# Patient Record
Sex: FEMALE | Race: WHITE | NOT HISPANIC OR LATINO | URBAN - METROPOLITAN AREA
[De-identification: names, ages, dates, MRNs, and addresses within clinical notes are randomized per-mention and may not be internally consistent; named-entity substitution may affect disease eponyms.]

---

## 2017-04-05 ENCOUNTER — TRANSFERRED RECORDS (OUTPATIENT)
Dept: HEALTH INFORMATION MANAGEMENT | Facility: CLINIC | Age: 67
End: 2017-04-05

## 2017-07-17 ENCOUNTER — TRANSFERRED RECORDS (OUTPATIENT)
Dept: HEALTH INFORMATION MANAGEMENT | Facility: CLINIC | Age: 67
End: 2017-07-17

## 2017-07-21 ENCOUNTER — MEDICAL CORRESPONDENCE (OUTPATIENT)
Dept: HEALTH INFORMATION MANAGEMENT | Facility: CLINIC | Age: 67
End: 2017-07-21

## 2017-07-25 ENCOUNTER — MEDICAL CORRESPONDENCE (OUTPATIENT)
Dept: HEALTH INFORMATION MANAGEMENT | Facility: CLINIC | Age: 67
End: 2017-07-25

## 2017-07-31 ENCOUNTER — TELEPHONE (OUTPATIENT)
Dept: SURGERY | Facility: CLINIC | Age: 67
End: 2017-07-31

## 2017-07-31 NOTE — TELEPHONE ENCOUNTER
Received a referral from [facility] for newly diagnosed colon cancer.  Called and spoke with patient.  Patient is scheduled 08/08/17 at 10:30 am with Dr. June.  Provided clinic address and check-in location.  Patient confirms appointment, and has my direct number for additional questions or concerns.  I also left a message for [facility] at the [facility] informing her of scheduled appointment.  Asked Yulissa if patient had any [facility] at the [facility], as we will need images and reports.  Provided my direct number to discuss.

## 2017-08-02 ENCOUNTER — TELEPHONE (OUTPATIENT)
Dept: SURGERY | Facility: CLINIC | Age: 67
End: 2017-08-02

## 2017-08-02 NOTE — TELEPHONE ENCOUNTER
Patient left a message stating she needs to reschedule her upcoming appointment.  Called patient at number provided,  and left a message with my direct call back number.

## 2017-08-08 ENCOUNTER — OFFICE VISIT (OUTPATIENT)
Dept: SURGERY | Facility: CLINIC | Age: 67
End: 2017-08-08

## 2017-08-08 ENCOUNTER — DOCUMENTATION ONLY (OUTPATIENT)
Dept: SURGERY | Facility: CLINIC | Age: 67
End: 2017-08-08

## 2017-08-08 VITALS
BODY MASS INDEX: 35.75 KG/M2 | HEART RATE: 93 BPM | DIASTOLIC BLOOD PRESSURE: 66 MMHG | HEIGHT: 67 IN | OXYGEN SATURATION: 96 % | TEMPERATURE: 98 F | SYSTOLIC BLOOD PRESSURE: 147 MMHG | WEIGHT: 227.8 LBS

## 2017-08-08 DIAGNOSIS — C18.7 CANCER OF SIGMOID COLON (H): Primary | ICD-10-CM

## 2017-08-08 PROBLEM — E11.9 DIABETES MELLITUS (H): Status: ACTIVE | Noted: 2017-08-08

## 2017-08-08 RX ORDER — PAROXETINE 40 MG/1
60 TABLET, FILM COATED ORAL AT BEDTIME
COMMUNITY

## 2017-08-08 RX ORDER — LOSARTAN POTASSIUM 50 MG/1
25 TABLET ORAL EVERY MORNING
COMMUNITY

## 2017-08-08 ASSESSMENT — PAIN SCALES - GENERAL: PAINLEVEL: NO PAIN (0)

## 2017-08-08 NOTE — LETTER
2017       RE: Zach Olmos  [address] [id number]     Dear Colleague,    Thank you for referring your patient, Zach Olmos, to the [facility] COLON [facility]CT[facility]. Please see a copy of my visit note below.    Colon and Rectal Surgery Clinic Note      RE: Zach Olmos  : 1950  BARRETT: 2017    Zach is very pleasant 67-year-old woman referred by the [facility] for treatment of a recently diagnosed sigmoid cancer. The reason for referral was inability to be seen [facility]. Zach has been asymptomatic. She underwent colonoscopy after a positive fecal occult blood test. A non-circumferential lesion was noted at 18 cm. This required use of an upper GI endoscope to reach the cecum. The remaining colon was normal. Biopsies demonstrated invasive adenocarcinoma. Zach has had no bowel symptoms. She denies rectal bleeding or obstructive symptoms. Her appetite has been fine and her weight is stable. She has had no issues with bowel control.    Past medical history is most significant for diabetes mellitus, treated with metformin and insulin. The patient also has hypertension, obesity, hyperlipidemia, obstructive sleep apnea, and a history of major depression. She has not had prior surgery.     Family history is negative for colorectal cancer. A maternal aunt had uterine cancer in her 60s.    Social history: The patient is . She has 2 children and is seen today with her daughter Mickie. She is a nonsmoker and a nondrinker.    Physical examination: The patient is an obese pleasant woman in no acute distress. Sclera are anicteric. 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 The abdomen is obese and soft. There are no surgical scars. There are no palpable masses. A soft liver edge is palpable just below the right costal margin.    I obtained written  informed consent and performed flexible sigmoidoscopy. A judith-circumferential mass is noted at 20 cm on withdrawal. It actually required significant additional scope to reach the mass which reduced to 20 cm so I think this is a quite straight scope and I feel fairly confident that the lesion is in the sigmoid and not in the proximal rectum.    The patient has not yet had blood tests or a CT scan.    A detailed discussion with the patient regarding treatment of sigmoid cancer. We will await her CT scan and labs, but we discussed laparoscopic sigmoid colectomy as treatment for this cancer. We discussed the medical and surgical risks associated with the procedure, including but not limited to bleeding, infection, adjacent organ injury, myocardial infarction, cerebrovascular accident, deep venous thrombosis, pulmonary embolism, pneumonia, anastomotic leak with its attendant and sometimes severe consequences, and death. I answered all the patient's questions to her stated satisfaction.  She expressed her understanding and agreement with the proposed plan. We will look for the next available operating room date within the next several weeks.    Total time 45 minutes. Greater than half the time was spent counseling.      For details of past medical history, surgical history, family history, medications, allergies, and review of systems, please see details below.    Medical history:  No past medical history on file.    Surgical history:  No past surgical history on file.    Family history:  No family history on file.    Medications:  Current Outpatient Prescriptions   Medication Sig Dispense Refill     aspirin (OK ASPIRIN EC LOW DOSE) 81 MG EC tablet Take 81 mg by mouth daily       metFORMIN (GLUCOPHAGE) 1000 MG tablet Take 1,000 mg by mouth 2 times daily (with meals)       insulin glargine (LANTUS SOLOSTAR) 100 UNIT/ML injection Inject Subcutaneous At Bedtime       losartan (COZAAR) 50 MG tablet Take 50 mg by mouth  "daily       PARoxetine (PAXIL) 40 MG tablet Take 60 mg by mouth every morning       Misc Natural Products (OSTEO BI-FLEX JOINT SHIELD PO)        Allergies:  The patientis allergic to atorvastatin.    Social history:  Social History   Substance Use Topics     Smoking status: Never Smoker     Smokeless tobacco: Never Used     Alcohol use No     Marital status: .    Review of Systems:  Nursing Notes:   Jay Addison LPN  8/8/2017  9:52 AM  Signed  Chief Complaint   Patient presents with     Clinic Care Coordination - Initial     New pt consults, rectosigmoid ca.        Vitals:    08/08/17 0946   BP: 147/66   BP Location: Left arm   Patient Position: Chair   Cuff Size: Adult Large   Pulse: 93   Temp: 98  F (36.7  C)   TempSrc: Oral   SpO2: 96%   Weight: 227 lb 12.8 oz   Height: 5' 7\"     Body mass index is 35.68 kg/(m^2).    STANFORD Vasquez MD   Professor and Chief  Division of Colon and Rectal Surgery  [facility]      Referring Provider:  Rose Mary Lepe MD  XXX RESIGNED XXX  [address]  [place] [address]     Primary Care Provider:  Kody Castrejon    "

## 2017-08-08 NOTE — PROGRESS NOTES
Colon and Rectal Surgery Clinic Note      RE: Zach Olmos  : 1950  BARRETT: 2017      Zach is very pleasant 67-year-old woman referred by the [facility] for treatment of a recently diagnosed sigmoid cancer. The reason for referral was inability to be [facility]. Zach has been asymptomatic. She underwent colonoscopy after a positive fecal occult blood test. A non-circumferential lesion was noted at 18 cm. This required use of an upper GI endoscope to reach the cecum. The remaining colon was normal. Biopsies demonstrated invasive adenocarcinoma. Zach has had no bowel symptoms. She denies rectal bleeding or obstructive symptoms. Her appetite has been fine and her weight is stable. She has had no issues with bowel control.    Past medical history is most significant for diabetes mellitus, treated with metformin and insulin. The patient also has hypertension, obesity, hyperlipidemia, obstructive sleep apnea, and a history of major depression. She has not had prior surgery.     Family history is negative for colorectal cancer. A maternal aunt had uterine cancer in her 60s.    Social history: The patient is . She has 2 children and is seen today with her daughter Mickie. She is a nonsmoker and a nondrinker.    Physical examination: The patient is an obese pleasant woman in no acute distress. Sclera are anicteric. 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 The abdomen is obese and soft. There are no surgical scars. There are no palpable masses. A soft liver edge is palpable just below the right costal margin.    I obtained written informed consent and performed flexible sigmoidoscopy. A judith-circumferential mass is noted at 20 cm on withdrawal. It actually required significant additional scope to reach the mass which reduced to 20 cm so I think this is a quite straight scope and I feel fairly confident that the  lesion is in the sigmoid and not in the proximal rectum.    The patient has not yet had blood tests or a CT scan.    A detailed discussion with the patient regarding treatment of sigmoid cancer. We will await her CT scan and labs, but we discussed laparoscopic sigmoid colectomy as treatment for this cancer. We discussed the medical and surgical risks associated with the procedure, including but not limited to bleeding, infection, adjacent organ injury, myocardial infarction, cerebrovascular accident, deep venous thrombosis, pulmonary embolism, pneumonia, anastomotic leak with its attendant and sometimes severe consequences, and death. I answered all the patient's questions to her stated satisfaction.  She expressed her understanding and agreement with the proposed plan. We will look for the next available operating room date within the next several weeks.    Total time 45 minutes. Greater than half the time was spent counseling.      For details of past medical history, surgical history, family history, medications, allergies, and review of systems, please see details below.    Medical history:  No past medical history on file.    Surgical history:  No past surgical history on file.    Family history:  No family history on file.    Medications:  Current Outpatient Prescriptions   Medication Sig Dispense Refill     aspirin (OK ASPIRIN EC LOW DOSE) 81 MG EC tablet Take 81 mg by mouth daily       metFORMIN (GLUCOPHAGE) 1000 MG tablet Take 1,000 mg by mouth 2 times daily (with meals)       insulin glargine (LANTUS SOLOSTAR) 100 UNIT/ML injection Inject Subcutaneous At Bedtime       losartan (COZAAR) 50 MG tablet Take 50 mg by mouth daily       PARoxetine (PAXIL) 40 MG tablet Take 60 mg by mouth every morning       Misc Natural Products (OSTEO BI-FLEX JOINT SHIELD PO)        Allergies:  The patientis allergic to atorvastatin.    Social history:  Social History   Substance Use Topics     Smoking status: Never Smoker  "    Smokeless tobacco: Never Used     Alcohol use No     Marital status: .    Review of Systems:  Nursing Notes:   Jay Addison LPN  8/8/2017  9:52 AM  Signed  Chief Complaint   Patient presents with     Clinic Care Coordination - Initial     New pt consults, rectosigmoid ca.        Vitals:    08/08/17 0946   BP: 147/66   BP Location: Left arm   Patient Position: Chair   Cuff Size: Adult Large   Pulse: 93   Temp: 98  F (36.7  C)   TempSrc: Oral   SpO2: 96%   Weight: 227 lb 12.8 oz   Height: 5' 7\"       Body mass index is 35.68 kg/(m^2).    STANFORD Vasquez MD   Professor and Chief  Division of Colon and Rectal Surgery  [facility]      Referring Provider:  Rose Mary Lepe MD  XXX RESIGNED XXX  [address]  [place] [address]     Primary Care Provider:  Kody Castrejon  "

## 2017-08-08 NOTE — NURSING NOTE
"Chief Complaint   Patient presents with     Clinic Care Coordination - Initial     New pt consults, rectosigmoid ca.        Vitals:    08/08/17 0946   BP: 147/66   BP Location: Left arm   Patient Position: Chair   Cuff Size: Adult Large   Pulse: 93   Temp: 98  F (36.7  C)   TempSrc: Oral   SpO2: 96%   Weight: 227 lb 12.8 oz   Height: 5' 7\"       Body mass index is 35.68 kg/(m^2).    Kristi MOCK LPN                        "

## 2017-08-08 NOTE — MR AVS SNAPSHOT
After Visit Summary   2017    Zach Olmos    MRN: [id number]           Patient Information     Date Of Birth          1950        Visit Information        Provider Department      2017 10:30 AM Carlos June MD [facility] Colon and Rectal Surgery        Today's Diagnoses     Cancer of sigmoid colon (H)    -  1       Follow-ups after your visit        Future tests that were ordered for you today     Open Future Orders        Priority Expected Expires Ordered    CEA Routine  2017    Comprehensive metabolic panel Routine  2017    CBC with platelets Routine  2017    FLEXIBLE SIGMOIDOSCOPY Routine  2017            Who to contact     Please call your clinic at [phone] to:    Ask questions about your health    Make or cancel appointments    Discuss your medicines    Learn about your test results    Speak to your doctor   If you have compliments or concerns about an experience at your clinic, or if you wish to file a complaint, please contact [facility] Patient Relations at [phone] or email us at [email or web address]         Additional Information About Your Visit        MyChart Information     Strawberry energyt is an electronic gateway that provides easy, online access to your medical records. With [employer]ill, you can request a clinic appointment, read your test results, renew a prescription or communicate with your care team.     To sign up for Strawberry energyt visit the website at [email or web address]   You will be asked to enter the access code listed below, as well as some personal information. Please follow the directions to create your username and password.     Your access code is: [email or web address]  Expires: 10/30/2017  6:31 AM     Your access code will  in 90 days. If you need help or a new code, please contact your [facility] Clinic or call [phone] for assistance.       "  Care EveryWhere ID     This is your Care EveryWhere ID. This could be used by other organizations to access your [place] medical records  [id number]        Your Vitals Were     Pulse Temperature Height Pulse Oximetry BMI (Body Mass Index)       93 98  F (36.7  C) (Oral) 5' 7\" 96% 35.68 kg/m2        Blood Pressure from Last 3 Encounters:   08/08/17 147/66    Weight from Last 3 Encounters:   08/08/17 227 lb 12.8 oz              We Performed the Following     Ramandeep-Operative Worksheet        Primary Care Provider Office Phone # Fax #    Kody Castrejon [phone]       XXX RESIGNED  [address] [facility] [address]  [facility] [address]        Equal Access to Services     THELMA CLARKE : Hadii aad ku hadasho Soomaali, waaxda luqadaha, qaybta kaalmada adeegyada, waxay romainin hayrobertn sammie bonilla . So [facility] [phone].    ATENCIÓN: Si habla español, tiene a patricio disposición servicios gratuitos de asistencia lingüística. [facility] [phone].    We comply with applicable federal civil rights laws and [place] laws. We do not discriminate on the basis of race, color, national origin, age, disability sex, sexual orientation or gender identity.            Thank you!     Thank you for choosing [facility] COLON AND RECTAL SURGERY  for your care. Our goal is always to provide you with excellent care. Hearing back from our patients is one way we can continue to improve our services. Please take a few minutes to complete the written survey that you may receive in the mail after your visit with us. Thank you!             Your Updated Medication List - Protect others around you: Learn how to safely use, store and throw away your medicines at www.disposemymeds.org.          This list is accurate as of: 8/8/17  5:16 PM.  Always use your most recent med list.                   Brand Name Dispense Instructions for use Diagnosis    OK ASPIRIN EC LOW DOSE 81 MG EC tablet   Generic drug:  aspirin      Take " 81 mg by mouth daily        LANTUS SOLOSTAR 100 UNIT/ML injection   Generic drug:  insulin glargine      Inject Subcutaneous At Bedtime        losartan 50 MG tablet    COZAAR     Take 50 mg by mouth daily        metFORMIN 1000 MG tablet    GLUCOPHAGE     Take 1,000 mg by mouth 2 times daily (with meals)        OSTEO BI-FLEX JOINT SHIELD PO           PARoxetine 40 MG tablet    PAXIL     Take 60 mg by mouth every morning

## 2017-08-17 ENCOUNTER — TELEPHONE (OUTPATIENT)
Dept: SURGERY | Facility: CLINIC | Age: 67
End: 2017-08-17

## 2017-08-17 DIAGNOSIS — C18.7 CANCER OF SIGMOID COLON (H): Primary | ICD-10-CM

## 2017-08-17 NOTE — TELEPHONE ENCOUNTER
Dr. June has requested patient have an echocardiogram, EKG, flex sig in clinic with tattoo, and PAC appointment prior to surgery.  Called patient on home phone, however number is disconnected.  Left message on cell phone informing her of this, and that I would like to schedule her for this upcoming Tuesday 08/22/17.   Requested a call back to discuss.  Provided my direct number.

## 2017-08-21 NOTE — TELEPHONE ENCOUNTER
Called patient to follow up on clinic scheduling, as patient has not called back.  Left message requesting a call back to go over appointments for tomorrow.  I stressed the importance of these appointments, and that they need to be completed prior to her surgery.  Provided my direct number.

## 2017-08-22 ENCOUNTER — ANESTHESIA EVENT (OUTPATIENT)
Dept: SURGERY | Facility: CLINIC | Age: 67
DRG: 331 | End: 2017-08-22
Payer: COMMERCIAL

## 2017-08-22 NOTE — ANESTHESIA PREPROCEDURE EVALUATION
Anesthesia Evaluation     . Pt has not had prior anesthetic            ROS/MED HX    ENT/Pulmonary:     (+)sleep apnea, uses CPAP , recent URI resolved . Other pulmonary disease elevated right hemidiaphragm.   (-) tobacco use   Neurologic:  - neg neurologic ROS     Cardiovascular:     (+) Dyslipidemia, hypertension----. Taking blood thinners Pt has received instructions: Instructions Given to patient: stopped ASA 8/24. . . :. . Previous cardiac testing Echodate:8/29/17 - see belowresults:date: results:ECG reviewed date:8/24/17 results:NSR at 87 date: results:         (-) CAD   METS/Exercise Tolerance: Comment: Can walk 10-12 blocks.  >4 METS   Hematologic:     (+) Anemia, -     (-) History of Transfusion   Musculoskeletal:   (+) arthritis, , , -       GI/Hepatic:     (+) bowel prep,      (-) GERD   Renal/Genitourinary:  - ROS Renal section negative       Endo:     (+) type II DM Last HgA1c: 9.1 date: 8/24/17 Using insulin - not using insulin pump Normal glucose range: ~ 130 (checks couple x per week) Obesity, .      Psychiatric:     (+) psychiatric history depression and anxiety      Infectious Disease:  - neg infectious disease ROS       Malignancy:   (+) Malignancy History of GI  GI CA Active status post,         Other:                     Physical Exam      Airway   Mallampati: II  TM distance: >3 FB  Neck ROM: full    Dental   (+) missing and chipped  Comment: POOR dentition    Cardiovascular   Rhythm and rate: regular and normal  (+) murmur   (-) carotid bruit is not present and no peripheral edema    Pulmonary    breath sounds clear to auscultation    Other findings:   Echocardiogram 8/29/17  Interpretation Summary  Global and regional left ventricular function is normal with an EF of 60-65%.  Left ventricular wall thickness is normal. Left ventricular size is normal.  Grade II or moderate diastolic dysfunction. No regional wall motion  abnormalities are seen.  Right ventricular function, chamber size, wall  motion, and thickness are  normal.  Moderate left atrial enlargement is present.  Mild to moderate mitral annular calcification is present.  Mild to moderate aortic insufficiency is present.  The inferior vena cava was normal in size with preserved respiratory  Variability.    Labs 8/24/17  Hemoglobin 10.9  WBC 11.3  Plt 269    Na 135  K+ 4.5  Bicarb 25  Bun 15  Cr 0.6  Glu 266*           PAC Discussion and Assessment    ASA Classification: 3  Case is suitable for: [place]  Anesthetic techniques and relevant risks discussed: GA with regional block for post-op pain control  Invasive monitoring and risk discussed: No  Types:   Possibility and Risk of blood transfusion discussed: Yes  NPO instructions given:   Additional anesthetic preparation and risks discussed:   Needs early admission to pre-op area:   Other:     PAC Resident/NP Anesthesia Assessment:  Scheduled for Laparoscopic Sigmoidectomy on 8/30/17 by Dr. June in treatment of colon cancer.  PAC referral for risk assessment and optimization for anesthesia with comorbid conditions of:    ERAS CRS  ** caution with teeth (may need tooth guard) - poor dentition  NO prior surgery.  [facility]     Pre-operative considerations:  1.  Cardiac:  Functional status good.  Can walk 10-12 blocks.  0.9%  risk of major adverse cardiac event.  + heart murmur (mild - mod AI, grade II diastolic dysfunction).  Had brief SVT (175 bpm) during echocardiogram (asymptomatic).  No history of arrhythmias.  EKG: NSR at 87   > hypertension:  On Losartan.  B/p 166/67 in PAC.   2.  Pulm:  Airway feasible.  + ANGEL on CPAP (setting unknown).  Will bring CPAP.  Elevated right judith-diaphragm.  Sats 95% pre op.  URI resolved.   3.  GI:  Risk of PONV score = 3.  If 3 or > anti-emetic intervention recommended (with 2 or more meds).   4.  Endo:  Diabetes mellitus, IDDM (uncontrolled).  Aic 9.1.  Instructed to take 50% Glargine (6 units) at HS and hold metformin dos per PMD.   Monitor BS.   5.  Psych:  Anxiety - uses hydroxyzine prn.  Depression - on Paroxetine      Patient is optimized and is acceptable candidate for the proposed procedure.  No further diagnostic evaluation is needed.     Patient also evaluated by Dr. Mireles. See recommendations below.               Reviewed and Signed by PAC Mid-Level Provider/Resident  Mid-Level Provider/Resident: Nemo Cortez PA-C  Date: 8/29/17  Time: 1530    Attending Anesthesiologist Anesthesia Assessment:  I have examined the patient and reviewed the medical record  I have discussed the patient with the JESSE and concur with her findings  The patient is scheduled for laparoscopic sigmoid resection for sigmoid colon CA found on hemoccult testing  The patient's medical history is remarkable for:  CVS:  Functional activity level over 4 METs without symptoms.  Infrequent palpitations which are self terminating within seconds and not treated.  Echocardiogram today for heart murmur demonstrated normal EF, moderate diastolic dysfunction, moderate AI, and Pulmonary pressure 35 mm Hg over RAP and incidental brief episode of SVT.  Pulmonary:  ANGEL treated with CPAP.  No known COPD.  Incidental finding of elevated right diaphragm  Endo:  On lantus.  Reports BG 130s but HgbA1c is 9.1 - surgeons aware.    PE:  Poor dentition, MPC 2-3, 3 FBTMD.  Lungs clear.  CVS  RRR with 3/6 diastolic murmur    No further testing necessary per guidelines.  Final plan per attending anesthesiologist the day of surgery  Consider tooth guard.        Reviewed and Signed by PAC Anesthesiologist  Anesthesiologist: Ricardo Mireles MD  Date: 08/29/2017  Time:   Pass/Fail:   Disposition:     PAC Pharmacist Assessment:        Pharmacist:   Date:   Time:      Anesthesia Plan      History & Physical Review  History and physical reviewed and following examination; no interval change.    ASA Status:  3 .    NPO Status:  > 2 hours    Plan for General and ETT with Intravenous induction.  Maintenance will be Balanced.    PONV prophylaxis:  Ondansetron (or other 5HT-3) and Dexamethasone or Solumedrol  Additional equipment: Videolaryngoscope      Postoperative Care  Postoperative pain management:  Peripheral nerve block (Single Shot) and IV analgesics.      Consents  Anesthetic plan, risks, benefits and alternatives discussed with:  Patient.  Use of blood products discussed: Yes.   Use of blood products discussed with Patient.  Consented to blood products.  .                          .

## 2017-08-24 ENCOUNTER — TELEPHONE (OUTPATIENT)
Dept: SURGERY | Facility: CLINIC | Age: 67
End: 2017-08-24

## 2017-08-24 DIAGNOSIS — C20 RECTAL CANCER (H): Primary | ICD-10-CM

## 2017-08-24 NOTE — NURSING NOTE
Patient had not receive rx for surgery coming up with Dr. June. [facility] [place]. [facility]. Closest one is [address] [place] [address], [place] [address]. Attempt to call patient to confirm, but first number listed is D/C and 2nd # listed, no answer. Called in Golytely, flagyl, neomycin, and zofran into pharmacy. Pt to call if need rx call into a different pharmacy.   Kristi MOCK LPN

## 2017-08-24 NOTE — TELEPHONE ENCOUNTER
Called patient as she still has not responded to my voicemails, and did not show up for any of her appointments on Tuesday 08/22/17.  Called and spoke with patient.  Patient is rescheduled on 08/29/17 at 11:30 am for ECHO, 2:30 pm-PAC, and 4:00 pm- Dr. June.  Patient states she did have her pre-op physical already.  I confirmed with patient, and explained that Dr. June would still like her to meet with our PAC clinic.  Patient is agreeable to this.  I also explained that Dr. June will want to do another flexible sigmoidoscopy, which patient is agreeable to.  Patient confirms she still has her surgery packet information.  Patient has my direct number for additional questions or concerns.

## 2017-08-28 DIAGNOSIS — C18.7 CANCER OF SIGMOID COLON (H): Primary | ICD-10-CM

## 2017-08-28 RX ORDER — METRONIDAZOLE 500 MG/1
500 TABLET ORAL EVERY 8 HOURS
Qty: 3 TABLET | Refills: 0 | Status: ON HOLD | OUTPATIENT
Start: 2017-08-28 | End: 2017-08-31

## 2017-08-28 RX ORDER — ONDANSETRON 4 MG/1
4 TABLET, FILM COATED ORAL EVERY 6 HOURS PRN
Qty: 3 TABLET | Refills: 0 | Status: ON HOLD | OUTPATIENT
Start: 2017-08-28 | End: 2017-08-31

## 2017-08-28 RX ORDER — NEOMYCIN SULFATE 500 MG/1
1000 TABLET ORAL EVERY 8 HOURS
Qty: 6 TABLET | Refills: 0 | Status: ON HOLD | OUTPATIENT
Start: 2017-08-28 | End: 2017-08-31

## 2017-08-28 NOTE — NURSING NOTE
Prescriptions for neomycin, ondansetron, metronidazole, and Golytely local [facility] at [phone].

## 2017-08-29 ENCOUNTER — OFFICE VISIT (OUTPATIENT)
Dept: SURGERY | Facility: CLINIC | Age: 67
End: 2017-08-29

## 2017-08-29 ENCOUNTER — RADIANT APPOINTMENT (OUTPATIENT)
Dept: CARDIOLOGY | Facility: CLINIC | Age: 67
End: 2017-08-29
Attending: COLON & RECTAL SURGERY

## 2017-08-29 ENCOUNTER — ALLIED HEALTH/NURSE VISIT (OUTPATIENT)
Dept: SURGERY | Facility: CLINIC | Age: 67
End: 2017-08-29

## 2017-08-29 VITALS
TEMPERATURE: 98.3 F | SYSTOLIC BLOOD PRESSURE: 166 MMHG | OXYGEN SATURATION: 95 % | HEIGHT: 66 IN | WEIGHT: 225.8 LBS | BODY MASS INDEX: 36.29 KG/M2 | DIASTOLIC BLOOD PRESSURE: 67 MMHG | HEART RATE: 87 BPM

## 2017-08-29 VITALS
BODY MASS INDEX: 36.29 KG/M2 | DIASTOLIC BLOOD PRESSURE: 67 MMHG | TEMPERATURE: 98.3 F | OXYGEN SATURATION: 95 % | RESPIRATION RATE: 16 BRPM | HEART RATE: 87 BPM | SYSTOLIC BLOOD PRESSURE: 166 MMHG | HEIGHT: 66 IN | WEIGHT: 225.8 LBS

## 2017-08-29 DIAGNOSIS — C18.7 MALIGNANT NEOPLASM OF SIGMOID COLON (H): Primary | ICD-10-CM

## 2017-08-29 DIAGNOSIS — C18.9 MALIGNANT NEOPLASM OF COLON, UNSPECIFIED PART OF COLON (H): Primary | ICD-10-CM

## 2017-08-29 DIAGNOSIS — C18.7 CANCER OF SIGMOID COLON (H): ICD-10-CM

## 2017-08-29 DIAGNOSIS — C18.9 MALIGNANT NEOPLASM OF COLON, UNSPECIFIED PART OF COLON (H): ICD-10-CM

## 2017-08-29 LAB
ABO + RH BLD: NORMAL
ABO + RH BLD: NORMAL
ALBUMIN SERPL-MCNC: 3.8 G/DL (ref 3.4–5)
ALP SERPL-CCNC: 71 U/L (ref 40–150)
ALT SERPL W P-5'-P-CCNC: 33 U/L (ref 0–50)
ANION GAP SERPL CALCULATED.3IONS-SCNC: 8 MMOL/L (ref 3–14)
AST SERPL W P-5'-P-CCNC: 19 U/L (ref 0–45)
BILIRUB SERPL-MCNC: 0.2 MG/DL (ref 0.2–1.3)
BLD GP AB SCN SERPL QL: NORMAL
BLOOD BANK CMNT PATIENT-IMP: NORMAL
BLOOD BANK CMNT PATIENT-IMP: NORMAL
BUN SERPL-MCNC: 15 MG/DL (ref 7–30)
CALCIUM SERPL-MCNC: 9.2 MG/DL (ref 8.5–10.1)
CEA SERPL-MCNC: 1.9 UG/L (ref 0–2.5)
CHLORIDE SERPL-SCNC: 102 MMOL/L (ref 94–109)
CO2 SERPL-SCNC: 27 MMOL/L (ref 20–32)
CREAT SERPL-MCNC: 0.73 MG/DL (ref 0.52–1.04)
ERYTHROCYTE [DISTWIDTH] IN BLOOD BY AUTOMATED COUNT: 15.4 % (ref 10–15)
GFR SERPL CREATININE-BSD FRML MDRD: 79 ML/MIN/1.7M2
GLUCOSE SERPL-MCNC: 136 MG/DL (ref 70–99)
HCT VFR BLD AUTO: 35 % (ref 35–47)
HGB BLD-MCNC: 10.5 G/DL (ref 11.7–15.7)
MCH RBC QN AUTO: 23.5 PG (ref 26.5–33)
MCHC RBC AUTO-ENTMCNC: 30 G/DL (ref 31.5–36.5)
MCV RBC AUTO: 79 FL (ref 78–100)
PLATELET # BLD AUTO: 268 10E9/L (ref 150–450)
POTASSIUM SERPL-SCNC: 4.4 MMOL/L (ref 3.4–5.3)
PROT SERPL-MCNC: 8.1 G/DL (ref 6.8–8.8)
RBC # BLD AUTO: 4.46 10E12/L (ref 3.8–5.2)
SODIUM SERPL-SCNC: 137 MMOL/L (ref 133–144)
SPECIMEN EXP DATE BLD: NORMAL
WBC # BLD AUTO: 11.4 10E9/L (ref 4–11)

## 2017-08-29 RX ORDER — MULTIPLE VITAMINS W/ MINERALS TAB 9MG-400MCG
1 TAB ORAL DAILY
COMMUNITY

## 2017-08-29 RX ORDER — TRIAMCINOLONE ACETONIDE 1 MG/G
OINTMENT TOPICAL PRN
COMMUNITY

## 2017-08-29 RX ORDER — INSULIN GLARGINE 100 [IU]/ML
12 INJECTION, SOLUTION SUBCUTANEOUS AT BEDTIME
COMMUNITY

## 2017-08-29 ASSESSMENT — LIFESTYLE VARIABLES: TOBACCO_USE: 0

## 2017-08-29 ASSESSMENT — PAIN SCALES - GENERAL: PAINLEVEL: NO PAIN (0)

## 2017-08-29 NOTE — MR AVS SNAPSHOT
After Visit Summary   2017    Zach Olmos    MRN: [id number]           Patient Information     Date Of Birth          1950        Visit Information        Provider Department      2017 4:00 PM Carlos June MD [facility] Colon and Rectal Surgery        Today's Diagnoses     Malignant neoplasm of sigmoid colon (H)    -  1       Follow-ups after your visit        Your next 10 appointments already scheduled     Aug 30, 2017   Procedure with Carlos June MD   [facility], [place], Same Day Surgery (--)    [address]  [place] [id number] [phone]              Future tests that were ordered for you today     Open Future Orders        Priority Expected Expires Ordered    Red blood cell have available Routine 2017            Who to contact     Please call your clinic at [phone] to:    Ask questions about your health    Make or cancel appointments    Discuss your medicines    Learn about your test results    Speak to your doctor   If you have compliments or concerns about an experience at your clinic, or if you wish to file a complaint, please contact [facility] Patient Relations at [phone] or email us at [email or web address]         Additional Information About Your Visit        MyChart Information     WeddingWire Inct is an electronic gateway that provides easy, online access to your medical records. With Soteira, you can request a clinic appointment, read your test results, renew a prescription or communicate with your care team.     To sign up for WeddingWire Inct visit the website at [email or web address]   You will be asked to enter the access code listed below, as well as some personal information. Please follow the directions to create your username and password.     Your access code is: [email or web address]  Expires: 10/30/2017  6:31 AM     Your access code will  in 90 days. If you need help or a new code, please contact  "your [facility] or call [phone] for assistance.        Care EveryWhere ID     This is your Care EveryWhere ID. This could be used by other organizations to access your [place] medical records  [id number]        Your Vitals Were     Pulse Temperature Height Pulse Oximetry BMI (Body Mass Index)       87 98.3  F (36.8  C) (Oral) 5' 5.5\" 95% 37 kg/m2        Blood Pressure from Last 3 Encounters:   08/29/17 166/67   08/29/17 166/67   08/08/17 147/66    Weight from Last 3 Encounters:   08/29/17 225 lb 12.8 oz   08/29/17 225 lb 12.8 oz   08/08/17 227 lb 12.8 oz              We Performed the Following     FLEX SIGMOIDOSCOPY W INJ/TATTOOING        Primary Care Provider Office Phone # Fax #    Kody Castrejon [phone]       XXX RESIGNED  [address] [address]        Equal Access to Services     THELMA CLARKE : Hadii aad ku hadasho Soomaali, waaxda luqadaha, qaybta kaalmada adeegyada, waxay idiin hayaan sherieg zen bonilla . So [facility] [phone].    ATENCIÓN: Si habla español, tiene a patricio disposición servicios gratuitos de asistencia lingüística. Ll[facility] [phone].    We comply with applicable federal civil rights laws and [place] laws. We do not discriminate on the basis of race, color, national origin, age, disability sex, sexual orientation or gender identity.            Thank you!     Thank you for choosing [facility] COLON AND RECTAL SURGERY  for your care. Our goal is always to provide you with excellent care. Hearing back from our patients is one way we can continue to improve our services. Please take a few minutes to complete the written survey that you may receive in the mail after your visit with us. Thank you!             Your Updated Medication List - Protect others around you: Learn how to safely use, store and throw away your medicines at www.disposemymeds.org.          This list is accurate as of: 8/29/17  5:39 " PM.  Always use your most recent med list.                   Brand Name Dispense Instructions for use Diagnosis    OK ASPIRIN EC LOW DOSE 81 MG EC tablet   Generic drug:  aspirin      Take 81 mg by mouth every morning        HYDROXYZINE PAMOATE PO      Take 25 mg by mouth 3 times daily as needed for anxiety        insulin glargine 100 UNIT/ML injection    LANTUS     Inject 12 Units Subcutaneous At Bedtime        losartan 50 MG tablet    COZAAR     Take 25 mg by mouth every morning        metFORMIN 1000 MG tablet    GLUCOPHAGE     Take 1,000 mg by mouth 2 times daily (with meals)        metroNIDAZOLE 500 MG tablet    FLAGYL    3 tablet    Take 1 tablet (500 mg) by mouth every 8 hours for 1 day prior to surgery.  Take in conjunction with Neomycin.    Cancer of sigmoid colon (H)       Multi-vitamin Tabs tablet      Take 1 tablet by mouth daily        neomycin 500 MG tablet    MYCIFRADIN    6 tablet    Take 2 tablets (1,000 mg) by mouth every 8 hours for 1 day prior to surgery.  Take in conjunction with Flagyl.    Cancer of sigmoid colon (H)       ondansetron 4 MG tablet    ZOFRAN    3 tablet    Take 1 tablet (4 mg) by mouth every 6 hours as needed for nausea when taking Neomycin and Flagyl.    Cancer of sigmoid colon (H)       PARoxetine 40 MG tablet    PAXIL     Take 60 mg by mouth At Bedtime        triamcinolone 0.1 % ointment    KENALOG     Apply topically as needed

## 2017-08-29 NOTE — MR AVS SNAPSHOT
After Visit Summary   2017    Zach Olmos    MRN: [id number]           Patient Information     Date Of Birth          1950        Visit Information        Provider Department      2017 3:30 PM Rn, [facility] Preoperative Assessment Center        Care Instructions    Preparing for Your Surgery  Name:  Zach Olmos   MRN:  [id number]   :  1950   Today's Date:  2017     Arriving for surgery:  Surgery date:  17  Surgery time:  11:50 am  Arrival time:  9:15 am    Please come to:     [facility], [address]    [address] [place]  [address]    -   parking is available in front of the hospital from 5:15 am to 8:00 pm    -  Stop at the Information Desk in the lobby    -   Inform the information person that you are here for surgery. An escort to 3c will be provided. If you would not like an escort, please proceed to 3C on the 3rd floor. [phone]     What can I eat or drink?  -  Follow bowel prep instructions from your surgeon's office.  -  You may have clear liquids such as water, gatorade, tea, black coffee, broth, jello, apple juice or 7up/Sprite until 4 hours prior to your surgery (until 7:50 am).    Which medicines can I take?     -  Please take ONLY 6 units of Lantus/glargine insulin tonight.      -  Please take these medications the day of surgery:  NONE.        How do I prepare myself?  -  Take two showers: one the night before surgery; and one the morning of surgery.         Use Scrubcare or Hibiclens to wash from neck down.  You may use your own shampoo and conditioner. No other hair products.   -  Do NOT use lotion, powder, deodorant, or antiperspirant the day of your surgery.  -  Do NOT wear any makeup, fingernail polish or jewelry.  -  Do not bring your own medications to the hospital, except for inhalers and eye drops.  -  Bring your ID and insurance card.        -  Bring your CPAP machine.  Enhanced Recovery  After Surgery     This is a team effort, including you, to get you back on your feet, eating and drinking normally and out of the hospital as quickly as possible.       The goals are: 1) NO INFECTIONS and      2) RETURN TO NORMAL DIET    How can we achieve these goals?    1) STAY ACTIVE: Walk every day before your surgery; try to increase the amount every day.  Walk after surgery as much as you can-the nurses will help you.  Walking speeds healing and gets you home quicker, you heal better at home and have less risk of infection.     2) INCENTIVE SPIROMETER: Practice your incentive spirometer 4 times per day with 5-10 repetitions each time.  Using the incentive spirometer can strengthen your muscles between your ribs and help you have a strong cough after surgery.  A more effective cough can help prevent problems with your lungs.    3) STAY HYDRATED: Drink clear liquids up until 2 hours before your surgery. We would like you to purchase a drink such as Gatorade.  Drink this before bedtime and on the way into the hospital, drink between 8-12 ounces or until you feel hydrated.  Keeping well hydrated leads to your veins being plump, you wake up faster, and you are less likely to be nauseated. Start drinking water as soon as you can after surgery and advance to clear liquids and food as tolerated.  IV fluids contain salt, drinking fluids will minimize the amount of IV fluids you need and decrease the amount of salt you get.     4) PAIN MANAGEMENT: If we minimize the amount of opioids and narcotics, and use regional blocks (which numb the area where your surgery is) along with oral pain medications; you will have less side effects of nausea and constipation. Narcotics can slow down your bowels and cause you to stay in the hospital longer.     Our goal is to keep you comfortable; eating and drinking normally and back home safely.     Questions or Concerns:  If you have questions or concerns, please call the  Preoperative  Assessment Center, Monday-Friday 7AM-7PM:  [phone]    AFTER YOUR SURGERY  Breathing exercises   Breathing exercises help you recover faster. Take deep breaths and let the air out slowly. This will:     Help you wake up after surgery.    Help prevent complications like pneumonia.  Preventing complications will help you go home sooner.   We may give you a breathing device (incentive spirometer) to encourage you to breathe deeply.   Nausea and vomiting   You may feel sick to your stomach after surgery; if so, let your nurse know.    Pain control:  After surgery, you may have pain. Our goal is to help you manage your pain. Pain medicine will help you feel comfortable enough to do activities that will help you heal.  These activities may include breathing exercises, walking and physical therapy.   To help your health care team treat your pain we will ask: 1) If you have pain  2) where it is located 3) describe your pain in your words  Methods of pain control include medications given by mouth, vein or by nerve block for some surgeries.  We may give you a pain control pump that will:  1) Deliver the medicine through a tube placed in your vein  2) Control the amount of medicine you receive  3) Allow you to push a button to deliver a dose of pain medicine  Sequential Compression Device (SCD) or Pneumo Boots:  You may need to wear SCD S on your legs or feet. These are wraps connected to a machine that pumps in air and releases it. The repeated pumping helps prevent blood clots from forming.           Follow-ups after your visit        Your next 10 appointments already scheduled     Aug 29, 2017  4:00 PM CDT   (Arrive by 3:45 PM)   PAC Anesthesia Consult with  Pac Anesthesiologist   [facility] Preoperative Assessment Center ([facility])    [address] [place] [id number] [phone]            Aug 29, 2017  4:00 PM CDT   Flexible Sigmoidoscopy with Carlos June MD    [facility] Colon and Rectal Surgery ([facility])    [address]  4th Floor  [facility] [id number] [phone]            Aug 29, 2017  4:30 PM CDT   LAB with  LAB   [facility] Lab ([facility])    [address]  [facility] [id number] [phone]           Patient must bring picture ID. Patient should be prepared to give a urine specimen  Please do not eat 10-12 hours before your appointment if you are coming in fasting for labs on lipids, cholesterol, or glucose (sugar). Pregnant women should follow their Care Team instructions. Water with medications is okay. Do not drink coffee or other fluids. If you have concerns about taking  your medications, please ask at office or if scheduling via TrialBee, send a message by clicking on Secure Messaging, Message Your Care Team.            Aug 30, 2017   Procedure with Carlos June MD   [facility], [place], Same Day Surgery (--)    [address]  [facility] [id number] [phone]              Future tests that were ordered for you today     Open Future Orders        Priority Expected Expires Ordered    ABO/Rh type and screen Routine 8/29/2017 9/28/2017 8/29/2017    Red blood cell have available Routine 8/29/2017 9/28/2017 8/29/2017            Who to contact     Please call your clinic at [phone] to:    Ask questions about your health    Make or cancel appointments    Discuss your medicines    Learn about your test results    Speak to your doctor   If you have compliments or concerns about an experience at your clinic, or if you wish to file a complaint, please contact [facility] Patient Relations at [phone] or email us at [email or web address]         Additional Information About Your Visit        TrialBee Information     TrialBee is an electronic gateway that provides easy, online access to your medical records. With TrialBee, you can request a  clinic appointment, read your test results, renew a prescription or communicate with your care team.     To sign up for Elitecore Technologieshart visit the website at [email or web address]   You will be asked to enter the access code listed below, as well as some personal information. Please follow the directions to create your username and password.     Your access code is: [email or web address]  Expires: 10/30/2017  6:31 AM     Your access code will  in 90 days. If you need help or a new code, please contact your [facility] or call [phone] for assistance.        Care EveryWhere ID     This is your Care EveryWhere ID. This could be used by other organizations to access your [place] medical records  [id number]         Blood Pressure from Last 3 Encounters:   17 166/67   17 147/66    Weight from Last 3 Encounters:   17 102.4 kg (225 lb 12.8 oz)   17 103.3 kg (227 lb 12.8 oz)              Today, you had the following     No orders found for display       Primary Care Provider Office Phone # Fax #    Kody Castrejon [phone]       XXX RESIGNED  [address] [address]        Equal Access to Services     THELMA CLARKE : Hadii hue ocampo hadisaio Sotheresaali, waaxda luqadaha, qaybta kaalmada adeegyada, karina ruiz. So [facility] [phone].    ATENCIÓN: Si habla español, tiene a patricio disposición servicios gratuitos de asistencia lingüística. Llame al [phone].    We comply with applicable federal civil rights laws and [place] laws. We do not discriminate on the basis of race, color, national origin, age, disability sex, sexual orientation or gender identity.            Thank you!     Thank you for choosing [facility] PREOPERATIVE ASSESSMENT CENTER  for your care. Our goal is always to provide you with excellent care. Hearing back from our patients is one way we can continue to improve our services.  Please take a few minutes to complete the written survey that you may receive in the mail after your visit with us. Thank you!             Your Updated Medication List - Protect others around you: Learn how to safely use, store and throw away your medicines at www.disposemymeds.org.          This list is accurate as of: 8/29/17  3:32 PM.  Always use your most recent med list.                   Brand Name Dispense Instructions for use Diagnosis    OK ASPIRIN EC LOW DOSE 81 MG EC tablet   Generic drug:  aspirin      Take 81 mg by mouth every morning        HYDROXYZINE PAMOATE PO      Take 25 mg by mouth 3 times daily as needed for anxiety        insulin glargine 100 UNIT/ML injection    LANTUS     Inject 12 Units Subcutaneous At Bedtime        losartan 50 MG tablet    COZAAR     Take 25 mg by mouth every morning        metFORMIN 1000 MG tablet    GLUCOPHAGE     Take 1,000 mg by mouth 2 times daily (with meals)        metroNIDAZOLE 500 MG tablet    FLAGYL    3 tablet    Take 1 tablet (500 mg) by mouth every 8 hours for 1 day prior to surgery.  Take in conjunction with Neomycin.    Cancer of sigmoid colon (H)       Multi-vitamin Tabs tablet      Take 1 tablet by mouth daily        neomycin 500 MG tablet    MYCIFRADIN    6 tablet    Take 2 tablets (1,000 mg) by mouth every 8 hours for 1 day prior to surgery.  Take in conjunction with Flagyl.    Cancer of sigmoid colon (H)       ondansetron 4 MG tablet    ZOFRAN    3 tablet    Take 1 tablet (4 mg) by mouth every 6 hours as needed for nausea when taking Neomycin and Flagyl.    Cancer of sigmoid colon (H)       PARoxetine 40 MG tablet    PAXIL     Take 60 mg by mouth At Bedtime        triamcinolone 0.1 % ointment    KENALOG     Apply topically as needed

## 2017-08-29 NOTE — PROGRESS NOTES
Colon and Rectal Surgery Clinic Note      RE: Zach Olmos  : 1950  BARRETT: 2017    Zach returns to clinic today in anticipation of her surgery scheduled for tomorrow. I felt that her tumor lay at 20 cm on flexible sigmoidoscopy on her last visit, but according to the x-ray read the tumor appeared much lower. I felt this was probably an error but I wanted to reassess the tumor level and also be certain that the tattoo was present. I accordingly obtained informed consent and performed a flexible sigmoidoscopy. Preparation was only fair. The distal rectum was entirely normal, including retroflexion. This was the site being noted on the CT scan read. I again found the disc-like tumor at 20 cm with the straight scope. I tattooed with Ashley ink in 3 quadrants. The colon above this lesion was completely obscured with solid stool.    We again reviewed tomorrow's planned laparoscopic sigmoidectomy. I answered all the patient's questions to her stated satisfaction. She expressed understanding and readiness to proceed. She will be completing her bowel prep tonight.    Total time 20 minutes. Greater than half the time spent counseling.        For details of past medical history, surgical history, family history, medications, allergies, and review of systems, please see details below.    Medical history:  Past Medical History:   Diagnosis Date     Anxiety      Cancer of sigmoid colon (H)      Depression      Diverticulosis      DM (diabetes mellitus) (H)      Granuloma annulare      HLD (hyperlipidemia)      HTN (hypertension)      Obese      ANGEL on CPAP        Surgical history:  Past Surgical History:   Procedure Laterality Date     COLONOSCOPY         Family history:  Family History   Problem Relation Age of Onset     Uterine Cancer Maternal Aunt        Medications:  Current Outpatient Prescriptions   Medication Sig Dispense Refill     HYDROXYZINE PAMOATE PO Take 25 mg by mouth 3 times daily as needed for anxiety    "    insulin glargine (LANTUS) 100 UNIT/ML injection Inject 12 Units Subcutaneous At Bedtime       triamcinolone (KENALOG) 0.1 % ointment Apply topically as needed        multivitamin, therapeutic with minerals (MULTI-VITAMIN) TABS tablet Take 1 tablet by mouth daily       metroNIDAZOLE (FLAGYL) 500 MG tablet Take 1 tablet (500 mg) by mouth every 8 hours for 1 day prior to surgery.  Take in conjunction with Neomycin. 3 tablet 0     neomycin (MYCIFRADIN) 500 MG tablet Take 2 tablets (1,000 mg) by mouth every 8 hours for 1 day prior to surgery.  Take in conjunction with Flagyl. 6 tablet 0     ondansetron (ZOFRAN) 4 MG tablet Take 1 tablet (4 mg) by mouth every 6 hours as needed for nausea when taking Neomycin and Flagyl. 3 tablet 0     aspirin (OK ASPIRIN EC LOW DOSE) 81 MG EC tablet Take 81 mg by mouth every morning        metFORMIN (GLUCOPHAGE) 1000 MG tablet Take 1,000 mg by mouth 2 times daily (with meals)       losartan (COZAAR) 50 MG tablet Take 25 mg by mouth every morning        PARoxetine (PAXIL) 40 MG tablet Take 60 mg by mouth At Bedtime        [DISCONTINUED] insulin glargine (LANTUS SOLOSTAR) 100 UNIT/ML injection Inject Subcutaneous At Bedtime       Allergies:  The patientis allergic to aricept [donepezil]; atorvastatin; glipizide; and lisinopril.    Social history:  Social History   Substance Use Topics     Smoking status: Never Smoker     Smokeless tobacco: Never Used     Alcohol use No     Marital status: .    Review of Systems:  Nursing Notes:   Jay Addison LPN  8/29/2017  4:36 PM  Signed  Chief Complaint   Patient presents with     Clinic Care Coordination - Follow-up     Pt here today for flexible sigmoidoscopy.        Vitals:    08/29/17 1634   BP: 166/67   Pulse: 87   Temp: 98.3  F (36.8  C)   TempSrc: Oral   SpO2: 95%   Weight: 225 lb 12.8 oz   Height: 5' 5.5\"       Body mass index is 37 kg/(m^2).     Vitals transferred from morning appt.     Kristi MOCK LPN                       "         Carlos June MD   Professor and Chief  Division of Colon and Rectal Surgery  [facility]      Referring Provider:  Carlos June MD  [address] [place] [id number]     Primary Care Provider:  Kody Castrejon

## 2017-08-29 NOTE — LETTER
2017       RE: Zach Olmos  [address] [place] [address]     Dear Colleague,    Thank you for referring your patient, Zach Olmos, to the [facility] COLON AND RECTAL SURGERY at [facility]. Please see a copy of my visit note below.    Colon and Rectal Surgery Clinic Note      RE: Zach Olmos  : 1950  BARRETT: 2017    Zach returns to clinic today in anticipation of her surgery scheduled for tomorrow. I felt that her tumor lay at 20 cm on flexible sigmoidoscopy on her last visit, but according to the x-ray read the tumor appeared much lower. I felt this was probably an error but I wanted to reassess the tumor level and also be certain that the tattoo was present. I accordingly obtained informed consent and performed a flexible sigmoidoscopy. Preparation was only fair. The distal rectum was entirely normal, including retroflexion. This was the site being noted on the CT scan read. I again found the disc-like tumor at 20 cm with the straight scope. I tattooed with Ashley ink in 3 quadrants. The colon above this lesion was completely obscured with solid stool.    We again reviewed tomorrow's planned laparoscopic sigmoidectomy. I answered all the patient's questions to her stated satisfaction. She expressed understanding and readiness to proceed. She will be completing her bowel prep tonight.    Total time 20 minutes. Greater than half the time spent counseling.    For details of past medical history, surgical history, family history, medications, allergies, and review of systems, please see details below.    Medical history:  Past Medical History:   Diagnosis Date     Anxiety      Cancer of sigmoid colon (H)      Depression      Diverticulosis      DM (diabetes mellitus) (H)      Granuloma annulare      HLD (hyperlipidemia)      HTN (hypertension)      Obese      ANGEL on CPAP        Surgical history:  Past Surgical History:   Procedure Laterality Date      COLONOSCOPY         Family history:  Family History   Problem Relation Age of Onset     Uterine Cancer Maternal Aunt        Medications:  Current Outpatient Prescriptions   Medication Sig Dispense Refill     HYDROXYZINE PAMOATE PO Take 25 mg by mouth 3 times daily as needed for anxiety       insulin glargine (LANTUS) 100 UNIT/ML injection Inject 12 Units Subcutaneous At Bedtime       triamcinolone (KENALOG) 0.1 % ointment Apply topically as needed        multivitamin, therapeutic with minerals (MULTI-VITAMIN) TABS tablet Take 1 tablet by mouth daily       metroNIDAZOLE (FLAGYL) 500 MG tablet Take 1 tablet (500 mg) by mouth every 8 hours for 1 day prior to surgery.  Take in conjunction with Neomycin. 3 tablet 0     neomycin (MYCIFRADIN) 500 MG tablet Take 2 tablets (1,000 mg) by mouth every 8 hours for 1 day prior to surgery.  Take in conjunction with Flagyl. 6 tablet 0     ondansetron (ZOFRAN) 4 MG tablet Take 1 tablet (4 mg) by mouth every 6 hours as needed for nausea when taking Neomycin and Flagyl. 3 tablet 0     aspirin (OK ASPIRIN EC LOW DOSE) 81 MG EC tablet Take 81 mg by mouth every morning        metFORMIN (GLUCOPHAGE) 1000 MG tablet Take 1,000 mg by mouth 2 times daily (with meals)       losartan (COZAAR) 50 MG tablet Take 25 mg by mouth every morning        PARoxetine (PAXIL) 40 MG tablet Take 60 mg by mouth At Bedtime        [DISCONTINUED] insulin glargine (LANTUS SOLOSTAR) 100 UNIT/ML injection Inject Subcutaneous At Bedtime       Allergies:  The patientis allergic to aricept [donepezil]; atorvastatin; glipizide; and lisinopril.    Social history:  Social History   Substance Use Topics     Smoking status: Never Smoker     Smokeless tobacco: Never Used     Alcohol use No     Marital status: .    Review of Systems:  Nursing Notes:   Jay Addison LPN  8/29/2017  4:36 PM  Signed  Chief Complaint   Patient presents with     Clinic Care Coordination - Follow-up     Pt here today for flexible  "sigmoidoscopy.        Vitals:    08/29/17 1634   BP: 166/67   Pulse: 87   Temp: 98.3  F (36.8  C)   TempSrc: Oral   SpO2: 95%   Weight: 225 lb 12.8 oz   Height: 5' 5.5\"       Body mass index is 37 kg/(m^2).     Vitals transferred from morning appt.     Kristi MOCK LPN    Referring Provider:  Carlos June MD  [address] [place] [address] [address]  [place] [address]     Primary Care Provider:  Kody Castrejon    Again, thank you for allowing me to participate in the care of your patient.      Sincerely,    Carlos June MD      "

## 2017-08-29 NOTE — NURSING NOTE
"Chief Complaint   Patient presents with     Clinic Care Coordination - Follow-up     Pt here today for flexible sigmoidoscopy.        Vitals:    08/29/17 1634   BP: 166/67   Pulse: 87   Temp: 98.3  F (36.8  C)   TempSrc: Oral   SpO2: 95%   Weight: 225 lb 12.8 oz   Height: 5' 5.5\"       Body mass index is 37 kg/(m^2).     Vitals transferred from morning appt.     Kristi MOCK LPN                        "

## 2017-08-29 NOTE — PATIENT INSTRUCTIONS
Preparing for Your Surgery  Name:  Zach Olmos   MRN:  [id number]   :  1950   Today's Date:  2017     Arriving for surgery:  Surgery date:  17  Surgery time:  11:50 am  Arrival time:  9:15 am    Please come to:     [facility], [address]    [address] [place]  [address]    -   parking is available in front of the hospital from 5:15 am to 8:00 pm    -  Stop at the Information Desk in the lobby    -   Inform the information person that you are here for surgery. An escort to 3c will be provided. If you would not like an escort, please proceed to 3C on the 3rd floor. [phone]     What can I eat or drink?  -  Follow bowel prep instructions from your surgeon's office.  -  You may have clear liquids such as water, gatorade, tea, black coffee, broth, jello, apple juice or 7up/Sprite until 4 hours prior to your surgery (until 7:50 am).    Which medicines can I take?     -  Please take ONLY 6 units of Lantus/glargine insulin tonight.      -  Please take these medications the day of surgery:  NONE.        How do I prepare myself?  -  Take two showers: one the night before surgery; and one the morning of surgery.         Use Scrubcare or Hibiclens to wash from neck down.  You may use your own shampoo and conditioner. No other hair products.   -  Do NOT use lotion, powder, deodorant, or antiperspirant the day of your surgery.  -  Do NOT wear any makeup, fingernail polish or jewelry.  -  Do not bring your own medications to the hospital, except for inhalers and eye drops.  -  Bring your ID and insurance card.        -  Bring your CPAP machine.  Enhanced Recovery After Surgery     This is a team effort, including you, to get you back on your feet, eating and drinking normally and out of the hospital as quickly as possible.       The goals are: 1) NO INFECTIONS and      2) RETURN TO NORMAL DIET    How can we achieve these goals?    1) STAY ACTIVE: Walk every day  before your surgery; try to increase the amount every day.  Walk after surgery as much as you can-the nurses will help you.  Walking speeds healing and gets you home quicker, you heal better at home and have less risk of infection.     2) INCENTIVE SPIROMETER: Practice your incentive spirometer 4 times per day with 5-10 repetitions each time.  Using the incentive spirometer can strengthen your muscles between your ribs and help you have a strong cough after surgery.  A more effective cough can help prevent problems with your lungs.    3) STAY HYDRATED: Drink clear liquids up until 2 hours before your surgery. We would like you to purchase a drink such as Gatorade.  Drink this before bedtime and on the way into the hospital, drink between 8-12 ounces or until you feel hydrated.  Keeping well hydrated leads to your veins being plump, you wake up faster, and you are less likely to be nauseated. Start drinking water as soon as you can after surgery and advance to clear liquids and food as tolerated.  IV fluids contain salt, drinking fluids will minimize the amount of IV fluids you need and decrease the amount of salt you get.     4) PAIN MANAGEMENT: If we minimize the amount of opioids and narcotics, and use regional blocks (which numb the area where your surgery is) along with oral pain medications; you will have less side effects of nausea and constipation. Narcotics can slow down your bowels and cause you to stay in the hospital longer.     Our goal is to keep you comfortable; eating and drinking normally and back home safely.     Questions or Concerns:  If you have questions or concerns, please call the  Preoperative Assessment Center, Monday-Friday 7AM-7PM:  [phone]    AFTER YOUR SURGERY  Breathing exercises   Breathing exercises help you recover faster. Take deep breaths and let the air out slowly. This will:     Help you wake up after surgery.    Help prevent complications like pneumonia.  Preventing  complications will help you go home sooner.   We may give you a breathing device (incentive spirometer) to encourage you to breathe deeply.   Nausea and vomiting   You may feel sick to your stomach after surgery; if so, let your nurse know.    Pain control:  After surgery, you may have pain. Our goal is to help you manage your pain. Pain medicine will help you feel comfortable enough to do activities that will help you heal.  These activities may include breathing exercises, walking and physical therapy.   To help your health care team treat your pain we will ask: 1) If you have pain  2) where it is located 3) describe your pain in your words  Methods of pain control include medications given by mouth, vein or by nerve block for some surgeries.  We may give you a pain control pump that will:  1) Deliver the medicine through a tube placed in your vein  2) Control the amount of medicine you receive  3) Allow you to push a button to deliver a dose of pain medicine  Sequential Compression Device (SCD) or Pneumo Boots:  You may need to wear SCD S on your legs or feet. These are wraps connected to a machine that pumps in air and releases it. The repeated pumping helps prevent blood clots from forming.

## 2017-08-30 ENCOUNTER — HOSPITAL ENCOUNTER (INPATIENT)
Facility: CLINIC | Age: 67
LOS: 6 days | Discharge: HOME HEALTH | DRG: 331 | End: 2017-09-05
Attending: COLON & RECTAL SURGERY | Admitting: COLON & RECTAL SURGERY
Payer: COMMERCIAL

## 2017-08-30 ENCOUNTER — ANESTHESIA (OUTPATIENT)
Dept: SURGERY | Facility: CLINIC | Age: 67
DRG: 331 | End: 2017-08-30
Payer: COMMERCIAL

## 2017-08-30 DIAGNOSIS — C18.7 CARCINOMA OF SIGMOID COLON (H): ICD-10-CM

## 2017-08-30 DIAGNOSIS — Z90.49 S/P LAPAROSCOPIC-ASSISTED SIGMOIDECTOMY: Primary | ICD-10-CM

## 2017-08-30 LAB
ANION GAP SERPL CALCULATED.3IONS-SCNC: 9 MMOL/L (ref 3–14)
BUN SERPL-MCNC: 13 MG/DL (ref 7–30)
CALCIUM SERPL-MCNC: 7.8 MG/DL (ref 8.5–10.1)
CHLORIDE SERPL-SCNC: 104 MMOL/L (ref 94–109)
CO2 SERPL-SCNC: 24 MMOL/L (ref 20–32)
CREAT SERPL-MCNC: 0.7 MG/DL (ref 0.52–1.04)
CREAT SERPL-MCNC: 0.78 MG/DL (ref 0.52–1.04)
ERYTHROCYTE [DISTWIDTH] IN BLOOD BY AUTOMATED COUNT: 16.1 % (ref 10–15)
GFR SERPL CREATININE-BSD FRML MDRD: 73 ML/MIN/1.7M2
GFR SERPL CREATININE-BSD FRML MDRD: 84 ML/MIN/1.7M2
GLUCOSE BLDC GLUCOMTR-MCNC: 195 MG/DL (ref 70–99)
GLUCOSE BLDC GLUCOMTR-MCNC: 210 MG/DL (ref 70–99)
GLUCOSE BLDC GLUCOMTR-MCNC: 228 MG/DL (ref 70–99)
GLUCOSE BLDC GLUCOMTR-MCNC: 236 MG/DL (ref 70–99)
GLUCOSE BLDC GLUCOMTR-MCNC: 248 MG/DL (ref 70–99)
GLUCOSE BLDC GLUCOMTR-MCNC: 255 MG/DL (ref 70–99)
GLUCOSE BLDC GLUCOMTR-MCNC: 266 MG/DL (ref 70–99)
GLUCOSE BLDC GLUCOMTR-MCNC: 299 MG/DL (ref 70–99)
GLUCOSE BLDC GLUCOMTR-MCNC: 299 MG/DL (ref 70–99)
GLUCOSE SERPL-MCNC: 282 MG/DL (ref 70–99)
HCT VFR BLD AUTO: 29.5 % (ref 35–47)
HGB BLD-MCNC: 9.2 G/DL (ref 11.7–15.7)
MAGNESIUM SERPL-MCNC: 1.6 MG/DL (ref 1.6–2.3)
MCH RBC QN AUTO: 24.3 PG (ref 26.5–33)
MCHC RBC AUTO-ENTMCNC: 31.2 G/DL (ref 31.5–36.5)
MCV RBC AUTO: 78 FL (ref 78–100)
PHOSPHATE SERPL-MCNC: 5.1 MG/DL (ref 2.5–4.5)
PLATELET # BLD AUTO: 205 10E9/L (ref 150–450)
PLATELET # BLD AUTO: 213 10E9/L (ref 150–450)
POTASSIUM SERPL-SCNC: 4.2 MMOL/L (ref 3.4–5.3)
RBC # BLD AUTO: 3.79 10E12/L (ref 3.8–5.2)
SODIUM SERPL-SCNC: 138 MMOL/L (ref 133–144)
TROPONIN I SERPL-MCNC: <0.015 UG/L (ref 0–0.04)
WBC # BLD AUTO: 15.1 10E9/L (ref 4–11)

## 2017-08-30 PROCEDURE — C9290 INJ, BUPIVACAINE LIPOSOME: HCPCS | Performed by: ANESTHESIOLOGY

## 2017-08-30 PROCEDURE — 80048 BASIC METABOLIC PNL TOTAL CA: CPT | Performed by: ANESTHESIOLOGY

## 2017-08-30 PROCEDURE — 37000008 ZZH ANESTHESIA TECHNICAL FEE, 1ST 30 MIN: Performed by: COLON & RECTAL SURGERY

## 2017-08-30 PROCEDURE — 40000275 ZZH STATISTIC RCP TIME EA 10 MIN

## 2017-08-30 PROCEDURE — 36415 COLL VENOUS BLD VENIPUNCTURE: CPT | Performed by: SURGERY

## 2017-08-30 PROCEDURE — 25000128 H RX IP 250 OP 636: Performed by: COLON & RECTAL SURGERY

## 2017-08-30 PROCEDURE — 83735 ASSAY OF MAGNESIUM: CPT | Performed by: ANESTHESIOLOGY

## 2017-08-30 PROCEDURE — 88304 TISSUE EXAM BY PATHOLOGIST: CPT | Performed by: COLON & RECTAL SURGERY

## 2017-08-30 PROCEDURE — 25000128 H RX IP 250 OP 636: Performed by: NURSE ANESTHETIST, CERTIFIED REGISTERED

## 2017-08-30 PROCEDURE — 25000128 H RX IP 250 OP 636: Performed by: ANESTHESIOLOGY

## 2017-08-30 PROCEDURE — 25000125 ZZHC RX 250: Performed by: NURSE ANESTHETIST, CERTIFIED REGISTERED

## 2017-08-30 PROCEDURE — 94660 CPAP INITIATION&MGMT: CPT

## 2017-08-30 PROCEDURE — 25000565 ZZH ISOFLURANE, EA 15 MIN: Performed by: COLON & RECTAL SURGERY

## 2017-08-30 PROCEDURE — 85049 AUTOMATED PLATELET COUNT: CPT | Performed by: SURGERY

## 2017-08-30 PROCEDURE — C9399 UNCLASSIFIED DRUGS OR BIOLOG: HCPCS | Performed by: NURSE ANESTHETIST, CERTIFIED REGISTERED

## 2017-08-30 PROCEDURE — 25000132 ZZH RX MED GY IP 250 OP 250 PS 637: Performed by: ANESTHESIOLOGY

## 2017-08-30 PROCEDURE — 25000128 H RX IP 250 OP 636: Performed by: SURGERY

## 2017-08-30 PROCEDURE — 88341 IMHCHEM/IMCYTCHM EA ADD ANTB: CPT | Performed by: COLON & RECTAL SURGERY

## 2017-08-30 PROCEDURE — 36000070 ZZH SURGERY LEVEL 5 EA 15 ADDTL MIN - UMMC: Performed by: COLON & RECTAL SURGERY

## 2017-08-30 PROCEDURE — 84484 ASSAY OF TROPONIN QUANT: CPT | Performed by: ANESTHESIOLOGY

## 2017-08-30 PROCEDURE — 0DBN0ZZ EXCISION OF SIGMOID COLON, OPEN APPROACH: ICD-10-PCS | Performed by: COLON & RECTAL SURGERY

## 2017-08-30 PROCEDURE — 25000132 ZZH RX MED GY IP 250 OP 250 PS 637: Performed by: SURGERY

## 2017-08-30 PROCEDURE — 25000125 ZZHC RX 250: Performed by: ANESTHESIOLOGY

## 2017-08-30 PROCEDURE — 0DJD8ZZ INSPECTION OF LOWER INTESTINAL TRACT, VIA NATURAL OR ARTIFICIAL OPENING ENDOSCOPIC: ICD-10-PCS | Performed by: COLON & RECTAL SURGERY

## 2017-08-30 PROCEDURE — 93005 ELECTROCARDIOGRAM TRACING: CPT

## 2017-08-30 PROCEDURE — 40000141 ZZH STATISTIC PERIPHERAL IV START W/O US GUIDANCE

## 2017-08-30 PROCEDURE — 84100 ASSAY OF PHOSPHORUS: CPT | Performed by: ANESTHESIOLOGY

## 2017-08-30 PROCEDURE — 88342 IMHCHEM/IMCYTCHM 1ST ANTB: CPT | Performed by: COLON & RECTAL SURGERY

## 2017-08-30 PROCEDURE — 27210794 ZZH OR GENERAL SUPPLY STERILE: Performed by: COLON & RECTAL SURGERY

## 2017-08-30 PROCEDURE — 25000131 ZZH RX MED GY IP 250 OP 636 PS 637: Performed by: SURGERY

## 2017-08-30 PROCEDURE — 82565 ASSAY OF CREATININE: CPT | Performed by: SURGERY

## 2017-08-30 PROCEDURE — 00000146 ZZHCL STATISTIC GLUCOSE BY METER IP

## 2017-08-30 PROCEDURE — 36000068 ZZH SURGERY LEVEL 5 1ST 30 MIN - UMMC: Performed by: COLON & RECTAL SURGERY

## 2017-08-30 PROCEDURE — 40000171 ZZH STATISTIC PRE-PROCEDURE ASSESSMENT III: Performed by: COLON & RECTAL SURGERY

## 2017-08-30 PROCEDURE — 37000009 ZZH ANESTHESIA TECHNICAL FEE, EACH ADDTL 15 MIN: Performed by: COLON & RECTAL SURGERY

## 2017-08-30 PROCEDURE — 93010 ELECTROCARDIOGRAM REPORT: CPT | Performed by: INTERNAL MEDICINE

## 2017-08-30 PROCEDURE — 71000014 ZZH RECOVERY PHASE 1 LEVEL 2 FIRST HR: Performed by: COLON & RECTAL SURGERY

## 2017-08-30 PROCEDURE — 12000006 ZZH R&B IMCU INTERMEDIATE UMMC

## 2017-08-30 PROCEDURE — 71000015 ZZH RECOVERY PHASE 1 LEVEL 2 EA ADDTL HR: Performed by: COLON & RECTAL SURGERY

## 2017-08-30 PROCEDURE — 88309 TISSUE EXAM BY PATHOLOGIST: CPT | Performed by: COLON & RECTAL SURGERY

## 2017-08-30 PROCEDURE — S0020 INJECTION, BUPIVICAINE HYDRO: HCPCS | Performed by: ANESTHESIOLOGY

## 2017-08-30 PROCEDURE — 85027 COMPLETE CBC AUTOMATED: CPT | Performed by: ANESTHESIOLOGY

## 2017-08-30 PROCEDURE — 25000132 ZZH RX MED GY IP 250 OP 250 PS 637: Performed by: COLON & RECTAL SURGERY

## 2017-08-30 RX ORDER — ALBUTEROL SULFATE 0.83 MG/ML
2.5 SOLUTION RESPIRATORY (INHALATION) EVERY 6 HOURS PRN
Status: DISCONTINUED | OUTPATIENT
Start: 2017-08-30 | End: 2017-09-05 | Stop reason: HOSPADM

## 2017-08-30 RX ORDER — LIDOCAINE 40 MG/G
CREAM TOPICAL
Status: DISCONTINUED | OUTPATIENT
Start: 2017-08-30 | End: 2017-08-30 | Stop reason: HOSPADM

## 2017-08-30 RX ORDER — FENTANYL CITRATE 50 UG/ML
INJECTION, SOLUTION INTRAMUSCULAR; INTRAVENOUS PRN
Status: DISCONTINUED | OUTPATIENT
Start: 2017-08-30 | End: 2017-08-30

## 2017-08-30 RX ORDER — ONDANSETRON 2 MG/ML
4-8 INJECTION INTRAMUSCULAR; INTRAVENOUS EVERY 6 HOURS PRN
Status: DISCONTINUED | OUTPATIENT
Start: 2017-08-30 | End: 2017-09-05 | Stop reason: HOSPADM

## 2017-08-30 RX ORDER — NALOXONE HYDROCHLORIDE 0.4 MG/ML
.1-.4 INJECTION, SOLUTION INTRAMUSCULAR; INTRAVENOUS; SUBCUTANEOUS
Status: DISCONTINUED | OUTPATIENT
Start: 2017-08-30 | End: 2017-08-30 | Stop reason: HOSPADM

## 2017-08-30 RX ORDER — PAROXETINE 20 MG/1
60 TABLET, FILM COATED ORAL AT BEDTIME
Status: DISCONTINUED | OUTPATIENT
Start: 2017-08-31 | End: 2017-09-05 | Stop reason: HOSPADM

## 2017-08-30 RX ORDER — PROPOFOL 10 MG/ML
INJECTION, EMULSION INTRAVENOUS PRN
Status: DISCONTINUED | OUTPATIENT
Start: 2017-08-30 | End: 2017-08-30

## 2017-08-30 RX ORDER — FENTANYL CITRATE 50 UG/ML
25-50 INJECTION, SOLUTION INTRAMUSCULAR; INTRAVENOUS
Status: DISCONTINUED | OUTPATIENT
Start: 2017-08-30 | End: 2017-08-30 | Stop reason: HOSPADM

## 2017-08-30 RX ORDER — ONDANSETRON 2 MG/ML
4 INJECTION INTRAMUSCULAR; INTRAVENOUS EVERY 30 MIN PRN
Status: DISCONTINUED | OUTPATIENT
Start: 2017-08-30 | End: 2017-08-30 | Stop reason: HOSPADM

## 2017-08-30 RX ORDER — CHLORHEXIDINE GLUCONATE 40 MG/ML
SOLUTION TOPICAL ONCE
Status: DISCONTINUED | OUTPATIENT
Start: 2017-08-30 | End: 2017-08-30 | Stop reason: HOSPADM

## 2017-08-30 RX ORDER — GLYCOPYRROLATE 0.2 MG/ML
INJECTION, SOLUTION INTRAMUSCULAR; INTRAVENOUS PRN
Status: DISCONTINUED | OUTPATIENT
Start: 2017-08-30 | End: 2017-08-30

## 2017-08-30 RX ORDER — SODIUM CHLORIDE, SODIUM LACTATE, POTASSIUM CHLORIDE, CALCIUM CHLORIDE 600; 310; 30; 20 MG/100ML; MG/100ML; MG/100ML; MG/100ML
INJECTION, SOLUTION INTRAVENOUS CONTINUOUS PRN
Status: DISCONTINUED | OUTPATIENT
Start: 2017-08-30 | End: 2017-08-30

## 2017-08-30 RX ORDER — DEXTROSE MONOHYDRATE 25 G/50ML
25-50 INJECTION, SOLUTION INTRAVENOUS
Status: DISCONTINUED | OUTPATIENT
Start: 2017-08-30 | End: 2017-09-05 | Stop reason: HOSPADM

## 2017-08-30 RX ORDER — LIDOCAINE HYDROCHLORIDE 20 MG/ML
INJECTION, SOLUTION INFILTRATION; PERINEURAL PRN
Status: DISCONTINUED | OUTPATIENT
Start: 2017-08-30 | End: 2017-08-30

## 2017-08-30 RX ORDER — CALCIUM CHLORIDE 100 MG/ML
500 INJECTION INTRAVENOUS; INTRAVENTRICULAR ONCE
Status: COMPLETED | OUTPATIENT
Start: 2017-08-30 | End: 2017-08-30

## 2017-08-30 RX ORDER — ACETAMINOPHEN 10 MG/ML
1000 INJECTION, SOLUTION INTRAVENOUS EVERY 6 HOURS
Status: DISCONTINUED | OUTPATIENT
Start: 2017-08-30 | End: 2017-08-31

## 2017-08-30 RX ORDER — BUPIVACAINE HYDROCHLORIDE 2.5 MG/ML
INJECTION, SOLUTION EPIDURAL; INFILTRATION; INTRACAUDAL PRN
Status: DISCONTINUED | OUTPATIENT
Start: 2017-08-30 | End: 2017-08-30

## 2017-08-30 RX ORDER — SODIUM CHLORIDE, SODIUM LACTATE, POTASSIUM CHLORIDE, CALCIUM CHLORIDE 600; 310; 30; 20 MG/100ML; MG/100ML; MG/100ML; MG/100ML
INJECTION, SOLUTION INTRAVENOUS CONTINUOUS
Status: DISCONTINUED | OUTPATIENT
Start: 2017-08-30 | End: 2017-08-30 | Stop reason: HOSPADM

## 2017-08-30 RX ORDER — NICOTINE POLACRILEX 4 MG
15-30 LOZENGE BUCCAL
Status: DISCONTINUED | OUTPATIENT
Start: 2017-08-30 | End: 2017-09-05 | Stop reason: HOSPADM

## 2017-08-30 RX ORDER — LABETALOL HYDROCHLORIDE 5 MG/ML
10 INJECTION, SOLUTION INTRAVENOUS
Status: DISCONTINUED | OUTPATIENT
Start: 2017-08-30 | End: 2017-08-30 | Stop reason: HOSPADM

## 2017-08-30 RX ORDER — ACETAMINOPHEN 325 MG/1
975 TABLET ORAL ONCE
Status: COMPLETED | OUTPATIENT
Start: 2017-08-30 | End: 2017-08-30

## 2017-08-30 RX ORDER — ONDANSETRON 2 MG/ML
INJECTION INTRAMUSCULAR; INTRAVENOUS PRN
Status: DISCONTINUED | OUTPATIENT
Start: 2017-08-30 | End: 2017-08-30

## 2017-08-30 RX ORDER — ERTAPENEM 1 G/1
1 INJECTION, POWDER, LYOPHILIZED, FOR SOLUTION INTRAMUSCULAR; INTRAVENOUS
Status: COMPLETED | OUTPATIENT
Start: 2017-08-30 | End: 2017-08-30

## 2017-08-30 RX ORDER — FLUMAZENIL 0.1 MG/ML
0.2 INJECTION, SOLUTION INTRAVENOUS
Status: DISCONTINUED | OUTPATIENT
Start: 2017-08-30 | End: 2017-08-30 | Stop reason: HOSPADM

## 2017-08-30 RX ORDER — LIDOCAINE 40 MG/G
CREAM TOPICAL
Status: DISCONTINUED | OUTPATIENT
Start: 2017-08-30 | End: 2017-09-05 | Stop reason: HOSPADM

## 2017-08-30 RX ORDER — LABETALOL HYDROCHLORIDE 5 MG/ML
INJECTION, SOLUTION INTRAVENOUS PRN
Status: DISCONTINUED | OUTPATIENT
Start: 2017-08-30 | End: 2017-08-30

## 2017-08-30 RX ORDER — LIDOCAINE 50 MG/G
1-2 PATCH TOPICAL
Status: DISCONTINUED | OUTPATIENT
Start: 2017-08-30 | End: 2017-09-05 | Stop reason: HOSPADM

## 2017-08-30 RX ORDER — ERTAPENEM 1 G/1
1 INJECTION, POWDER, LYOPHILIZED, FOR SOLUTION INTRAMUSCULAR; INTRAVENOUS SEE ADMIN INSTRUCTIONS
Status: DISCONTINUED | OUTPATIENT
Start: 2017-08-30 | End: 2017-08-30 | Stop reason: HOSPADM

## 2017-08-30 RX ORDER — SODIUM CHLORIDE, SODIUM LACTATE, POTASSIUM CHLORIDE, CALCIUM CHLORIDE 600; 310; 30; 20 MG/100ML; MG/100ML; MG/100ML; MG/100ML
INJECTION, SOLUTION INTRAVENOUS CONTINUOUS
Status: DISCONTINUED | OUTPATIENT
Start: 2017-08-30 | End: 2017-09-03

## 2017-08-30 RX ORDER — HYDRALAZINE HYDROCHLORIDE 20 MG/ML
INJECTION INTRAMUSCULAR; INTRAVENOUS PRN
Status: DISCONTINUED | OUTPATIENT
Start: 2017-08-30 | End: 2017-08-30

## 2017-08-30 RX ORDER — ONDANSETRON 4 MG/1
4 TABLET, ORALLY DISINTEGRATING ORAL EVERY 30 MIN PRN
Status: DISCONTINUED | OUTPATIENT
Start: 2017-08-30 | End: 2017-08-30 | Stop reason: HOSPADM

## 2017-08-30 RX ORDER — FENTANYL CITRATE 50 UG/ML
25-50 INJECTION, SOLUTION INTRAMUSCULAR; INTRAVENOUS EVERY 5 MIN PRN
Status: DISCONTINUED | OUTPATIENT
Start: 2017-08-30 | End: 2017-08-30 | Stop reason: HOSPADM

## 2017-08-30 RX ORDER — NALOXONE HYDROCHLORIDE 0.4 MG/ML
.1-.4 INJECTION, SOLUTION INTRAMUSCULAR; INTRAVENOUS; SUBCUTANEOUS
Status: DISCONTINUED | OUTPATIENT
Start: 2017-08-30 | End: 2017-09-05 | Stop reason: HOSPADM

## 2017-08-30 RX ADMIN — PROPOFOL 200 MG: 10 INJECTION, EMULSION INTRAVENOUS at 11:57

## 2017-08-30 RX ADMIN — ERTAPENEM SODIUM 1 G: 1 INJECTION, POWDER, LYOPHILIZED, FOR SOLUTION INTRAMUSCULAR; INTRAVENOUS at 11:41

## 2017-08-30 RX ADMIN — HUMAN INSULIN 3 UNITS: 100 INJECTION, SOLUTION SUBCUTANEOUS at 19:14

## 2017-08-30 RX ADMIN — HYDROMORPHONE HYDROCHLORIDE 0.5 MG: 1 INJECTION, SOLUTION INTRAMUSCULAR; INTRAVENOUS; SUBCUTANEOUS at 17:50

## 2017-08-30 RX ADMIN — HYDRALAZINE HYDROCHLORIDE 3 MG: 20 INJECTION INTRAMUSCULAR; INTRAVENOUS at 14:47

## 2017-08-30 RX ADMIN — ROCURONIUM BROMIDE 20 MG: 10 INJECTION INTRAVENOUS at 16:27

## 2017-08-30 RX ADMIN — FENTANYL CITRATE 50 MCG: 50 INJECTION, SOLUTION INTRAMUSCULAR; INTRAVENOUS at 13:46

## 2017-08-30 RX ADMIN — FENTANYL CITRATE 50 MCG: 50 INJECTION INTRAMUSCULAR; INTRAVENOUS at 10:28

## 2017-08-30 RX ADMIN — PHENYLEPHRINE HYDROCHLORIDE 100 MCG: 10 INJECTION, SOLUTION INTRAMUSCULAR; INTRAVENOUS; SUBCUTANEOUS at 12:12

## 2017-08-30 RX ADMIN — SODIUM CHLORIDE, POTASSIUM CHLORIDE, SODIUM LACTATE AND CALCIUM CHLORIDE: 600; 310; 30; 20 INJECTION, SOLUTION INTRAVENOUS at 14:30

## 2017-08-30 RX ADMIN — LIDOCAINE HYDROCHLORIDE 100 MG: 20 INJECTION, SOLUTION INFILTRATION; PERINEURAL at 11:57

## 2017-08-30 RX ADMIN — BUPIVACAINE HYDROCHLORIDE 20 ML: 2.5 INJECTION, SOLUTION EPIDURAL; INFILTRATION; INTRACAUDAL at 10:35

## 2017-08-30 RX ADMIN — FENTANYL CITRATE 50 MCG: 50 INJECTION, SOLUTION INTRAMUSCULAR; INTRAVENOUS at 13:16

## 2017-08-30 RX ADMIN — SUGAMMADEX 200 MG: 100 INJECTION, SOLUTION INTRAVENOUS at 17:50

## 2017-08-30 RX ADMIN — MIDAZOLAM 1 MG: 1 INJECTION INTRAMUSCULAR; INTRAVENOUS at 10:28

## 2017-08-30 RX ADMIN — ROCURONIUM BROMIDE 10 MG: 10 INJECTION INTRAVENOUS at 14:36

## 2017-08-30 RX ADMIN — ROCURONIUM BROMIDE 20 MG: 10 INJECTION INTRAVENOUS at 15:44

## 2017-08-30 RX ADMIN — SODIUM CHLORIDE, POTASSIUM CHLORIDE, SODIUM LACTATE AND CALCIUM CHLORIDE: 600; 310; 30; 20 INJECTION, SOLUTION INTRAVENOUS at 19:01

## 2017-08-30 RX ADMIN — PHENYLEPHRINE HYDROCHLORIDE 100 MCG: 10 INJECTION, SOLUTION INTRAMUSCULAR; INTRAVENOUS; SUBCUTANEOUS at 12:04

## 2017-08-30 RX ADMIN — INSULIN ASPART 3 UNITS: 100 INJECTION, SOLUTION INTRAVENOUS; SUBCUTANEOUS at 22:48

## 2017-08-30 RX ADMIN — ROCURONIUM BROMIDE 30 MG: 10 INJECTION INTRAVENOUS at 15:21

## 2017-08-30 RX ADMIN — HYDROMORPHONE HYDROCHLORIDE: 10 INJECTION, SOLUTION INTRAMUSCULAR; INTRAVENOUS; SUBCUTANEOUS at 18:57

## 2017-08-30 RX ADMIN — INSULIN GLARGINE 6 UNITS: 100 INJECTION, SOLUTION SUBCUTANEOUS at 22:48

## 2017-08-30 RX ADMIN — SODIUM CHLORIDE, POTASSIUM CHLORIDE, SODIUM LACTATE AND CALCIUM CHLORIDE: 600; 310; 30; 20 INJECTION, SOLUTION INTRAVENOUS at 16:27

## 2017-08-30 RX ADMIN — SODIUM CHLORIDE, POTASSIUM CHLORIDE, SODIUM LACTATE AND CALCIUM CHLORIDE: 600; 310; 30; 20 INJECTION, SOLUTION INTRAVENOUS at 11:41

## 2017-08-30 RX ADMIN — FENTANYL CITRATE 50 MCG: 50 INJECTION INTRAMUSCULAR; INTRAVENOUS at 19:33

## 2017-08-30 RX ADMIN — LABETALOL HYDROCHLORIDE 10 MG: 5 INJECTION, SOLUTION INTRAVENOUS at 17:54

## 2017-08-30 RX ADMIN — FENTANYL CITRATE 100 MCG: 50 INJECTION, SOLUTION INTRAMUSCULAR; INTRAVENOUS at 11:57

## 2017-08-30 RX ADMIN — ROCURONIUM BROMIDE 20 MG: 10 INJECTION INTRAVENOUS at 12:32

## 2017-08-30 RX ADMIN — Medication 2 G: at 19:47

## 2017-08-30 RX ADMIN — ONDANSETRON 4 MG: 2 INJECTION INTRAMUSCULAR; INTRAVENOUS at 19:16

## 2017-08-30 RX ADMIN — CALCIUM CHLORIDE 500 MG: 100 INJECTION INTRAVENOUS; INTRAVENTRICULAR at 19:50

## 2017-08-30 RX ADMIN — PROCHLORPERAZINE EDISYLATE 5 MG: 5 INJECTION INTRAMUSCULAR; INTRAVENOUS at 21:52

## 2017-08-30 RX ADMIN — HUMAN INSULIN 3 UNITS: 100 INJECTION, SOLUTION SUBCUTANEOUS at 18:40

## 2017-08-30 RX ADMIN — ROCURONIUM BROMIDE 10 MG: 10 INJECTION INTRAVENOUS at 14:58

## 2017-08-30 RX ADMIN — ACETAMINOPHEN 975 MG: 325 TABLET, FILM COATED ORAL at 09:49

## 2017-08-30 RX ADMIN — FENTANYL CITRATE 100 MCG: 50 INJECTION, SOLUTION INTRAMUSCULAR; INTRAVENOUS at 12:46

## 2017-08-30 RX ADMIN — LIDOCAINE 1 PATCH: 50 PATCH CUTANEOUS at 21:52

## 2017-08-30 RX ADMIN — FENTANYL CITRATE 100 MCG: 50 INJECTION, SOLUTION INTRAMUSCULAR; INTRAVENOUS at 12:27

## 2017-08-30 RX ADMIN — ONDANSETRON 4 MG: 2 INJECTION INTRAMUSCULAR; INTRAVENOUS at 17:18

## 2017-08-30 RX ADMIN — ONDANSETRON 4 MG: 2 INJECTION INTRAMUSCULAR; INTRAVENOUS at 21:17

## 2017-08-30 RX ADMIN — ENOXAPARIN SODIUM 40 MG: 40 INJECTION SUBCUTANEOUS at 10:33

## 2017-08-30 RX ADMIN — ROCURONIUM BROMIDE 10 MG: 10 INJECTION INTRAVENOUS at 13:37

## 2017-08-30 RX ADMIN — HYDRALAZINE HYDROCHLORIDE 3 MG: 20 INJECTION INTRAMUSCULAR; INTRAVENOUS at 14:41

## 2017-08-30 RX ADMIN — LABETALOL HYDROCHLORIDE 7.5 MG: 5 INJECTION, SOLUTION INTRAVENOUS at 14:53

## 2017-08-30 RX ADMIN — FENTANYL CITRATE 100 MCG: 50 INJECTION, SOLUTION INTRAMUSCULAR; INTRAVENOUS at 15:52

## 2017-08-30 RX ADMIN — Medication 0.1 MG: at 11:57

## 2017-08-30 RX ADMIN — PHENYLEPHRINE HYDROCHLORIDE 100 MCG: 10 INJECTION, SOLUTION INTRAMUSCULAR; INTRAVENOUS; SUBCUTANEOUS at 12:24

## 2017-08-30 RX ADMIN — ROCURONIUM BROMIDE 50 MG: 10 INJECTION INTRAVENOUS at 11:54

## 2017-08-30 RX ADMIN — BUPIVACAINE 20 ML: 13.3 INJECTION, SUSPENSION, LIPOSOMAL INFILTRATION at 10:35

## 2017-08-30 RX ADMIN — PROPOFOL 100 MG: 10 INJECTION, EMULSION INTRAVENOUS at 17:50

## 2017-08-30 NOTE — OR NURSING
Patient tolerated bilateral TAP blocks with no observed discomfort and no immediate complications. Monitored per flowsheets and until transferred to OR.

## 2017-08-30 NOTE — IP AVS SNAPSHOT
MRN:[id number]                      After Visit Summary   8/30/2017    Zach Olmos    MRN: [id number]           Thank you!     Thank you for choosing [facility] for your care. Our goal is always to provide you with excellent care. Hearing back from our patients is one way we can continue to improve our services. Please take a few minutes to complete the written survey that you may receive in the mail after you visit with us. Thank you!        Patient Information     Date Of Birth          1950        Designated Caregiver       Most Recent Value    Caregiver    Will someone help with your care after discharge? yes    Name of designated caregiver lexa    Phone number of caregiver [phone]    Caregiver address [facility]      About your hospital stay     You were admitted on:  August 30, 2017 You last received care in the:  [address] [facility]    You were discharged on:  September 5, 2017        Reason for your hospital stay       You underwent a laparoscopic sigmoidectomy                  Who to Call     For medical emergencies, please call 911.  For non-urgent questions about your medical care, please call your primary care provider or clinic, [phone]  For questions related to your surgery, please call your surgery clinic        Attending Provider     Provider Specialty    Carlos June MD Colon and Rectal Surgery       Primary Care Provider Office Phone # Fax #    Kody Castrejon [phone]      After Care Instructions     Activity       Your activity upon discharge:   -No lifting, pushing, pulling greater than 10 lbs and no strenuous exercise for 6 weeks   -No driving while on narcotic analgesics (i.e. Percocet, oxycodone, Vicodin)  -No driving until you are able to fully twist to both sides or slam on brakes quickly and without any pain            Diet       Follow this diet upon discharge:   -Low Residue Diet for at least 4-6 weeks unless cleared by  Colorectal surgery.  No raw vegetables, fruit skins, fibrous foods that require a lot of chewing, nuts, seeds, corn, popcorn.   -We recommend eating slowly, chewing thoroughly, eating small frequent meals throughout the day  -Stay well hydrated.                  Follow-up Appointments     Adult [facility] Follow-up and recommended labs and tests       DIET  -Low Residue Diet for at least 4-6 weeks unless cleared by Colorectal surgery.  No raw vegetables, fruit skins, fibrous foods that require a lot of chewing, nuts, seeds, corn, popcorn.   -We recommend eating slowly, chewing thoroughly, eating small frequent meals throughout the day  -Stay well hydrated.      ACTIVITY  -No lifting, pushing, pulling greater than 10 lbs and no strenuous exercise for 6 weeks   -No driving while on narcotic analgesics (i.e. Percocet, oxycodone, Vicodin)  -No driving until you are able to fully twist to both sides or slam on brakes quickly and without any pain    WOUND CLINIC  -Inspect your wounds daily for signs of infection (increased redness, drainage, pain)  -Keep your wound clean and dry  -You may shower, but do not soak in tub or pool    NOTIFY  Please contact Kristi Addison LPN or Melanie Ferraro RN at [phone] for problems after discharge such as:  -Temperature > 101F, chills, rigors, dizziness  -Redness around or purulent drainage from wound  -Inability to tolerate diet, nausea or vomiting  -You stop passing gas, develop significant bloating, abdominal pain  -Have blood in stools/vomit  -Have severe diarrhea/constipation  -Any other questions or concerns.  - At nights (after 4:30pm), on weekends, or if urgent, call [phone] and ask the  to speak with the on-call Colorectal Surgery resident or fellow      Medication Instructions  Some of your medications may have changed. Please take only prescribed and resumed medications     FOLLOW-UP  1.  You will need to follow-up with Rose Mary Shannon NP in the Colon  and Rectal Surgery clinic in 1 week(s) and then with CRS Staff: Dr. June in 2-3 weeks after.  Please contact our clinic scheduler Liliane Lopez (phone # [phone]) or Gloria Glover (phone # [phone]) if you have not heard from our clinic in 3 business days afer discharge to schedule a follow-up appointment.       Appointments on [facility] and/or [facility] (with [facility] or [facility] provider or service). Call [phone] if you haven't heard regarding these appointments within 7 days of discharge.                  Additional Services     Home Care OT Referral for Hospital Discharge       OT to eval and treat    Your provider has ordered home care - occupational therapy. If you have not been contacted within 2 days of your discharge please call the department phone number listed on the top of this document.            Home care nursing referral       Please fax discharge orders to [facility]    Ph:  [phone]    Fax: [phone]    Skilled home care RN for initial home safety evaluation and 1-3 times a week to evaluate medication management, nutrition and hydration evaluation, endurance evaluation, and general status evaluation after discharge from the acute care hospital setting status post surgery.    Skilled RN to assist with management and education reinforcement with home lovenox injections as prescribed.    Skilled home Care Physical Therapist to Evaluate and treat in home setting.    Skilled home care Occupational Therapist to evaluate and treat in home setting and to perform a cognition evaluation in her home setting.                  Pending Results     Date and Time Order Name Status Description    9/2/2017 0833 Blood culture Preliminary     9/2/2017 0833 Blood culture Preliminary             Statement of Approval     Ordered          09/05/17 1304  I have reviewed and agree with all the recommendations and orders detailed in this document.  EFFECTIVE NOW     Approved and  "electronically signed by:  Bernadine Young MD             Admission Information     Date & Time Provider Department Dept. Phone    2017 Carlos June MD [address] [facility] [phone]      Your Vitals Were     Blood Pressure Pulse Temperature Respirations Height Weight    168/55 (BP Location: Left arm) 85 97.5  F (36.4  C) (Oral) 16 1.664 m (5' 5.51\") 105.2 kg (232 lb)    Pulse Oximetry BMI (Body Mass Index)                97% 38.01 kg/m2          [employer] Information     [facility] lets you send messages to your doctor, view your test results, renew your prescriptions, schedule appointments and more. To sign up, go to [email or web address] . Click on \"Log in\" on the left side of the screen, which will take you to the Welcome page. Then click on \"Sign up Now\" on the right side of the page.     You will be asked to enter the access code listed below, as well as some personal information. Please follow the directions to create your username and password.     Your access code is: [email or web address]  Expires: 10/30/2017  6:31 AM     Your access code will  in 90 days. If you need help or a new code, please call your [facility] clinic or [phone].        Care EveryWhere ID     This is your Care EveryWhere ID. This could be used by other organizations to access your [facility] medical records  [id number]        Equal Access to Services     THELMA CLARKE AH: Hadii hue ocampo hadasho Sotheresaali, waaxda luqadaha, qaybta kaalmada marcel, karina bonilla . So [facility] [phone].    ATENCIÓN: Si habla español, tiene a patricio disposición servicios gratuitos de asistencia lingüística. Llame al [phone].    We comply with applicable federal civil rights laws and [place] laws. We do not discriminate on the basis of race, color, national origin, age, disability sex, sexual orientation or gender identity.               Review of your medicines      START taking        Dose / Directions    " acetaminophen 500 MG tablet   Commonly known as:  TYLENOL        Dose:  1000 mg   Take 2 tablets (1,000 mg) by mouth every 6 hours   Quantity:  400 tablet   Refills:  0       enoxaparin 40 MG/0.4ML injection   Commonly known as:  LOVENOX        Dose:  40 mg   Inject 0.4 mLs (40 mg) Subcutaneous every 24 hours for 23 days   Quantity:  9.2 mL   Refills:  0       oxyCODONE 5 MG IR tablet   Commonly known as:  ROXICODONE        Dose:  2.5 mg   Take 0.5 tablets (2.5 mg) by mouth every 4 hours as needed for moderate to severe pain   Quantity:  10 tablet   Refills:  0         CONTINUE these medicines which have NOT CHANGED        Dose / Directions    OK ASPIRIN EC LOW DOSE 81 MG EC tablet   Generic drug:  aspirin        Dose:  81 mg   Take 81 mg by mouth every morning   Refills:  0       HYDROXYZINE PAMOATE PO        Dose:  25 mg   Take 25 mg by mouth 3 times daily as needed for anxiety   Refills:  0       insulin glargine 100 UNIT/ML injection   Commonly known as:  LANTUS        Dose:  12 Units   Inject 12 Units Subcutaneous At Bedtime   Refills:  0       losartan 50 MG tablet   Commonly known as:  COZAAR        Dose:  25 mg   Take 25 mg by mouth every morning   Refills:  0       metFORMIN 1000 MG tablet   Commonly known as:  GLUCOPHAGE        Dose:  1000 mg   Take 1,000 mg by mouth 2 times daily (with meals)   Refills:  0       Multi-vitamin Tabs tablet        Dose:  1 tablet   Take 1 tablet by mouth daily   Refills:  0       PARoxetine 40 MG tablet   Commonly known as:  PAXIL        Dose:  60 mg   Take 60 mg by mouth At Bedtime   Refills:  0       triamcinolone 0.1 % ointment   Commonly known as:  KENALOG        Apply topically as needed   Refills:  0         STOP taking     metroNIDAZOLE 500 MG tablet   Commonly known as:  FLAGYL           neomycin 500 MG tablet   Commonly known as:  MYCIFRADIN           ondansetron 4 MG tablet   Commonly known as:  ZOFRAN                Where to get your medicines      These  medications were sent to [facility] - [place], [place] - [address]  [address], [facility] [address]     Phone:  [phone]     acetaminophen 500 MG tablet    enoxaparin 40 MG/0.4ML injection         Some of these will need a paper prescription and others can be bought over the counter. Ask your nurse if you have questions.     Bring a paper prescription for each of these medications     oxyCODONE 5 MG IR tablet                Protect others around you: Learn how to safely use, store and throw away your medicines at www.disposemymeds.org.             Medication List: This is a list of all your medications and when to take them. Check marks below indicate your daily home schedule. Keep this list as a reference.      Medications           Morning Afternoon Evening Bedtime As Needed    acetaminophen 500 MG tablet   Commonly known as:  TYLENOL   Take 2 tablets (1,000 mg) by mouth every 6 hours   Last time this was given:  1,000 mg on 9/5/2017  2:07 PM                                OK ASPIRIN EC LOW DOSE 81 MG EC tablet   Take 81 mg by mouth every morning   Generic drug:  aspirin                                enoxaparin 40 MG/0.4ML injection   Commonly known as:  LOVENOX   Inject 0.4 mLs (40 mg) Subcutaneous every 24 hours for 23 days   Last time this was given:  40 mg on 9/5/2017 10:42 AM                                HYDROXYZINE PAMOATE PO   Take 25 mg by mouth 3 times daily as needed for anxiety                                insulin glargine 100 UNIT/ML injection   Commonly known as:  LANTUS   Inject 12 Units Subcutaneous At Bedtime   Last time this was given:  14 Units on 9/4/2017 10:16 PM                                losartan 50 MG tablet   Commonly known as:  COZAAR   Take 25 mg by mouth every morning   Last time this was given:  25 mg on 9/5/2017  8:35 AM                                metFORMIN 1000 MG tablet   Commonly known as:  GLUCOPHAGE   Take 1,000 mg by mouth  2 times daily (with meals)                                Multi-vitamin Tabs tablet   Take 1 tablet by mouth daily                                oxyCODONE 5 MG IR tablet   Commonly known as:  ROXICODONE   Take 0.5 tablets (2.5 mg) by mouth every 4 hours as needed for moderate to severe pain   Last time this was given:  5 mg on 9/3/2017 10:06 AM                                PARoxetine 40 MG tablet   Commonly known as:  PAXIL   Take 60 mg by mouth At Bedtime   Last time this was given:  60 mg on 9/4/2017 10:15 PM                                triamcinolone 0.1 % ointment   Commonly known as:  KENALOG   Apply topically as needed

## 2017-08-30 NOTE — PROVIDER NOTIFICATION
Diabetes CNS was notified yesterday of this patient's surgery scheduled for today and need for glycemic management.  Please utilize IV insulin infusion, adult, for periop management and order Endocrinology/Diabetes Management Team Consult.    Thank you.    KIMBERLY Jaimes, MSN, ACNS-BC, BC-ADM, CDE  Diabetes Clinical Nurse Specialist  [phone]

## 2017-08-30 NOTE — ANESTHESIA PROCEDURE NOTES
Peripheral Nerve Block Procedure Note    Staff:     Anesthesiologist:  MARLON JAIN    Resident/CRNA:  ALLEN VILLANUEVA    Block performed by resident/CRNA in the presence of a teaching physician    Location: Pre-op  Procedure Start/Stop TImes:      8/30/2017 10:30 AM     8/30/2017 10:35 AM    patient identified, IV checked, site marked, risks and benefits discussed, informed consent, monitors and equipment checked, pre-op evaluation, at physician/surgeon's request and post-op pain management      Correct Patient: Yes      Correct Position: Yes      Correct Site: Yes      Correct Procedure: Yes      Correct Laterality:  Yes    Site Marked:  Yes  Procedure details:     Procedure:  TAP    Laterality:  Bilateral    Position:  Supine    Sterile Prep: chloraprep, mask and sterile gloves      Needle:  Short bevel    Needle gauge:  21    Needle length (mm):  110    Ultrasound: Yes      Ultrasound used to identify targeted nerve, plexus, or vascular structure and placed a needle adjacent to it      Permanent Image entered into patiient's record      Abnormal pain on injection: No      Blood Aspirated: No      Paresthesias:  No    Bleeding at site: No      Bolus via:  Needle    Complications:  None  Assessment/Narrative:     Injection made incrementally with aspirations every (mL):  5

## 2017-08-30 NOTE — LETTER
Transition Communication Hand-off for Care Transitions to Next Level of Care Provider    Name: Zach Olmos  MRN #: [id number]  Primary Care Provider: Kody Castrejon     Primary Clinic: XXX RESIGNED  [facility] [address]  [facility] [address]     Reason for Hospitalization:  S/P laparoscopic-assisted sigmoidectomy [Z90.49]  Admit Date/Time: 8/30/2017  8:53 AM  Discharge Date: To be determined.  Payor Source: Payor: COMMERCIAL / Plan: [facility] / Product Type: Indemnity /     Discharge Plan: Home with home care services verses possible TCU this post op period.  Discharge Needs Assessment:  Needs       Most Recent Value    Equipment Currently Used at Home none    Transportation Available family or friend will provide    Home Care [facility] [phone], Fax: [phone]        Follow-up plan:  Future Appointments  Date Time Provider Department Center   9/4/2017 6:00 AM Yulissa Marinelli [facility] O   9/4/2017 6:30 AM Francisca Michaud [facility]       Any outstanding tests or procedures:        Referrals     Future Labs/Procedures    Home care nursing referral     Comments:    Please fax discharge orders to [facility]    Ph:  [phone]    Fax: [phone]    Skilled home care RN for initial home safety evaluation and 1-3 times a week to evaluate medication management, nutrition and hydration evaluation, endurance evaluation, and general status evaluation after discharge from the acute care hospital setting status post surgery.    Skilled RN to assist with management and education reinforcement with home lovenox injections as prescribed.    Skilled home Care Physical Therapist to Evaluate and treat in home setting.    Skilled home care Occupational Therapist to evaluate and treat in home setting and to perform a cognition evaluation in her home setting.            Hailey Morrissey, B.S.N., P.H.N.,R.N.         Pager

## 2017-08-30 NOTE — OR NURSING
Dr. Villafuerte at bedside in PACU and would like a CBC and BMP due to pt's noted change in EKG. Also MD notified of BS level. MD gave a verbal order for 3 units regular insulin IV.

## 2017-08-30 NOTE — LETTER
Transition Communication Hand-off for Care Transitions to Next Level of Care Provider    Name: Zach Olmos  MRN #: [id number]  Primary Care Provider: Kody Castrejon     Primary Clinic: XXX RESIGNED  [facility] [address]  [facility] [address]     Reason for Hospitalization:  S/P laparoscopic-assisted sigmoidectomy [Z90.49]  Admit Date/Time: 8/30/2017  8:53 AM  Discharge Date:9/5/17  Payor Source: Payor: COMMERCIAL / Plan: [facility] / Product Type: Indemnity /                  Reason for Communication Hand-off Referral: Notify of admit and discharge from acute setting    Discharge Plan:  HOME       Concern for non-adherence with plan of care:   Y/N nO  Discharge Needs Assessment:  Needs       Most Recent Value    Equipment Currently Used at Home none    Transportation Available family or friend will provide    Home Care [facility] [phone], Fax: [phone]            Follow-up specialty is recommended: No    Follow-up plan:  No future appointments.    Any outstanding tests or procedures:        Referrals     Future Labs/Procedures    Home care nursing referral     Comments:    Please fax discharge orders to [facility]    Ph:  [phone]    Fax: [phone]    Skilled home care RN for initial home safety evaluation and 1-3 times a week to evaluate medication management, nutrition and hydration evaluation, endurance evaluation, and general status evaluation after discharge from the acute care hospital setting status post surgery.    Skilled RN to assist with management and education reinforcement with home lovenox injections as prescribed.    Skilled home Care Physical Therapist to Evaluate and treat in home setting.    Skilled home care Occupational Therapist to evaluate and treat in home setting and to perform a cognition evaluation in her home setting.            Key Recommendations:      Arleth Clay    AVS/Discharge Summary is the source of  truth; this is a helpful guide for improved communication of patient story

## 2017-08-30 NOTE — ANESTHESIA CARE TRANSFER NOTE
Patient: Zach Olmos    Procedure(s):  Laparoscopic Assisted Sigmoid Colectomy, Flexible Sigmoidoscopy   - Wound Class: II-Clean Contaminated   - Wound Class: II-Clean Contaminated    Diagnosis: Sigmoid Colon Cancer   Diagnosis Additional Information: No value filed.    Anesthesia Type:   General, ETT     Note:  Airway :Face Mask  Patient transferred to:PACU  Comments: Nasal trumpet placed on arrival to PAR for obstructive resp pattern followed by CPAP 10 .35. Staff present on arrival to PAR. Par notified of runs of PACs      Vitals: (Last set prior to Anesthesia Care Transfer)    CRNA VITALS  8/30/2017 1746 - 8/30/2017 1833      8/30/2017             Resp Rate (observed): 13    Resp Rate (set): 10                Electronically Signed By: KIMBERLY Wolfe CRNA  August 30, 2017  6:33 PM

## 2017-08-30 NOTE — OR NURSING
Notified surgery cross cover of pt's EKG changes, MD said she will place orders for tele floor as well as mg and phos add on and EKG.

## 2017-08-30 NOTE — BRIEF OP NOTE
[facility] [place]    Brief Operative Note    Pre-operative diagnosis: Sigmoid Colon Cancer   Post-operative diagnosis Sigmoid colon cancer  Procedure: Procedure(s):  Laparoscopic Assisted Sigmoid Colectomy, Flexible Sigmoidoscopy   - Wound Class: II-Clean Contaminated   - Wound Class: II-Clean Contaminated  Surgeon: Surgeon(s) and Role:     * Carlos June MD - Primary     * Tj Scott MD - Resident - Assisting     * Meño Pedroza MD - Assisting  Anesthesia: Combined General with Block   Estimated blood loss: 200 ml  Drains: None  Specimens:   ID Type Source Tests Collected by Time Destination   A : Sigmoid Colon Tissue Large Intestine, Sigmoid SURGICAL PATHOLOGY EXAM Carlos June MD 8/30/2017  4:46 PM    B : Anastomotic Ring Tissue Colon SURGICAL PATHOLOGY EXAM Carlos June MD 8/30/2017  5:05 PM      Findings:   Sigmoid colon cancer  Complications: None.  Implants: None.

## 2017-08-30 NOTE — IP AVS SNAPSHOT
[address] [address]    Phone:  [phone]                                       After Visit Summary   8/30/2017    Zach Olmos    MRN: [id number]           After Visit Summary Signature Page     I have received my discharge instructions, and my questions have been answered. I have discussed any challenges I see with this plan with the nurse or doctor.    ..........................................................................................................................................  Patient/Patient Representative Signature      ..........................................................................................................................................  Patient Representative Print Name and Relationship to Patient    ..................................................               ................................................  Date                                            Time    ..........................................................................................................................................  Reviewed by Signature/Title    ...................................................              ..............................................  Date                                                            Time

## 2017-08-31 ENCOUNTER — APPOINTMENT (OUTPATIENT)
Dept: PHYSICAL THERAPY | Facility: CLINIC | Age: 67
DRG: 331 | End: 2017-08-31
Attending: COLON & RECTAL SURGERY
Payer: COMMERCIAL

## 2017-08-31 LAB
ERYTHROCYTE [DISTWIDTH] IN BLOOD BY AUTOMATED COUNT: 16.3 % (ref 10–15)
GLUCOSE BLDC GLUCOMTR-MCNC: 191 MG/DL (ref 70–99)
GLUCOSE BLDC GLUCOMTR-MCNC: 207 MG/DL (ref 70–99)
GLUCOSE BLDC GLUCOMTR-MCNC: 207 MG/DL (ref 70–99)
GLUCOSE BLDC GLUCOMTR-MCNC: 211 MG/DL (ref 70–99)
GLUCOSE BLDC GLUCOMTR-MCNC: 224 MG/DL (ref 70–99)
GLUCOSE BLDC GLUCOMTR-MCNC: 242 MG/DL (ref 70–99)
GLUCOSE BLDC GLUCOMTR-MCNC: 253 MG/DL (ref 70–99)
GLUCOSE BLDC GLUCOMTR-MCNC: 266 MG/DL (ref 70–99)
GLUCOSE BLDC GLUCOMTR-MCNC: 282 MG/DL (ref 70–99)
HBA1C MFR BLD: 9.3 % (ref 4.3–6)
HCT VFR BLD AUTO: 27 % (ref 35–47)
HGB BLD-MCNC: 8.1 G/DL (ref 11.7–15.7)
LACTATE BLD-SCNC: 1.8 MMOL/L (ref 0.7–2)
MCH RBC QN AUTO: 23.2 PG (ref 26.5–33)
MCHC RBC AUTO-ENTMCNC: 30 G/DL (ref 31.5–36.5)
MCV RBC AUTO: 77 FL (ref 78–100)
PLATELET # BLD AUTO: 221 10E9/L (ref 150–450)
RBC # BLD AUTO: 3.49 10E12/L (ref 3.8–5.2)
WBC # BLD AUTO: 13.6 10E9/L (ref 4–11)

## 2017-08-31 PROCEDURE — 25000132 ZZH RX MED GY IP 250 OP 250 PS 637: Performed by: SURGERY

## 2017-08-31 PROCEDURE — 97530 THERAPEUTIC ACTIVITIES: CPT | Mod: GP | Performed by: PHYSICAL THERAPIST

## 2017-08-31 PROCEDURE — 94660 CPAP INITIATION&MGMT: CPT

## 2017-08-31 PROCEDURE — 36415 COLL VENOUS BLD VENIPUNCTURE: CPT | Performed by: SURGERY

## 2017-08-31 PROCEDURE — 25000132 ZZH RX MED GY IP 250 OP 250 PS 637: Performed by: COLON & RECTAL SURGERY

## 2017-08-31 PROCEDURE — 83036 HEMOGLOBIN GLYCOSYLATED A1C: CPT | Performed by: SURGERY

## 2017-08-31 PROCEDURE — 36415 COLL VENOUS BLD VENIPUNCTURE: CPT | Performed by: COLON & RECTAL SURGERY

## 2017-08-31 PROCEDURE — 40000193 ZZH STATISTIC PT WARD VISIT: Performed by: PHYSICAL THERAPIST

## 2017-08-31 PROCEDURE — 85027 COMPLETE CBC AUTOMATED: CPT | Performed by: SURGERY

## 2017-08-31 PROCEDURE — 25000128 H RX IP 250 OP 636: Performed by: SURGERY

## 2017-08-31 PROCEDURE — 97116 GAIT TRAINING THERAPY: CPT | Mod: GP | Performed by: PHYSICAL THERAPIST

## 2017-08-31 PROCEDURE — 99231 SBSQ HOSP IP/OBS SF/LOW 25: CPT | Performed by: NURSE PRACTITIONER

## 2017-08-31 PROCEDURE — 12000006 ZZH R&B IMCU INTERMEDIATE UMMC

## 2017-08-31 PROCEDURE — 83605 ASSAY OF LACTIC ACID: CPT | Performed by: COLON & RECTAL SURGERY

## 2017-08-31 PROCEDURE — 40000275 ZZH STATISTIC RCP TIME EA 10 MIN

## 2017-08-31 PROCEDURE — 97161 PT EVAL LOW COMPLEX 20 MIN: CPT | Mod: GP | Performed by: PHYSICAL THERAPIST

## 2017-08-31 PROCEDURE — 00000146 ZZHCL STATISTIC GLUCOSE BY METER IP

## 2017-08-31 RX ORDER — ACETAMINOPHEN 500 MG
1000 TABLET ORAL EVERY 6 HOURS
Status: DISCONTINUED | OUTPATIENT
Start: 2017-08-31 | End: 2017-09-05 | Stop reason: HOSPADM

## 2017-08-31 RX ADMIN — ACETAMINOPHEN 1000 MG: 500 TABLET, FILM COATED ORAL at 15:23

## 2017-08-31 RX ADMIN — ACETAMINOPHEN 1000 MG: 10 INJECTION, SOLUTION INTRAVENOUS at 09:30

## 2017-08-31 RX ADMIN — ACETAMINOPHEN 1000 MG: 10 INJECTION, SOLUTION INTRAVENOUS at 00:37

## 2017-08-31 RX ADMIN — SODIUM CHLORIDE, POTASSIUM CHLORIDE, SODIUM LACTATE AND CALCIUM CHLORIDE: 600; 310; 30; 20 INJECTION, SOLUTION INTRAVENOUS at 09:42

## 2017-08-31 RX ADMIN — INSULIN ASPART 2 UNITS: 100 INJECTION, SOLUTION INTRAVENOUS; SUBCUTANEOUS at 14:02

## 2017-08-31 RX ADMIN — PAROXETINE 60 MG: 20 TABLET, FILM COATED ORAL at 22:09

## 2017-08-31 RX ADMIN — INSULIN ASPART 2 UNITS: 100 INJECTION, SOLUTION INTRAVENOUS; SUBCUTANEOUS at 06:06

## 2017-08-31 RX ADMIN — INSULIN ASPART 3 UNITS: 100 INJECTION, SOLUTION INTRAVENOUS; SUBCUTANEOUS at 02:24

## 2017-08-31 RX ADMIN — INSULIN ASPART 2 UNITS: 100 INJECTION, SOLUTION INTRAVENOUS; SUBCUTANEOUS at 09:35

## 2017-08-31 RX ADMIN — ACETAMINOPHEN 1000 MG: 10 INJECTION, SOLUTION INTRAVENOUS at 04:09

## 2017-08-31 RX ADMIN — ACETAMINOPHEN 1000 MG: 500 TABLET, FILM COATED ORAL at 22:10

## 2017-08-31 RX ADMIN — LIDOCAINE 1 PATCH: 50 PATCH CUTANEOUS at 22:10

## 2017-08-31 RX ADMIN — INSULIN ASPART 3 UNITS: 100 INJECTION, SOLUTION INTRAVENOUS; SUBCUTANEOUS at 17:59

## 2017-08-31 RX ADMIN — INSULIN ASPART 3 UNITS: 100 INJECTION, SOLUTION INTRAVENOUS; SUBCUTANEOUS at 22:18

## 2017-08-31 ASSESSMENT — PAIN DESCRIPTION - DESCRIPTORS
DESCRIPTORS: ACHING;CRAMPING
DESCRIPTORS: ACHING;CRAMPING

## 2017-08-31 NOTE — PLAN OF CARE
Problem: Goal Outcome Summary  Goal: Goal Outcome Summary  Outcome: Improving  Neuro: A&Ox4, follows commands, PERRLA intact.  Cardiac: SR. HR 80's, 's-150's, afebrile.      Respiratory: Sating on 90's on CPAP/Capno. EtCO2 25-26, IPI 8-10 RA. Clear lung sounds with diminished bases.  GI/: Good urine output via camacho.  BM X0, active bowel sounds.  Diet/appetite: Tolerating NPO/ice chip diet.   Activity: Pt is able to able assist with repo.  Pain: Abdominal/incisional, pain managed with PCA dilaudid 0.1mg with bumps q10min.  Skin: Abdominal incision with 2 lap sites approximated and vladimir with no drainage.  LDA's: Rt PIV x 2.      Will continue with POC and notify primary team with changes.

## 2017-08-31 NOTE — PROGRESS NOTES
"Colorectal Surgery Progress Note    Subjective: No acute issues overnight. Pain controlled with PCA. Denies fevers, chills, CP, SOB, and N/V. Voiding to camacho. No flatus, no BM.    Objective:   /87 (BP Location: Left arm, Cuff Size: Adult Regular)  Temp 99.1  F (37.3  C) (Axillary)  Resp 15  Ht 1.664 m (5' 5.51\")  Wt 105 kg (231 lb 7.7 oz)  SpO2 98%  BMI 37.92 kg/m2    Gen: Awake, alert, NAD   CV: RRR  Resp: non-labored at rest  Abd: Soft, non-distended, ATTP  Ext: warm and well perfused  Incision: c/d/i    A/P: Zahc Olmos is a 67 year old female, who is POD # 1  following lap assisted sigmoidectomy. She initially had runs of afib and vtach in the PACU, but that resolved overnight and she was asymptomatic. She is doing well after surgery.    -FLD  -Hgb 10->9->8. Expected after intra-op bleeding. Will hold lovenox today  -Ambulate  -IS/cough/db  -D/c camacho later today  -IVF  -Transfer to     Seen and discussed with colorectal fellow, who will discuss with staff.    Tj Scott MD  General Surgery PGY-3    Attestation:  This patient has been seen and evaluated by me.  Discussed with the house staff team or resident(s) and agree with the findings and plan in this note.  Carlos June MD  Professor of Surgery  Chief, Division of Colon and Rectal Surgery  [phone]        "

## 2017-08-31 NOTE — PLAN OF CARE
Problem: Goal Outcome Summary  Goal: Goal Outcome Summary  PT 6B: Physical therapy evaluation completed, treatment initiated. Pt completes bed mobility with min assist, STS with SBA without UE use, ambulates 200ft with single UE support on IV pole and some mild instability, VSS on 5L O2, pain controlled throughout session.   Pending progress, anticipate discharge home with assist when medically appropriate.

## 2017-08-31 NOTE — PROGRESS NOTES
"REGIONAL ANESTHESIA PAIN SERVICE  SUBJECTIVE: Reports adequate pain control with  analgesics and nerve block injections, rating pain 4/10 at rest and 6/10 with activity.  Patient is tolerating FL diet, denies nausea/vomiting.  Denies any weakness, paresthesias, circumoral numbness, metallic taste or tinnitus.       Clinically Aligned Pain Assessment (CAPA):  Comfort (How is your pain?): Comfortably manageable  Change in Pain (Since your last medication/intervention?): About the same  Pain Control (How are your pain treatments working?):  Partially effective pain control      Medications related to Pain Management (Future)    Start     Dose/Rate Route Frequency Ordered Stop    08/30/17 2200  acetaminophen (OFIRMEV) infusion 1,000 mg      1,000 mg  400 mL/hr over 15 Minutes Intravenous EVERY 6 HOURS 08/30/17 2114 08/30/17 2200  lidocaine (LIDODERM) 5 % Patch 1-2 patch      1-2 patch Transdermal EVERY 24 HOURS 2000 08/30/17 2114 08/30/17 2200  lidocaine (LIDODERM) patch in PLACE       Transdermal EVERY 8 HOURS 08/30/17 2114 08/30/17 2114  bupivacaine liposome (EXPAREL) LONG ACTING injection was administered into the infiltration site to produce postsurgical analgesia. Duration of action is up to 72 hours, and other \"fabio\" medications should not be given for 96 hours with the exception of the lidocaine 5% patch (LIDODERM) and the lidocaine 10mg in potassium infusions. This entry is for INFORMATION ONLY.       Does not apply CONTINUOUS PRN 08/30/17 2114 09/03/17 2113 08/30/17 2114  lidocaine 1 % 1 mL      1 mL Other EVERY 1 HOUR PRN 08/30/17 2114 08/30/17 2114  lidocaine (LMX4) kit       Topical EVERY 1 HOUR PRN 08/30/17 2114 08/30/17 1845  HYDROmorphone (DILAUDID) PCA 1 mg/mL       Intravenous PCA 08/30/17 1838            OBJECTIVE:    Blood pressure 142/56, temperature 98.1  F (36.7  C), temperature source Oral, resp. rate 18, height 1.664 m (5' 5.51\"), weight 105 kg (231 lb 7.7 oz), SpO2 " 98 %.        ASSESSMENT/PLAN:    Zach Olmos is a 67 year old female with h/o carcinoma of the distal sigmoid, now POD #1 s/p  LAPAROSCOPIC ASSISTED SIGMOID COLECTOMY with single shot B/L TAP nerve block injections.  Total bupivacaine 0.25% with epinephrine 1:200,000 20mL total, then liposome bupivacaine (Exparel) 1.3% 20mL total administered 8/30/17 for postop pain control.  Pt is ambulating without difficulty.  No weakness or paresthesias.  No evidence of adverse side effects associated with B/L TAPnerve block injections.  Pt acheiving adequate pain control, with B/L TAP nerve block and oral IV analgesics.  Anticipate up to 72 hours of pain control with long-acting local anesthetic. Additionally, pt will continue to require opioid/nonopioid analgesics for visceral and muscle pain not controlled with local anesthetic.      - NO other local anesthetic use within 96 hours of liposome bupivacaine (Exparel)  - patient received verbal and written instructions about liposome bupivacaine and counseling about pharmacologic and nonpharmacologic measures for acute postoperative pain management  - please call if questions or concerns      KIMBERLY Chaidez CNP  Regional Anesthesia Pain Service  8/31/2017 9:42 AM    24 hour Job Code Pager.  For in-house use only.     Dial * * [phone] and  [place]:  [phone]  [place]: [phone]  Peds:  [phone]  Enter call-back number  May text page using [employer], but NOT [employer].

## 2017-08-31 NOTE — PROGRESS NOTES
Transfer  Transferred from: PACU  Via:bed  Reason for transfer:Pt appropriate for 6B- improved/worsened patient condition  Family: Aware of transfer, toni Saleem at bedside.   Belongings: Received with pt, clothing, shoes, cpap machine. Wallet and change purse sent home with toni Saleem per pt's request.   Chart: Received with pt  2 RN Skin Assessment Completed By: AIDEN Meyers and Kristina WLOFE

## 2017-08-31 NOTE — ANESTHESIA POSTPROCEDURE EVALUATION
Patient: Zach Olmos    Procedure(s):  Laparoscopic Assisted Sigmoid Colectomy, Flexible Sigmoidoscopy   - Wound Class: II-Clean Contaminated   - Wound Class: II-Clean Contaminated    Diagnosis:Sigmoid Colon Cancer   Diagnosis Additional Information: No value filed.    Anesthesia Type:  General, ETT    Note:  Anesthesia Post Evaluation    Patient location during evaluation: PACU and Bedside  Patient participation: Able to fully participate in evaluation  Level of consciousness: sleepy but conscious  Pain management: adequate  Airway patency: patent  Cardiovascular status: acceptable  Respiratory status: acceptable  Hydration status: acceptable  PONV: none     Anesthetic complications: None          Last vitals:  Vitals:    08/30/17 1930 08/30/17 1945 08/30/17 2000   BP: 148/62 117/56 137/59   Resp: 18 16 13   Temp:   36.8  C (98.2  F)   SpO2: 97% 96% 98%         Electronically Signed By: Justin Villafuerte MD  August 30, 2017  8:07 PM

## 2017-08-31 NOTE — PROGRESS NOTES
08/31/17 1627   Quick Adds   Type of Visit Initial PT Evaluation   Living Environment   Lives With child(cindy), adult   Living Arrangements house   Home Accessibility stairs to enter home;stairs within home   Number of Stairs to Enter Home 6   Number of Stairs Within Home 12   Stair Railings at Home outside, present at both sides;inside, present at both sides   Transportation Available car;family or friend will provide   Living Environment Comment pt reports she lives with her son who works but says she has family/friends around to help as needed.    Self-Care   Usual Activity Tolerance good   Current Activity Tolerance moderate   Regular Exercise no   Equipment Currently Used at Home none   Activity/Exercise/Self-Care Comment Pt reports she is IND prior to admission    Functional Level Prior   Ambulation 0-->independent   Transferring 0-->independent   Toileting 0-->independent   Bathing 0-->independent   Dressing 0-->independent   Eating 0-->independent   Communication 0-->understands/communicates without difficulty   Swallowing 0-->swallows foods/liquids without difficulty   Cognition 0 - no cognition issues reported   Fall history within last six months no   Which of the above functional risks had a recent onset or change? none   Prior Functional Level Comment IND   General Information   Onset of Illness/Injury or Date of Surgery - Date 08/30/17   Referring Physician Tj Scott MD   Patient/Family Goals Statement to get better/ go home   Pertinent History of Current Problem (include personal factors and/or comorbidities that impact the POC) 67 year old female, who is POD # 1  following lap assisted sigmoidectomy. She initially had runs of afib and vtach in the PACU, but that resolved overnight and she was asymptomatic   Precautions/Limitations abdominal precautions   Heart Disease Risk Factors Diabetes;Age;Overweight   General Observations Pt alert, agreeable to session   General Info Comments Activity  orders: ambulate with assist   Cognitive Status Examination   Orientation orientation to person, place and time   Level of Consciousness alert   Follows Commands and Answers Questions 100% of the time   Personal Safety and Judgment intact   Memory intact   Pain Assessment   Patient Currently in Pain Yes, see Vital Sign flowsheet   Integumentary/Edema   Integumentary/Edema no deficits were identifed   Integumentary/Edema Comments surgical incision    Posture    Posture Forward head position   Range of Motion (ROM)   ROM Quick Adds No deficits were identified   Strength   Manual Muscle Testing Quick Adds No deficits were identified   Bed Mobility   Bed Mobility Comments Pt completes supine to sit transfer with    Transfer Skills   Transfer Comments Pt completes STS with SBA without UE use   Gait   Gait Comments pt ambulates 200 ft with UE support on IV pole    Balance   Balance Comments some mild lateral instability noted with gait   Sensory Examination   Sensory Perception no deficits were identified   Coordination   Coordination no deficits were identified   Muscle Tone   Muscle Tone no deficits were identified   General Therapy Interventions   Planned Therapy Interventions balance training;gait training;progressive activity/exercise;home program guidelines;risk factor education;transfer training;strengthening   Clinical Impression   Criteria for Skilled Therapeutic Intervention yes, treatment indicated   PT Diagnosis impaired functional mobility    Influenced by the following impairments impaired balance, gait, tolerance   Functional limitations due to impairments Impaired functional mobility    Clinical Presentation Stable/Uncomplicated   Clinical Presentation Rationale Pt with uncomplicated PMH, clear presentation   Clinical Decision Making (Complexity) Low complexity   Therapy Frequency` daily   Predicted Duration of Therapy Intervention (days/wks) 5 days    Anticipated Discharge Disposition Home with Assist  "  Risk & Benefits of therapy have been explained Yes   Patient, Family & other staff in agreement with plan of care Yes   Clinical Impression Comments Pt with impaired activity tolerance, impaired gait however active previously, likely will resume PLOF    [facility] TM \"6 Clicks\"   2016, Trustees of [facility], under license to [employer].  All rights reserved.   6 Clicks Short Forms Basic Mobility Inpatient Short Form   [facility]  \"6 Clicks\" V.2 Basic Mobility Inpatient Short Form   1. Turning from your back to your side while in a flat bed without using bedrails? 4 - None   2. Moving from lying on your back to sitting on the side of a flat bed without using bedrails? 3 - A Little   3. Moving to and from a bed to a chair (including a wheelchair)? 4 - None   4. Standing up from a chair using your arms (e.g., wheelchair, or bedside chair)? 4 - None   5. To walk in hospital room? 3 - A Little   6. Climbing 3-5 steps with a railing? 3 - A Little   Basic Mobility Raw Score (Score out of 24.Lower scores equate to lower levels of function) 21   Total Evaluation Time   Total Evaluation Time (Minutes) 5     "

## 2017-08-31 NOTE — PLAN OF CARE
Problem: Goal Outcome Summary  Goal: Goal Outcome Summary  Outcome: No Change  Pt AOx4. VSS. No N/V. Pain controlled with 0.2mg Dilaudid PCA [id number]. 3-5L NC when sleeping. Pt sleeping most of the day, claims she didn't sleep at all over night. Lap sites CDI, liquid bandage present. Camacho removed, due to void. Adequate output when camacho was in place. Poor appetite, but tolerating some foods. Pt ambulated x2 with staff/PT. Will update team with changes.

## 2017-08-31 NOTE — PROGRESS NOTES
Care Coordinator Progress Note     Admission Date/Time:  8/30/2017  Attending MD:  Carlos June MD     Data  Chart reviewed, discussed with interdisciplinary team.   Patient was admitted for: Sigmoid Colectomy    Concerns with insurance coverage for discharge needs: None identified   Current Living Situation: Patient lives with Son  Support System: Pts Daughter is Primary Caregiver  Services Involved:   Transportation: Family  Barriers to Discharge: None identified          Assessment  Pt s/p lap Sigmoid Colectomy, anticipated discharge 1-2 days. I have met with Pt to introduce myself and care coordinator role. Pt states she was independent prior to surgery, her children will be available to assist post discharge.  Pt is pleased with Nursing care and hospitals Surgery Team has kept her updated.  No discharge needs identified.     Plan  Anticipated Discharge Date:  1-2 days  Anticipated Discharge Plan:  D/C to home with out patient follow up    Constance Yepez RN   6B care coordinator #[phone]

## 2017-08-31 NOTE — OP NOTE
DATE OF PROCEDURE:  8/30/2017      PREOPERATIVE DIAGNOSIS:  Carcinoma of the distal sigmoid.      POSTOPERATIVE DIAGNOSIS:  Carcinoma of the distal sigmoid.      OPERATION:  Laparoscopic sigmoid colectomy.      HISTORY:  Zach Olmos is a 67-year-old woman who was diagnosed with a distal sigmoid cancer at the [facility].  Preoperative workup showed no evidence of metastasis.  Sigmoidoscopy showed a disk-like tumor at the 20 cm level, which I tattooed.  I advised laparoscopic sigmoid colectomy.  I discussed all the risks and alternatives.  I answered all the patient's questions to her stated satisfaction.  She expressed understanding and provided written informed consent.      SURGEON:  Carlos June MD      ASSISTANTS:  Meño Pedroza MD, Tj Scott MD.      DESCRIPTION OF PROCEDURE:  After induction of adequate endotracheal anesthesia, the patient was placed in the modified lithotomy position using Yellofin stirrups and pneumatic compression stockings.  She was positioned on a pink pad.  She was strapped to the table.  The right arm was tucked and the left arm was on an arm board.  The abdomen was prepped and draped in a sterile fashion.  A timeout was performed and the patient and procedure confirmed.  We placed a 12 mm balloon port using a Timi technique just above the umbilicus.  Two additional 5 mm ports were placed in the right upper and lower quadrants and in suprapubic position.  A laparoscopic exploration of the abdomen demonstrated a quite redundant sigmoid colon and a tattoo just above the peritoneal reflection.  There was no intra-abdominal evidence of metastasis.  We began our dissection with a medial to lateral dissection.  Given the patient's quite redundant colon, I had planned not to take down the splenic flexure.  We elevated the superior rectal artery but initially had difficulty identifying the left ureter.  This was related to the patient's marked central obesity  with a BMI of 36.6.  We switched to a lateral to medial dissection and took down the lateral attachments of the sigmoid and descending colon.  We went all the way to the splenic flexure, but did not fully mobilize the splenic flexure.  Once lateral dissection was complete, we again looked medially and now were able to identify the left ureter, which we preserved posteriorly.  We worked our way proximally to the vascular pedicle.  I initially chose to divide the superior rectal artery, which we did using 3 double applications of the LigaSure.  Once this was completed, I elected to perform a lower midline incision to complete the pelvic mobilization.  We placed the Christian wound retractor and used the Omni  Speed-Tract as well.  We opened the pelvic peritoneum and dissected caudally until we had mobilized the bowel adequate to choose a point for transection 6-7 cm below the tattoo and palpable tumor in the uncut rectosigmoid specimen.  This was in the level of the upper rectum.  With the abdomen open, I had a much clearer view of the inferior mesenteric artery pedicle and chose to divide this above the superior rectal to facilitate reaching to the pelvis.  This was clamped, divided and suture ligated with 0 Vicryl.  We selected then selected a healthy-appearing segment of descending colon, cleared it, and divided between clamps.  The mesentery was taken down using the LigaSure to a point of transection above our division of the inferior mesenteric artery pedicle.  We secured the anvil of an EEA 28-mm stapler in the proximal bowel segment with a 2-0 Prolene pursestring.  We then cleared the tunnel posterior to the proximal rectum at our designated level of resection.  We divided the rectum with a green load contour stapler.  We then worked our way perpendicular through the mesorectum using the LigaSure.  This afforded us a nice distal margin of what appeared to be 6-7 cm, with intact underlying proximal mesorectum.  We  irrigated the abdomen and ensured hemostasis.  I went to the perineal position and performed a sigmoidoscopy of the rectal remnant.  This was healthy-appearing.  Air insufflation showed no air leak.  I then introduced the 28 mm EEA stapler under abdominal guidance to the apex of the rectal remnant.  The trocar was advanced through the left corner of the contour staple lines so there would only be intersecting staple line.  The stapler was reassembled, taking care to ensure that there was no twisting of the mesentery or any extraneous material included in the staple line.  The stapler was then fired and removed.  There were 2 generous anastomotic rings.  Sigmoidoscopy was performed above the anastomosis and the proximal bowel segment was well vascularized.  The anastomosis was anatomically intact and had no bleeding.  Air insufflation showed no air leak.  I evacuated all possible air from the large bowel and completed the scope.  The abdomen was next irrigated with warm saline and hemostasis reassured.  The midline fascia was closed with running #1 PDS suture.  Subcutaneous tissues were irrigated.  The skin was closed with running 4-0 Monocryl and Dermabond.      Estimated blood loss was 100 mL.  The patient tolerated the procedure well without evident complications.  Sponge, needle and instrument counts were reported as correct at the conclusion of the case x2.         DANIELLE GARZA MD             D: 2017 17:37   T: 2017 22:16   MT: JONNY      Name:     ELKE WESLEY   MRN:      [id number]        Account:        [id number]   :      1950           Procedure Date: 2017      Document: [id number]       cc: ETHEL MCCALLUM MD       [facility] Surgery Billing

## 2017-08-31 NOTE — CONSULTS
Diabetes/Hyperglycemia Management Consult    Chief Complaint: The patient is seen in consultation at the request of KIMBERLY Law.     History of Present Illness:   Zach Olmos is a 66yo female with PMH significant for sigmoid adenocarcinoma, Type II Diabetes, HTN, HLD, obesity, and ANGEL who presented for a scheduled surgery for her colon cancer. She is s/p sigmoidectomy 8/30. The Endocrine Diabetes team was consulted for post-op hyperglycemia. Her glucose levels in the hospital have been in the 200s. She is on a full liquid diet- had soup and ice cream today. Continues to have abdominal pain from surgery, improves with pain meds. Denies headaches, f/c, chest pain, SOB, flatus, numbness and tingling.     Diabetes Type: Type II Diabetes   Diabetes Duration: diagnosed 20 yrs ago  Usual home Diabetes Regimen: has been on metformin for past 18yrs and on insulin for past 10yrs (started at 10 units and increased to 12 units ~2yrs ago). Her current outpatient regimen is Lantus 12 units at bedtime and Metformin 1,000mg twice a day. She reports being compliant with her medications.   Glucose Checks Outpatient: patient reports checking her blood sugars 3-4x a week before breakfast, they have been ~130-135.  Diabetes Complications: cataract surgery in both eyes ~10yrs ago, has been receiving injections in both eyes once every 3mo for retinopathy (started in 11/2016). No peripheral neuropathy. No nephropathy.   Able to Detect Hypoglycemia: headaches with low blood sugars. No diaphoresis or hx of seizures.   Usual Diabetes Care Provider: PCP: Dr. Castrejon     Ordered Dm Regimen:  Medium Insulin Resistance Aspart  Dose     Total Daily Insulin:   8/31: 5 units  8/30: 18 units    Current Diet:  Full liquid diet    Recent Labs  Lab 08/31/17  1129 08/31/17  0935 08/31/17  0804 08/31/17  0606 08/31/17  0224 08/30/17  2247  08/30/17  1842  08/29/17  1601   GLC  --   --   --   --   --   --   --  282*  --  136*   * 211* 191*  207* 282* 299*  < >  --   < >  --    < > = values in this interval not displayed.      Diabetes Control:   Lab Results   Component Value Date    A1C 9.3 2017       Review of Systems  10 point ROS completed with pertinent positives and negatives noted in the HPI    Past Medical History  Past Medical History:   Diagnosis Date     Anxiety      Cancer of sigmoid colon (H)      Depression      Diverticulosis      DM (diabetes mellitus) (H)      Granuloma annulare      HLD (hyperlipidemia)      HTN (hypertension)      Obese      ANGEL on CPAP        Family History  Family History   Problem Relation Age of Onset     Uterine Cancer Maternal Aunt    Brother- Type II Diabetes  No family history of thyroid problems.     Social History  Social History     Social History     Marital status:      Spouse name: N/A     Number of children: N/A     Years of education: N/A     Social History Main Topics     Smoking status: Never Smoker     Smokeless tobacco: Never Used     Alcohol use No     Drug use: No     Sexual activity: Not Asked     Other Topics Concern     None     Social History Narrative         Physical Exam  Temp: 98.4  F (36.9  C) Temp  Min: 98  F (36.7  C)  Max: 99.1  F (37.3  C)  Resp: 24 Resp  Min: 12  Max: 24  SpO2: 93 % SpO2  Min: 93 %  Max: 100 %  Pulse: 77 Pulse  Min: 77  Max: 77  Heart Rate: 89 Heart Rate  Min: 64  Max: 89  BP: 136/42 Systolic (24hrs), Av , Min:109 , Max:161   Diastolic (24hrs), Av, Min:42, Max:87    General: NAD, lying in bed comfortably   HEENT: NC/AT, anicteric sclera, no adenopathy, no thyroid enlargement or masses  Heart: RRR, S1 S2 normal, 2/6 systolic ejection murmur best heard at upper right sternal border   Lungs: CTAB, no wheezing, crackles, or rhonchi   ABD: soft, ND, normoactive bowel sounds, tender to palpation in lower abdomen, no rebound or guarding. Incision site without erythema, swelling, or discharge.   Skin: warm and dry, no obvious lesions  Extremities: no  "LE edema  Neuro: alert, oriented x3, communicating clearly, no focal deficits   Psych: calm, even mood    Laboratory  Recent Labs   Lab Test  08/30/17   2158  08/30/17   1842  08/29/17   1601   NA   --   138  137   POTASSIUM   --   4.2  4.4   CHLORIDE   --   104  102   CO2   --   24  27   ANIONGAP   --   9  8   GLC   --   282*  136*   BUN   --   13  15   CR  0.70  0.78  0.73   NICKOLAS   --   7.8*  9.2     CBC RESULTS:   Recent Labs   Lab Test  08/31/17   0637   WBC  13.6*   RBC  3.49*   HGB  8.1*   HCT  27.0*   MCV  77*   MCH  23.2*   MCHC  30.0*   RDW  16.3*   PLT  221       Liver Function Studies -   Recent Labs   Lab Test  08/29/17   1601   PROTTOTAL  8.1   ALBUMIN  3.8   BILITOTAL  0.2   ALKPHOS  71   AST  19   ALT  33       Active Medications  Current Facility-Administered Medications   Medication     bupivacaine liposome (EXPAREL) LONG ACTING injection was administered into the infiltration site to produce postsurgical analgesia. Duration of action is up to 72 hours, and other \"fabio\" medications should not be given for 96 hours with the exception of the lidocaine 5% patch (LIDODERM) and the lidocaine 10mg in potassium infusions. This entry is for INFORMATION ONLY.     insulin glargine (LANTUS) injection 6 Units     PARoxetine (PAXIL) tablet 60 mg     lidocaine 1 % 1 mL     lidocaine (LMX4) kit     sodium chloride (PF) 0.9% PF flush 3 mL     sodium chloride (PF) 0.9% PF flush 3 mL     lactated ringers BOLUS 500 mL     naloxone (NARCAN) injection 0.1-0.4 mg     lactated ringers infusion     HYDROmorphone (DILAUDID) PCA 1 mg/mL     acetaminophen (OFIRMEV) infusion 1,000 mg     ondansetron (ZOFRAN) injection 4-8 mg     prochlorperazine (COMPAZINE) injection 5 mg     albuterol neb solution 2.5 mg     phenol (CHLORASEPTIC) 1.4 % spray 1 mL     lidocaine (LIDODERM) 5 % Patch 1-2 patch    And     lidocaine (LIDODERM) patch REMOVAL    And     lidocaine (LIDODERM) patch in PLACE     menthol (ICY HOT) 5 % patch 1 patch    " And     menthol (ICY HOT) Patch in Place    And     menthol (ICY HOT) patch REMOVAL     glucose 40 % gel 15-30 g    Or     dextrose 50 % injection 25-50 mL    Or     glucagon injection 1 mg     insulin aspart (NovoLOG) inj (RAPID ACTING)     No current outpatient prescriptions on file.         Assessment and Plan:  Zach Olmos is a 66yo female with PMH significant for sigmoid adenocarcinoma, Type II Diabetes, HTN, HLD, obesity, and ANGEL who presented for a scheduled surgery and is s/p sigmoidectomy 8/30. The Endocrine Diabetes team was consulted for post-op hyperglycemia.     1. Poorly controlled Type II Diabetes-, presumably complicated by diabetic retinopathy/ maculopathy - (we do not have the eye records).  patient is on Lantus and Metformin as outpatient. Her HgbA1C from 8/31 is 9.3 indicating that her diabetes has been poorly controlled over the past 3 months. Her blood glucose levels post-op have been in the 200s and her current insulin regimen has not been sufficient in controlling her sugars. Our recommendation is to restart patient on her home dose of lantus, continue with the medium insulin resistance dose, and place her on prandial coverage.   - Lantus 12 units in the evening (starting 8/31) (order placed)  - Prandial insulin coverage at 1 unit/10 grams of carbohydrate (order placed)   - Continue with medium insulin resistance dose  - Blood glucose checks   - Hypoglycemia protocol  - Endocrine Diabetes will continue to follow     Patient seen and discussed with staff Endocrinologist, Dr. Smith.    Mala Ruiz MD  Internal Medicine, PGY-1  Pager: [phone]    Attending tie-in statement: Patient seen and examined by me approximately 4PM, discussed with resident/  whose note I have reviewed and amended to reflect our joint findings.     Hayley Smith MD

## 2017-08-31 NOTE — PROGRESS NOTES
Surgery Cross-Cover Note  8/30/2017 8:15 PM     Spoke to cardiology about the recent event of vtach and afib.   She is asymptomatic  He reviewed her EKG, the rhythm is sinus with 1 small run of SVT, ? Atrial tachycardia  There is no role of any intervention at the moment except for correcting electrolytes  If the runs are longer or patient is symptomatic, we can consider metoprolol  In the long run, she might benefit from ablation, if she remains stable inpatient, she can have an outpatient cardiology referral for further evaluation of this issue    Maurice Donis MD  PGY-1 General Surgery  Pager # [phone]

## 2017-08-31 NOTE — PLAN OF CARE
Problem: Goal Outcome Summary  Goal: Goal Outcome Summary  Outcome: No Change  Temp:  [98.2  F (36.8  C)-98.7  F (37.1  C)] 98.2  F (36.8  C)  Heart Rate:  [64-95] 80  Resp:  [9-21] 16  BP: (109-166)/(47-68) 152/58  FiO2 (%):  [35 %] 35 %  SpO2:  [94 %-100 %] 96 %    Shift 4414-6997  Neuro: lethargic, arouses to voice. A/Ox4.   Cardiac: NSR, no tele alarms.   Respiratory:  3L NC. Capno WNL. CPAP for night shift set up per RT.   GI/: hypoactive bowel sounds, no flatus yet. Adequate UO per camacho.   Diet/appetite: ice chips. Nauseated upon arrival to floor, received zofran and compazine with relief after compazine.   MIV LR @100/hr.  Activity: able to assist with turns. Moves all extremities equally.   Pain: improving per pt. Dilaudid 0.1mg Q10min bumps.   Skin: midline incision and 2 lap sites approximated with liquid bandage, no drainage, POWER.      BG Q4, 299 SS insulin and lantus.       Continue with POC. Notify primary team with changes.

## 2017-08-31 NOTE — PROGRESS NOTES
"POST OP CHECK  08/31/2017    Zach Olmos is a 67 year old female with h/o sigmoid colon cancer now POD #0 s/p laparoscopic assisted sigmoid colectomy, flexible sigmoidoscopy.    Pt reports pain is controlled. No new issues.    /58  Temp 98.2  F (36.8  C) (Oral)  Resp 16  Ht 1.664 m (5' 5.51\")  Wt 105 kg (231 lb 7.7 oz)  SpO2 96%  BMI 37.92 kg/m2    General: Alert, interactive, & in NAD  Resp: CTAB, no crackles or wheezes  Cardiac: Regular rate; extremities warm;   Abdomen: Soft, non tender, non distended  Incision: c/d/i withouth erythema, warmth, or discharge.   Extremities: No LE edema or obvious joint abnormalities    Tele strip is showing sinus rhythm      ml      A/P: No acute post-op issues. Continue plan of care per primary team. Please call with questions. 200 ml    Maurice Donis MD  General Surgery PGY-1  [phone]  (After 6AM Please page primary team)        "

## 2017-08-31 NOTE — OR NURSING
Notified Dr. Villafuerte of keith BS. MD ok with result, no further insulin to be given in PACU. MD said he will place anesthesia sign out. Pt ok to transfer to floor.

## 2017-08-31 NOTE — OR NURSING
Notified Dr. Villafuerte of pt's recheck BS. MD gave another verbal order for 3 units regular insulin IV. MD also notified of lab results and EKG being completed.

## 2017-09-01 ENCOUNTER — APPOINTMENT (OUTPATIENT)
Dept: PHYSICAL THERAPY | Facility: CLINIC | Age: 67
DRG: 331 | End: 2017-09-01
Attending: COLON & RECTAL SURGERY
Payer: COMMERCIAL

## 2017-09-01 LAB
ERYTHROCYTE [DISTWIDTH] IN BLOOD BY AUTOMATED COUNT: 16.6 % (ref 10–15)
GLUCOSE BLDC GLUCOMTR-MCNC: 176 MG/DL (ref 70–99)
GLUCOSE BLDC GLUCOMTR-MCNC: 214 MG/DL (ref 70–99)
GLUCOSE BLDC GLUCOMTR-MCNC: 230 MG/DL (ref 70–99)
GLUCOSE BLDC GLUCOMTR-MCNC: 237 MG/DL (ref 70–99)
GLUCOSE BLDC GLUCOMTR-MCNC: 259 MG/DL (ref 70–99)
HCT VFR BLD AUTO: 27.8 % (ref 35–47)
HGB BLD-MCNC: 8.2 G/DL (ref 11.7–15.7)
INTERPRETATION ECG - MUSE: NORMAL
MCH RBC QN AUTO: 23.4 PG (ref 26.5–33)
MCHC RBC AUTO-ENTMCNC: 29.5 G/DL (ref 31.5–36.5)
MCV RBC AUTO: 79 FL (ref 78–100)
PLATELET # BLD AUTO: 250 10E9/L (ref 150–450)
RBC # BLD AUTO: 3.51 10E12/L (ref 3.8–5.2)
WBC # BLD AUTO: 18.2 10E9/L (ref 4–11)

## 2017-09-01 PROCEDURE — 97530 THERAPEUTIC ACTIVITIES: CPT | Mod: GP | Performed by: PHYSICAL THERAPIST

## 2017-09-01 PROCEDURE — 00000146 ZZHCL STATISTIC GLUCOSE BY METER IP

## 2017-09-01 PROCEDURE — 40000894 ZZH STATISTIC OT IP EVAL DEFER: Performed by: OCCUPATIONAL THERAPIST

## 2017-09-01 PROCEDURE — 25000132 ZZH RX MED GY IP 250 OP 250 PS 637: Performed by: SURGERY

## 2017-09-01 PROCEDURE — 83605 ASSAY OF LACTIC ACID: CPT | Performed by: COLON & RECTAL SURGERY

## 2017-09-01 PROCEDURE — 12000001 ZZH R&B MED SURG/OB UMMC

## 2017-09-01 PROCEDURE — 36415 COLL VENOUS BLD VENIPUNCTURE: CPT | Performed by: COLON & RECTAL SURGERY

## 2017-09-01 PROCEDURE — 25000128 H RX IP 250 OP 636: Performed by: SURGERY

## 2017-09-01 PROCEDURE — 25000132 ZZH RX MED GY IP 250 OP 250 PS 637: Performed by: COLON & RECTAL SURGERY

## 2017-09-01 PROCEDURE — 94660 CPAP INITIATION&MGMT: CPT

## 2017-09-01 PROCEDURE — 85027 COMPLETE CBC AUTOMATED: CPT | Performed by: STUDENT IN AN ORGANIZED HEALTH CARE EDUCATION/TRAINING PROGRAM

## 2017-09-01 PROCEDURE — 97116 GAIT TRAINING THERAPY: CPT | Mod: GP | Performed by: PHYSICAL THERAPIST

## 2017-09-01 PROCEDURE — 36415 COLL VENOUS BLD VENIPUNCTURE: CPT | Performed by: STUDENT IN AN ORGANIZED HEALTH CARE EDUCATION/TRAINING PROGRAM

## 2017-09-01 PROCEDURE — 40000141 ZZH STATISTIC PERIPHERAL IV START W/O US GUIDANCE

## 2017-09-01 PROCEDURE — 40000193 ZZH STATISTIC PT WARD VISIT: Performed by: PHYSICAL THERAPIST

## 2017-09-01 PROCEDURE — 40000275 ZZH STATISTIC RCP TIME EA 10 MIN

## 2017-09-01 RX ORDER — DEXTROSE MONOHYDRATE 25 G/50ML
25-50 INJECTION, SOLUTION INTRAVENOUS
Status: DISCONTINUED | OUTPATIENT
Start: 2017-09-01 | End: 2017-09-01

## 2017-09-01 RX ORDER — NICOTINE POLACRILEX 4 MG
15-30 LOZENGE BUCCAL
Status: DISCONTINUED | OUTPATIENT
Start: 2017-09-01 | End: 2017-09-01

## 2017-09-01 RX ADMIN — INSULIN ASPART 2 UNITS: 100 INJECTION, SOLUTION INTRAVENOUS; SUBCUTANEOUS at 02:40

## 2017-09-01 RX ADMIN — SODIUM CHLORIDE, POTASSIUM CHLORIDE, SODIUM LACTATE AND CALCIUM CHLORIDE: 600; 310; 30; 20 INJECTION, SOLUTION INTRAVENOUS at 17:04

## 2017-09-01 RX ADMIN — INSULIN ASPART 3 UNITS: 100 INJECTION, SOLUTION INTRAVENOUS; SUBCUTANEOUS at 08:04

## 2017-09-01 RX ADMIN — ACETAMINOPHEN 1000 MG: 500 TABLET, FILM COATED ORAL at 10:23

## 2017-09-01 RX ADMIN — ACETAMINOPHEN 1000 MG: 500 TABLET, FILM COATED ORAL at 04:40

## 2017-09-01 RX ADMIN — SODIUM CHLORIDE, POTASSIUM CHLORIDE, SODIUM LACTATE AND CALCIUM CHLORIDE 1000 ML: 600; 310; 30; 20 INJECTION, SOLUTION INTRAVENOUS at 05:08

## 2017-09-01 RX ADMIN — PAROXETINE 60 MG: 20 TABLET, FILM COATED ORAL at 22:40

## 2017-09-01 RX ADMIN — ENOXAPARIN SODIUM 40 MG: 40 INJECTION SUBCUTANEOUS at 10:47

## 2017-09-01 RX ADMIN — ONDANSETRON 8 MG: 2 INJECTION INTRAMUSCULAR; INTRAVENOUS at 21:36

## 2017-09-01 RX ADMIN — ONDANSETRON 8 MG: 2 INJECTION INTRAMUSCULAR; INTRAVENOUS at 13:04

## 2017-09-01 RX ADMIN — LIDOCAINE 2 PATCH: 50 PATCH CUTANEOUS at 22:41

## 2017-09-01 RX ADMIN — ACETAMINOPHEN 1000 MG: 500 TABLET, FILM COATED ORAL at 16:05

## 2017-09-01 RX ADMIN — PROCHLORPERAZINE EDISYLATE 5 MG: 5 INJECTION INTRAMUSCULAR; INTRAVENOUS at 22:58

## 2017-09-01 RX ADMIN — ACETAMINOPHEN 1000 MG: 500 TABLET, FILM COATED ORAL at 22:40

## 2017-09-01 RX ADMIN — INSULIN ASPART 2 UNITS: 100 INJECTION, SOLUTION INTRAVENOUS; SUBCUTANEOUS at 12:10

## 2017-09-01 RX ADMIN — ONDANSETRON 8 MG: 2 INJECTION INTRAMUSCULAR; INTRAVENOUS at 05:08

## 2017-09-01 RX ADMIN — INSULIN ASPART 2 UNITS: 100 INJECTION, SOLUTION INTRAVENOUS; SUBCUTANEOUS at 05:09

## 2017-09-01 ASSESSMENT — PAIN DESCRIPTION - DESCRIPTORS
DESCRIPTORS: SORE
DESCRIPTORS: ACHING

## 2017-09-01 NOTE — PROGRESS NOTES
Diabetes Consult Daily Progress Note    Assessment/Plan:      Zach Olmos is a 68yo female with PMH significant for sigmoid adenocarcinoma, Type II Diabetes, HTN, HLD, obesity, and ANGEL who presented for a scheduled surgery and is s/p sigmoidectomy 8/30. The Endocrine Diabetes team was consulted for post-op hyperglycemia.      1. Poorly controlled Type II Diabetes-  Usual home Diabetes Regimen: Lantus 12 units at bedtime and Metformin 1,000mg twice a day  Her HgbA1C from 8/31 is 9.3 indicating that her diabetes has been poorly controlled over the past 3 months. Her blood glucose levels post-op have been in the 200s and her current insulin regimen has not been sufficient in controlling her sugars.   - Increase Lantus to 14 units in the evening  - Cont Prandial insulin coverage at 1 unit/10 grams of carbohydrate  - Cont with medium insulin resistance dose  - Blood glucose checks   - Hypoglycemia protocol  - Endocrine Diabetes will continue to follow      Patient discussed with staff Endocrinologist, Dr. Smith.    Akira Issa MD  Endocrine Fellow  [phone]     Interval History:      No acute event over night. Pt still has poor appetite. Glucose 230-266. Total insulin requirement from yesterday 30 units. Glucose improve this afternoon, glucose 176  She had jello, tea and chicken broth for lunch. No BM yet.    Current Diabetes Medications:   Lantus 12 units in the evening (starting 8/31)   Prandial insulin coverage at 1 unit/10 grams of carbohydrate   Medium insulin resistance dose      Active Diet Order    Active Diet Order      Full Liquid Diet      Diet      Exam:      B/P: 131/52, T: 98.3, P: 88, R: 20    General: Lying in bed, on PCA   HEENT: NC/AT, mucous membranes are moist.  Resp: Unlabored breathing, on cannula  Neuro: Alert and oriented, communicating clearly.   Ext: no swelling or edema  Data:        Recent Labs  Lab 09/01/17  1612 09/01/17  1209 09/01/17  0755 09/01/17  0443 08/31/17  4310  08/31/17  1919  08/30/17  1842  08/29/17  1601   GLC  --   --   --   --   --   --   --  282*  --  136*   * 214* 259* 230* 266* 242*  < >  --   < >  --    < > = values in this interval not displayed.  Lab Results   Component Value Date    A1C 9.3 08/31/2017     Attending tie-in statement: Patient discussed with fellow whose note I have reviewed and with which I agree.    Hayley Smith MD

## 2017-09-01 NOTE — PLAN OF CARE
Problem: Goal Outcome Summary  Goal: Goal Outcome Summary  OT 7C: OT orders received and eval deferred. Per screen of functional performance and discussion with PT, no OT needs identified at this time. Pt demos mod ind with supine<>sit and is able to independently self-modify LB dressing without intervention. Discussed options for seated shower during recovery given mild balance instability, pt reports she has a chair available to borrow and walk-in shower available. Will complete OT orders and defer full eval.

## 2017-09-01 NOTE — PLAN OF CARE
Problem: Goal Outcome Summary  Goal: Goal Outcome Summary  Outcome: No Change     Neuro: lethargic but easily arousable. A/Ox4 with occasional confusion.   Cardiac: NSR, VSS   Respiratory:  3L NC while awake, CPAP with 5L while asleep.   GI/: pt able to void without difficulty following removal of camacho catheter during the evening shift.     Diet/appetite: full liquid diet, poor appetite. received zofran PRN x1 this shift for c/o nausea.   LR @100/hr.  Activity: assist of 1-2, up to the commode.   Pain: improving per pt. Dilaudid PCA at 0.2mg available Q10min.   Skin: midline incision and 2 lap sites approximated with liquid bandage, POWER.       BG Q4, SS and carb coverage novolog and scheduled lantus.         Continue with POC. Notify primary team with changes.

## 2017-09-01 NOTE — PROGRESS NOTES
"Transfer to  at 1133  Transferred to: 7C  Via: WC  Reason for transfer: Pt no longer appropriate for 6B- improved patient condition  Belongings: Packed and sent with pt  Chart: Delivered with pt to next unit  Medications: Meds sent to   Report given to: Lucia WOLFE  Pt status: Stable. Blood pressure 131/52, pulse 88, temperature 98.3  F (36.8  C), temperature source Oral, resp. rate 20, height 1.664 m (5' 5.51\"), weight 103.9 kg (229 lb), SpO2 97 %.      "

## 2017-09-01 NOTE — PLAN OF CARE
Problem: Goal Outcome Summary  Goal: Goal Outcome Summary  Outcome: No Change  [id number]:  Neuro: A&Ox4. Quiet, flat.   Cardiac: SR w/ rare PVC. About 20 sec run of SVT, asymptomatic. MD notified and ok with transfer to 7C off tele. OVSS        Respiratory: Sating >92% on 5L NC. CPAP when sleeping. Capno IPI 9-10. CO2 33-40  GI/: Adequate urine output. Voiding spont. No BM. Pt denies passing gas.   Diet/appetite: Full liquid diet. Poor appetite. Zofran partially effective this AM.   Activity:  Assist of 1 to commode and ambulated to bathroom x1  Pain: At acceptable level on current regimen. 0.2 mg Dilaudid PCA.   Skin: Midline incision POWER. Lap sites POWER.   LDA's: PIV infusing LR at 75.      Report given to Lucia WOLFE on 7C. Bed ready and WC transport ordered.      Plan: Continue with POC. Notify primary team with changes.    Problem: Pain, Acute (Adult)  Goal: Identify Related Risk Factors and Signs and Symptoms  Related risk factors and signs and symptoms are identified upon initiation of Human Response Clinical Practice Guideline (CPG)   Outcome: No Change    09/01/17 1150   Pain, Acute   Related Risk Factors (Acute Pain) surgery   Signs and Symptoms (Acute Pain) verbalization of pain descriptors

## 2017-09-01 NOTE — PLAN OF CARE
Problem: Goal Outcome Summary  Goal: Goal Outcome Summary  PT 7C: Pt ambulates 200ft x 2 with CGA without AD, some unsteadiness on feet and instances of pt reaching for external support for stability with fatigue. Pt SBA for transfers, cues for bed mobility within precautions, completes 8 stairs with B rails and SBA. Pt on 4L O2 during session, Spo2 98%, left on 2L at end of session.   Anticipate discharge home with assist when medically appropriate.

## 2017-09-01 NOTE — PROGRESS NOTES
CLINICAL NUTRITION SERVICES  Reason for Assessment:  Nutrition education regarding low fiber diet education  Diet History:  Patient has no history of low fiber diet education.  Nutrition Diagnosis:  Food- and nutrition-related knowledge deficit r/t no previous knowledge of low fiber diet as evidenced by patient report  Interventions:  Provided instruction on low fiber diet. Discussed each food group and foods to eat and avoid. Answered patient's questions regarding diet.   Provided handouts : Low Fiber Nutrition Therapy and Low Fiber Diet Hospital Menu  Goals:   Patient will verbalize at least 5 low fiber foods acceptable on diet and 5 high fiber foods to avoid.  Follow-up:    Patient to ask any further nutrition-related questions before discharge.  In addition, pt may request outpatient RD appointment.    Komal Lou RD, LD  Unit 7C pgr: [phone]

## 2017-09-01 NOTE — PROGRESS NOTES
"Colorectal Surgery Progress Note    Subjective: No acute issues overnight. Pain controlled. Denies fevers, chills, CP, SOB, and V. Tolerating FLD. Voiding. no flatus, no BM.    Objective:   /52 (BP Location: Left arm)  Pulse 88  Temp 98.3  F (36.8  C) (Oral)  Resp 20  Ht 1.664 m (5' 5.51\")  Wt 103.9 kg (229 lb)  SpO2 97%  BMI 37.51 kg/m2    Gen: Awake, alert, NAD   CV: RRR  Resp: non-labored at rest  Abd: Soft, non-distended, ATTP  Ext: warm and well perfused  Incision: c/d/i     A/P: Zach Olmos is a 67 year old female, who is POD # 2  following lap assisted sigmoidectomy. She initially had runs of afib and vtach in the PACU, but that resolved and she has been asymptomatic. She is doing well after surgery.     -FLD until ROBF  -Hgb stable, will start lovenox today  -Ambulate  -IS/cough/db  -Decrease IVF to 75cc/hr  -Transfer to     Seen and discussed with colorectal fellow, who will discuss with staff.    Tj Scott MD  General Surgery PGY-3  Please page [phone] with any questions    Attending addendum:  Seen and examined. No real bowel function yet.  Abd benign  Plan as above.  Glucose control.  "

## 2017-09-01 NOTE — PLAN OF CARE
Problem: Goal Outcome Summary  Goal: Goal Outcome Summary  Outcome: No Change  Transferred from  at 1245. VSS. Currently on 4 L via nasal cannula. Capno dc'd. Puls ox on. Pain managed with dilaudid pca.  HR regular rate and rhythm. Up with assist. Hypoactive bowel sounds. - flatus or stool. Awaiting ROBF. Voiding spontaneously. Tolerating full liquid diet. BG checked q 4 hrs and carb coverage with meals.  Midline incision and lap sites x 4 intact with dermabond. Patient would like to shower this evening. Encourage ambulation. Resting in-between cares. Will continue current plan of care.

## 2017-09-01 NOTE — PLAN OF CARE
Problem: Goal Outcome Summary  Goal: Goal Outcome Summary  Outcome: Improving  VSS on 2L via NC. Pt up with SBA. Ambulated in halls. Tolerating full liquid diet. Insulin given per sliding scale. Voids spont with adequate UOP. No Flatus, no BM. Pain controlled with dilaudid PCA. Cont. POC.

## 2017-09-02 ENCOUNTER — APPOINTMENT (OUTPATIENT)
Dept: PHYSICAL THERAPY | Facility: CLINIC | Age: 67
DRG: 331 | End: 2017-09-02
Attending: COLON & RECTAL SURGERY
Payer: COMMERCIAL

## 2017-09-02 ENCOUNTER — APPOINTMENT (OUTPATIENT)
Dept: GENERAL RADIOLOGY | Facility: CLINIC | Age: 67
DRG: 331 | End: 2017-09-02
Attending: COLON & RECTAL SURGERY
Payer: COMMERCIAL

## 2017-09-02 LAB
ALBUMIN UR-MCNC: 10 MG/DL
APPEARANCE UR: ABNORMAL
BILIRUB UR QL STRIP: NEGATIVE
COLOR UR AUTO: ABNORMAL
ERYTHROCYTE [DISTWIDTH] IN BLOOD BY AUTOMATED COUNT: 16.5 % (ref 10–15)
GLUCOSE BLDC GLUCOMTR-MCNC: 145 MG/DL (ref 70–99)
GLUCOSE BLDC GLUCOMTR-MCNC: 170 MG/DL (ref 70–99)
GLUCOSE BLDC GLUCOMTR-MCNC: 171 MG/DL (ref 70–99)
GLUCOSE BLDC GLUCOMTR-MCNC: 182 MG/DL (ref 70–99)
GLUCOSE BLDC GLUCOMTR-MCNC: 224 MG/DL (ref 70–99)
GLUCOSE UR STRIP-MCNC: 150 MG/DL
HCT VFR BLD AUTO: 24.7 % (ref 35–47)
HGB BLD-MCNC: 7.5 G/DL (ref 11.7–15.7)
HGB UR QL STRIP: NEGATIVE
KETONES UR STRIP-MCNC: 80 MG/DL
LACTATE BLD-SCNC: 1.1 MMOL/L (ref 0.7–2)
LACTATE BLD-SCNC: 2.1 MMOL/L (ref 0.7–2)
LEUKOCYTE ESTERASE UR QL STRIP: NEGATIVE
MCH RBC QN AUTO: 24 PG (ref 26.5–33)
MCHC RBC AUTO-ENTMCNC: 30.4 G/DL (ref 31.5–36.5)
MCV RBC AUTO: 79 FL (ref 78–100)
NITRATE UR QL: NEGATIVE
PH UR STRIP: 5.5 PH (ref 5–7)
PLATELET # BLD AUTO: 212 10E9/L (ref 150–450)
PLATELET # BLD AUTO: 236 10E9/L (ref 150–450)
RBC # BLD AUTO: 3.13 10E12/L (ref 3.8–5.2)
RBC #/AREA URNS AUTO: 0 /HPF (ref 0–2)
SOURCE: ABNORMAL
SP GR UR STRIP: 1.02 (ref 1–1.03)
UROBILINOGEN UR STRIP-MCNC: NORMAL MG/DL (ref 0–2)
WBC # BLD AUTO: 10.8 10E9/L (ref 4–11)
WBC #/AREA URNS AUTO: 1 /HPF (ref 0–2)

## 2017-09-02 PROCEDURE — 25000128 H RX IP 250 OP 636: Performed by: SURGERY

## 2017-09-02 PROCEDURE — 97110 THERAPEUTIC EXERCISES: CPT | Mod: GP | Performed by: PHYSICAL THERAPIST

## 2017-09-02 PROCEDURE — 40000141 ZZH STATISTIC PERIPHERAL IV START W/O US GUIDANCE

## 2017-09-02 PROCEDURE — 97116 GAIT TRAINING THERAPY: CPT | Mod: GP | Performed by: PHYSICAL THERAPIST

## 2017-09-02 PROCEDURE — 85027 COMPLETE CBC AUTOMATED: CPT | Performed by: STUDENT IN AN ORGANIZED HEALTH CARE EDUCATION/TRAINING PROGRAM

## 2017-09-02 PROCEDURE — 36415 COLL VENOUS BLD VENIPUNCTURE: CPT | Performed by: STUDENT IN AN ORGANIZED HEALTH CARE EDUCATION/TRAINING PROGRAM

## 2017-09-02 PROCEDURE — 00000146 ZZHCL STATISTIC GLUCOSE BY METER IP

## 2017-09-02 PROCEDURE — 94660 CPAP INITIATION&MGMT: CPT

## 2017-09-02 PROCEDURE — 25000132 ZZH RX MED GY IP 250 OP 250 PS 637: Performed by: COLON & RECTAL SURGERY

## 2017-09-02 PROCEDURE — 87040 BLOOD CULTURE FOR BACTERIA: CPT | Performed by: STUDENT IN AN ORGANIZED HEALTH CARE EDUCATION/TRAINING PROGRAM

## 2017-09-02 PROCEDURE — 36415 COLL VENOUS BLD VENIPUNCTURE: CPT | Performed by: SURGERY

## 2017-09-02 PROCEDURE — 83605 ASSAY OF LACTIC ACID: CPT

## 2017-09-02 PROCEDURE — 81001 URINALYSIS AUTO W/SCOPE: CPT | Performed by: STUDENT IN AN ORGANIZED HEALTH CARE EDUCATION/TRAINING PROGRAM

## 2017-09-02 PROCEDURE — 71020 XR CHEST 2 VW: CPT

## 2017-09-02 PROCEDURE — 12000001 ZZH R&B MED SURG/OB UMMC

## 2017-09-02 PROCEDURE — 85049 AUTOMATED PLATELET COUNT: CPT | Performed by: SURGERY

## 2017-09-02 PROCEDURE — 25000132 ZZH RX MED GY IP 250 OP 250 PS 637: Performed by: STUDENT IN AN ORGANIZED HEALTH CARE EDUCATION/TRAINING PROGRAM

## 2017-09-02 PROCEDURE — 40000193 ZZH STATISTIC PT WARD VISIT: Performed by: PHYSICAL THERAPIST

## 2017-09-02 PROCEDURE — 97530 THERAPEUTIC ACTIVITIES: CPT | Mod: GP | Performed by: PHYSICAL THERAPIST

## 2017-09-02 PROCEDURE — 25000132 ZZH RX MED GY IP 250 OP 250 PS 637: Performed by: SURGERY

## 2017-09-02 RX ORDER — OXYCODONE HYDROCHLORIDE 5 MG/1
5-10 TABLET ORAL EVERY 6 HOURS PRN
Qty: 25 TABLET | Refills: 0 | Status: SHIPPED | OUTPATIENT
Start: 2017-09-02 | End: 2017-09-05

## 2017-09-02 RX ORDER — HYDROXYZINE HYDROCHLORIDE 25 MG/1
25 TABLET, FILM COATED ORAL EVERY 6 HOURS PRN
Status: DISCONTINUED | OUTPATIENT
Start: 2017-09-02 | End: 2017-09-05 | Stop reason: HOSPADM

## 2017-09-02 RX ORDER — LOSARTAN POTASSIUM 25 MG/1
25 TABLET ORAL DAILY
Status: DISCONTINUED | OUTPATIENT
Start: 2017-09-02 | End: 2017-09-05 | Stop reason: HOSPADM

## 2017-09-02 RX ORDER — HYDROMORPHONE HYDROCHLORIDE 1 MG/ML
.3-.5 INJECTION, SOLUTION INTRAMUSCULAR; INTRAVENOUS; SUBCUTANEOUS
Status: DISCONTINUED | OUTPATIENT
Start: 2017-09-02 | End: 2017-09-05 | Stop reason: HOSPADM

## 2017-09-02 RX ORDER — OXYCODONE HYDROCHLORIDE 5 MG/1
5-10 TABLET ORAL EVERY 4 HOURS PRN
Status: DISCONTINUED | OUTPATIENT
Start: 2017-09-02 | End: 2017-09-03

## 2017-09-02 RX ADMIN — LOSARTAN POTASSIUM 25 MG: 25 TABLET, FILM COATED ORAL at 10:00

## 2017-09-02 RX ADMIN — ACETAMINOPHEN 1000 MG: 500 TABLET, FILM COATED ORAL at 06:05

## 2017-09-02 RX ADMIN — HYDROMORPHONE HYDROCHLORIDE: 10 INJECTION, SOLUTION INTRAMUSCULAR; INTRAVENOUS; SUBCUTANEOUS at 00:45

## 2017-09-02 RX ADMIN — OXYCODONE HYDROCHLORIDE 10 MG: 5 TABLET ORAL at 13:39

## 2017-09-02 RX ADMIN — ENOXAPARIN SODIUM 40 MG: 40 INJECTION SUBCUTANEOUS at 10:00

## 2017-09-02 RX ADMIN — PAROXETINE 60 MG: 20 TABLET, FILM COATED ORAL at 22:11

## 2017-09-02 RX ADMIN — MENTHOL 1 PATCH: 205.5 PATCH TOPICAL at 10:03

## 2017-09-02 RX ADMIN — OXYCODONE HYDROCHLORIDE 5 MG: 5 TABLET ORAL at 19:35

## 2017-09-02 RX ADMIN — LIDOCAINE 2 PATCH: 50 PATCH CUTANEOUS at 22:13

## 2017-09-02 RX ADMIN — ACETAMINOPHEN 1000 MG: 500 TABLET, FILM COATED ORAL at 17:42

## 2017-09-02 RX ADMIN — SODIUM CHLORIDE, POTASSIUM CHLORIDE, SODIUM LACTATE AND CALCIUM CHLORIDE: 600; 310; 30; 20 INJECTION, SOLUTION INTRAVENOUS at 19:26

## 2017-09-02 RX ADMIN — SODIUM CHLORIDE, POTASSIUM CHLORIDE, SODIUM LACTATE AND CALCIUM CHLORIDE: 600; 310; 30; 20 INJECTION, SOLUTION INTRAVENOUS at 06:10

## 2017-09-02 RX ADMIN — ACETAMINOPHEN 1000 MG: 500 TABLET, FILM COATED ORAL at 11:32

## 2017-09-02 ASSESSMENT — PAIN DESCRIPTION - DESCRIPTORS
DESCRIPTORS: CONSTANT;DISCOMFORT
DESCRIPTORS: ACHING
DESCRIPTORS: CONSTANT
DESCRIPTORS: ACHING

## 2017-09-02 NOTE — PLAN OF CARE
Problem: Goal Outcome Summary  Goal: Goal Outcome Summary  Outcome: Improving  /64, AOVSS on 3L oxymask. Pt. Cozaar started. Showed signs of confusion early in shift. This seems to have improved throughout the morning and afternoon. Pain well managed with Dilaudid PCA. PCA discontinued and now on oxycodone q 4 hrs prn. SBA. Ambulated x 2. Lactic Acid 1.1.   and 171. Tolerating full liquid diet.  Denies nausea. + Gas. Setting off bed/chair alarm occasionally. Icy Hot patch on lower right back.  Please remove at 1800. Respiratory notified for hospital CPAP for this evening.

## 2017-09-02 NOTE — PROGRESS NOTES
Video patient monitoring started @approx 0325 d/t patient impulsivity; pulling at lines and repeatedly attempting to get out of bed independently despite weakness and reinforcement to call for assistance.     Pt informed of VPM and provided consent. Bed alarm still on for safety. Pt also moved to private room closer to desk for closer monitoring.

## 2017-09-02 NOTE — PROGRESS NOTES
"Diabetes consult service:    Assessment/plan  1. Diabetes mellitus type 2 with historically poor control.  BSs are improving but still above ideal on only full liquid diet.  It is too soon to conclude the current regimen isn't working.  No change in regimen for now.     Hayley Smith MD      Cc DM    HPI  Elke Wesley is a 68yo female with PMH significant for sigmoid adenocarcinoma, Type II Diabetes, HTN, HLD, obesity, and ANGEL who presented for a scheduled surgery and is s/p sigmoidectomy 8/30    Total daily insulin  9/1/17: 30 units  8/31/17: 30 units    Ordered DM regimen  aspart medium resistance scale with meals and at HS  aspart 1 unit/10 grams of carbs  glargine 14 units at HS- lst dos was last night.  She got 12 unit son 8/31.        Active Diet Order      Full Liquid Diet      Diet      Recent Labs  Lab 09/02/17  0912 09/02/17  0205 09/01/17  2155 09/01/17  1612 09/01/17  1209 09/01/17  0755  08/30/17  1842  08/29/17  1601   GLC  --   --   --   --   --   --   --  282*  --  136*   * 224* 237* 176* 214* 259*  < >  --   < >  --    < > = values in this interval not displayed.      ROS:  Doing OK  Tolerating diet - had mushroom broth today - doesn't know when diet will advance  Has been up walking    Exam  /61 (BP Location: Left arm)  Pulse 81  Temp 97.9  F (36.6  C) (Oral)  Resp 18  Ht 1.664 m (5' 5.51\")  Wt 105.1 kg (231 lb 12.8 oz)  SpO2 99%  BMI 37.97 kg/m2   Patient seen and examined by me approximately 2 PM.  Sitting in chair in NAD  Awake alert, responds appropriate    Results for ELKE WESLEY (MRN [id number]) as of 9/2/2017 11:39   Ref. Range 9/2/2017 09:13   WBC Latest Ref Range: 4.0 - 11.0 10e9/L 10.8   Hemoglobin Latest Ref Range: 11.7 - 15.7 g/dL 7.5 (L)   Hematocrit Latest Ref Range: 35.0 - 47.0 % 24.7 (L)   Platelet Count Latest Ref Range: 150 - 450 10e9/L 236   RBC Count Latest Ref Range: 3.8 - 5.2 10e12/L 3.13 (L)   MCV Latest Ref Range: 78 - 100 fl 79   MCH Latest Ref " Range: 26.5 - 33.0 pg 24.0 (L)   MCHC Latest Ref Range: 31.5 - 36.5 g/dL 30.4 (L)   RDW Latest Ref Range: 10.0 - 15.0 % 16.5 (H)

## 2017-09-02 NOTE — PROGRESS NOTES
"Subjective: No acute events overnight. RN reports pt was agitated, requiring bed alarm. Tolerating FLD without nausea or emesis. Denies flatus or BM. Endorses abdominal tenderness. Hypertensive to SBP 160s with lactate of 2.1. This AM SBP in 130s with lactate 1.1.    Objective:   /61 (BP Location: Left arm)  Pulse 81  Temp 97.9  F (36.6  C) (Oral)  Resp 18  Ht 1.664 m (5' 5.51\")  Wt 103.9 kg (229 lb)  SpO2 99%  BMI 37.51 kg/m2    PE:  Gen: Awake, alert, NAD   CV: RRR  Resp: non-labored at rest  Abd: Soft, non-distended, ATTP  Ext: warm and well perfused  Incision: c/d/i with small amount of erythema along inferior edge of incision, no induration      I/O last 3 completed shifts:  In: 2643.33 [P.O.:460; I.V.:2183.33]  Out: 1650 [Urine:1650] - Last 24 hours      Labs/Imaging  Heme:  Recent Labs  Lab 09/02/17  0730 09/01/17  0627 08/31/17  0637 08/30/17  2158 08/30/17  1842 08/29/17  1601   WBC  --  18.2* 13.6*  --  15.1* 11.4*   HGB  --  8.2* 8.1*  --  9.2* 10.5*    250 221 213 205 268     Chem:  Recent Labs  Lab 08/30/17 2158 08/30/17  1842 08/29/17  1601   POTASSIUM  --  4.2 4.4   CR 0.70 0.78 0.73         A/P: Zach Olmos is a 67 year old female, who is POD # 3  following lap assisted sigmoidectomy. She initially had runs of afib and vtach in the PACU, but that resolved and she has been asymptomatic. She is doing well after surgery.      NEURO Agitation. Continue home paxil.   Pain well controlled on  Dilaudid PCA and acetaminophen  Changes:  PO oxycodone PRN,  IV dilaudid PRN   CV HDS. Restart home lasartan   PULM  I/S. Still requiring 3L supp O2. CXR today.   FEN/GI mIVF @ 75 ml/hr. Continue current diet     No acute issues, good UOP    HEME Hgb as above. Postop anemia expected for this surgery.  Continue to monitor. No transfusions indicated at this time   ID Afebrile, worsening leukocytosis. Will obtain blood cultures, repeat CBC, UA.   Antibiotics: None    ENDO Goal BG < 180 unachieved. " Currently high dose insulin correction scale. Appreciate recs from Endocrine.   ACTIVITY PT/OT consulted  Ambulate at least TID    PPx Prophylactic lovenox         Patient seen and discussed with the Fellow.    Corinna Kidd, PGY-1  General Surgery    Physician Attestation   I, Bernadine Young, saw this patient with the resident and agree with the resident s findings and plan of care as documented in the resident s note.      I personally reviewed vital signs, medications and labs.    Key findings: looks great. + bowel movement and flatus. LRD, possible DC tomorrow PM. Hopefully glucoses will improve.    Bernadine Young  Date of Service (when I saw the patient): 09/02/17

## 2017-09-02 NOTE — PROGRESS NOTES
[facility], [place]    Sepsis Evaluation Progress Note    Date of Service: 09/02/2017    I was called to see Zach Olmos due to abnormal vital signs triggering the Sepsis SIRS screening alert. She is not known to have an infection.     Physical Exam    Vital Signs:  Temp: 98.6  F (37  C) Temp src: Oral BP: 150/59 (pt just got back from bathroom) Pulse: 86 Heart Rate: 98 Resp: 16 SpO2: 96 % O2 Device: Oxymask Oxygen Delivery: 3 LPM    Lab:  Lactic Acid   Date Value Ref Range Status   09/01/2017 2.1 (H) 0.7 - 2.0 mmol/L Final       The patient is at baseline mental status.    The rest of their physical exam is significant for the surgery that was done recently.    Assessment and Plan    The SIRS and exam findings are likely due to her recent surgery for strangulated ventral hernia, there is no sign of sepsis at this time.    Disposition: The patient will remain on the current unit. We will continue to monitor this patient closely.    Maurice Forbes MD

## 2017-09-02 NOTE — PLAN OF CARE
Problem: Goal Outcome Summary  Goal: Goal Outcome Summary  PT 7C: Pt demonstrated supine>sit with SBA using logroll, tx sit<>stand with SBA, and amb 250 ft x 2 with SBA and no AD. Pt amb at slowed jose and with decreased heel strike, progressing well with no overt LOB or path deviation. Pt negotiated 4 stairs x 2 with B HR and SBA. Recommend disch home with assist once medically appropriate.

## 2017-09-02 NOTE — PLAN OF CARE
Problem: Goal Outcome Summary  Goal: Goal Outcome Summary  Outcome: No Change  HTN but within parameters, AOVSS on 3L oxymask to keep sats>90%. RT unable to utilize oxygen adapter with pt's home BIPAP/CPAP machine, please f/u in am to order hospital CPAP. Pt reports pain managed with dilaudid PCA @0.2 mg available q 10 + scheduled tylenol, pt pushing PCA button minimally this shift. Pt up with SBA/assist of 1, pt unsteady on feet at times and needs assistance with lines management. Blood sugars continuing to be in 200s, covered per sliding scale. Full liquid diet. C/o nausea @2130, IV zofran & compazine given with improvement; per pt coughing up sputum contributed to nausea. Pt passed flatus this shift! VPM & bed alarm on for safety. Restless legs noted while sleeping.

## 2017-09-02 NOTE — PROVIDER NOTIFICATION
Notified Dr. Maurice Donis at 0054 regarding lab results, triggered SIRS protocol, Lactic 2.1.    Orders were obtained for recheck at 7 am.

## 2017-09-02 NOTE — PROVIDER NOTIFICATION
Charge RN Demario Daley notified surg crosscover #[id number] of lactic acid result 2.1.     Writer spoke with surg resident on phone & surg resident ordered lactic acid recheck @0700.

## 2017-09-02 NOTE — PLAN OF CARE
Problem: Goal Outcome Summary  Goal: Goal Outcome Summary  Outcome: Therapy, progress toward functional goals as expected  Cared from pt from 9900-2995. Slightly hypertensive (started on Cozaar today), on 1L oxymask (titrating, was on 2L)- otherwise VSS. Wears CPAP at night. Is not on 02 at home. Incisions closed with derma bond. Erythema present. LR running @ 75 an hour into PIV. Advanced to low fiber diet this evening. Blood sugar checks QID. Carb coverage. +PG/+BM. Up SBA.  Pain is being managed with Oxycodone and Tylenol. Please take off icy hot patch this evening. Plan-- Await SUBHASH, pain control, wean off 02.

## 2017-09-03 ENCOUNTER — APPOINTMENT (OUTPATIENT)
Dept: PHYSICAL THERAPY | Facility: CLINIC | Age: 67
DRG: 331 | End: 2017-09-03
Attending: COLON & RECTAL SURGERY
Payer: COMMERCIAL

## 2017-09-03 ENCOUNTER — APPOINTMENT (OUTPATIENT)
Dept: OCCUPATIONAL THERAPY | Facility: CLINIC | Age: 67
DRG: 331 | End: 2017-09-03
Attending: COLON & RECTAL SURGERY
Payer: COMMERCIAL

## 2017-09-03 LAB
ANION GAP SERPL CALCULATED.3IONS-SCNC: 6 MMOL/L (ref 3–14)
BUN SERPL-MCNC: 11 MG/DL (ref 7–30)
CALCIUM SERPL-MCNC: 8 MG/DL (ref 8.5–10.1)
CHLORIDE SERPL-SCNC: 101 MMOL/L (ref 94–109)
CO2 SERPL-SCNC: 31 MMOL/L (ref 20–32)
CREAT SERPL-MCNC: 0.68 MG/DL (ref 0.52–1.04)
ERYTHROCYTE [DISTWIDTH] IN BLOOD BY AUTOMATED COUNT: 16.6 % (ref 10–15)
GFR SERPL CREATININE-BSD FRML MDRD: 85 ML/MIN/1.7M2
GLUCOSE BLDC GLUCOMTR-MCNC: 146 MG/DL (ref 70–99)
GLUCOSE BLDC GLUCOMTR-MCNC: 149 MG/DL (ref 70–99)
GLUCOSE BLDC GLUCOMTR-MCNC: 167 MG/DL (ref 70–99)
GLUCOSE BLDC GLUCOMTR-MCNC: 180 MG/DL (ref 70–99)
GLUCOSE SERPL-MCNC: 154 MG/DL (ref 70–99)
HCT VFR BLD AUTO: 24.3 % (ref 35–47)
HGB BLD-MCNC: 7.4 G/DL (ref 11.7–15.7)
MAGNESIUM SERPL-MCNC: 1.9 MG/DL (ref 1.6–2.3)
MCH RBC QN AUTO: 24.2 PG (ref 26.5–33)
MCHC RBC AUTO-ENTMCNC: 30.5 G/DL (ref 31.5–36.5)
MCV RBC AUTO: 79 FL (ref 78–100)
PLATELET # BLD AUTO: 205 10E9/L (ref 150–450)
POTASSIUM SERPL-SCNC: 3.6 MMOL/L (ref 3.4–5.3)
RBC # BLD AUTO: 3.06 10E12/L (ref 3.8–5.2)
SODIUM SERPL-SCNC: 137 MMOL/L (ref 133–144)
WBC # BLD AUTO: 8.7 10E9/L (ref 4–11)

## 2017-09-03 PROCEDURE — 25000132 ZZH RX MED GY IP 250 OP 250 PS 637: Performed by: COLON & RECTAL SURGERY

## 2017-09-03 PROCEDURE — 97112 NEUROMUSCULAR REEDUCATION: CPT | Mod: GP | Performed by: PHYSICAL THERAPIST

## 2017-09-03 PROCEDURE — 97530 THERAPEUTIC ACTIVITIES: CPT | Mod: GP | Performed by: PHYSICAL THERAPIST

## 2017-09-03 PROCEDURE — 94660 CPAP INITIATION&MGMT: CPT

## 2017-09-03 PROCEDURE — 40000275 ZZH STATISTIC RCP TIME EA 10 MIN

## 2017-09-03 PROCEDURE — 25000132 ZZH RX MED GY IP 250 OP 250 PS 637: Performed by: STUDENT IN AN ORGANIZED HEALTH CARE EDUCATION/TRAINING PROGRAM

## 2017-09-03 PROCEDURE — 97165 OT EVAL LOW COMPLEX 30 MIN: CPT | Mod: GO

## 2017-09-03 PROCEDURE — 97116 GAIT TRAINING THERAPY: CPT | Mod: GP | Performed by: PHYSICAL THERAPIST

## 2017-09-03 PROCEDURE — 80048 BASIC METABOLIC PNL TOTAL CA: CPT | Performed by: STUDENT IN AN ORGANIZED HEALTH CARE EDUCATION/TRAINING PROGRAM

## 2017-09-03 PROCEDURE — 25000132 ZZH RX MED GY IP 250 OP 250 PS 637: Performed by: SURGERY

## 2017-09-03 PROCEDURE — 83735 ASSAY OF MAGNESIUM: CPT | Performed by: STUDENT IN AN ORGANIZED HEALTH CARE EDUCATION/TRAINING PROGRAM

## 2017-09-03 PROCEDURE — 36415 COLL VENOUS BLD VENIPUNCTURE: CPT | Performed by: STUDENT IN AN ORGANIZED HEALTH CARE EDUCATION/TRAINING PROGRAM

## 2017-09-03 PROCEDURE — 25000128 H RX IP 250 OP 636: Performed by: SURGERY

## 2017-09-03 PROCEDURE — 40000133 ZZH STATISTIC OT WARD VISIT

## 2017-09-03 PROCEDURE — 00000146 ZZHCL STATISTIC GLUCOSE BY METER IP

## 2017-09-03 PROCEDURE — 40000193 ZZH STATISTIC PT WARD VISIT: Performed by: PHYSICAL THERAPIST

## 2017-09-03 PROCEDURE — 97535 SELF CARE MNGMENT TRAINING: CPT | Mod: GO

## 2017-09-03 PROCEDURE — 12000001 ZZH R&B MED SURG/OB UMMC

## 2017-09-03 PROCEDURE — 85027 COMPLETE CBC AUTOMATED: CPT | Performed by: STUDENT IN AN ORGANIZED HEALTH CARE EDUCATION/TRAINING PROGRAM

## 2017-09-03 RX ORDER — OXYCODONE HYDROCHLORIDE 5 MG/1
5 TABLET ORAL EVERY 4 HOURS PRN
Status: DISCONTINUED | OUTPATIENT
Start: 2017-09-03 | End: 2017-09-03

## 2017-09-03 RX ADMIN — ACETAMINOPHEN 1000 MG: 500 TABLET, FILM COATED ORAL at 16:40

## 2017-09-03 RX ADMIN — OXYCODONE HYDROCHLORIDE 5 MG: 5 TABLET ORAL at 00:58

## 2017-09-03 RX ADMIN — SODIUM CHLORIDE, POTASSIUM CHLORIDE, SODIUM LACTATE AND CALCIUM CHLORIDE: 600; 310; 30; 20 INJECTION, SOLUTION INTRAVENOUS at 08:30

## 2017-09-03 RX ADMIN — ACETAMINOPHEN 1000 MG: 500 TABLET, FILM COATED ORAL at 10:06

## 2017-09-03 RX ADMIN — LIDOCAINE 1 PATCH: 50 PATCH CUTANEOUS at 22:57

## 2017-09-03 RX ADMIN — ENOXAPARIN SODIUM 40 MG: 40 INJECTION SUBCUTANEOUS at 10:06

## 2017-09-03 RX ADMIN — OXYCODONE HYDROCHLORIDE 5 MG: 5 TABLET ORAL at 10:06

## 2017-09-03 RX ADMIN — LOSARTAN POTASSIUM 25 MG: 25 TABLET, FILM COATED ORAL at 09:18

## 2017-09-03 RX ADMIN — PAROXETINE 60 MG: 20 TABLET, FILM COATED ORAL at 22:57

## 2017-09-03 RX ADMIN — ACETAMINOPHEN 1000 MG: 500 TABLET, FILM COATED ORAL at 22:57

## 2017-09-03 RX ADMIN — ACETAMINOPHEN 1000 MG: 500 TABLET, FILM COATED ORAL at 04:06

## 2017-09-03 ASSESSMENT — ACTIVITIES OF DAILY LIVING (ADL): PREVIOUS_RESPONSIBILITIES: MEAL PREP;HOUSEKEEPING;LAUNDRY;SHOPPING;YARDWORK;MEDICATION MANAGEMENT;FINANCES;DRIVING

## 2017-09-03 ASSESSMENT — PAIN DESCRIPTION - DESCRIPTORS
DESCRIPTORS: SORE

## 2017-09-03 NOTE — PLAN OF CARE
Problem: Goal Outcome Summary  Goal: Goal Outcome Summary  Outcome: Improving  SBA. AVSS on 0-1 L NC.   and 180. Pt. Appeared groggy this morning but after waking up fully has been A&O, no s/sx or confusion. Tolerating low fiber diet. Denies nausea. Pain well controlled with 5-10 mg oxycodone q 4 hours and scheduled Tylenol. Has not set off bed or chair alarm today, calling appropriately. VPM on. Midline incision with dermabond, CDI. Lap sites in tact. PT/OT consulted. Lido patches removed. IVF d/c'd, adequate oral intake. Needs Lovenox teaching.

## 2017-09-03 NOTE — PROGRESS NOTES
09/03/17 1233   Quick Adds   Type of Visit Initial Occupational Therapy Evaluation   Living Environment   Lives With child(cindy), adult;other (see comments)  (30 year old son, 1 dog American bull dog)   Living Arrangements house   Home Accessibility bed and bath on same level;tub/shower is not walk in;other (see comments);stairs (1 railing present);stairs (2 railings present);stairs to enter home;stairs within home  (claw foot tub)   Number of Stairs to Enter Home 10   Number of Stairs Within Home 24  (12 main to UL and 12 main to LL)   Stair Railings at Home outside, present on left side;outside, present at both sides;inside, present on left side  (outside 6 both sides, 4 one side)   Transportation Available car;family or friend will provide   Living Environment Comment Does not need to access LL, laundry there can get assist   Self-Care   Dominant Hand right   Usual Activity Tolerance moderate   Current Activity Tolerance fair   Regular Exercise yes   Activity/Exercise Type walking   Exercise Amount/Frequency 30 mins;3-5 times/wk   Equipment Currently Used at Home none   Functional Level Prior   Ambulation 0-->independent   Transferring 0-->independent   Toileting 0-->independent   Bathing 0-->independent   Dressing 0-->independent   Eating 0-->independent   Communication 0-->understands/communicates without difficulty   Swallowing 0-->swallows foods/liquids without difficulty   Cognition 0 - no cognition issues reported   Fall history within last six months no   Which of the above functional risks had a recent onset or change? none   Prior Functional Level Comment I per PLOF. Son works 12 hours a day 6 AM to 6 PM.       Present no   Language English   General Information   Onset of Illness/Injury or Date of Surgery - Date 08/31/17   Referring Physician Corinna Kidd MD    Patient/Family Goals Statement No stated goals.   Additional Occupational Profile Info/Pertinent History of Current  Problem 67 year old female, who is POD # 4  following lap assisted sigmoidectomy. She initially had runs of afib and vtach in the PACU, but that resolved overnight and she was asymptomatic   Precautions/Limitations fall precautions;abdominal precautions   Weight-Bearing Status - LUE other (see comments)  (10#)   Weight-Bearing Status - RUE other (see comments)  (10#)   Weight-Bearing Status - LLE full weight-bearing   Weight-Bearing Status - RLE full weight-bearing   General Info Comments activity:  ambulate with assist   Cognitive Status Examination   Orientation orientation to person, place and time   Level of Consciousness alert;confused   Able to Follow Commands mild impairment   Personal Safety (Cognitive) mild impairment;at risk behaviors demonstrated   Memory impaired   Attention Distractible during evaluation;Quiet environment required   Cognitive Comment 20/30 MoCA cognitive screen.    Visual Perception   Visual Perception No deficits were identified;Wears glasses   Sensory Examination   Sensory Quick Adds No deficits were identified   Pain Assessment   Patient Currently in Pain Yes, see Vital Sign flowsheet   Range of Motion (ROM)   ROM Quick Adds No deficits were identified   ROM Comment B UE gross screen WFL   Strength   Manual Muscle Testing Quick Adds Other   Strength Comments B UE gross screen MMT 3+/5 lightly tested 2/2 precautions   Hand Strength   Hand Strength Comments Good R hand fair L hand gross grasp   Muscle Tone Assessment   Muscle Tone Quick Adds No deficits were identified   Coordination   Coordination Comments Decreased MCP/PIP L hand digits 1-4 due to arthritis, unable to make full grasp   Lower Body Dressing   Level of Bowman: Dress Lower Body minimum assist (75% patients effort)   Physical Assist/Nonphysical Assist: Dress Lower Body set-up required;1 person assist   Instrumental Activities of Daily Living (IADL)   Previous Responsibilities meal  "prep;housekeeping;laundry;shopping;yardwork;medication management;finances;driving   IADL Comments Son assist with heavy IADLs   Activities of Daily Living Analysis   Impairments Contributing to Impaired Activities of Daily Living cognition impaired;pain;post surgical precautions;strength decreased;ROM decreased   General Therapy Interventions   Planned Therapy Interventions ADL retraining;IADL retraining;cognition;strengthening;home program guidelines;progressive activity/exercise;risk factor education   Clinical Impression   Criteria for Skilled Therapeutic Interventions Met yes, treatment indicated   OT Diagnosis Risk of deconditioning impacting I with ADLs   Assessment of Occupational Performance 3-5 Performance Deficits   Identified Performance Deficits Cognition, LB dressing, bathing, home management   Clinical Decision Making (Complexity) Low complexity   Therapy Frequency other (see comments)  (6xweek)   Predicted Duration of Therapy Intervention (days/wks) 1 week 9/9/17   Anticipated Equipment Needs at Discharge bathing equipment;other (see comments);shower chair;dressing equipment  (Long handle sponge/loofah)   Anticipated Discharge Disposition Home with Assist;Home with Home Therapy;Transitional Care Facility   Risks and Benefits of Treatment have been explained. Yes   Patient, Family & other staff in agreement with plan of care Yes   [facility] TM \"6 Clicks\"   2016, Trustees of [facility], under license to [employer].  All rights reserved.   6 Clicks Short Forms Daily Activity Inpatient Short Form   [facility]  \"6 Clicks\" Daily Activity Inpatient Short Form   1. Putting on and taking off regular lower body clothing? 3 - A Little   2. Bathing (including washing, rinsing, drying)? 3 - A Little   3. Toileting, which includes using toilet, bedpan or urinal? 3 - A Little   4. Putting on and taking off regular upper body clothing? 4 - None   5. Taking care of personal " grooming such as brushing teeth? 4 - None   6. Eating meals? 4 - None   Daily Activity Raw Score (Score out of 24.Lower scores equate to lower levels of function) 21   Total Evaluation Time   Total Evaluation Time (Minutes) 5

## 2017-09-03 NOTE — PROGRESS NOTES
"Subjective: No acute issues overnight. Drowsy, but arousable this AM. Seen again at the end of rounds; doing well. Pain controlled, tolerated diet without n/v. Last flatus and BM was yesterday. Ambulated. Lives with son, but doesn't feel ready for d/c today. Hypoxia improved with CPAP last night. Now on 1L NC from 3L NC.    Objective:   /63 (BP Location: Left arm)  Pulse 87  Temp 98.3  F (36.8  C) (Axillary)  Resp 18  Ht 1.664 m (5' 5.51\")  Wt 105.1 kg (231 lb 12.8 oz)  SpO2 95%  BMI 37.97 kg/m2    PE:  Gen: Awake, alert, NAD   CV: RRR  Resp: non-labored at rest  Abd: Soft, non-distended, NTTP  Ext: warm and well perfused  Incision: c/d/i without surrounding erythema. Has small areas of ecchymosis from lovenox injections that are stable in size.    I/O last 3 completed shifts:  In: 2400 [P.O.:600; I.V.:1800]  Out: 1550 [Urine:1550] - Last 24 hours      Labs/Imaging  Heme:  Recent Labs  Lab 09/03/17  0718 09/02/17  0913 09/02/17  0730 09/01/17  0627 08/31/17  0637   WBC 8.7 10.8  --  18.2* 13.6*   HGB 7.4* 7.5*  --  8.2* 8.1*    236 212 250 221     Chem:  Recent Labs  Lab 09/03/17  0718 08/30/17  2158 08/30/17  1842 08/29/17  1601   POTASSIUM 3.6  --  4.2 4.4   CR 0.68 0.70 0.78 0.73         A/P:Zach Olmos is a 67 year old female, who is POD # 4  following lap assisted sigmoidectomy. She initially had runs of afib and vtach in the PACU, but that resolved and she has been asymptomatic. She is doing well after surgery with improvement in agitation after restarting home CPAP.       NEURO Continue home paxil.   Pain well controlled on  PO oxycodone PRN, IV dilaudid PRN   CV HDS. Lasartan.   PULM  I/S. 1L NC,wean off as tolerated   FEN/GI D/c MIVF. Continue current diet     No acute issues, good UOP    HEME Hgb as above.    ID Afebrile, improving leukocytosis. F/u blood cultures, currently NGTD x 24 hours.   ENDO Goal BG < 180 unachieved. Currently high dose insulin correction scale. Appreciate recs " from Endocrine.   ACTIVITY PT/OT consulted for dispo planning.  Ambulate at least TID    PPx Prophylactic lovenox           Patient seen and discussed with the Fellow.    Corinna Kidd, PGY-1  General Surgery    Physician Attestation   I, Bernadine Young, saw this patient with the resident and agree with the resident s findings and plan of care as documented in the resident s note.      I personally reviewed vital signs, medications and labs.    Key findings: looks well. Glucoses better controlled. Wound not infected.  Plan as agove.  Will decrease oxycodone to 5 mg dose in order to limit confusion and low score on cognitive testing- discussed with patient.    Bernadine Young  Date of Service (when I saw the patient): 09/03/17

## 2017-09-03 NOTE — PLAN OF CARE
Problem: Goal Outcome Summary  Goal: Goal Outcome Summary  Outcome: Therapy, progress towards functional goals is fair  OT 7C: OT eval and treatment completed. Scored 20/30 on cognitive screen, MoCA, normal 26 and above. Educated on precautions and min assist for lower body dressing due to cueing for incisional straining. Chair alarm on for safety during session. O2 89-95% and HR 93 during session.      Recommend home with 24 hour assist and home OT/PT due to cognitive deficits and max assist for driving, med management, and finances or TCU if 24 hour assist not available to increase strength and activity tolerance for ADLs, safety with ADLs within precautions and to monitor cognitive deficits.

## 2017-09-03 NOTE — PROGRESS NOTES
"  Care Coordinator- Discharge Planning     Admission Date/Time:  8/30/2017  Attending MD:  Carlos June MD     Data  Date of initial CC assessment:  Today after report from the interdisciplinary team.   Chart reviewed, discussed with interdisciplinary team.   Patient was admitted for:   1. S/P laparoscopic-assisted sigmoidectomy    2. Carcinoma of sigmoid colon (H)         Assessment  Ms. Olmos has recommendations as of today for 24 hour assistance upon return home verses TCU placement if 24 hour assistance is not available. Patient is hoping to improve and wishes to not have to be discharged to a TCU.  In light of the above, the following home care plans of care have been arranged in the event Ms. Olmos can discharge to home:    Please fax discharge orders to [facility]     Ph:  [phone]     Fax: [phone]     Skilled home care RN for initial home safety evaluation and 1-3 times a week to evaluate medication management, nutrition and hydration evaluation, endurance evaluation, and general status evaluation after discharge from the acute care hospital setting status post surgery.     Skilled RN to assist with management and education reinforcement with home lovenox injections as prescribed.    Skilled home Care Physical Therapist to Evaluate and treat in home setting.     Skilled home care Occupational Therapist to evaluate and treat in home setting and to perform a cognition evaluation in her home setting.     Per patient, she will check with her children this evening to inquire if they can stay with her for a few days after discharge.  \"Both my son and daughter work but, maybe they can rotate their work schedules around to be with me.\"    Coordination of Care and Referrals: Provided patient/family with options for home care agency of choice in home area.      Plan  Anticipated Discharge Date:  To be determined.  Anticipated Discharge Plan:  To be determined.  As above if Ms. Olmos can " safely discharge to home verses possible TCU placement.  If TCU needed, inpatient Social Work Team will need to assist with locating a TCU of choice.    CTS Handoff completed: (Clinic Letter)  Sent to clinic.    LONNIE Styles.S.JUSTIN., P.H.N.,R.N.         Pager

## 2017-09-03 NOTE — PROGRESS NOTES
Diabetes Consult Daily Progress Note    Assessment/Plan:      Assessment/plan  1. Diabetes mellitus type 2 with historically poor control at home.  BSs in good control over 24 hours  -- continue current regimen     Patient was seen with Dr. Smith.     Akira Issa MD  Endocrine Fellow  [phone]     Interval History:    Talked to nursing staff, last night, pt tried to get out of bed, intermittent confused and could not keep the CPAP mask on. Switched to oxygen mask. She was fine during the day  Talked to the pt, she had fish for dinner yesterday, finished half of the plate. No N/V  This morning, just woke up and did not order breakfast yet.  IV fluid running     Current Diabetes Medications:   Lantus 14 units at bedtime  Aspart 1:10 g CHO and medium SSI      Active Diet Order    Active Diet Order      Diet      Low Fiber Diet      Exam:      B/P: 122/63, T: 98.3, P: 87, R: 18    General: NAD. Lying in bed on oxygen mask (Pt used CPAP at home and has CPAP at bedside)  HEENT: NC/AT, mucous membranes are moist.  Resp: Unlabored breathing.   Neuro: drowsy but engage and communicate clearly.   Ext: no swelling or edema  Data:        Recent Labs  Lab 09/03/17  0803 09/03/17  0718 09/03/17  0159 09/02/17  2204 09/02/17  1744 09/02/17  1229 09/02/17  0912  08/30/17  1842  08/29/17  1601   GLC  --  154*  --   --   --   --   --   --  282*  --  136*   *  --  167* 182* 145* 171* 170*  < >  --   < >  --    < > = values in this interval not displayed.  Lab Results   Component Value Date    A1C 9.3 08/31/2017       Attending tie-in statement: Patient  discussed fellow whose note I have reviewed and with which I agree.

## 2017-09-03 NOTE — PLAN OF CARE
Problem: Goal Outcome Summary  Goal: Goal Outcome Summary  Outcome: Improving  Assumed care for this patient at 1930.  Patient up in chair for dinner.  Up in room with stand-by assist.  Oxycodone administered for pain with reported relief, also on scheduled meds.  Lidocaine patches on abdomen and IcyHot patch removed from back. Oxiplus mask at 1LPM while awake, now wearing CPAP at HS.  Voiding adequate amounts.  Reports positive flatus.  Tolerating low fiber diet.  BG monitoring continues AC/HS/2am, on Lantus and Novolog sliding scale and carb coverage.  Oriented x4, but remains on VPM and bed alarm for safety as she does not use call light consistently.

## 2017-09-03 NOTE — PLAN OF CARE
Problem: Goal Outcome Summary  Goal: Goal Outcome Summary  Outcome: Improving  AVSS.  CPAP overnight, pt tolerated well.  Forgetful at times, but cooperative, using call light.  Bed Alarm ON for safety overnight, VPM on.  Oxycodone for abd pain, + scheduled tylenol.  Lido patches ON overnight.  Midline with dermabond, c/d/i.  Up with SBA to BR, voiding good vols.  + flatus.  PIV@75/hr.  Denies nausea, tolerating diet.    02 UO=235.  Cont with POC.

## 2017-09-03 NOTE — PLAN OF CARE
Problem: Goal Outcome Summary  Goal: Goal Outcome Summary  Outcome: Therapy, progress toward functional goals as expected  PT-7C: pt has progressed to independent bed mobility, transfers & gait on level without device, maintaining abd precautions with all mobility. SBA for stairs. Scored 20/24 on DGI; scores of 19/24 or higher are associated with lower risk for falls. Recommend discharge to home when medically appropriate with assist from son prn.

## 2017-09-04 ENCOUNTER — APPOINTMENT (OUTPATIENT)
Dept: OCCUPATIONAL THERAPY | Facility: CLINIC | Age: 67
DRG: 331 | End: 2017-09-04
Attending: COLON & RECTAL SURGERY
Payer: COMMERCIAL

## 2017-09-04 LAB
GLUCOSE BLDC GLUCOMTR-MCNC: 127 MG/DL (ref 70–99)
GLUCOSE BLDC GLUCOMTR-MCNC: 148 MG/DL (ref 70–99)
GLUCOSE BLDC GLUCOMTR-MCNC: 158 MG/DL (ref 70–99)
GLUCOSE BLDC GLUCOMTR-MCNC: 173 MG/DL (ref 70–99)
GLUCOSE BLDC GLUCOMTR-MCNC: 197 MG/DL (ref 70–99)

## 2017-09-04 PROCEDURE — 25000132 ZZH RX MED GY IP 250 OP 250 PS 637: Performed by: COLON & RECTAL SURGERY

## 2017-09-04 PROCEDURE — 40000133 ZZH STATISTIC OT WARD VISIT

## 2017-09-04 PROCEDURE — 97535 SELF CARE MNGMENT TRAINING: CPT | Mod: GO

## 2017-09-04 PROCEDURE — 00000146 ZZHCL STATISTIC GLUCOSE BY METER IP

## 2017-09-04 PROCEDURE — 25000128 H RX IP 250 OP 636: Performed by: SURGERY

## 2017-09-04 PROCEDURE — 25000132 ZZH RX MED GY IP 250 OP 250 PS 637: Performed by: SURGERY

## 2017-09-04 PROCEDURE — 12000001 ZZH R&B MED SURG/OB UMMC

## 2017-09-04 PROCEDURE — 25000132 ZZH RX MED GY IP 250 OP 250 PS 637: Performed by: STUDENT IN AN ORGANIZED HEALTH CARE EDUCATION/TRAINING PROGRAM

## 2017-09-04 RX ADMIN — ACETAMINOPHEN 1000 MG: 500 TABLET, FILM COATED ORAL at 04:11

## 2017-09-04 RX ADMIN — ACETAMINOPHEN 1000 MG: 500 TABLET, FILM COATED ORAL at 10:24

## 2017-09-04 RX ADMIN — MENTHOL 1 PATCH: 205.5 PATCH TOPICAL at 13:59

## 2017-09-04 RX ADMIN — ACETAMINOPHEN 1000 MG: 500 TABLET, FILM COATED ORAL at 16:19

## 2017-09-04 RX ADMIN — ENOXAPARIN SODIUM 40 MG: 40 INJECTION SUBCUTANEOUS at 10:24

## 2017-09-04 RX ADMIN — LOSARTAN POTASSIUM 25 MG: 25 TABLET, FILM COATED ORAL at 08:50

## 2017-09-04 RX ADMIN — PAROXETINE 60 MG: 20 TABLET, FILM COATED ORAL at 22:15

## 2017-09-04 RX ADMIN — LIDOCAINE 1 PATCH: 50 PATCH CUTANEOUS at 22:22

## 2017-09-04 NOTE — PROGRESS NOTES
Diabetes Consult Daily Progress Note    Assessment/Plan:      Assessment/plan  1. Diabetes mellitus type 2 with historically poor control at home.  Usual home Diabetes Regimen: Lantus 12 units at bedtime and Metformin 1,000mg twice a day.  BSs in good control over 24 hours on basal bolus  -- continue current regimen     Patient was seen with Dr. Smith.     Akira Issa MD  Endocrine Fellow  [phone]     Interval History:    Pt said food is going well, she had fish again for dinner yesterday. No N/V.  Plan to be discharge home with 24/7 or TCU  Glucose 149-180    Current Diabetes Medications:   Lantus 14 units at bedtime  Aspart 1:10 g CHO and medium SSI    Total daily insulin  9/3/17: 30 units  9/2/17: 25 units  9/1/17: 30 units  8/31/17: 30 units      Active Diet Order    Active Diet Order      Diet      Low Fiber Diet      Exam:      B/P: 122/63, T: 98.3, P: 87, R: 18    General: NAD. Lying in bed on CPAP   HEENT: NC/AT, mucous membranes are moist.  Resp: Unlabored breathing.   Neuro: alert and orientedx3  Ext: no swelling or edema  Data:        Recent Labs  Lab 09/04/17  0142 09/03/17  2217 09/03/17  1149 09/03/17  0803 09/03/17  0718 09/03/17  0159 09/02/17  2204  08/30/17  1842  08/29/17  1601   GLC  --   --   --   --  154*  --   --   --  282*  --  136*   * 146* 180* 149*  --  167* 182*  < >  --   < >  --    < > = values in this interval not displayed.  Lab Results   Component Value Date    A1C 9.3 08/31/2017     Attending tie-in statement: Patient seen and examined by me, separate from fellow, at around 1:15 PM. She is sitting in chair eating lunch when I saw her.  She tells me discharge is not planned due to lack of BM.   Patient discussed with  fellow whose note I have reviewed and with which I agree.  If she is discharged today OK to go back to outpatient preadmission  Metformin and past DM regimen. However, she needs to continue to monitor her BS as outpatient and follow up with her PCP  as it is possible she will need additional therapy as outpatient.     Hayley Smith MD

## 2017-09-04 NOTE — PLAN OF CARE
Problem: Goal Outcome Summary  Goal: Goal Outcome Summary  Outcome: No Change  AVSS.  CPAP overnight, pt tolerated well.  Denies pain, scheduled tylenol + Lido patches with relief.  Pt aware oxycodone is available.  Denies nausea, tolerating LFD.  SL.  Bed Alarm ON, VPM in use - pt forgetful at times, using call light appropriate.  Abd incision with brusing.  02 CH=843.  Patchy rash on LE - pt using cream at home - not new since hospitalization.    Up to BR with SBA, voiding good vols.  +BS, BM on days.    Per OT, pt will need 24 hr supervision.  Pt states son can provide part, will check with daughter.  [facility] referral placed.  Cont with POC.

## 2017-09-04 NOTE — PLAN OF CARE
Problem: Goal Outcome Summary  Goal: Goal Outcome Summary  OT 7C: Patient presented with improved cognition this AM. Recalled therapist and able to state 5/5 safety questions correctly. Completed simulated med management task and scored 2/4 correctly and required significant amount of additional time and max assist x1 for 2/4 meds for correction.      Recommend home with assist for heavy household chores and running errands. Home health to assist with med management/set up and home OT for safety evaluation

## 2017-09-04 NOTE — PLAN OF CARE
Problem: Goal Outcome Summary  Goal: Goal Outcome Summary  Outcome: Therapy, progress toward functional goals is gradual  VSS. Up SBA. Denies dizziness/lightheadness. Tolerating low fiber diet. Blood glucose checks QID and 0200. Pt forgot to call when ordering dinner. BG not checked before diner. Carb coverage given. Hypoactive bowel sounds. -PG (today)/+ BM (today). Midline and lap sites ecchymotic. Lovenox teaching given. On RA (was on Oxymask 1L). Pain is well controlled with just Tylenol this evening. Oxycodone dose changed form 5mg to 2.5mg q4h. VPM and alarms in use. Alert and oriented x 4, forgetful. Per OT pt will need 24 supervision. Pt states son can provide part of that care. Pt has not checked with her daughter yet. Home nursing referral placed. Continue POC.

## 2017-09-04 NOTE — PROGRESS NOTES
Care Coordinator- Discharge Planning     Admission Date/Time:  8/30/2017  Attending MD:  Carlos June MD     Data  Date of initial CC assessment:  9/3/2017  Chart reviewed, discussed with interdisciplinary team.   Patient was admitted for:   1. S/P laparoscopic-assisted sigmoidectomy    2. Carcinoma of sigmoid colon (H)         Coordination of Care and Referrals: Received call from resident Srini, who needed home care set up for patient. Per Hailey Morrissey RNCC note, home care orders were written. Orders to be  faxed to [facility] on Tuesday, Setpember 5 prior to patient dc'ing home.      _______________________________________________________________________  [facility]  Phone  [phone]  Fax  [phone]  _______________________________________________________________________        Plan  Anticipated Discharge Date:  9/5/2017  Anticipated Discharge Plan:  Home with Home Care      Sarah Whelan RN, BSN  Care Coordinator, 8A  Phone [phone]  Pager [phone]

## 2017-09-04 NOTE — PLAN OF CARE
Problem: Goal Outcome Summary  Goal: Goal Outcome Summary  Outcome: Therapy, progress toward functional goals as expected  POD5 of lap sigmoidectomy. A&Ox4, VSS on room air. Denies pain. Icy hot patch on. PIV is SL. LS clear, BS active. Tolerating a regular diet. BG checks AC/HS with SSI and CC. Incision is open to air and CDI. Passing gas, BM yesterday. Plan is to DC tomorrow with home care to follow.

## 2017-09-04 NOTE — PROGRESS NOTES
"Colorectal Surgery Progress Note    Subjective: No acute issues overnight. Pain controlled. Denies fevers, chills, CP, SOB, and N/V. Tolerating diet. Voiding. Passing flatus, and BM.    Objective:   /79 (BP Location: Right arm)  Pulse 71  Temp 96.8  F (36  C) (Oral)  Resp 18  Ht 1.664 m (5' 5.51\")  Wt 105.1 kg (231 lb 12.8 oz)  SpO2 94%  BMI 37.97 kg/m2    Gen: Awake, alert, NAD   CV: RRR  Resp: non-labored at rest  Abd: Soft, non-distended, NTTP  Ext: warm and well perfused  Incision: c/d/i without surrounding erythema. Has small areas of ecchymosis from lovenox injections that are stable in size.       A/P: Zach Olmos is a 67 year old female, who is POD # 5  following lap assisted sigmoidectomy. She initially had runs of afib and vtach in the PACU, but that resolved and she has been asymptomatic. She is doing well after surgery with improvement in agitation after restarting home CPAP.     -Cleared for discharge to home with home health RN for medication assistance and home OT. These services are not available today due to the holiday and thus cannot be set up.  -Will plan to d/c tomorrow AM once home services have been set up  -Endocrine prefers to keep current BG management while inpatient and patient may resume outpatient medication on discharge with close follow up with PCP.    Discussed with staff.    Tj Scott MD  General Surgery PGY-3    Attending addendum:  Seen and examined, agree with above documentation. Discussed with Dr. Scott  "

## 2017-09-05 ENCOUNTER — APPOINTMENT (OUTPATIENT)
Dept: PHYSICAL THERAPY | Facility: CLINIC | Age: 67
DRG: 331 | End: 2017-09-05
Attending: COLON & RECTAL SURGERY
Payer: COMMERCIAL

## 2017-09-05 VITALS
HEIGHT: 66 IN | SYSTOLIC BLOOD PRESSURE: 168 MMHG | DIASTOLIC BLOOD PRESSURE: 55 MMHG | TEMPERATURE: 97.5 F | HEART RATE: 85 BPM | WEIGHT: 232 LBS | BODY MASS INDEX: 37.28 KG/M2 | RESPIRATION RATE: 16 BRPM | OXYGEN SATURATION: 97 %

## 2017-09-05 LAB
C DIFF TOX B STL QL: NEGATIVE
GLUCOSE BLDC GLUCOMTR-MCNC: 132 MG/DL (ref 70–99)
GLUCOSE BLDC GLUCOMTR-MCNC: 143 MG/DL (ref 70–99)
GLUCOSE BLDC GLUCOMTR-MCNC: 171 MG/DL (ref 70–99)
PLATELET # BLD AUTO: 282 10E9/L (ref 150–450)
SPECIMEN SOURCE: NORMAL

## 2017-09-05 PROCEDURE — 94660 CPAP INITIATION&MGMT: CPT

## 2017-09-05 PROCEDURE — 36415 COLL VENOUS BLD VENIPUNCTURE: CPT | Performed by: SURGERY

## 2017-09-05 PROCEDURE — 25000132 ZZH RX MED GY IP 250 OP 250 PS 637: Performed by: COLON & RECTAL SURGERY

## 2017-09-05 PROCEDURE — 85049 AUTOMATED PLATELET COUNT: CPT | Performed by: SURGERY

## 2017-09-05 PROCEDURE — 97112 NEUROMUSCULAR REEDUCATION: CPT | Mod: GP

## 2017-09-05 PROCEDURE — 40000193 ZZH STATISTIC PT WARD VISIT

## 2017-09-05 PROCEDURE — 25000132 ZZH RX MED GY IP 250 OP 250 PS 637

## 2017-09-05 PROCEDURE — 25000132 ZZH RX MED GY IP 250 OP 250 PS 637: Performed by: STUDENT IN AN ORGANIZED HEALTH CARE EDUCATION/TRAINING PROGRAM

## 2017-09-05 PROCEDURE — 97530 THERAPEUTIC ACTIVITIES: CPT | Mod: GP

## 2017-09-05 PROCEDURE — 97116 GAIT TRAINING THERAPY: CPT | Mod: GP

## 2017-09-05 PROCEDURE — 00000146 ZZHCL STATISTIC GLUCOSE BY METER IP

## 2017-09-05 PROCEDURE — 40000275 ZZH STATISTIC RCP TIME EA 10 MIN

## 2017-09-05 PROCEDURE — 25000128 H RX IP 250 OP 636: Performed by: SURGERY

## 2017-09-05 PROCEDURE — 87493 C DIFF AMPLIFIED PROBE: CPT | Performed by: COLON & RECTAL SURGERY

## 2017-09-05 RX ORDER — OXYCODONE HYDROCHLORIDE 5 MG/1
2.5 TABLET ORAL EVERY 4 HOURS PRN
Qty: 10 TABLET | Refills: 0 | Status: SHIPPED | OUTPATIENT
Start: 2017-09-05 | End: 2017-09-12

## 2017-09-05 RX ORDER — ACETAMINOPHEN 500 MG
1000 TABLET ORAL EVERY 6 HOURS
Qty: 400 TABLET | Refills: 0 | Status: SHIPPED | OUTPATIENT
Start: 2017-09-05

## 2017-09-05 RX ADMIN — LOSARTAN POTASSIUM 25 MG: 25 TABLET, FILM COATED ORAL at 08:35

## 2017-09-05 RX ADMIN — ACETAMINOPHEN 1000 MG: 500 TABLET, FILM COATED ORAL at 01:41

## 2017-09-05 RX ADMIN — ACETAMINOPHEN 1000 MG: 500 TABLET, FILM COATED ORAL at 14:07

## 2017-09-05 RX ADMIN — ACETAMINOPHEN 1000 MG: 500 TABLET, FILM COATED ORAL at 08:35

## 2017-09-05 RX ADMIN — BISMUTH SUBSALICYLATE 15 ML: 262 LIQUID ORAL at 01:29

## 2017-09-05 RX ADMIN — ENOXAPARIN SODIUM 40 MG: 40 INJECTION SUBCUTANEOUS at 10:42

## 2017-09-05 ASSESSMENT — PAIN DESCRIPTION - DESCRIPTORS: DESCRIPTORS: OTHER (COMMENT)

## 2017-09-05 NOTE — PLAN OF CARE
Problem: Goal Outcome Summary  Goal: Goal Outcome Summary  Outcome: Therapy, progress toward functional goals is gradual  VSS. Denies pain but taking scheduled tylenol tabs and pain patches. UAL in room, SBA in ambulation in hallways. Abd incision/lap sites with intact derma bond, incision with bruising. +BS, passing flatus, loose stools, negative C.diff. Good uop. Pt tolerating diet, BGs checked, insulin given per sliding scale and per carbs consumption. IS/CDB done.  P: continue to monitor status and continue with POC. DC planned in the luis when ride is available.    Addendum 4:27 PM  DC instructions and follow up appts reviewed with pt using teach back. To home with Home Care at around 7-7:30PM.

## 2017-09-05 NOTE — PROGRESS NOTES
[facility]  Met with pt to discuss plans for HC.  Pt to be discharged home 9/5 and has agreed to have [facility] follow with services of . Patient care support center processing referral.  Pt verbalized understanding that initial visit is scheduled for 1 to 2 days after discharge.   Pt has 24 hour phone number for [facility] for any questions or concerns.    Regina Mccloud RN, BSN  [facility] Liaison  [phone]

## 2017-09-05 NOTE — PLAN OF CARE
Problem: Goal Outcome Summary  Goal: Goal Outcome Summary  Outcome: Improving  VSS. Alert and oriented x4, calling appropriately. Pain comfortably managed with scheduled tylenol, IcyHot and lidocaine patches. Tolerating low fiber diet. BG with meals and bedtime, SS and CC insulin given. +passing flatus, +BM today. Voiding spont, adequate amounts. Up with SBA. PIV saline locked. Pt resting comfortably. Continue POC. Possible D/C home tomorrow with home care.

## 2017-09-05 NOTE — PLAN OF CARE
Problem: Goal Outcome Summary  Goal: Goal Outcome Summary  PT 7C: Pt ambulates 2 x 275' with SBA to Independent without AD; pt endorses slowed gait speed and slight imbalance. Completes 3 x 4 stairs with 1 rail and SBA. Tolerates dynamic balance exercises well with SBA - CGA. Completes bed mobility independently     Recommend discharge home with assist as needed once medically appropriate.

## 2017-09-05 NOTE — PROGRESS NOTES
Colorectal Surgery Progress Note  POD#6      Subjective:  Feels a little bloated this AM.  Had multiple stools this AM (6x), but very small and has consistency of 'peanut butter.'  Otherwise doing ok.  Her son (liam) does not get off of work until 6pm so would not be able to dc until about 7pm tonight.      Vitals:  Vitals:    09/04/17 0900 09/04/17 1013 09/04/17 1516 09/04/17 2205   BP:   145/51 170/55   BP Location:   Left arm Right arm   Cuff Size:       Pulse:   85    Resp:   16 20   Temp:   99  F (37.2  C) 99.7  F (37.6  C)   TempSrc:   Oral Oral   SpO2: 94%  94% 95%   Weight:  106 kg (233 lb 9.6 oz)     Height:         I/O:  I/O last 3 completed shifts:  In: 300 [P.O.:300]  Out: 1100 [Urine:1100]    Physical Exam:  Gen: AAOx3, NAD  Pulm: Non-labored breathing  Abd: Soft, mildly distended, appropriately tender, no guarding/rebound   Incision C/D/I  Ext:  Warm and well-perfused    BMP  Recent Labs  Lab 09/03/17  0718 08/30/17  2158 08/30/17  1842 08/29/17  1601     --  138 137   POTASSIUM 3.6  --  4.2 4.4   CHLORIDE 101  --  104 102   CO2 31  --  24 27   BUN 11  --  13 15   CR 0.68 0.70 0.78 0.73   *  --  282* 136*   MAG 1.9  --  1.6  --    PHOS  --   --  5.1*  --      CBC  Recent Labs  Lab 09/05/17  0612 09/03/17  0718 09/02/17  0913 09/02/17  0730 09/01/17  0627 08/31/17  0637   WBC  --  8.7 10.8  --  18.2* 13.6*   HGB  --  7.4* 7.5*  --  8.2* 8.1*   HCT  --  24.3* 24.7*  --  27.8* 27.0*    205 236 212 250 221         ASSESSMENT: This is a 67 year old female with PMH of DM2, HTN, depression, ANGEL on CPAP, recently diagnosed with sigmoid cancer.  Now POD# 6 lap sigmoid colectomy.     Neuro/Pain: home paxil.  Tylenol 1000 QID, lidoderm patches, low dose oxycodone.    CV: home losartan  PULM: encourage IS.  GI/FEN: LRD.  Ambulate.  : NANCY  Heme:  Hgb 7.4.  [facility].   ID: NANCY  Endocrine: home lantus 14 U qHS.  With scale and meal time coverage while inpatient.  Resume  metformin at discharge.   Activity: as tolerated.  Ppx: lovenox  Dispo: DC later this evening.  Will need 30 days of lovenox injections.        Jennifer Calhoun PA-C   Colon and Rectal Surgery  [id number]     Patient was seen and discussed with Dr. Pedroza.         Post discharge addendum:  Acute on chronic anemia, multifactorial - inherent to procedure, dilution, on chronic anemia in the setting of known colon cancer.

## 2017-09-05 NOTE — PLAN OF CARE
Problem: Goal Outcome Summary  Goal: Goal Outcome Summary  Outcome: Improving  All vitals stable. Complained of indigestion discomfort. PRN Bismuth subsalicylate  given with relief. Pt able to rest soundly the remainder the of the shift.  Denies nausea and vomiting, tolerating low fiber diet.  Ambulated to bathroom twice with SBA. Making needs known calling out appropriately. Slept comfortably throughout night. Plan is for patient to be discharged this morning.     Update 0640 hours: Pt has had multiple loose stools since last evening. Colorectal team aware. On enteric precautions awaiting for next stool so that sample can be taken.

## 2017-09-05 NOTE — PROGRESS NOTES
Care Coordinator- Discharge Planning     Admission Date/Time:  8/30/2017  Attending MD:  Carlos June MD     Data  Date of initial CC assessment:  8/31/2017  Chart reviewed, discussed with interdisciplinary team.   Patient was admitted for:   1. S/P laparoscopic-assisted sigmoidectomy    2. Carcinoma of sigmoid colon (H)         Assessment  Concerns with insurance coverage for discharge needs: None.  Current Living Situation: Patient lives with family/lives with son.  Support System: Supportive  Services Involved: None at this time  Transportation: Family or Friend will provide  Barriers to Discharge: None        Coordination of Care and Referrals: Provided patient/family with options for N/A.      Plan  Anticipated Discharge Date:  9/5/17  Anticipated Discharge Plan:  Home    CTS Handoff completed:  YES  Arleth Clay RN

## 2017-09-05 NOTE — DISCHARGE SUMMARY
[facility]  Discharge Summary  Colon and Rectal Surgery     Zach Olmos MRN# [id number]   YOB: 1950 Age: 67 year old     Date of Admission:  8/30/2017  Date of Discharge::  9/5/2017  Admitting Physician:  Carlos June MD  Discharge Physician:    Primary Care Physician:        Kody Castrejon          Admission Diagnoses:   S/P laparoscopic-assisted sigmoidectomy [Z90.49]  Sigmoid adenocarcinoma  Chronic anemia          Discharge Diagnosis:   Sigmoid adenocarcinoma    Acute on chronic anemia secondary to expected acute blood loss from surgery and dilution from post-operative fluid resuscitation in the setting of chronic anemia and sigmoid adenocarcinoma.        Procedures:   8/30 - Laparoscopic assisted sigmoid colectomy          Consultations:   OCCUPATIONAL THERAPY ADULT IP CONSULT  PHYSICAL THERAPY ADULT IP CONSULT  VASCULAR ACCESS CARE ADULT IP CONSULT  ENDOCRINE DIABETES ADULT IP CONSULT  VASCULAR ACCESS CARE ADULT IP CONSULT  VASCULAR ACCESS CARE ADULT IP CONSULT  VASCULAR ACCESS ADULT IP CONSULT  VASCULAR ACCESS CARE ADULT IP CONSULT  PHYSICAL THERAPY ADULT IP CONSULT  OCCUPATIONAL THERAPY ADULT IP CONSULT         Imaging Studies:     Results for orders placed or performed during the hospital encounter of 08/30/17   XR Chest 2 Views    Narrative    EXAMINATION: XR CHEST 2 VW, 9/2/2017 10:30 AM    COMPARISON: None    HISTORY: r/o pneumonia    FINDINGS: Asymmetric elevation of the right hemidiaphragm. Cardiac  silhouette is within normal limits. No significant pleural effusion.  Basilar atelectasis.      Impression    IMPRESSION: Asymmetric right hemidiaphragm elevation and basilar  atelectasis. Otherwise clear lungs.     JUNG PETER MD          Medications Prior to Admission:     Prescriptions Prior to Admission   Medication Sig Dispense Refill Last Dose     HYDROXYZINE PAMOATE PO Take 25 mg by mouth 3 times daily as needed for anxiety   8/29/2017 at 2000      insulin glargine (LANTUS) 100 UNIT/ML injection Inject 12 Units Subcutaneous At Bedtime   8/29/2017 at 2000     triamcinolone (KENALOG) 0.1 % ointment Apply topically as needed    Past Week at Unknown time     multivitamin, therapeutic with minerals (MULTI-VITAMIN) TABS tablet Take 1 tablet by mouth daily   Past Week at Unknown time     metFORMIN (GLUCOPHAGE) 1000 MG tablet Take 1,000 mg by mouth 2 times daily (with meals)   8/29/2017 at 2000     losartan (COZAAR) 50 MG tablet Take 25 mg by mouth every morning    8/29/2017 at 0800     PARoxetine (PAXIL) 40 MG tablet Take 60 mg by mouth At Bedtime    8/29/2017 at 2000     [DISCONTINUED] metroNIDAZOLE (FLAGYL) 500 MG tablet Take 1 tablet (500 mg) by mouth every 8 hours for 1 day prior to surgery.  Take in conjunction with Neomycin. 3 tablet 0      [DISCONTINUED] neomycin (MYCIFRADIN) 500 MG tablet Take 2 tablets (1,000 mg) by mouth every 8 hours for 1 day prior to surgery.  Take in conjunction with Flagyl. 6 tablet 0      [DISCONTINUED] ondansetron (ZOFRAN) 4 MG tablet Take 1 tablet (4 mg) by mouth every 6 hours as needed for nausea when taking Neomycin and Flagyl. 3 tablet 0 8/29/2017 at 2000     aspirin (OK ASPIRIN EC LOW DOSE) 81 MG EC tablet Take 81 mg by mouth every morning    8/26/2017              Discharge Medications:     Current Discharge Medication List      START taking these medications    Details   enoxaparin (LOVENOX) 40 MG/0.4ML injection Inject 0.4 mLs (40 mg) Subcutaneous every 24 hours for 23 days  Qty: 9.2 mL, Refills: 0    Associated Diagnoses: Carcinoma of sigmoid colon (H)      acetaminophen (TYLENOL) 500 MG tablet Take 2 tablets (1,000 mg) by mouth every 6 hours  Qty: 400 tablet, Refills: 0    Associated Diagnoses: Carcinoma of sigmoid colon (H)      oxyCODONE (ROXICODONE) 5 MG IR tablet Take 0.5 tablets (2.5 mg) by mouth every 4 hours as needed for moderate to severe pain  Qty: 10 tablet, Refills: 0    Associated Diagnoses: Carcinoma of  sigmoid colon (H)         CONTINUE these medications which have NOT CHANGED    Details   HYDROXYZINE PAMOATE PO Take 25 mg by mouth 3 times daily as needed for anxiety      insulin glargine (LANTUS) 100 UNIT/ML injection Inject 12 Units Subcutaneous At Bedtime      triamcinolone (KENALOG) 0.1 % ointment Apply topically as needed       multivitamin, therapeutic with minerals (MULTI-VITAMIN) TABS tablet Take 1 tablet by mouth daily      metFORMIN (GLUCOPHAGE) 1000 MG tablet Take 1,000 mg by mouth 2 times daily (with meals)      losartan (COZAAR) 50 MG tablet Take 25 mg by mouth every morning       PARoxetine (PAXIL) 40 MG tablet Take 60 mg by mouth At Bedtime       aspirin (OK ASPIRIN EC LOW DOSE) 81 MG EC tablet Take 81 mg by mouth every morning          STOP taking these medications       metroNIDAZOLE (FLAGYL) 500 MG tablet Comments:   Reason for Stopping:         neomycin (MYCIFRADIN) 500 MG tablet Comments:   Reason for Stopping:         ondansetron (ZOFRAN) 4 MG tablet Comments:   Reason for Stopping:                   Brief History of Illness:   Zach is very pleasant 67-year-old woman referred by the [facility] for treatment of a recently diagnosed sigmoid cancer. The reason for referral was [facility]. Zach has been asymptomatic. She underwent colonoscopy after a positive fecal occult blood test. A non-circumferential lesion was noted at 18 cm. This required use of an upper GI endoscope to reach the cecum. The remaining colon was normal. Biopsies demonstrated invasive adenocarcinoma. Zach has had no bowel symptoms. She denies rectal bleeding or obstructive symptoms. Her appetite has been fine and her weight is stable. She has had no issues with bowel control.           Hospital Course:   She underwent the above named procedure and tolerated it well. She had some brief runs of Vtach and Afib in the PACU and was monitored on  "telemetry overnight. This resolved with correction of electrolytes and EKG and troponin were normal. She was transferred to the general care floor. Her diet was advanced and pain controlled on oral medication. OT determined she was safe to return with home OT and home health RN. On the day of discharge she was ambulating, tolerating a diet, having anterograde bowel function and her pain was controlled with PO medication. She was discharged safely to home.    Patient is to follow up in the Colon and Rectal Surgery Clinic in 1 week with Rose Mary Shannon NP and then with Dr. June in 2-3 weeks after.   She will also follow up with oncology for evaluation of the need for post-operative chemotherapy.         Day of Discharge Physical Exam:   Blood pressure 163/64, pulse 84, temperature 97.2  F (36.2  C), temperature source Oral, resp. rate 18, height 1.664 m (5' 5.51\"), weight 105.2 kg (232 lb), SpO2 94 %.    Gen: AAOx3, NAD  Pulm: Non-labored breathing  Abd: Soft, appropriately tender, no guarding   Incision C/D/I  Ext:  Warm and well-perfused         Final Pathology Result:   A: Sigmoid colon: Low grade colon adenocarcinoma, extending into but not through muscularis propria (pT2)    - Resection margins negative for malignancy    - 30 lymph nodes with no evidence of malignancy    -pT2N0  B: Anastomotic ring - No evidence of malignancy          Discharge Instructions and Follow-Up:     Discharge Procedure Orders  Home care nursing referral   Referral Type: Home Health Therapies & Aides     Home Care OT Referral for Hospital Discharge     Reason for your hospital stay   Order Comments: You underwent a laparoscopic sigmoidectomy     Adult [facility] Follow-up and recommended labs and tests   Order Comments: DIET  -Low Residue Diet for at least 4-6 weeks unless cleared by Colorectal surgery.  No raw vegetables, fruit skins, fibrous foods that require a lot of chewing, nuts, seeds, corn, popcorn.   -We recommend " eating slowly, chewing thoroughly, eating small frequent meals throughout the day  -Stay well hydrated.      ACTIVITY  -No lifting, pushing, pulling greater than 10 lbs and no strenuous exercise for 6 weeks   -No driving while on narcotic analgesics (i.e. Percocet, oxycodone, Vicodin)  -No driving until you are able to fully twist to both sides or slam on brakes quickly and without any pain    WOUND CLINIC  -Inspect your wounds daily for signs of infection (increased redness, drainage, pain)  -Keep your wound clean and dry  -You may shower, but do not soak in tub or pool    NOTIFY  Please contact Kristi Addison LPN or Melanie Ferraro RN at [phone] for problems after discharge such as:  -Temperature > 101F, chills, rigors, dizziness  -Redness around or purulent drainage from wound  -Inability to tolerate diet, nausea or vomiting  -You stop passing gas, develop significant bloating, abdominal pain  -Have blood in stools/vomit  -Have severe diarrhea/constipation  -Any other questions or concerns.  - At nights (after 4:30pm), on weekends, or if urgent, call [phone] and ask the  to speak with the on-call Colorectal Surgery resident or fellow      Medication Instructions  Some of your medications may have changed. Please take only prescribed and resumed medications     FOLLOW-UP  1.  You will need to follow-up with Rose Mary Shannon NP in the Colon and Rectal Surgery clinic in 1 week(s) and then with CRS Staff: Dr. June in 2-3 weeks after.  Please contact our clinic scheduler Liliane Lopez (phone # [phone]) or Gloria Glover (phone # [phone]) if you have not heard from our clinic in 3 business days afer discharge to schedule a follow-up appointment.       Appointments on [place] and/or [facility] (with [facility] or [facility] provider or service). Call [phone] if you haven't heard regarding these appointments within 7 days of discharge.     Activity   Order Comments: Your activity upon  discharge:   -No lifting, pushing, pulling greater than 10 lbs and no strenuous exercise for 6 weeks   -No driving while on narcotic analgesics (i.e. Percocet, oxycodone, Vicodin)  -No driving until you are able to fully twist to both sides or slam on brakes quickly and without any pain   Order Specific Question Answer Comments   Is discharge order? Yes      Full Code     Diet   Order Comments: Follow this diet upon discharge:   -Low Residue Diet for at least 4-6 weeks unless cleared by Colorectal surgery.  No raw vegetables, fruit skins, fibrous foods that require a lot of chewing, nuts, seeds, corn, popcorn.   -We recommend eating slowly, chewing thoroughly, eating small frequent meals throughout the day  -Stay well hydrated.   Order Specific Question Answer Comments   Is discharge order? Yes               Home Health Care:   Arranged           Discharge Disposition:   Discharged to home      Condition at discharge: Stable    Tj Scott MD  General Surgery PGY-3    Physician Attestation   I, Bernadine Young, saw this patient with the resident and agree with the resident s findings and plan of care as documented in the resident s note.      I personally reviewed vital signs, medications and labs.    Key findings: looks great.BM and flatus today. Feels well. Wound without infection. Stable for [place] home.    Bernadine Young  Date of Service (when I saw the patient): 09/05/17

## 2017-09-06 ENCOUNTER — CARE COORDINATION (OUTPATIENT)
Dept: CARE COORDINATION | Facility: CLINIC | Age: 67
End: 2017-09-06

## 2017-09-06 ENCOUNTER — TELEPHONE (OUTPATIENT)
Dept: SURGERY | Facility: CLINIC | Age: 67
End: 2017-09-06

## 2017-09-06 NOTE — TELEPHONE ENCOUNTER
"Called to check on patient after hospital discharge. She states that she is doing well. She is not having any pain. She is having \"squirts\" of stool a few times a day. She denies any nausea or vomiting. She denies any fevers or chills. Would like to see her in follow up in one week and she confirms appointment date and time of 9/12/17 at 1400. Encouraged the patient to contact the clinic in the meantime with any questions or concerns.    KIMBERLY Hernández, NP-C  Colon and Rectal Surgery  [facility]    "

## 2017-09-06 NOTE — PROGRESS NOTES
Patient was Discharged from Colon and Rectal Surgery so their Care Coordinators will handle patient's Post Discharge Follow up        [facility]  Discharge Summary  Colon and Rectal Surgery      Zach Olmos MRN# [id number]   YOB: 1950 Age: 67 year old      Date of Admission:                                          8/30/2017  Date of Discharge::                                          9/5/2017  Admitting Physician:                                        Carlos June MD  Discharge Physician:                                         Primary Care Physician:        Kody Castrejon

## 2017-09-06 NOTE — PLAN OF CARE
Problem: Goal Outcome Summary  Goal: Goal Outcome Summary  Outcome: Adequate for Discharge Date Met:  09/05/17  AVSS, A&Ox4. Writer removed PIV immediately prior to leaving 7C. Pt left floor @approx 1940 by wheelchair accompanied by writer, discharge meds and personal belongings. Picked up by patient's son at hospital front door, discharging to home with home care.      Please refer to previous note - previous RN Lucia CHING Reviewed all discharge education and AVS with pt, all questions answered at that time, pt had no questions for me.

## 2017-09-06 NOTE — PLAN OF CARE
Problem: Goal Outcome Summary  Goal: Goal Outcome Summary  Physical Therapy Discharge Summary     Reason for therapy discharge:    Discharged to home with home therapy.     Progress towards therapy goal(s). See goals on Care Plan in [facility] electronic health record for goal details.  Goals met     Therapy recommendation(s):    Continue home exercise program.   OT for home safety eval with higher level ADLs

## 2017-09-08 LAB
BACTERIA SPEC CULT: NO GROWTH
BACTERIA SPEC CULT: NO GROWTH
SPECIMEN SOURCE: NORMAL
SPECIMEN SOURCE: NORMAL

## 2017-09-12 ENCOUNTER — OFFICE VISIT (OUTPATIENT)
Dept: SURGERY | Facility: CLINIC | Age: 67
End: 2017-09-12

## 2017-09-12 VITALS
BODY MASS INDEX: 35.39 KG/M2 | OXYGEN SATURATION: 93 % | TEMPERATURE: 99.3 F | HEART RATE: 81 BPM | WEIGHT: 220.2 LBS | SYSTOLIC BLOOD PRESSURE: 156 MMHG | DIASTOLIC BLOOD PRESSURE: 55 MMHG | HEIGHT: 66 IN

## 2017-09-12 DIAGNOSIS — Z09 FOLLOW-UP EXAMINATION FOLLOWING SURGERY: Primary | ICD-10-CM

## 2017-09-12 DIAGNOSIS — C18.7 CANCER OF SIGMOID COLON (H): ICD-10-CM

## 2017-09-12 ASSESSMENT — PAIN SCALES - GENERAL: PAINLEVEL: NO PAIN (0)

## 2017-09-12 NOTE — PROGRESS NOTES
Colon and Rectal Surgery Postoperative Clinic Note    RE: Zach Olmos  : 1950  BARRETT: 2017    Zach Olmos is a very pleasant 67 year old female  with carcinoma of the distal sigmoid colon who is now status post laparoscopic sigmoid colectomy on 2017 with Dr. June. Her postoperative course was uneventful and she was discharged to home on 2017. She presents today for follow-up.    Interval history: Zach has been doing well since returning home. She denies any pain. She is having soft bowel movements daily. She is eating and drinking without difficulty and tolerating a low residue diet. She denies any nausea or vomiting. She denies any fevers or chills.    Assessment/Plan:  67 year old female status post laparoscopic sigmoid colectomy on 2017 with Dr. June. 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 [facility] and she will contact them to get this set up. She is otherwise recovering quite well from surgery. Midline incision is healing without any signs of infection. Advised her to remain on a low residue diet for another 2 weeks and then she can slowly add fiber back into her diet. Notify the clinic for any uncontrolled pain, nausea, vomiting, fevers, chills, or with any questions or concerns. Will have her follow up with Dr. June in the next 3-4 weeks as well.  Patient's questions were answered to her stated satisfaction and she is in agreement with this plan.    Medical history:  Past Medical History:   Diagnosis Date     Anxiety      Cancer of sigmoid colon (H)      Depression      Diabetic retinopathy (H)      Diverticulosis      DM (diabetes mellitus) (H)      Granuloma annulare      HLD (hyperlipidemia)      HTN (hypertension)      Obese      ANGEL on CPAP        Surgical history:  Past Surgical History:   Procedure Laterality Date      COLONOSCOPY       LAPAROSCOPIC ASSISTED SIGMOID COLECTOMY N/A 8/30/2017    Procedure: LAPAROSCOPIC ASSISTED SIGMOID COLECTOMY;  Laparoscopic Assisted Sigmoid Colectomy, Flexible Sigmoidoscopy  ;  Surgeon: Carlos June MD;  Location: [facility] OR     SIGMOIDOSCOPY FLEXIBLE N/A 8/30/2017    Procedure: SIGMOIDOSCOPY FLEXIBLE;;  Surgeon: Carlos June MD;  Location: [facility] OR       Problem list:  Patient Active Problem List    Diagnosis Date Noted     Carcinoma of sigmoid colon (H) 08/30/2017     Priority: Medium     Diabetes mellitus (H) 08/08/2017     Priority: Medium       Medications:  Current Outpatient Prescriptions   Medication Sig Dispense Refill     enoxaparin (LOVENOX) 40 MG/0.4ML injection Inject 0.4 mLs (40 mg) Subcutaneous every 24 hours for 23 days 9.2 mL 0     acetaminophen (TYLENOL) 500 MG tablet Take 2 tablets (1,000 mg) by mouth every 6 hours 400 tablet 0     HYDROXYZINE PAMOATE PO Take 25 mg by mouth 3 times daily as needed for anxiety       insulin glargine (LANTUS) 100 UNIT/ML injection Inject 12 Units Subcutaneous At Bedtime       triamcinolone (KENALOG) 0.1 % ointment Apply topically as needed        multivitamin, therapeutic with minerals (MULTI-VITAMIN) TABS tablet Take 1 tablet by mouth daily       aspirin (OK ASPIRIN EC LOW DOSE) 81 MG EC tablet Take 81 mg by mouth every morning        metFORMIN (GLUCOPHAGE) 1000 MG tablet Take 1,000 mg by mouth 2 times daily (with meals)       losartan (COZAAR) 50 MG tablet Take 25 mg by mouth every morning        PARoxetine (PAXIL) 40 MG tablet Take 60 mg by mouth At Bedtime          Allergies:  Allergies   Allergen Reactions     Aricept [Donepezil]      Per [facility] records.       Atorvastatin Other (See Comments)     Dizzyness, tremors     Glipizide      Tremors, dizzy     Lisinopril      ? Reaction (per [facility])       Family history:  Family History   Problem Relation Age of Onset     Uterine Cancer Maternal Aunt        Social history:  Social History  "  Substance Use Topics     Smoking status: Never Smoker     Smokeless tobacco: Never Used     Alcohol use No     Marital status: .     Jay Addison LPN  9/12/2017  2:03 PM  Signed  Chief Complaint   Patient presents with     Surgical Followup     Post op appt, Pt had surgery with Dr. June.        Vitals:    09/12/17 1400   BP: 156/55   BP Location: Left arm   Patient Position: Chair   Cuff Size: Adult Regular   Pulse: 81   Temp: 99.3  F (37.4  C)   TempSrc: Oral   SpO2: 93%   Weight: 220 lb 3.2 oz   Height: 5' 5.51\"       Body mass index is 36.07 kg/(m^2).  Kristi MOCK LPN                           Physical Examination:  /55 (BP Location: Left arm, Patient Position: Chair, Cuff Size: Adult Regular)  Pulse 81  Temp 99.3  F (37.4  C) (Oral)  Ht 5' 5.51\"  Wt 220 lb 3.2 oz  SpO2 93%  BMI 36.07 kg/m2  General: Alert, oriented, in no acute distress, sitting comfortably  HEENT:  mucous membranes moist  Abdomen: soft, nondistended, nontender on palpation. Midline incision well approximated with no erythema and no drainage present.    Total face to face time was 15 minutes, >50% counseling.  This is a postoperative visit.     KIMBERLY Hernández, NP-C  Colon and Rectal Surgery  [facility]    This note was created using speech recognition software and may contain unintended word substitutions.    "

## 2017-09-12 NOTE — NURSING NOTE
"Chief Complaint   Patient presents with     Surgical Followup     Post op appt, Pt had surgery with Dr. June.        Vitals:    09/12/17 1400   BP: 156/55   BP Location: Left arm   Patient Position: Chair   Cuff Size: Adult Regular   Pulse: 81   Temp: 99.3  F (37.4  C)   TempSrc: Oral   SpO2: 93%   Weight: 220 lb 3.2 oz   Height: 5' 5.51\"       Body mass index is 36.07 kg/(m^2).  Kristi MOCK LPN                        "

## 2017-09-12 NOTE — MR AVS SNAPSHOT
After Visit Summary   2017    Zach Olmos    MRN: [id number]           Patient Information     Date Of Birth          1950        Visit Information        Provider Department      2017 2:00 PM Rose Mary Gregory APRN [facility] Colon and Rectal Surgery         Follow-ups after your visit        Your next 10 appointments already scheduled     Sep 26, 2017 11:30 AM CDT   (Arrive by 11:15 AM)   Return Visit with MD MARY Ervin [facility] Colon and Rectal Surgery ([facility] [place])    [address] [place] [id number] [phone]              Who to contact     Please call your clinic at [phone] to:    Ask questions about your health    Make or cancel appointments    Discuss your medicines    Learn about your test results    Speak to your doctor   If you have compliments or concerns about an experience at your clinic, or if you wish to file a complaint, please contact [facility] Patient Relations at [phone] or email us at [email or web address]         Additional Information About Your Visit        MyChart Information     Blue Rooster is an electronic gateway that provides easy, online access to your medical records. With Blue Rooster, you can request a clinic appointment, read your test results, renew a prescription or communicate with your care team.     To sign up for Blue Rooster visit the website at [email or web address]   You will be asked to enter the access code listed below, as well as some personal information. Please follow the directions to create your username and password.     Your access code is: [email or web address]  Expires: 10/30/2017  6:31 AM     Your access code will  in 90 days. If you need help or a new code, please contact your [facility] or call [phone] for assistance.        Care EveryWhere ID     This is your Care EveryWhere ID.  "This could be used by other organizations to access your [place] medical records  [id number]        Your Vitals Were     Pulse Temperature Height Pulse Oximetry BMI (Body Mass Index)       81 99.3  F (37.4  C) (Oral) 5' 5.51\" 93% 36.07 kg/m2        Blood Pressure from Last 3 Encounters:   09/12/17 156/55   09/05/17 168/55   08/29/17 166/67    Weight from Last 3 Encounters:   09/12/17 220 lb 3.2 oz   09/05/17 232 lb   08/29/17 225 lb 12.8 oz              Today, you had the following     No orders found for display       Primary Care Provider Office Phone # Fax #    Brown Bender [phone]       [facility] [address]  [facility] [address]        Equal Access to Services     [facility]: Hadii hue ocampo hadasho Soshandra, waaxda luqadaha, qaybta kaalmada adeegyada, karina henriquez hayalonzo bonilla . So [facility] [phone].    ATENCIÓN: Si habla español, tiene a patricio disposición servicios gratuitos de asistencia lingüística. James al [phone].    We comply with applicable federal civil rights laws and [place] laws. We do not discriminate on the basis of race, color, national origin, age, disability sex, sexual orientation or gender identity.            Thank you!     Thank you for choosing [facility] COLON AND RECTAL SURGERY  for your care. Our goal is always to provide you with excellent care. Hearing back from our patients is one way we can continue to improve our services. Please take a few minutes to complete the written survey that you may receive in the mail after your visit with us. Thank you!             Your Updated Medication List - Protect others around you: Learn how to safely use, store and throw away your medicines at www.disposemymeds.org.          This list is accurate as of: 9/12/17  2:18 PM.  Always use your most recent med list.                   Brand Name Dispense Instructions for use Diagnosis    acetaminophen 500 MG tablet    TYLENOL    400 tablet "    Take 2 tablets (1,000 mg) by mouth every 6 hours    Carcinoma of sigmoid colon (H)       OK ASPIRIN EC LOW DOSE 81 MG EC tablet   Generic drug:  aspirin      Take 81 mg by mouth every morning        enoxaparin 40 MG/0.4ML injection    LOVENOX    9.2 mL    Inject 0.4 mLs (40 mg) Subcutaneous every 24 hours for 23 days    Carcinoma of sigmoid colon (H)       HYDROXYZINE PAMOATE PO      Take 25 mg by mouth 3 times daily as needed for anxiety        insulin glargine 100 UNIT/ML injection    LANTUS     Inject 12 Units Subcutaneous At Bedtime        losartan 50 MG tablet    COZAAR     Take 25 mg by mouth every morning        metFORMIN 1000 MG tablet    GLUCOPHAGE     Take 1,000 mg by mouth 2 times daily (with meals)        Multi-vitamin Tabs tablet      Take 1 tablet by mouth daily        PARoxetine 40 MG tablet    PAXIL     Take 60 mg by mouth At Bedtime        triamcinolone 0.1 % ointment    KENALOG     Apply topically as needed

## 2017-09-26 ENCOUNTER — OFFICE VISIT (OUTPATIENT)
Dept: SURGERY | Facility: CLINIC | Age: 67
End: 2017-09-26

## 2017-09-26 VITALS
TEMPERATURE: 98.4 F | HEIGHT: 66 IN | OXYGEN SATURATION: 98 % | WEIGHT: 213.6 LBS | BODY MASS INDEX: 34.33 KG/M2 | HEART RATE: 102 BPM | DIASTOLIC BLOOD PRESSURE: 50 MMHG | SYSTOLIC BLOOD PRESSURE: 131 MMHG

## 2017-09-26 DIAGNOSIS — Z09 FOLLOW-UP EXAMINATION FOLLOWING SURGERY: Primary | ICD-10-CM

## 2017-09-26 DIAGNOSIS — C18.7 CANCER OF SIGMOID COLON (H): ICD-10-CM

## 2017-09-26 ASSESSMENT — PAIN SCALES - GENERAL: PAINLEVEL: NO PAIN (0)

## 2017-09-26 NOTE — NURSING NOTE
"Chief Complaint   Patient presents with     Surgical Followup     Post op visit.        Vitals:    09/26/17 1121   BP: 131/50   BP Location: Left arm   Patient Position: Chair   Cuff Size: Adult Large   Pulse: 102   Temp: 98.4  F (36.9  C)   TempSrc: Oral   SpO2: 98%   Weight: 213 lb 9.6 oz   Height: 5' 5.51\"       Body mass index is 34.99 kg/(m^2).    Kristi MOCK LPN                     "

## 2017-09-26 NOTE — PROGRESS NOTES
Colon and Rectal Surgery Postoperative Clinic Note    RE: Zach Olmos  : 1950  BARRETT: 2017    Zach Olmos is a very pleasant 67 year old female  with carcinoma of the distal sigmoid colon who is now status post laparoscopic sigmoid colectomy on 2017 with Dr. June. Her postoperative course was uneventful and she was discharged to home on 2017. She was seen in clinic on 17 and presents today for follow up.    Interval history: Zach has been doing well. She denies any pain. She is having soft bowel movements daily. She is eating and drinking without difficulty and tolerating a low residue diet. She denies any nausea or vomiting. She denies any fevers or chills. She has two open sites in her wound that she would like assessed today.    Assessment/Plan:  67 year old female status post laparoscopic sigmoid colectomy on 2017 with Dr. June. 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 [facility]. She is otherwise recovering quite well from surgery. Midline incision with two open areas. This was assessed by Dr. June today as well. Suture and some fibrinous tissue were trimmed out of the superior portion of the wound. I taught her how to pack this with NuGauze. Return to clinic in 3-4 weeks if not completely healed. [facility] June recommended flexible sigmoidoscopy in  Notify the clinic for any uncontrolled pain, nausea, vomiting, fevers, chills, or with any questions or concerns. Will have her follow up with Dr. June in the next 3-4 weeks as well.  Patient's questions were answered to her stated satisfaction and she is in agreement with this plan.    Medical history:  Past Medical History:   Diagnosis Date     Anxiety      Cancer of sigmoid colon (H)      Depression      Diabetic retinopathy (H)       Diverticulosis      DM (diabetes mellitus) (H) 1997     Granuloma annulare      HLD (hyperlipidemia)      HTN (hypertension)      Obese      ANGEL on CPAP        Surgical history:  Past Surgical History:   Procedure Laterality Date     COLONOSCOPY       LAPAROSCOPIC ASSISTED SIGMOID COLECTOMY N/A 8/30/2017    Procedure: LAPAROSCOPIC ASSISTED SIGMOID COLECTOMY;  Laparoscopic Assisted Sigmoid Colectomy, Flexible Sigmoidoscopy  ;  Surgeon: Carlos June MD;  Location: [facility] OR     SIGMOIDOSCOPY FLEXIBLE N/A 8/30/2017    Procedure: SIGMOIDOSCOPY FLEXIBLE;;  Surgeon: Carlos June MD;  Location: [facility] OR       Problem list:  Patient Active Problem List    Diagnosis Date Noted     Carcinoma of sigmoid colon (H) 08/30/2017     Priority: Medium     Diabetes mellitus (H) 08/08/2017     Priority: Medium       Medications:  Current Outpatient Prescriptions   Medication Sig Dispense Refill     enoxaparin (LOVENOX) 40 MG/0.4ML injection Inject 0.4 mLs (40 mg) Subcutaneous every 24 hours for 23 days 9.2 mL 0     acetaminophen (TYLENOL) 500 MG tablet Take 2 tablets (1,000 mg) by mouth every 6 hours 400 tablet 0     HYDROXYZINE PAMOATE PO Take 25 mg by mouth 3 times daily as needed for anxiety       insulin glargine (LANTUS) 100 UNIT/ML injection Inject 12 Units Subcutaneous At Bedtime       triamcinolone (KENALOG) 0.1 % ointment Apply topically as needed        multivitamin, therapeutic with minerals (MULTI-VITAMIN) TABS tablet Take 1 tablet by mouth daily       aspirin (OK ASPIRIN EC LOW DOSE) 81 MG EC tablet Take 81 mg by mouth every morning        metFORMIN (GLUCOPHAGE) 1000 MG tablet Take 1,000 mg by mouth 2 times daily (with meals)       losartan (COZAAR) 50 MG tablet Take 25 mg by mouth every morning        PARoxetine (PAXIL) 40 MG tablet Take 60 mg by mouth At Bedtime          Allergies:  Allergies   Allergen Reactions     Aricept [Donepezil]      Per [facility] records.       Atorvastatin Other (See Comments)     Dizzyness,  "tremors     Glipizide      Tremors, dizzy     Lisinopril      ? Reaction (per [facility])       Family history:  Family History   Problem Relation Age of Onset     Uterine Cancer Maternal Aunt        Social history:  Social History   Substance Use Topics     Smoking status: Never Smoker     Smokeless tobacco: Never Used     Alcohol use No     Marital status: .     Jay Addison LPN  9/12/2017  2:03 PM  Signed  Chief Complaint   Patient presents with     Surgical Followup     Post op appt, Pt had surgery with Dr. June.        Vitals:    09/12/17 1400   BP: 156/55   BP Location: Left arm   Patient Position: Chair   Cuff Size: Adult Regular   Pulse: 81   Temp: 99.3  F (37.4  C)   TempSrc: Oral   SpO2: 93%   Weight: 220 lb 3.2 oz   Height: 5' 5.51\"       Body mass index is 36.07 kg/(m^2).  Kristi MOCK LPN                           Physical Examination:  /55 (BP Location: Left arm, Patient Position: Chair, Cuff Size: Adult Regular)  Pulse 81  Temp 99.3  F (37.4  C) (Oral)  Ht 5' 5.51\"  Wt 220 lb 3.2 oz  SpO2 93%  BMI 36.07 kg/m2  General: Alert, oriented, in no acute distress, sitting comfortably  HEENT:  mucous membranes moist  Abdomen: soft, nondistended, nontender on palpation. Midline incision with two open areas in the superior and inferior portions of the wound. Some fibrinous tissue and suture trimmed out of the superior portion of the wound. No purulent drainage. Mild local erythema. Packed with 1/4 inch NuGauze.    Total face to face time was 15 minutes, >50% counseling.  This is a postoperative visit.     KIMBERLY Hernández, NP-C  Colon and Rectal Surgery  [facility]    This note was created using speech recognition software and may contain unintended word substitutions.    "

## 2017-09-26 NOTE — MR AVS SNAPSHOT
After Visit Summary   2017    Zach Olmos    MRN: [id number]           Patient Information     Date Of Birth          1950        Visit Information        Provider Department      2017 11:30 AM Rose Mary Gregory APRN [facility] Colon and Rectal Surgery        Today's Diagnoses     Follow-up examination following surgery    -  1    Cancer of sigmoid colon (H)           Follow-ups after your visit        Who to contact     Please call your clinic at [phone] to:    Ask questions about your health    Make or cancel appointments    Discuss your medicines    Learn about your test results    Speak to your doctor   If you have compliments or concerns about an experience at your clinic, or if you wish to file a complaint, please contact [facility] Patient Relations at [phone] or email us at [email or web address]         Additional Information About Your Visit        MyChart Information     "[employer] is an electronic gateway that provides easy, online access to your medical records. With "[employer] you can request a clinic appointment, read your test results, renew a prescription or communicate with your care team.     To sign up for "[employer] visit the website at [email or web address]   You will be asked to enter the access code listed below, as well as some personal information. Please follow the directions to create your username and password.     Your access code is: [email or web address]  Expires: 10/30/2017  6:31 AM     Your access code will  in 90 days. If you need help or a new code, please contact your [facility] or call [phone] for assistance.        Care EveryWhere ID     This is your Care EveryWhere ID. This could be used by other organizations to access your [place] medical records  [id number]        Your Vitals Were     Pulse Temperature Height Pulse Oximetry BMI (Body Mass Index)        "102 98.4  F (36.9  C) (Oral) 1.664 m (5' 5.51\") 98% 34.99 kg/m2        Blood Pressure from Last 3 Encounters:   09/26/17 131/50   09/12/17 156/55   09/05/17 168/55    Weight from Last 3 Encounters:   09/26/17 96.9 kg (213 lb 9.6 oz)   09/12/17 99.9 kg (220 lb 3.2 oz)   09/05/17 105.2 kg (232 lb)              Today, you had the following     No orders found for display       Primary Care Provider Office Phone # Fax #    Brown MD Yuila [phone]       [facility] [address]  [facility] [address]        Equal Access to Services     THELMA CLARKE : Hadii hue shermano Soshandra, waaxda luqadaha, qaybta kaalmada adeegyada, karina bonilla . So [facility] [phone].    ATENCIÓN: Si habla español, tiene a patricio disposición servicios gratuitos de asistencia lingüística. James al [phone].    We comply with applicable federal civil rights laws and [place] laws. We do not discriminate on the basis of race, color, national origin, age, disability sex, sexual orientation or gender identity.            Thank you!     Thank you for choosing [facility] COLON AND RECTAL SURGERY  for your care. Our goal is always to provide you with excellent care. Hearing back from our patients is one way we can continue to improve our services. Please take a few minutes to complete the written survey that you may receive in the mail after your visit with us. Thank you!             Your Updated Medication List - Protect others around you: Learn how to safely use, store and throw away your medicines at www.disposemymeds.org.          This list is accurate as of: 9/26/17  1:07 PM.  Always use your most recent med list.                   Brand Name Dispense Instructions for use Diagnosis    acetaminophen 500 MG tablet    TYLENOL    400 tablet    Take 2 tablets (1,000 mg) by mouth every 6 hours    Carcinoma of sigmoid colon (H)       OK ASPIRIN EC LOW DOSE 81 MG EC tablet   Generic drug:  " aspirin      Take 81 mg by mouth every morning        enoxaparin 40 MG/0.4ML injection    LOVENOX    9.2 mL    Inject 0.4 mLs (40 mg) Subcutaneous every 24 hours for 23 days    Carcinoma of sigmoid colon (H)       HYDROXYZINE PAMOATE PO      Take 25 mg by mouth 3 times daily as needed for anxiety        insulin glargine 100 UNIT/ML injection    LANTUS     Inject 12 Units Subcutaneous At Bedtime        losartan 50 MG tablet    COZAAR     Take 25 mg by mouth every morning        metFORMIN 1000 MG tablet    GLUCOPHAGE     Take 1,000 mg by mouth 2 times daily (with meals)        Multi-vitamin Tabs tablet      Take 1 tablet by mouth daily        PARoxetine 40 MG tablet    PAXIL     Take 60 mg by mouth At Bedtime        triamcinolone 0.1 % ointment    KENALOG     Apply topically as needed

## 2017-10-18 LAB — COPATH REPORT: NORMAL

## 2017-10-19 ENCOUNTER — TELEPHONE (OUTPATIENT)
Dept: SURGERY | Facility: CLINIC | Age: 67
End: 2017-10-19

## 2017-10-19 NOTE — TELEPHONE ENCOUNTER
"Patient called with questions about her incision. Patient states that the two open areas that she has been packing have changed in appearance and [facility]. The umbilicus opening has not changed in size and she feels she may be having to pack it more. The opening at the bottom of her incision is closing well and she does not need to pack it much.Both sites are without redness, but do have drainage with is \"slighly cloudy\" and does not have an odor. The incision remains sore but this soreness is getting progressively better. This nurse made an appointment for the patient to come in for a nurse visit next Monday at 11:30  "

## 2017-10-23 ENCOUNTER — ALLIED HEALTH/NURSE VISIT (OUTPATIENT)
Dept: SURGERY | Facility: CLINIC | Age: 67
End: 2017-10-23

## 2017-10-23 DIAGNOSIS — Z51.89 VISIT FOR WOUND CHECK: Primary | ICD-10-CM

## 2017-10-23 NOTE — MR AVS SNAPSHOT
After Visit Summary   10/23/2017    Zach Olmos    MRN: [id number]           Patient Information     Date Of Birth          1950        Visit Information        Provider Department      10/23/2017 11:30 AM Nurse,  Surgery General Surgery        Today's Diagnoses     Visit for wound check    -  1       Follow-ups after your visit        Who to contact     Please call your clinic at [phone] to:    Ask questions about your health    Make or cancel appointments    Discuss your medicines    Learn about your test results    Speak to your doctor   If you have compliments or concerns about an experience at your clinic, or if you wish to file a complaint, please contact [facility] Patient Relations at [phone] or email us at [email or web address]         Additional Information About Your Visit        MyChart Information     VeriTweet is an electronic gateway that provides easy, online access to your medical records. With VeriTweet, you can request a clinic appointment, read your test results, renew a prescription or communicate with your care team.     To sign up for [employer] visit the website at [email or web address]   You will be asked to enter the access code listed below, as well as some personal information. Please follow the directions to create your username and password.     Your access code is: [email or web address]  Expires: 10/30/2017  6:31 AM     Your access code will  in 90 days. If you need help or a new code, please contact your [facility] or call [phone] for assistance.        Care EveryWhere ID     This is your Care EveryWhere ID. This could be used by other organizations to access your [place] medical records  [id number]         Blood Pressure from Last 3 Encounters:   17 131/50   17 156/55   17 168/55    Weight from Last 3 Encounters:   17 213 lb 9.6 oz   17 220 lb 3.2 oz    09/05/17 232 lb              Today, you had the following     No orders found for display       Primary Care Provider Office Phone # Fax #    Brown Bender [phone]       [facility] [address]  [facility] [address]        Equal Access to Services     ULIJOVANY PEOPLESREBECCA : Hadii aad ku hadasho Soomaali, waaxda luqadaha, qaybta kaalmada adeegyada, waxay idiin hayaan adeeg zen lajovanna . So [facility] [phone].    ATENCIÓN: Si habla español, tiene a patricio disposición servicios gratuitos de asistencia lingüística. [facility] [phone].    We comply with applicable federal civil rights laws and [place] laws. We do not discriminate on the basis of race, color, national origin, age, disability, sex, sexual orientation, or gender identity.            Thank you!     Thank you for choosing GENERAL SURGERY  for your care. Our goal is always to provide you with excellent care. Hearing back from our patients is one way we can continue to improve our services. Please take a few minutes to complete the written survey that you may receive in the mail after your visit with us. Thank you!             Your Updated Medication List - Protect others around you: Learn how to safely use, store and throw away your medicines at www.disposemymeds.org.          This list is accurate as of: 10/23/17  2:07 PM.  Always use your most recent med list.                   Brand Name Dispense Instructions for use Diagnosis    acetaminophen 500 MG tablet    TYLENOL    400 tablet    Take 2 tablets (1,000 mg) by mouth every 6 hours    Carcinoma of sigmoid colon (H)       OK ASPIRIN EC LOW DOSE 81 MG EC tablet   Generic drug:  aspirin      Take 81 mg by mouth every morning        HYDROXYZINE PAMOATE PO      Take 25 mg by mouth 3 times daily as needed for anxiety        insulin glargine 100 UNIT/ML injection    LANTUS     Inject 12 Units Subcutaneous At Bedtime        losartan 50 MG tablet    COZAAR     Take 25 mg  by mouth every morning        metFORMIN 1000 MG tablet    GLUCOPHAGE     Take 1,000 mg by mouth 2 times daily (with meals)        Multi-vitamin Tabs tablet      Take 1 tablet by mouth daily        PARoxetine 40 MG tablet    PAXIL     Take 60 mg by mouth At Bedtime        triamcinolone 0.1 % ointment    KENALOG     Apply topically as needed

## 2017-10-23 NOTE — NURSING NOTE
[facility] for a nursing visit  for wound assessment of  Two open wounds the patient has been packing along her surgical incision since her surgery on 08/30/2017) with Dr. June.     Clean gloves are donned and current dressing removed. Previous treatment includes patient packing wound once a day    Objective findings:   Wound 1    Location: midline incision around umbilicus and at the lower 3cm of incision    Wound measured.: 6 cm in length at the umbilicus and width is 1 cm. Deepest area of wound was 0.5 cm in depth and the size of a tip of a q-tip. The lowerst 3 cm of the incision are aslo open, unable to see and depth and the width is also 1 cm.     Wound description: wound healing    Tenderness:occasionally with movment    Erythema surrounding the wound worrisome: mobility appropriate, no erythema, no drainage, wound(s) healing well and no erythemia    Odor: none     Not inflamed.    Drainage is scant, clear     Tunneling:  N/A      Undermining: N/A    Wound Base: granulation     Palpation of the wound bed:  normal    Slough appearance:  none       Eschar appearance:  none   0  %     Periwound Skin: intact,      Color: pink  Treatment: Removal of existing dressing, cleaning and talked with the patient about wound care she is doing at home. Patient will continue to pack the small area at the umbilicus that is the size of a q-tip head. She will lay the reast of the packing material over the open wound and then put a dry gauze dressing on top to protect the area of healing. It was explained to the patient that the wound is healing well and there was no need for antibiotics, or changes in plan of care at this time      Pt received the following instructions:    Cleansing with soap and water once a day in her daily shower. Patient should continue to pack the small area of the wound and then place a gauze over the packing to protect it  Secure with: tape. Frequency:Once daily.  Signs and symptoms  "of infection taught.  Patient needs to be seen prn.   No treatment and appointment scheduled to see MD. Rose Mary Shannon NP  was available for supervision of care if needed or if questions should arise and regarding plan of care.     Patient was also given an abdominal binder at this visit due to patient stating she felt as thought the incision was \"pulling apart as she moved. The abdominal binder was fit to the patient and the patient stated immediatly after the binder was tightened around the abd. that the binder helped with the discomfort she was feeling around her incision site when moving around.       Sarah Padilla RN                        At Home Dressing Change Instructions:  Wash hands before and after the dressing change. You may wear gloves if you choose. Gloves are available at any pharmacy like [facility] or [facility].  Use clean scissors at home that you keep in a glass of rubbing alcohol or wipe clean before and after use. Keep these scissors for wound care only.    Remove old dressing and wash hands again.  Cleanse wound with wound cleanser irrigate as needed.   Cover as instructed.  Change dressing Once daily .  You may shower with this dressing off    Signs and symptoms of infection:  Yellow or green drainage, Foul odor, Increased pain to wound, Redness or swelling at the site of the wound orFever    Call your physician if you notice any of these signs or symptoms. Our call center is [phone]    The following supplies of packing gauze and 4x4  were Gathered and sent home with the patient.  "

## 2017-11-20 DIAGNOSIS — C18.7 CANCER OF SIGMOID COLON (H): Primary | ICD-10-CM

## 2018-11-01 ENCOUNTER — TELEPHONE (OUTPATIENT)
Dept: SURGERY | Facility: CLINIC | Age: 68
End: 2018-11-01

## 2018-11-01 NOTE — TELEPHONE ENCOUNTER
----- Message from Liliane Lopez sent at 11/20/2017  8:49 AM CST -----  Regarding: FW: Recall      ----- Message -----     From: Liliane Lopez     Sent: 11/20/2017       To: Liliane Lopez, #  Subject: Recall                                           Patient is due for flexible sigmoidoscopy December 2018.  [facility] not [facility].      Will need colonoscopy 09/2018.

## 2018-11-01 NOTE — TELEPHONE ENCOUNTER
Per task, called patient and left a message requesting a call back to schedule.  Provided my direct number.

## 2018-11-14 ENCOUNTER — TELEPHONE (OUTPATIENT)
Dept: GASTROENTEROLOGY | Facility: CLINIC | Age: 68
End: 2018-11-14

## 2018-11-14 DIAGNOSIS — C18.7 CARCINOMA OF SIGMOID COLON (H): Primary | ICD-10-CM

## 2018-11-15 ENCOUNTER — HOSPITAL ENCOUNTER (OUTPATIENT)
Facility: CLINIC | Age: 68
End: 2018-11-15
Attending: INTERNAL MEDICINE | Admitting: INTERNAL MEDICINE
Payer: COMMERCIAL

## 2018-12-13 ENCOUNTER — TELEPHONE (OUTPATIENT)
Dept: GASTROENTEROLOGY | Facility: CLINIC | Age: 68
End: 2018-12-13

## 2018-12-13 NOTE — TELEPHONE ENCOUNTER
VM with request pt contact Endoscopy Pre-assessment RN to review upcoming procedure information.      Telephone call-back number provided.  Chelly Barnes, RN  [facility]    Additional Information regarding appointment:   Patient scheduled for:  Colonoscopy  Indication for procedure: Screening / Previous CA Colon  Date/Arrival time: Thurs; 12/20/18; 0930  Procedure Provider:  Jason  Referring Provider. Slade  Prep Type: Golytely eRx: (not sent)  Facility location:  [address], [address], [address]  Anticoagulants or blood thinners: ASA 81mg  ________________________________

## 2018-12-18 ENCOUNTER — OFFICE VISIT - HEALTHEAST (OUTPATIENT)
Dept: GERIATRICS | Facility: CLINIC | Age: 68
End: 2018-12-18

## 2018-12-18 DIAGNOSIS — I25.709 CORONARY ARTERY DISEASE INVOLVING CORONARY BYPASS GRAFT WITH ANGINA PECTORIS, UNSPECIFIED WHETHER NATIVE OR TRANSPLANTED HEART (H): ICD-10-CM

## 2018-12-18 DIAGNOSIS — E78.5 HYPERLIPIDEMIA, UNSPECIFIED HYPERLIPIDEMIA TYPE: ICD-10-CM

## 2018-12-18 DIAGNOSIS — I63.9 CEREBROVASCULAR ACCIDENT (CVA), UNSPECIFIED MECHANISM (H): ICD-10-CM

## 2018-12-18 DIAGNOSIS — I10 ESSENTIAL HYPERTENSION: ICD-10-CM

## 2018-12-18 DIAGNOSIS — E10.8 TYPE 1 DIABETES MELLITUS WITH COMPLICATION (H): ICD-10-CM

## 2018-12-18 DIAGNOSIS — K11.20 PAROTITIS: ICD-10-CM

## 2018-12-18 DIAGNOSIS — G81.91 RIGHT HEMIPLEGIA (H): ICD-10-CM

## 2018-12-19 ENCOUNTER — AMBULATORY - HEALTHEAST (OUTPATIENT)
Dept: ADMINISTRATIVE | Facility: CLINIC | Age: 68
End: 2018-12-19

## 2018-12-19 ENCOUNTER — RECORDS - HEALTHEAST (OUTPATIENT)
Dept: LAB | Facility: CLINIC | Age: 68
End: 2018-12-19

## 2018-12-19 RX ORDER — LIDOCAINE 40 MG/G
CREAM TOPICAL
Status: CANCELLED | OUTPATIENT
Start: 2018-12-19

## 2018-12-19 RX ORDER — ONDANSETRON 2 MG/ML
4 INJECTION INTRAMUSCULAR; INTRAVENOUS
Status: CANCELLED | OUTPATIENT
Start: 2018-12-19

## 2018-12-20 ENCOUNTER — OFFICE VISIT - HEALTHEAST (OUTPATIENT)
Dept: GERIATRICS | Facility: CLINIC | Age: 68
End: 2018-12-20

## 2018-12-20 DIAGNOSIS — I63.9 CEREBROVASCULAR ACCIDENT (CVA), UNSPECIFIED MECHANISM (H): ICD-10-CM

## 2018-12-20 DIAGNOSIS — Z85.038 HISTORY OF COLON CANCER: ICD-10-CM

## 2018-12-20 DIAGNOSIS — I25.709 CORONARY ARTERY DISEASE INVOLVING CORONARY BYPASS GRAFT WITH ANGINA PECTORIS, UNSPECIFIED WHETHER NATIVE OR TRANSPLANTED HEART (H): ICD-10-CM

## 2018-12-20 DIAGNOSIS — G81.91 RIGHT HEMIPLEGIA (H): ICD-10-CM

## 2018-12-20 DIAGNOSIS — E78.5 HYPERLIPIDEMIA, UNSPECIFIED HYPERLIPIDEMIA TYPE: ICD-10-CM

## 2018-12-20 DIAGNOSIS — Z86.718 PERSONAL HISTORY OF DVT (DEEP VEIN THROMBOSIS): ICD-10-CM

## 2018-12-20 DIAGNOSIS — K11.20 PAROTITIS: ICD-10-CM

## 2018-12-20 DIAGNOSIS — Z86.711 HISTORY OF PULMONARY EMBOLUS (PE): ICD-10-CM

## 2018-12-20 LAB
ALBUMIN SERPL-MCNC: 3 G/DL (ref 3.5–5)
ALP SERPL-CCNC: 57 U/L (ref 45–120)
ALT SERPL W P-5'-P-CCNC: 37 U/L (ref 0–45)
ANION GAP SERPL CALCULATED.3IONS-SCNC: 10 MMOL/L (ref 5–18)
AST SERPL W P-5'-P-CCNC: 43 U/L (ref 0–40)
BILIRUB SERPL-MCNC: 0.4 MG/DL (ref 0–1)
BUN SERPL-MCNC: 26 MG/DL (ref 8–28)
CALCIUM SERPL-MCNC: 9.7 MG/DL (ref 8.5–10.5)
CHLORIDE BLD-SCNC: 99 MMOL/L (ref 98–107)
CO2 SERPL-SCNC: 27 MMOL/L (ref 22–31)
CREAT SERPL-MCNC: 0.66 MG/DL (ref 0.6–1.1)
GFR SERPL CREATININE-BSD FRML MDRD: >60 ML/MIN/1.73M2
GLUCOSE BLD-MCNC: 207 MG/DL (ref 70–125)
HGB BLD-MCNC: 10.7 G/DL (ref 12–16)
IRON SATN MFR SERPL: 14 % (ref 20–50)
IRON SERPL-MCNC: 49 UG/DL (ref 42–175)
POTASSIUM BLD-SCNC: 3.9 MMOL/L (ref 3.5–5)
PREALB SERPL-MCNC: 31.3 MG/DL (ref 19–38)
PROT SERPL-MCNC: 7.6 G/DL (ref 6–8)
SODIUM SERPL-SCNC: 136 MMOL/L (ref 136–145)
TIBC SERPL-MCNC: 340 UG/DL (ref 313–563)
TRANSFERRIN SERPL-MCNC: 272 MG/DL (ref 212–360)

## 2018-12-27 ENCOUNTER — OFFICE VISIT - HEALTHEAST (OUTPATIENT)
Dept: GERIATRICS | Facility: CLINIC | Age: 68
End: 2018-12-27

## 2018-12-27 DIAGNOSIS — I10 HYPERTENSION, UNSPECIFIED TYPE: ICD-10-CM

## 2018-12-27 DIAGNOSIS — G81.91 RIGHT HEMIPLEGIA (H): ICD-10-CM

## 2018-12-27 DIAGNOSIS — I63.9 CEREBROVASCULAR ACCIDENT (CVA), UNSPECIFIED MECHANISM (H): ICD-10-CM

## 2018-12-28 ENCOUNTER — OFFICE VISIT - HEALTHEAST (OUTPATIENT)
Dept: GERIATRICS | Facility: CLINIC | Age: 68
End: 2018-12-28

## 2018-12-28 DIAGNOSIS — G81.91 RIGHT HEMIPLEGIA (H): ICD-10-CM

## 2018-12-28 DIAGNOSIS — R13.12 OROPHARYNGEAL DYSPHAGIA: ICD-10-CM

## 2018-12-28 DIAGNOSIS — I10 ESSENTIAL HYPERTENSION: ICD-10-CM

## 2018-12-28 DIAGNOSIS — E10.8 TYPE 1 DIABETES MELLITUS WITH COMPLICATION (H): ICD-10-CM

## 2019-01-02 ENCOUNTER — OFFICE VISIT - HEALTHEAST (OUTPATIENT)
Dept: GERIATRICS | Facility: CLINIC | Age: 69
End: 2019-01-02

## 2019-01-02 DIAGNOSIS — R13.12 OROPHARYNGEAL DYSPHAGIA: ICD-10-CM

## 2019-01-02 DIAGNOSIS — I10 ESSENTIAL HYPERTENSION: ICD-10-CM

## 2019-01-02 DIAGNOSIS — G81.91 RIGHT HEMIPLEGIA (H): ICD-10-CM

## 2019-01-02 DIAGNOSIS — E10.8 TYPE 1 DIABETES MELLITUS WITH COMPLICATION (H): ICD-10-CM

## 2019-01-04 ENCOUNTER — OFFICE VISIT - HEALTHEAST (OUTPATIENT)
Dept: GERIATRICS | Facility: CLINIC | Age: 69
End: 2019-01-04

## 2019-01-04 DIAGNOSIS — I63.9 CEREBROVASCULAR ACCIDENT (CVA), UNSPECIFIED MECHANISM (H): ICD-10-CM

## 2019-01-04 DIAGNOSIS — G81.91 RIGHT HEMIPLEGIA (H): ICD-10-CM

## 2019-01-04 DIAGNOSIS — E11.9 DIABETES MELLITUS TYPE 2, INSULIN DEPENDENT (H): ICD-10-CM

## 2019-01-04 DIAGNOSIS — E10.8 TYPE 1 DIABETES MELLITUS WITH COMPLICATION (H): ICD-10-CM

## 2019-01-04 DIAGNOSIS — I10 ESSENTIAL HYPERTENSION: ICD-10-CM

## 2019-01-04 DIAGNOSIS — Z79.4 DIABETES MELLITUS TYPE 2, INSULIN DEPENDENT (H): ICD-10-CM

## 2019-01-04 DIAGNOSIS — E78.5 HYPERLIPIDEMIA, UNSPECIFIED HYPERLIPIDEMIA TYPE: ICD-10-CM

## 2019-01-04 DIAGNOSIS — R13.12 OROPHARYNGEAL DYSPHAGIA: ICD-10-CM

## 2019-01-04 DIAGNOSIS — I25.709 CORONARY ARTERY DISEASE INVOLVING CORONARY BYPASS GRAFT WITH ANGINA PECTORIS, UNSPECIFIED WHETHER NATIVE OR TRANSPLANTED HEART (H): ICD-10-CM

## 2019-01-18 ENCOUNTER — COMMUNICATION - HEALTHEAST (OUTPATIENT)
Dept: GERIATRICS | Facility: CLINIC | Age: 69
End: 2019-01-18

## 2019-01-18 ENCOUNTER — RECORDS - HEALTHEAST (OUTPATIENT)
Dept: LAB | Facility: CLINIC | Age: 69
End: 2019-01-18

## 2019-01-18 LAB
ALBUMIN UR-MCNC: NEGATIVE MG/DL
APPEARANCE UR: ABNORMAL
BACTERIA #/AREA URNS HPF: ABNORMAL HPF
BILIRUB UR QL STRIP: NEGATIVE
COLOR UR AUTO: YELLOW
GLUCOSE UR STRIP-MCNC: NEGATIVE MG/DL
HGB UR QL STRIP: NEGATIVE
KETONES UR STRIP-MCNC: NEGATIVE MG/DL
LEUKOCYTE ESTERASE UR QL STRIP: ABNORMAL
NITRATE UR QL: POSITIVE
PH UR STRIP: 5 [PH] (ref 4.5–8)
RBC #/AREA URNS AUTO: ABNORMAL HPF
SP GR UR STRIP: 1.01 (ref 1–1.03)
SQUAMOUS #/AREA URNS AUTO: ABNORMAL LPF
UROBILINOGEN UR STRIP-ACNC: ABNORMAL
WBC #/AREA URNS AUTO: ABNORMAL HPF
WBC CLUMPS #/AREA URNS HPF: PRESENT /[HPF]

## 2019-01-21 LAB — BACTERIA SPEC CULT: ABNORMAL

## 2019-02-20 ENCOUNTER — OFFICE VISIT - HEALTHEAST (OUTPATIENT)
Dept: GERIATRICS | Facility: CLINIC | Age: 69
End: 2019-02-20

## 2019-02-20 DIAGNOSIS — E78.5 HYPERLIPIDEMIA, UNSPECIFIED HYPERLIPIDEMIA TYPE: ICD-10-CM

## 2019-02-20 DIAGNOSIS — R13.12 OROPHARYNGEAL DYSPHAGIA: ICD-10-CM

## 2019-02-20 DIAGNOSIS — G81.91 RIGHT HEMIPLEGIA (H): ICD-10-CM

## 2019-02-20 DIAGNOSIS — E10.8 TYPE 1 DIABETES MELLITUS WITH COMPLICATION (H): ICD-10-CM

## 2019-02-25 ENCOUNTER — RECORDS - HEALTHEAST (OUTPATIENT)
Dept: ADMINISTRATIVE | Facility: OTHER | Age: 69
End: 2019-02-25

## 2019-03-01 ENCOUNTER — COMMUNICATION - HEALTHEAST (OUTPATIENT)
Dept: GERIATRICS | Facility: CLINIC | Age: 69
End: 2019-03-01

## 2019-03-07 ENCOUNTER — HOSPITAL ENCOUNTER (OUTPATIENT)
Dept: RADIOLOGY | Facility: CLINIC | Age: 69
Discharge: HOME | End: 2019-03-07

## 2019-03-07 DIAGNOSIS — T17.998A ASPIRATION OF LIQUID: ICD-10-CM

## 2019-03-25 ENCOUNTER — OFFICE VISIT - HEALTHEAST (OUTPATIENT)
Dept: GERIATRICS | Facility: CLINIC | Age: 69
End: 2019-03-25

## 2019-03-25 DIAGNOSIS — G81.91 RIGHT HEMIPLEGIA (H): ICD-10-CM

## 2019-03-25 DIAGNOSIS — I25.709 CORONARY ARTERY DISEASE INVOLVING CORONARY BYPASS GRAFT WITH ANGINA PECTORIS, UNSPECIFIED WHETHER NATIVE OR TRANSPLANTED HEART (H): ICD-10-CM

## 2019-03-25 DIAGNOSIS — I63.9 CEREBROVASCULAR ACCIDENT (CVA), UNSPECIFIED MECHANISM (H): ICD-10-CM

## 2019-03-25 DIAGNOSIS — E11.9 DIABETES MELLITUS TYPE 2, INSULIN DEPENDENT (H): ICD-10-CM

## 2019-03-25 DIAGNOSIS — E78.5 HYPERLIPIDEMIA, UNSPECIFIED HYPERLIPIDEMIA TYPE: ICD-10-CM

## 2019-03-25 DIAGNOSIS — Z79.4 DIABETES MELLITUS TYPE 2, INSULIN DEPENDENT (H): ICD-10-CM

## 2019-03-25 DIAGNOSIS — R13.12 OROPHARYNGEAL DYSPHAGIA: ICD-10-CM

## 2019-03-25 DIAGNOSIS — I10 ESSENTIAL HYPERTENSION: ICD-10-CM

## 2019-04-10 ENCOUNTER — COMMUNICATION - HEALTHEAST (OUTPATIENT)
Dept: GERIATRICS | Facility: CLINIC | Age: 69
End: 2019-04-10

## 2019-04-10 ENCOUNTER — OFFICE VISIT - HEALTHEAST (OUTPATIENT)
Dept: GERIATRICS | Facility: CLINIC | Age: 69
End: 2019-04-10

## 2019-04-10 DIAGNOSIS — R13.12 OROPHARYNGEAL DYSPHAGIA: ICD-10-CM

## 2019-04-10 DIAGNOSIS — I63.9 CEREBROVASCULAR ACCIDENT (CVA), UNSPECIFIED MECHANISM (H): ICD-10-CM

## 2019-04-10 DIAGNOSIS — E10.8 TYPE 1 DIABETES MELLITUS WITH COMPLICATION (H): ICD-10-CM

## 2019-04-10 DIAGNOSIS — G81.91 RIGHT HEMIPLEGIA (H): ICD-10-CM

## 2019-04-12 ENCOUNTER — COMMUNICATION - HEALTHEAST (OUTPATIENT)
Dept: GERIATRIC MEDICINE | Facility: CLINIC | Age: 69
End: 2019-04-12

## 2019-04-16 ENCOUNTER — RECORDS - HEALTHEAST (OUTPATIENT)
Dept: LAB | Facility: CLINIC | Age: 69
End: 2019-04-16

## 2019-04-16 LAB
CHOLEST SERPL-MCNC: 77 MG/DL
FASTING STATUS PATIENT QL REPORTED: ABNORMAL
HBA1C MFR BLD: 6.4 % (ref 4.2–6.1)
HDLC SERPL-MCNC: 19 MG/DL
HGB BLD-MCNC: 10.6 G/DL (ref 12–16)
LDLC SERPL CALC-MCNC: 38 MG/DL
TRIGL SERPL-MCNC: 98 MG/DL

## 2019-04-23 ENCOUNTER — AMBULATORY - HEALTHEAST (OUTPATIENT)
Dept: GERIATRICS | Facility: CLINIC | Age: 69
End: 2019-04-23

## 2019-05-15 ENCOUNTER — OFFICE VISIT - HEALTHEAST (OUTPATIENT)
Dept: GERIATRICS | Facility: CLINIC | Age: 69
End: 2019-05-15

## 2019-05-15 DIAGNOSIS — G81.91 RIGHT HEMIPLEGIA (H): ICD-10-CM

## 2019-05-15 DIAGNOSIS — I63.9 CEREBROVASCULAR ACCIDENT (CVA), UNSPECIFIED MECHANISM (H): ICD-10-CM

## 2019-05-15 DIAGNOSIS — I10 ESSENTIAL HYPERTENSION: ICD-10-CM

## 2019-05-15 DIAGNOSIS — R13.12 OROPHARYNGEAL DYSPHAGIA: ICD-10-CM

## 2019-05-24 ENCOUNTER — COMMUNICATION - HEALTHEAST (OUTPATIENT)
Dept: GERIATRICS | Facility: CLINIC | Age: 69
End: 2019-05-24

## 2019-05-29 ENCOUNTER — OFFICE VISIT - HEALTHEAST (OUTPATIENT)
Dept: GERIATRICS | Facility: CLINIC | Age: 69
End: 2019-05-29

## 2019-05-29 DIAGNOSIS — E11.8 TYPE 2 DIABETES MELLITUS WITH COMPLICATION, WITH LONG-TERM CURRENT USE OF INSULIN (H): ICD-10-CM

## 2019-05-29 DIAGNOSIS — I10 ESSENTIAL HYPERTENSION: ICD-10-CM

## 2019-05-29 DIAGNOSIS — I63.9 CEREBROVASCULAR ACCIDENT (CVA), UNSPECIFIED MECHANISM (H): ICD-10-CM

## 2019-05-29 DIAGNOSIS — Z79.4 TYPE 2 DIABETES MELLITUS WITH COMPLICATION, WITH LONG-TERM CURRENT USE OF INSULIN (H): ICD-10-CM

## 2019-05-29 DIAGNOSIS — Z93.1 G TUBE FEEDINGS (H): ICD-10-CM

## 2019-06-12 ENCOUNTER — OFFICE VISIT - HEALTHEAST (OUTPATIENT)
Dept: GERIATRICS | Facility: CLINIC | Age: 69
End: 2019-06-12

## 2019-06-12 DIAGNOSIS — I10 ESSENTIAL HYPERTENSION: ICD-10-CM

## 2019-06-12 DIAGNOSIS — I63.9 CEREBROVASCULAR ACCIDENT (CVA), UNSPECIFIED MECHANISM (H): ICD-10-CM

## 2019-06-12 DIAGNOSIS — R13.12 OROPHARYNGEAL DYSPHAGIA: ICD-10-CM

## 2019-06-12 DIAGNOSIS — G81.91 RIGHT HEMIPLEGIA (H): ICD-10-CM

## 2019-07-18 ENCOUNTER — OFFICE VISIT - HEALTHEAST (OUTPATIENT)
Dept: GERIATRICS | Facility: CLINIC | Age: 69
End: 2019-07-18

## 2019-07-18 DIAGNOSIS — H53.8 BLURRY VISION, RIGHT EYE: ICD-10-CM

## 2019-07-18 DIAGNOSIS — I10 ESSENTIAL HYPERTENSION: ICD-10-CM

## 2019-07-18 DIAGNOSIS — G81.91 RIGHT HEMIPLEGIA (H): ICD-10-CM

## 2019-07-18 DIAGNOSIS — E78.5 HYPERLIPIDEMIA, UNSPECIFIED HYPERLIPIDEMIA TYPE: ICD-10-CM

## 2019-07-18 DIAGNOSIS — I25.709 CORONARY ARTERY DISEASE INVOLVING CORONARY BYPASS GRAFT WITH ANGINA PECTORIS, UNSPECIFIED WHETHER NATIVE OR TRANSPLANTED HEART (H): ICD-10-CM

## 2019-07-18 DIAGNOSIS — I63.9 CEREBROVASCULAR ACCIDENT (CVA), UNSPECIFIED MECHANISM (H): ICD-10-CM

## 2019-07-25 ENCOUNTER — OFFICE VISIT - HEALTHEAST (OUTPATIENT)
Dept: GERIATRICS | Facility: CLINIC | Age: 69
End: 2019-07-25

## 2019-07-25 ENCOUNTER — AMBULATORY - HEALTHEAST (OUTPATIENT)
Dept: GERIATRICS | Facility: CLINIC | Age: 69
End: 2019-07-25

## 2019-07-25 DIAGNOSIS — R13.12 OROPHARYNGEAL DYSPHAGIA: ICD-10-CM

## 2019-07-25 DIAGNOSIS — G81.91 RIGHT HEMIPLEGIA (H): ICD-10-CM

## 2019-07-25 DIAGNOSIS — I10 ESSENTIAL HYPERTENSION: ICD-10-CM

## 2019-07-25 DIAGNOSIS — E78.5 HYPERLIPIDEMIA, UNSPECIFIED HYPERLIPIDEMIA TYPE: ICD-10-CM

## 2019-07-31 ENCOUNTER — OFFICE VISIT - HEALTHEAST (OUTPATIENT)
Dept: GERIATRICS | Facility: CLINIC | Age: 69
End: 2019-07-31

## 2019-07-31 DIAGNOSIS — E11.22 TYPE 2 DM WITH CKD STAGE 1 AND HYPERTENSION (H): ICD-10-CM

## 2019-07-31 DIAGNOSIS — N18.1 TYPE 2 DM WITH CKD STAGE 1 AND HYPERTENSION (H): ICD-10-CM

## 2019-07-31 DIAGNOSIS — F32.A ANXIETY AND DEPRESSION: ICD-10-CM

## 2019-07-31 DIAGNOSIS — I12.9 TYPE 2 DM WITH CKD STAGE 1 AND HYPERTENSION (H): ICD-10-CM

## 2019-07-31 DIAGNOSIS — G47.33 OSA ON CPAP: ICD-10-CM

## 2019-07-31 DIAGNOSIS — I48.0 PAF (PAROXYSMAL ATRIAL FIBRILLATION) (H): ICD-10-CM

## 2019-07-31 DIAGNOSIS — K21.9 GASTROESOPHAGEAL REFLUX DISEASE WITHOUT ESOPHAGITIS: ICD-10-CM

## 2019-07-31 DIAGNOSIS — G81.91 RIGHT HEMIPLEGIA (H): ICD-10-CM

## 2019-07-31 DIAGNOSIS — F41.9 ANXIETY AND DEPRESSION: ICD-10-CM

## 2019-07-31 DIAGNOSIS — E78.5 HYPERLIPIDEMIA, UNSPECIFIED HYPERLIPIDEMIA TYPE: ICD-10-CM

## 2019-07-31 DIAGNOSIS — I10 ESSENTIAL HYPERTENSION: ICD-10-CM

## 2019-07-31 DIAGNOSIS — I25.709 CORONARY ARTERY DISEASE INVOLVING CORONARY BYPASS GRAFT WITH ANGINA PECTORIS, UNSPECIFIED WHETHER NATIVE OR TRANSPLANTED HEART (H): ICD-10-CM

## 2019-07-31 DIAGNOSIS — I63.9 CEREBROVASCULAR ACCIDENT (CVA), UNSPECIFIED MECHANISM (H): ICD-10-CM

## 2019-07-31 DIAGNOSIS — R13.12 OROPHARYNGEAL DYSPHAGIA: ICD-10-CM

## 2019-08-06 ENCOUNTER — RECORDS - HEALTHEAST (OUTPATIENT)
Dept: LAB | Facility: CLINIC | Age: 69
End: 2019-08-06

## 2019-08-07 LAB
ALBUMIN SERPL-MCNC: 3.2 G/DL (ref 3.5–5)
ALP SERPL-CCNC: 65 U/L (ref 45–120)
ALT SERPL W P-5'-P-CCNC: 16 U/L (ref 0–45)
ANION GAP SERPL CALCULATED.3IONS-SCNC: 6 MMOL/L (ref 5–18)
AST SERPL W P-5'-P-CCNC: 20 U/L (ref 0–40)
BASOPHILS # BLD AUTO: 0.1 THOU/UL (ref 0–0.2)
BASOPHILS NFR BLD AUTO: 1 % (ref 0–2)
BILIRUB SERPL-MCNC: 0.4 MG/DL (ref 0–1)
BUN SERPL-MCNC: 14 MG/DL (ref 8–28)
CALCIUM SERPL-MCNC: 9.6 MG/DL (ref 8.5–10.5)
CHLORIDE BLD-SCNC: 100 MMOL/L (ref 98–107)
CO2 SERPL-SCNC: 33 MMOL/L (ref 22–31)
CREAT SERPL-MCNC: 0.78 MG/DL (ref 0.6–1.1)
EOSINOPHIL # BLD AUTO: 0.3 THOU/UL (ref 0–0.4)
EOSINOPHIL NFR BLD AUTO: 3 % (ref 0–6)
ERYTHROCYTE [DISTWIDTH] IN BLOOD BY AUTOMATED COUNT: 13.7 % (ref 11–14.5)
GFR SERPL CREATININE-BSD FRML MDRD: >60 ML/MIN/1.73M2
GLUCOSE BLD-MCNC: 93 MG/DL (ref 70–125)
HBA1C MFR BLD: 5.7 % (ref 4.2–6.1)
HCT VFR BLD AUTO: 34.3 % (ref 35–47)
HGB BLD-MCNC: 11.8 G/DL (ref 12–16)
LYMPHOCYTES # BLD AUTO: 3 THOU/UL (ref 0.8–4.4)
LYMPHOCYTES NFR BLD AUTO: 31 % (ref 20–40)
MCH RBC QN AUTO: 30.3 PG (ref 27–34)
MCHC RBC AUTO-ENTMCNC: 34.4 G/DL (ref 32–36)
MCV RBC AUTO: 88 FL (ref 80–100)
MONOCYTES # BLD AUTO: 0.8 THOU/UL (ref 0–0.9)
MONOCYTES NFR BLD AUTO: 9 % (ref 2–10)
NEUTROPHILS # BLD AUTO: 5.4 THOU/UL (ref 2–7.7)
NEUTROPHILS NFR BLD AUTO: 57 % (ref 50–70)
PLATELET # BLD AUTO: 204 THOU/UL (ref 140–440)
PMV BLD AUTO: 10.4 FL (ref 8.5–12.5)
POTASSIUM BLD-SCNC: 4.2 MMOL/L (ref 3.5–5)
PROT SERPL-MCNC: 6.8 G/DL (ref 6–8)
RBC # BLD AUTO: 3.9 MILL/UL (ref 3.8–5.4)
SODIUM SERPL-SCNC: 139 MMOL/L (ref 136–145)
TSH SERPL DL<=0.005 MIU/L-ACNC: 1.04 UIU/ML (ref 0.3–5)
WBC: 9.5 THOU/UL (ref 4–11)

## 2019-08-22 ENCOUNTER — OFFICE VISIT - HEALTHEAST (OUTPATIENT)
Dept: GERIATRICS | Facility: CLINIC | Age: 69
End: 2019-08-22

## 2019-08-22 DIAGNOSIS — E11.22 TYPE 2 DM WITH CKD STAGE 1 AND HYPERTENSION (H): ICD-10-CM

## 2019-08-22 DIAGNOSIS — I12.9 TYPE 2 DM WITH CKD STAGE 1 AND HYPERTENSION (H): ICD-10-CM

## 2019-08-22 DIAGNOSIS — F41.9 ANXIETY AND DEPRESSION: ICD-10-CM

## 2019-08-22 DIAGNOSIS — I48.0 PAF (PAROXYSMAL ATRIAL FIBRILLATION) (H): ICD-10-CM

## 2019-08-22 DIAGNOSIS — G47.33 OSA ON CPAP: ICD-10-CM

## 2019-08-22 DIAGNOSIS — I10 ESSENTIAL HYPERTENSION: ICD-10-CM

## 2019-08-22 DIAGNOSIS — G81.91 RIGHT HEMIPLEGIA (H): ICD-10-CM

## 2019-08-22 DIAGNOSIS — N18.1 TYPE 2 DM WITH CKD STAGE 1 AND HYPERTENSION (H): ICD-10-CM

## 2019-08-22 DIAGNOSIS — R13.12 OROPHARYNGEAL DYSPHAGIA: ICD-10-CM

## 2019-08-22 DIAGNOSIS — F32.A ANXIETY AND DEPRESSION: ICD-10-CM

## 2019-08-22 DIAGNOSIS — K21.9 GASTROESOPHAGEAL REFLUX DISEASE WITHOUT ESOPHAGITIS: ICD-10-CM

## 2019-08-22 DIAGNOSIS — I63.9 CEREBROVASCULAR ACCIDENT (CVA), UNSPECIFIED MECHANISM (H): ICD-10-CM

## 2019-08-30 ENCOUNTER — OFFICE VISIT - HEALTHEAST (OUTPATIENT)
Dept: GERIATRICS | Facility: CLINIC | Age: 69
End: 2019-08-30

## 2019-08-30 DIAGNOSIS — I48.0 PAROXYSMAL ATRIAL FIBRILLATION (H): ICD-10-CM

## 2019-08-30 DIAGNOSIS — I10 ESSENTIAL HYPERTENSION: ICD-10-CM

## 2019-08-30 DIAGNOSIS — I63.9 CEREBROVASCULAR ACCIDENT (CVA), UNSPECIFIED MECHANISM (H): ICD-10-CM

## 2019-09-09 ENCOUNTER — COMMUNICATION - HEALTHEAST (OUTPATIENT)
Dept: GERIATRICS | Facility: CLINIC | Age: 69
End: 2019-09-09

## 2019-09-10 ENCOUNTER — OFFICE VISIT - HEALTHEAST (OUTPATIENT)
Dept: GERIATRICS | Facility: CLINIC | Age: 69
End: 2019-09-10

## 2019-09-10 DIAGNOSIS — K21.9 GASTROESOPHAGEAL REFLUX DISEASE WITHOUT ESOPHAGITIS: ICD-10-CM

## 2019-09-10 DIAGNOSIS — F32.A ANXIETY AND DEPRESSION: ICD-10-CM

## 2019-09-10 DIAGNOSIS — E78.5 DYSLIPIDEMIA (HIGH LDL; LOW HDL): ICD-10-CM

## 2019-09-10 DIAGNOSIS — K94.29 GASTRIC TUBE GRANULATION TISSUE (H): ICD-10-CM

## 2019-09-10 DIAGNOSIS — L92.9 HYPERGRANULATION: ICD-10-CM

## 2019-09-10 DIAGNOSIS — E11.22 TYPE 2 DM WITH CKD STAGE 1 AND HYPERTENSION (H): ICD-10-CM

## 2019-09-10 DIAGNOSIS — I12.9 TYPE 2 DM WITH CKD STAGE 1 AND HYPERTENSION (H): ICD-10-CM

## 2019-09-10 DIAGNOSIS — I10 ESSENTIAL HYPERTENSION: ICD-10-CM

## 2019-09-10 DIAGNOSIS — I25.709 CORONARY ARTERY DISEASE INVOLVING CORONARY BYPASS GRAFT WITH ANGINA PECTORIS, UNSPECIFIED WHETHER NATIVE OR TRANSPLANTED HEART (H): ICD-10-CM

## 2019-09-10 DIAGNOSIS — I48.0 PAF (PAROXYSMAL ATRIAL FIBRILLATION) (H): ICD-10-CM

## 2019-09-10 DIAGNOSIS — R13.12 OROPHARYNGEAL DYSPHAGIA: ICD-10-CM

## 2019-09-10 DIAGNOSIS — F41.9 ANXIETY AND DEPRESSION: ICD-10-CM

## 2019-09-10 DIAGNOSIS — N18.1 TYPE 2 DM WITH CKD STAGE 1 AND HYPERTENSION (H): ICD-10-CM

## 2019-09-10 DIAGNOSIS — I63.9 CEREBROVASCULAR ACCIDENT (CVA), UNSPECIFIED MECHANISM (H): ICD-10-CM

## 2019-09-24 ENCOUNTER — COMMUNICATION - HEALTHEAST (OUTPATIENT)
Dept: GERIATRICS | Facility: CLINIC | Age: 69
End: 2019-09-24

## 2019-09-25 ENCOUNTER — OFFICE VISIT - HEALTHEAST (OUTPATIENT)
Dept: GERIATRICS | Facility: CLINIC | Age: 69
End: 2019-09-25

## 2019-09-25 DIAGNOSIS — F41.9 ANXIETY AND DEPRESSION: ICD-10-CM

## 2019-09-25 DIAGNOSIS — F32.A ANXIETY AND DEPRESSION: ICD-10-CM

## 2019-09-25 DIAGNOSIS — K94.29 GASTRIC TUBE GRANULATION TISSUE (H): ICD-10-CM

## 2019-09-25 DIAGNOSIS — I25.709 CORONARY ARTERY DISEASE INVOLVING CORONARY BYPASS GRAFT WITH ANGINA PECTORIS, UNSPECIFIED WHETHER NATIVE OR TRANSPLANTED HEART (H): ICD-10-CM

## 2019-09-25 DIAGNOSIS — I10 ESSENTIAL HYPERTENSION: ICD-10-CM

## 2019-09-25 DIAGNOSIS — E11.22 TYPE 2 DM WITH CKD STAGE 1 AND HYPERTENSION (H): ICD-10-CM

## 2019-09-25 DIAGNOSIS — N18.1 TYPE 2 DM WITH CKD STAGE 1 AND HYPERTENSION (H): ICD-10-CM

## 2019-09-25 DIAGNOSIS — R13.12 OROPHARYNGEAL DYSPHAGIA: ICD-10-CM

## 2019-09-25 DIAGNOSIS — Z78.9 ENCOUNTER FOR GASTROJEJUNAL (GJ) TUBE PLACEMENT: ICD-10-CM

## 2019-09-25 DIAGNOSIS — I12.9 TYPE 2 DM WITH CKD STAGE 1 AND HYPERTENSION (H): ICD-10-CM

## 2019-09-25 DIAGNOSIS — G47.33 OSA ON CPAP: ICD-10-CM

## 2019-09-25 DIAGNOSIS — I63.9 CEREBROVASCULAR ACCIDENT (CVA), UNSPECIFIED MECHANISM (H): ICD-10-CM

## 2019-09-25 DIAGNOSIS — I48.0 PAF (PAROXYSMAL ATRIAL FIBRILLATION) (H): ICD-10-CM

## 2019-09-25 DIAGNOSIS — G81.91 RIGHT HEMIPLEGIA (H): ICD-10-CM

## 2019-10-08 ENCOUNTER — OFFICE VISIT - HEALTHEAST (OUTPATIENT)
Dept: GERIATRICS | Facility: CLINIC | Age: 69
End: 2019-10-08

## 2019-10-08 ENCOUNTER — AMBULATORY - HEALTHEAST (OUTPATIENT)
Dept: GERIATRICS | Facility: CLINIC | Age: 69
End: 2019-10-08

## 2019-10-08 DIAGNOSIS — F32.A ANXIETY AND DEPRESSION: ICD-10-CM

## 2019-10-08 DIAGNOSIS — I83.893 VARICOSE VEINS OF BOTH LEGS WITH EDEMA: ICD-10-CM

## 2019-10-08 DIAGNOSIS — R13.12 OROPHARYNGEAL DYSPHAGIA: ICD-10-CM

## 2019-10-08 DIAGNOSIS — I12.9 TYPE 2 DM WITH CKD STAGE 1 AND HYPERTENSION (H): ICD-10-CM

## 2019-10-08 DIAGNOSIS — N18.1 TYPE 2 DM WITH CKD STAGE 1 AND HYPERTENSION (H): ICD-10-CM

## 2019-10-08 DIAGNOSIS — E78.5 DYSLIPIDEMIA (HIGH LDL; LOW HDL): ICD-10-CM

## 2019-10-08 DIAGNOSIS — G47.33 OSA ON CPAP: ICD-10-CM

## 2019-10-08 DIAGNOSIS — Z86.718 H/O DEEP VENOUS THROMBOSIS: ICD-10-CM

## 2019-10-08 DIAGNOSIS — I48.0 PAF (PAROXYSMAL ATRIAL FIBRILLATION) (H): ICD-10-CM

## 2019-10-08 DIAGNOSIS — E66.01 MORBID OBESITY (H): ICD-10-CM

## 2019-10-08 DIAGNOSIS — L08.9 WOUND INFECTION: ICD-10-CM

## 2019-10-08 DIAGNOSIS — L92.0 GRANULOMA ANNULARE: ICD-10-CM

## 2019-10-08 DIAGNOSIS — E66.2 OBESITY HYPOVENTILATION SYNDROME (H): ICD-10-CM

## 2019-10-08 DIAGNOSIS — F41.9 ANXIETY AND DEPRESSION: ICD-10-CM

## 2019-10-08 DIAGNOSIS — I50.42 CHRONIC COMBINED SYSTOLIC AND DIASTOLIC CONGESTIVE HEART FAILURE (H): ICD-10-CM

## 2019-10-08 DIAGNOSIS — G81.91 RIGHT HEMIPLEGIA (H): ICD-10-CM

## 2019-10-08 DIAGNOSIS — T14.8XXA WOUND INFECTION: ICD-10-CM

## 2019-10-08 DIAGNOSIS — E11.22 TYPE 2 DM WITH CKD STAGE 1 AND HYPERTENSION (H): ICD-10-CM

## 2019-10-09 ENCOUNTER — RECORDS - HEALTHEAST (OUTPATIENT)
Dept: LAB | Facility: CLINIC | Age: 69
End: 2019-10-09

## 2019-10-09 LAB
ANION GAP SERPL CALCULATED.3IONS-SCNC: 8 MMOL/L (ref 5–18)
BASOPHILS # BLD AUTO: 0.1 THOU/UL (ref 0–0.2)
BASOPHILS NFR BLD AUTO: 1 % (ref 0–2)
BUN SERPL-MCNC: 16 MG/DL (ref 8–28)
CALCIUM SERPL-MCNC: 9.2 MG/DL (ref 8.5–10.5)
CHLORIDE BLD-SCNC: 103 MMOL/L (ref 98–107)
CO2 SERPL-SCNC: 28 MMOL/L (ref 22–31)
CREAT SERPL-MCNC: 0.83 MG/DL (ref 0.6–1.1)
EOSINOPHIL # BLD AUTO: 0.3 THOU/UL (ref 0–0.4)
EOSINOPHIL NFR BLD AUTO: 3 % (ref 0–6)
ERYTHROCYTE [DISTWIDTH] IN BLOOD BY AUTOMATED COUNT: 13.6 % (ref 11–14.5)
GFR SERPL CREATININE-BSD FRML MDRD: >60 ML/MIN/1.73M2
GLUCOSE BLD-MCNC: 87 MG/DL (ref 70–125)
HCT VFR BLD AUTO: 31.6 % (ref 35–47)
HGB BLD-MCNC: 10.3 G/DL (ref 12–16)
LYMPHOCYTES # BLD AUTO: 3.3 THOU/UL (ref 0.8–4.4)
LYMPHOCYTES NFR BLD AUTO: 31 % (ref 20–40)
MCH RBC QN AUTO: 30 PG (ref 27–34)
MCHC RBC AUTO-ENTMCNC: 32.6 G/DL (ref 32–36)
MCV RBC AUTO: 92 FL (ref 80–100)
MONOCYTES # BLD AUTO: 0.7 THOU/UL (ref 0–0.9)
MONOCYTES NFR BLD AUTO: 7 % (ref 2–10)
NEUTROPHILS # BLD AUTO: 6.1 THOU/UL (ref 2–7.7)
NEUTROPHILS NFR BLD AUTO: 59 % (ref 50–70)
PLATELET # BLD AUTO: 241 THOU/UL (ref 140–440)
PMV BLD AUTO: 10.2 FL (ref 8.5–12.5)
POTASSIUM BLD-SCNC: 4.3 MMOL/L (ref 3.5–5)
RBC # BLD AUTO: 3.43 MILL/UL (ref 3.8–5.4)
SODIUM SERPL-SCNC: 139 MMOL/L (ref 136–145)
WBC: 10.5 THOU/UL (ref 4–11)

## 2019-10-11 ENCOUNTER — COMMUNICATION - HEALTHEAST (OUTPATIENT)
Dept: CARE COORDINATION | Facility: HOSPITAL | Age: 69
End: 2019-10-11

## 2019-10-30 ENCOUNTER — COMMUNICATION - HEALTHEAST (OUTPATIENT)
Dept: GERIATRICS | Facility: CLINIC | Age: 69
End: 2019-10-30

## 2019-10-31 ENCOUNTER — OFFICE VISIT - HEALTHEAST (OUTPATIENT)
Dept: GERIATRICS | Facility: CLINIC | Age: 69
End: 2019-10-31

## 2019-10-31 DIAGNOSIS — J06.9 VIRAL URI: ICD-10-CM

## 2019-10-31 DIAGNOSIS — I50.42 CHRONIC COMBINED SYSTOLIC AND DIASTOLIC CONGESTIVE HEART FAILURE (H): ICD-10-CM

## 2019-10-31 DIAGNOSIS — F32.A ANXIETY AND DEPRESSION: ICD-10-CM

## 2019-10-31 DIAGNOSIS — F41.9 ANXIETY AND DEPRESSION: ICD-10-CM

## 2019-10-31 DIAGNOSIS — I25.709 CORONARY ARTERY DISEASE INVOLVING CORONARY BYPASS GRAFT WITH ANGINA PECTORIS, UNSPECIFIED WHETHER NATIVE OR TRANSPLANTED HEART (H): ICD-10-CM

## 2019-10-31 DIAGNOSIS — I12.9 TYPE 2 DM WITH CKD STAGE 1 AND HYPERTENSION (H): ICD-10-CM

## 2019-10-31 DIAGNOSIS — R13.12 OROPHARYNGEAL DYSPHAGIA: ICD-10-CM

## 2019-10-31 DIAGNOSIS — I48.0 PAF (PAROXYSMAL ATRIAL FIBRILLATION) (H): ICD-10-CM

## 2019-10-31 DIAGNOSIS — K21.9 GASTROESOPHAGEAL REFLUX DISEASE WITHOUT ESOPHAGITIS: ICD-10-CM

## 2019-10-31 DIAGNOSIS — G81.91 RIGHT HEMIPLEGIA (H): ICD-10-CM

## 2019-10-31 DIAGNOSIS — N18.1 TYPE 2 DM WITH CKD STAGE 1 AND HYPERTENSION (H): ICD-10-CM

## 2019-10-31 DIAGNOSIS — G47.33 OSA ON CPAP: ICD-10-CM

## 2019-10-31 DIAGNOSIS — E11.22 TYPE 2 DM WITH CKD STAGE 1 AND HYPERTENSION (H): ICD-10-CM

## 2019-10-31 DIAGNOSIS — I10 ESSENTIAL HYPERTENSION: ICD-10-CM

## 2019-10-31 DIAGNOSIS — E66.2 OBESITY HYPOVENTILATION SYNDROME (H): ICD-10-CM

## 2019-10-31 DIAGNOSIS — E66.01 MORBID OBESITY (H): ICD-10-CM

## 2019-11-05 ENCOUNTER — OFFICE VISIT - HEALTHEAST (OUTPATIENT)
Dept: GERIATRICS | Facility: CLINIC | Age: 69
End: 2019-11-05

## 2019-11-05 DIAGNOSIS — R13.10 DYSPHAGIA, UNSPECIFIED TYPE: ICD-10-CM

## 2019-11-05 DIAGNOSIS — Z86.73 HISTORY OF STROKE: ICD-10-CM

## 2019-11-05 DIAGNOSIS — I48.0 PAROXYSMAL ATRIAL FIBRILLATION (H): ICD-10-CM

## 2019-11-05 DIAGNOSIS — I10 ESSENTIAL HYPERTENSION: ICD-10-CM

## 2019-11-07 ENCOUNTER — RECORDS - HEALTHEAST (OUTPATIENT)
Dept: LAB | Facility: CLINIC | Age: 69
End: 2019-11-07

## 2019-11-08 LAB
CREAT SERPL-MCNC: 0.88 MG/DL (ref 0.6–1.1)
GFR SERPL CREATININE-BSD FRML MDRD: >60 ML/MIN/1.73M2
HBA1C MFR BLD: 5.5 % (ref 4.2–6.1)

## 2019-11-27 ENCOUNTER — OFFICE VISIT - HEALTHEAST (OUTPATIENT)
Dept: GERIATRICS | Facility: CLINIC | Age: 69
End: 2019-11-27

## 2019-11-27 DIAGNOSIS — G47.33 OSA ON CPAP: ICD-10-CM

## 2019-11-27 DIAGNOSIS — K21.9 GASTROESOPHAGEAL REFLUX DISEASE WITHOUT ESOPHAGITIS: ICD-10-CM

## 2019-11-27 DIAGNOSIS — E11.22 TYPE 2 DM WITH CKD STAGE 1 AND HYPERTENSION (H): ICD-10-CM

## 2019-11-27 DIAGNOSIS — E66.01 MORBID OBESITY (H): ICD-10-CM

## 2019-11-27 DIAGNOSIS — I10 ESSENTIAL HYPERTENSION: ICD-10-CM

## 2019-11-27 DIAGNOSIS — F32.A ANXIETY AND DEPRESSION: ICD-10-CM

## 2019-11-27 DIAGNOSIS — E66.2 OBESITY HYPOVENTILATION SYNDROME (H): ICD-10-CM

## 2019-11-27 DIAGNOSIS — F41.9 ANXIETY AND DEPRESSION: ICD-10-CM

## 2019-11-27 DIAGNOSIS — I48.0 PAF (PAROXYSMAL ATRIAL FIBRILLATION) (H): ICD-10-CM

## 2019-11-27 DIAGNOSIS — I25.709 CORONARY ARTERY DISEASE INVOLVING CORONARY BYPASS GRAFT WITH ANGINA PECTORIS, UNSPECIFIED WHETHER NATIVE OR TRANSPLANTED HEART (H): ICD-10-CM

## 2019-11-27 DIAGNOSIS — I12.9 TYPE 2 DM WITH CKD STAGE 1 AND HYPERTENSION (H): ICD-10-CM

## 2019-11-27 DIAGNOSIS — N18.1 TYPE 2 DM WITH CKD STAGE 1 AND HYPERTENSION (H): ICD-10-CM

## 2019-11-27 DIAGNOSIS — G81.91 RIGHT HEMIPLEGIA (H): ICD-10-CM

## 2019-11-27 DIAGNOSIS — I50.42 CHRONIC COMBINED SYSTOLIC AND DIASTOLIC CONGESTIVE HEART FAILURE (H): ICD-10-CM

## 2019-12-04 ENCOUNTER — OFFICE VISIT - HEALTHEAST (OUTPATIENT)
Dept: GERIATRICS | Facility: CLINIC | Age: 69
End: 2019-12-04

## 2019-12-04 DIAGNOSIS — I10 ESSENTIAL HYPERTENSION: ICD-10-CM

## 2019-12-04 DIAGNOSIS — G47.33 OSA ON CPAP: ICD-10-CM

## 2019-12-04 DIAGNOSIS — N18.1 TYPE 2 DM WITH CKD STAGE 1 AND HYPERTENSION (H): ICD-10-CM

## 2019-12-04 DIAGNOSIS — E11.22 TYPE 2 DM WITH CKD STAGE 1 AND HYPERTENSION (H): ICD-10-CM

## 2019-12-04 DIAGNOSIS — I25.709 CORONARY ARTERY DISEASE INVOLVING CORONARY BYPASS GRAFT WITH ANGINA PECTORIS, UNSPECIFIED WHETHER NATIVE OR TRANSPLANTED HEART (H): ICD-10-CM

## 2019-12-04 DIAGNOSIS — I48.0 PAF (PAROXYSMAL ATRIAL FIBRILLATION) (H): ICD-10-CM

## 2019-12-04 DIAGNOSIS — R13.12 OROPHARYNGEAL DYSPHAGIA: ICD-10-CM

## 2019-12-04 DIAGNOSIS — F41.9 ANXIETY AND DEPRESSION: ICD-10-CM

## 2019-12-04 DIAGNOSIS — G81.91 RIGHT HEMIPLEGIA (H): ICD-10-CM

## 2019-12-04 DIAGNOSIS — E66.01 MORBID OBESITY (H): ICD-10-CM

## 2019-12-04 DIAGNOSIS — F32.A ANXIETY AND DEPRESSION: ICD-10-CM

## 2019-12-04 DIAGNOSIS — I50.42 CHRONIC COMBINED SYSTOLIC AND DIASTOLIC CONGESTIVE HEART FAILURE (H): ICD-10-CM

## 2019-12-04 DIAGNOSIS — I12.9 TYPE 2 DM WITH CKD STAGE 1 AND HYPERTENSION (H): ICD-10-CM

## 2019-12-04 DIAGNOSIS — E66.2 OBESITY HYPOVENTILATION SYNDROME (H): ICD-10-CM

## 2019-12-11 ENCOUNTER — OFFICE VISIT - HEALTHEAST (OUTPATIENT)
Dept: GERIATRICS | Facility: CLINIC | Age: 69
End: 2019-12-11

## 2019-12-11 DIAGNOSIS — E66.2 OBESITY HYPOVENTILATION SYNDROME (H): ICD-10-CM

## 2019-12-11 DIAGNOSIS — I48.0 PAF (PAROXYSMAL ATRIAL FIBRILLATION) (H): ICD-10-CM

## 2019-12-11 DIAGNOSIS — H60.312 ACUTE DIFFUSE OTITIS EXTERNA OF LEFT EAR: ICD-10-CM

## 2019-12-11 DIAGNOSIS — F41.9 ANXIETY AND DEPRESSION: ICD-10-CM

## 2019-12-11 DIAGNOSIS — E11.22 TYPE 2 DM WITH CKD STAGE 1 AND HYPERTENSION (H): ICD-10-CM

## 2019-12-11 DIAGNOSIS — G47.33 OSA ON CPAP: ICD-10-CM

## 2019-12-11 DIAGNOSIS — N18.1 TYPE 2 DM WITH CKD STAGE 1 AND HYPERTENSION (H): ICD-10-CM

## 2019-12-11 DIAGNOSIS — I12.9 TYPE 2 DM WITH CKD STAGE 1 AND HYPERTENSION (H): ICD-10-CM

## 2019-12-11 DIAGNOSIS — E66.01 MORBID OBESITY (H): ICD-10-CM

## 2019-12-11 DIAGNOSIS — I50.42 CHRONIC COMBINED SYSTOLIC AND DIASTOLIC CONGESTIVE HEART FAILURE (H): ICD-10-CM

## 2019-12-11 DIAGNOSIS — F32.A ANXIETY AND DEPRESSION: ICD-10-CM

## 2019-12-11 DIAGNOSIS — G81.91 RIGHT HEMIPLEGIA (H): ICD-10-CM

## 2020-01-07 ENCOUNTER — OFFICE VISIT - HEALTHEAST (OUTPATIENT)
Dept: GERIATRICS | Facility: CLINIC | Age: 70
End: 2020-01-07

## 2020-01-07 DIAGNOSIS — G47.33 OSA ON CPAP: ICD-10-CM

## 2020-01-07 DIAGNOSIS — E66.01 MORBID OBESITY (H): ICD-10-CM

## 2020-01-07 DIAGNOSIS — F41.9 ANXIETY AND DEPRESSION: ICD-10-CM

## 2020-01-07 DIAGNOSIS — I10 ESSENTIAL HYPERTENSION: ICD-10-CM

## 2020-01-07 DIAGNOSIS — E78.5 DYSLIPIDEMIA (HIGH LDL; LOW HDL): ICD-10-CM

## 2020-01-07 DIAGNOSIS — F32.A ANXIETY AND DEPRESSION: ICD-10-CM

## 2020-01-07 DIAGNOSIS — E66.2 OBESITY HYPOVENTILATION SYNDROME (H): ICD-10-CM

## 2020-01-07 DIAGNOSIS — R13.12 OROPHARYNGEAL DYSPHAGIA: ICD-10-CM

## 2020-01-07 DIAGNOSIS — I25.709 CORONARY ARTERY DISEASE INVOLVING CORONARY BYPASS GRAFT WITH ANGINA PECTORIS, UNSPECIFIED WHETHER NATIVE OR TRANSPLANTED HEART (H): ICD-10-CM

## 2020-01-07 DIAGNOSIS — Z86.718 H/O DEEP VENOUS THROMBOSIS: ICD-10-CM

## 2020-01-07 DIAGNOSIS — G81.91 RIGHT HEMIPLEGIA (H): ICD-10-CM

## 2020-01-07 DIAGNOSIS — I48.0 PAF (PAROXYSMAL ATRIAL FIBRILLATION) (H): ICD-10-CM

## 2020-01-07 DIAGNOSIS — I50.42 CHRONIC COMBINED SYSTOLIC AND DIASTOLIC CONGESTIVE HEART FAILURE (H): ICD-10-CM

## 2020-01-07 ASSESSMENT — MIFFLIN-ST. JEOR: SCORE: 1372.25

## 2020-01-28 ENCOUNTER — OFFICE VISIT - HEALTHEAST (OUTPATIENT)
Dept: GERIATRICS | Facility: CLINIC | Age: 70
End: 2020-01-28

## 2020-01-28 DIAGNOSIS — I12.9 TYPE 2 DM WITH CKD STAGE 1 AND HYPERTENSION (H): ICD-10-CM

## 2020-01-28 DIAGNOSIS — L02.214 ABSCESS OF LEFT GROIN: ICD-10-CM

## 2020-01-28 DIAGNOSIS — N18.1 TYPE 2 DM WITH CKD STAGE 1 AND HYPERTENSION (H): ICD-10-CM

## 2020-01-28 DIAGNOSIS — R13.12 OROPHARYNGEAL DYSPHAGIA: ICD-10-CM

## 2020-01-28 DIAGNOSIS — I48.0 PAF (PAROXYSMAL ATRIAL FIBRILLATION) (H): ICD-10-CM

## 2020-01-28 DIAGNOSIS — I25.709 CORONARY ARTERY DISEASE INVOLVING CORONARY BYPASS GRAFT WITH ANGINA PECTORIS, UNSPECIFIED WHETHER NATIVE OR TRANSPLANTED HEART (H): ICD-10-CM

## 2020-01-28 DIAGNOSIS — E66.2 OBESITY HYPOVENTILATION SYNDROME (H): ICD-10-CM

## 2020-01-28 DIAGNOSIS — E11.22 TYPE 2 DM WITH CKD STAGE 1 AND HYPERTENSION (H): ICD-10-CM

## 2020-01-28 DIAGNOSIS — I50.42 CHRONIC COMBINED SYSTOLIC AND DIASTOLIC CONGESTIVE HEART FAILURE (H): ICD-10-CM

## 2020-01-29 ENCOUNTER — OFFICE VISIT - HEALTHEAST (OUTPATIENT)
Dept: GERIATRICS | Facility: CLINIC | Age: 70
End: 2020-01-29

## 2020-01-29 DIAGNOSIS — L03.314 CELLULITIS OF LEFT GROIN: ICD-10-CM

## 2020-01-29 DIAGNOSIS — L02.214 ABSCESS OF LEFT GROIN: ICD-10-CM

## 2020-01-31 ENCOUNTER — OFFICE VISIT - HEALTHEAST (OUTPATIENT)
Dept: GERIATRICS | Facility: CLINIC | Age: 70
End: 2020-01-31

## 2020-01-31 DIAGNOSIS — I25.709 CORONARY ARTERY DISEASE INVOLVING CORONARY BYPASS GRAFT WITH ANGINA PECTORIS, UNSPECIFIED WHETHER NATIVE OR TRANSPLANTED HEART (H): ICD-10-CM

## 2020-01-31 DIAGNOSIS — E11.22 TYPE 2 DM WITH CKD STAGE 1 AND HYPERTENSION (H): ICD-10-CM

## 2020-01-31 DIAGNOSIS — L03.314 CELLULITIS OF LEFT GROIN: ICD-10-CM

## 2020-01-31 DIAGNOSIS — I50.42 CHRONIC COMBINED SYSTOLIC AND DIASTOLIC CONGESTIVE HEART FAILURE (H): ICD-10-CM

## 2020-01-31 DIAGNOSIS — N18.1 TYPE 2 DM WITH CKD STAGE 1 AND HYPERTENSION (H): ICD-10-CM

## 2020-01-31 DIAGNOSIS — R13.12 OROPHARYNGEAL DYSPHAGIA: ICD-10-CM

## 2020-01-31 DIAGNOSIS — I12.9 TYPE 2 DM WITH CKD STAGE 1 AND HYPERTENSION (H): ICD-10-CM

## 2020-01-31 DIAGNOSIS — I48.0 PAF (PAROXYSMAL ATRIAL FIBRILLATION) (H): ICD-10-CM

## 2020-01-31 DIAGNOSIS — L02.214 ABSCESS OF LEFT GROIN: ICD-10-CM

## 2020-02-04 ENCOUNTER — OFFICE VISIT - HEALTHEAST (OUTPATIENT)
Dept: GERIATRICS | Facility: CLINIC | Age: 70
End: 2020-02-04

## 2020-02-04 DIAGNOSIS — I50.42 CHRONIC COMBINED SYSTOLIC AND DIASTOLIC CONGESTIVE HEART FAILURE (H): ICD-10-CM

## 2020-02-04 DIAGNOSIS — I12.9 TYPE 2 DM WITH CKD STAGE 1 AND HYPERTENSION (H): ICD-10-CM

## 2020-02-04 DIAGNOSIS — N18.1 TYPE 2 DM WITH CKD STAGE 1 AND HYPERTENSION (H): ICD-10-CM

## 2020-02-04 DIAGNOSIS — S31.109D WOUND OF LEFT GROIN, SUBSEQUENT ENCOUNTER: ICD-10-CM

## 2020-02-04 DIAGNOSIS — Z98.890 S/P DEBRIDEMENT: ICD-10-CM

## 2020-02-04 DIAGNOSIS — F32.A ANXIETY AND DEPRESSION: ICD-10-CM

## 2020-02-04 DIAGNOSIS — E66.01 MORBID OBESITY (H): ICD-10-CM

## 2020-02-04 DIAGNOSIS — E11.22 TYPE 2 DM WITH CKD STAGE 1 AND HYPERTENSION (H): ICD-10-CM

## 2020-02-04 DIAGNOSIS — F41.9 ANXIETY AND DEPRESSION: ICD-10-CM

## 2020-02-04 DIAGNOSIS — I25.709 CORONARY ARTERY DISEASE INVOLVING CORONARY BYPASS GRAFT WITH ANGINA PECTORIS, UNSPECIFIED WHETHER NATIVE OR TRANSPLANTED HEART (H): ICD-10-CM

## 2020-02-04 DIAGNOSIS — L03.314 CELLULITIS OF LEFT GROIN: ICD-10-CM

## 2020-02-04 DIAGNOSIS — G81.91 RIGHT HEMIPLEGIA (H): ICD-10-CM

## 2020-02-26 ENCOUNTER — RECORDS - HEALTHEAST (OUTPATIENT)
Dept: LAB | Facility: CLINIC | Age: 70
End: 2020-02-26

## 2020-02-26 ENCOUNTER — COMMUNICATION - HEALTHEAST (OUTPATIENT)
Dept: GERIATRICS | Facility: CLINIC | Age: 70
End: 2020-02-26

## 2020-02-26 LAB — HBA1C MFR BLD: 6.5 %

## 2020-03-02 ENCOUNTER — OFFICE VISIT - HEALTHEAST (OUTPATIENT)
Dept: GERIATRICS | Facility: CLINIC | Age: 70
End: 2020-03-02

## 2020-03-02 DIAGNOSIS — I10 ESSENTIAL HYPERTENSION: ICD-10-CM

## 2020-03-02 DIAGNOSIS — G47.33 OSA ON CPAP: ICD-10-CM

## 2020-03-02 DIAGNOSIS — I48.0 PAF (PAROXYSMAL ATRIAL FIBRILLATION) (H): ICD-10-CM

## 2020-03-02 DIAGNOSIS — I12.9 TYPE 2 DM WITH CKD STAGE 1 AND HYPERTENSION (H): ICD-10-CM

## 2020-03-02 DIAGNOSIS — I50.42 CHRONIC COMBINED SYSTOLIC AND DIASTOLIC CONGESTIVE HEART FAILURE (H): ICD-10-CM

## 2020-03-02 DIAGNOSIS — G81.91 RIGHT HEMIPLEGIA (H): ICD-10-CM

## 2020-03-02 DIAGNOSIS — S31.109D WOUND OF LEFT GROIN, SUBSEQUENT ENCOUNTER: ICD-10-CM

## 2020-03-02 DIAGNOSIS — E11.22 TYPE 2 DM WITH CKD STAGE 1 AND HYPERTENSION (H): ICD-10-CM

## 2020-03-02 DIAGNOSIS — N18.1 TYPE 2 DM WITH CKD STAGE 1 AND HYPERTENSION (H): ICD-10-CM

## 2020-03-02 DIAGNOSIS — E66.2 OBESITY HYPOVENTILATION SYNDROME (H): ICD-10-CM

## 2020-03-02 DIAGNOSIS — I25.709 CORONARY ARTERY DISEASE INVOLVING CORONARY BYPASS GRAFT WITH ANGINA PECTORIS, UNSPECIFIED WHETHER NATIVE OR TRANSPLANTED HEART (H): ICD-10-CM

## 2020-03-11 ENCOUNTER — OFFICE VISIT - HEALTHEAST (OUTPATIENT)
Dept: GERIATRICS | Facility: CLINIC | Age: 70
End: 2020-03-11

## 2020-03-11 DIAGNOSIS — E11.22 TYPE 2 DM WITH CKD STAGE 1 AND HYPERTENSION (H): ICD-10-CM

## 2020-03-11 DIAGNOSIS — F32.A ANXIETY AND DEPRESSION: ICD-10-CM

## 2020-03-11 DIAGNOSIS — I50.42 CHRONIC COMBINED SYSTOLIC AND DIASTOLIC CONGESTIVE HEART FAILURE (H): ICD-10-CM

## 2020-03-11 DIAGNOSIS — F41.9 ANXIETY AND DEPRESSION: ICD-10-CM

## 2020-03-11 DIAGNOSIS — N18.1 TYPE 2 DM WITH CKD STAGE 1 AND HYPERTENSION (H): ICD-10-CM

## 2020-03-11 DIAGNOSIS — I12.9 TYPE 2 DM WITH CKD STAGE 1 AND HYPERTENSION (H): ICD-10-CM

## 2020-03-11 DIAGNOSIS — I25.709 CORONARY ARTERY DISEASE INVOLVING CORONARY BYPASS GRAFT WITH ANGINA PECTORIS, UNSPECIFIED WHETHER NATIVE OR TRANSPLANTED HEART (H): ICD-10-CM

## 2020-03-11 DIAGNOSIS — I10 ESSENTIAL HYPERTENSION: ICD-10-CM

## 2020-03-11 DIAGNOSIS — G47.33 OSA ON CPAP: ICD-10-CM

## 2020-03-11 DIAGNOSIS — R13.12 OROPHARYNGEAL DYSPHAGIA: ICD-10-CM

## 2020-03-11 DIAGNOSIS — E66.01 MORBID OBESITY (H): ICD-10-CM

## 2020-03-11 DIAGNOSIS — I48.0 PAF (PAROXYSMAL ATRIAL FIBRILLATION) (H): ICD-10-CM

## 2020-03-11 DIAGNOSIS — E66.2 OBESITY HYPOVENTILATION SYNDROME (H): ICD-10-CM

## 2020-05-13 ENCOUNTER — OFFICE VISIT - HEALTHEAST (OUTPATIENT)
Dept: GERIATRICS | Facility: CLINIC | Age: 70
End: 2020-05-13

## 2020-05-13 DIAGNOSIS — F32.A ANXIETY AND DEPRESSION: ICD-10-CM

## 2020-05-13 DIAGNOSIS — G47.33 OSA ON CPAP: ICD-10-CM

## 2020-05-13 DIAGNOSIS — N18.1 TYPE 2 DM WITH CKD STAGE 1 AND HYPERTENSION (H): ICD-10-CM

## 2020-05-13 DIAGNOSIS — I12.9 TYPE 2 DM WITH CKD STAGE 1 AND HYPERTENSION (H): ICD-10-CM

## 2020-05-13 DIAGNOSIS — E11.22 TYPE 2 DM WITH CKD STAGE 1 AND HYPERTENSION (H): ICD-10-CM

## 2020-05-13 DIAGNOSIS — G81.91 RIGHT HEMIPLEGIA (H): ICD-10-CM

## 2020-05-13 DIAGNOSIS — I25.709 CORONARY ARTERY DISEASE INVOLVING CORONARY BYPASS GRAFT WITH ANGINA PECTORIS, UNSPECIFIED WHETHER NATIVE OR TRANSPLANTED HEART (H): ICD-10-CM

## 2020-05-13 DIAGNOSIS — F41.9 ANXIETY AND DEPRESSION: ICD-10-CM

## 2020-05-13 DIAGNOSIS — I50.42 CHRONIC COMBINED SYSTOLIC AND DIASTOLIC CONGESTIVE HEART FAILURE (H): ICD-10-CM

## 2020-05-13 DIAGNOSIS — I48.0 PAF (PAROXYSMAL ATRIAL FIBRILLATION) (H): ICD-10-CM

## 2020-06-03 ENCOUNTER — OFFICE VISIT - HEALTHEAST (OUTPATIENT)
Dept: GERIATRICS | Facility: CLINIC | Age: 70
End: 2020-06-03

## 2020-06-03 DIAGNOSIS — I50.42 CHRONIC COMBINED SYSTOLIC AND DIASTOLIC CONGESTIVE HEART FAILURE (H): ICD-10-CM

## 2020-06-03 DIAGNOSIS — I25.709 CORONARY ARTERY DISEASE INVOLVING CORONARY BYPASS GRAFT WITH ANGINA PECTORIS, UNSPECIFIED WHETHER NATIVE OR TRANSPLANTED HEART (H): ICD-10-CM

## 2020-06-03 DIAGNOSIS — R13.12 OROPHARYNGEAL DYSPHAGIA: ICD-10-CM

## 2020-06-03 DIAGNOSIS — E66.01 MORBID OBESITY (H): ICD-10-CM

## 2020-06-03 DIAGNOSIS — I12.9 TYPE 2 DM WITH CKD STAGE 1 AND HYPERTENSION (H): ICD-10-CM

## 2020-06-03 DIAGNOSIS — F41.9 ANXIETY AND DEPRESSION: ICD-10-CM

## 2020-06-03 DIAGNOSIS — I10 ESSENTIAL HYPERTENSION: ICD-10-CM

## 2020-06-03 DIAGNOSIS — G47.33 OSA ON CPAP: ICD-10-CM

## 2020-06-03 DIAGNOSIS — N18.1 TYPE 2 DM WITH CKD STAGE 1 AND HYPERTENSION (H): ICD-10-CM

## 2020-06-03 DIAGNOSIS — F32.A ANXIETY AND DEPRESSION: ICD-10-CM

## 2020-06-03 DIAGNOSIS — I48.0 PAF (PAROXYSMAL ATRIAL FIBRILLATION) (H): ICD-10-CM

## 2020-06-03 DIAGNOSIS — E66.2 OBESITY HYPOVENTILATION SYNDROME (H): ICD-10-CM

## 2020-06-03 DIAGNOSIS — G81.91 RIGHT HEMIPLEGIA (H): ICD-10-CM

## 2020-06-03 DIAGNOSIS — E11.22 TYPE 2 DM WITH CKD STAGE 1 AND HYPERTENSION (H): ICD-10-CM

## 2020-06-10 ENCOUNTER — RECORDS - HEALTHEAST (OUTPATIENT)
Dept: LAB | Facility: CLINIC | Age: 70
End: 2020-06-10

## 2020-06-10 LAB
ANION GAP SERPL CALCULATED.3IONS-SCNC: 8 MMOL/L (ref 5–18)
BASOPHILS # BLD AUTO: 0 THOU/UL (ref 0–0.2)
BASOPHILS NFR BLD AUTO: 1 % (ref 0–2)
BUN SERPL-MCNC: 18 MG/DL (ref 8–28)
CALCIUM SERPL-MCNC: 9.2 MG/DL (ref 8.5–10.5)
CHLORIDE BLD-SCNC: 103 MMOL/L (ref 98–107)
CO2 SERPL-SCNC: 29 MMOL/L (ref 22–31)
CREAT SERPL-MCNC: 0.85 MG/DL (ref 0.6–1.1)
EOSINOPHIL # BLD AUTO: 0.2 THOU/UL (ref 0–0.4)
EOSINOPHIL NFR BLD AUTO: 3 % (ref 0–6)
ERYTHROCYTE [DISTWIDTH] IN BLOOD BY AUTOMATED COUNT: 13.2 % (ref 11–14.5)
GFR SERPL CREATININE-BSD FRML MDRD: >60 ML/MIN/1.73M2
GLUCOSE BLD-MCNC: 121 MG/DL (ref 70–125)
HBA1C MFR BLD: 7.3 %
HCT VFR BLD AUTO: 38 % (ref 35–47)
HGB BLD-MCNC: 12.3 G/DL (ref 12–16)
LYMPHOCYTES # BLD AUTO: 2.3 THOU/UL (ref 0.8–4.4)
LYMPHOCYTES NFR BLD AUTO: 28 % (ref 20–40)
MCH RBC QN AUTO: 29.6 PG (ref 27–34)
MCHC RBC AUTO-ENTMCNC: 32.4 G/DL (ref 32–36)
MCV RBC AUTO: 91 FL (ref 80–100)
MONOCYTES # BLD AUTO: 0.6 THOU/UL (ref 0–0.9)
MONOCYTES NFR BLD AUTO: 8 % (ref 2–10)
NEUTROPHILS # BLD AUTO: 4.9 THOU/UL (ref 2–7.7)
NEUTROPHILS NFR BLD AUTO: 61 % (ref 50–70)
PLATELET # BLD AUTO: 193 THOU/UL (ref 140–440)
PMV BLD AUTO: 11.2 FL (ref 8.5–12.5)
POTASSIUM BLD-SCNC: 4.3 MMOL/L (ref 3.5–5)
RBC # BLD AUTO: 4.16 MILL/UL (ref 3.8–5.4)
SODIUM SERPL-SCNC: 140 MMOL/L (ref 136–145)
TSH SERPL DL<=0.005 MIU/L-ACNC: 1.24 UIU/ML (ref 0.3–5)
WBC: 8.1 THOU/UL (ref 4–11)

## 2020-06-30 ENCOUNTER — COMMUNICATION - HEALTHEAST (OUTPATIENT)
Dept: GERIATRIC MEDICINE | Facility: CLINIC | Age: 70
End: 2020-06-30

## 2020-06-30 ASSESSMENT — ACTIVITIES OF DAILY LIVING (ADL): DEPENDENT_IADLS:: CLEANING;COOKING;LAUNDRY;SHOPPING;MEAL PREPARATION;MEDICATION MANAGEMENT;MONEY MANAGEMENT;INCONTINENCE

## 2020-07-01 ENCOUNTER — COMMUNICATION - HEALTHEAST (OUTPATIENT)
Dept: GERIATRIC MEDICINE | Facility: CLINIC | Age: 70
End: 2020-07-01

## 2020-07-08 ENCOUNTER — COMMUNICATION - HEALTHEAST (OUTPATIENT)
Dept: GERIATRIC MEDICINE | Facility: CLINIC | Age: 70
End: 2020-07-08

## 2020-07-09 ENCOUNTER — COMMUNICATION - HEALTHEAST (OUTPATIENT)
Dept: GERIATRIC MEDICINE | Facility: CLINIC | Age: 70
End: 2020-07-09

## 2020-07-28 ENCOUNTER — COMMUNICATION - HEALTHEAST (OUTPATIENT)
Dept: GERIATRIC MEDICINE | Facility: CLINIC | Age: 70
End: 2020-07-28

## 2020-08-05 ENCOUNTER — COMMUNICATION - HEALTHEAST (OUTPATIENT)
Dept: GERIATRICS | Facility: CLINIC | Age: 70
End: 2020-08-05

## 2020-08-11 ENCOUNTER — OFFICE VISIT - HEALTHEAST (OUTPATIENT)
Dept: GERIATRICS | Facility: CLINIC | Age: 70
End: 2020-08-11

## 2020-08-11 ENCOUNTER — COMMUNICATION - HEALTHEAST (OUTPATIENT)
Dept: GERIATRIC MEDICINE | Facility: CLINIC | Age: 70
End: 2020-08-11

## 2020-08-11 DIAGNOSIS — I25.709 CORONARY ARTERY DISEASE INVOLVING CORONARY BYPASS GRAFT WITH ANGINA PECTORIS, UNSPECIFIED WHETHER NATIVE OR TRANSPLANTED HEART (H): ICD-10-CM

## 2020-08-11 DIAGNOSIS — E11.22 TYPE 2 DM WITH CKD STAGE 1 AND HYPERTENSION (H): ICD-10-CM

## 2020-08-11 DIAGNOSIS — I12.9 TYPE 2 DM WITH CKD STAGE 1 AND HYPERTENSION (H): ICD-10-CM

## 2020-08-11 DIAGNOSIS — N18.1 TYPE 2 DM WITH CKD STAGE 1 AND HYPERTENSION (H): ICD-10-CM

## 2020-08-11 DIAGNOSIS — E78.5 DYSLIPIDEMIA (HIGH LDL; LOW HDL): ICD-10-CM

## 2020-08-11 DIAGNOSIS — I10 ESSENTIAL HYPERTENSION: ICD-10-CM

## 2020-08-11 DIAGNOSIS — G81.91 RIGHT HEMIPLEGIA (H): ICD-10-CM

## 2020-08-11 DIAGNOSIS — I50.42 CHRONIC COMBINED SYSTOLIC AND DIASTOLIC CONGESTIVE HEART FAILURE (H): ICD-10-CM

## 2020-08-17 ENCOUNTER — OFFICE VISIT - HEALTHEAST (OUTPATIENT)
Dept: GERIATRICS | Facility: CLINIC | Age: 70
End: 2020-08-17

## 2020-08-17 DIAGNOSIS — R13.12 OROPHARYNGEAL DYSPHAGIA: ICD-10-CM

## 2020-08-17 DIAGNOSIS — I10 ESSENTIAL HYPERTENSION: ICD-10-CM

## 2020-08-17 DIAGNOSIS — I50.42 CHRONIC COMBINED SYSTOLIC AND DIASTOLIC CONGESTIVE HEART FAILURE (H): ICD-10-CM

## 2020-08-17 DIAGNOSIS — E66.01 MORBID OBESITY (H): ICD-10-CM

## 2020-08-17 DIAGNOSIS — N18.1 TYPE 2 DM WITH CKD STAGE 1 AND HYPERTENSION (H): ICD-10-CM

## 2020-08-17 DIAGNOSIS — E11.22 TYPE 2 DM WITH CKD STAGE 1 AND HYPERTENSION (H): ICD-10-CM

## 2020-08-17 DIAGNOSIS — F32.A ANXIETY AND DEPRESSION: ICD-10-CM

## 2020-08-17 DIAGNOSIS — G81.91 RIGHT HEMIPLEGIA (H): ICD-10-CM

## 2020-08-17 DIAGNOSIS — F41.9 ANXIETY AND DEPRESSION: ICD-10-CM

## 2020-08-17 DIAGNOSIS — E66.2 OBESITY HYPOVENTILATION SYNDROME (H): ICD-10-CM

## 2020-08-17 DIAGNOSIS — I25.709 CORONARY ARTERY DISEASE INVOLVING CORONARY BYPASS GRAFT WITH ANGINA PECTORIS, UNSPECIFIED WHETHER NATIVE OR TRANSPLANTED HEART (H): ICD-10-CM

## 2020-08-17 DIAGNOSIS — I12.9 TYPE 2 DM WITH CKD STAGE 1 AND HYPERTENSION (H): ICD-10-CM

## 2020-08-17 DIAGNOSIS — R04.0 EPISTAXIS: ICD-10-CM

## 2020-09-09 ENCOUNTER — OFFICE VISIT - HEALTHEAST (OUTPATIENT)
Dept: GERIATRICS | Facility: CLINIC | Age: 70
End: 2020-09-09

## 2020-09-09 DIAGNOSIS — I25.709 CORONARY ARTERY DISEASE INVOLVING CORONARY BYPASS GRAFT WITH ANGINA PECTORIS, UNSPECIFIED WHETHER NATIVE OR TRANSPLANTED HEART (H): ICD-10-CM

## 2020-09-09 DIAGNOSIS — I10 ESSENTIAL HYPERTENSION: ICD-10-CM

## 2020-09-09 DIAGNOSIS — E11.22 TYPE 2 DM WITH CKD STAGE 1 AND HYPERTENSION (H): ICD-10-CM

## 2020-09-09 DIAGNOSIS — I12.9 TYPE 2 DM WITH CKD STAGE 1 AND HYPERTENSION (H): ICD-10-CM

## 2020-09-09 DIAGNOSIS — I50.42 CHRONIC COMBINED SYSTOLIC AND DIASTOLIC CONGESTIVE HEART FAILURE (H): ICD-10-CM

## 2020-09-09 DIAGNOSIS — F32.A ANXIETY AND DEPRESSION: ICD-10-CM

## 2020-09-09 DIAGNOSIS — G81.91 RIGHT HEMIPLEGIA (H): ICD-10-CM

## 2020-09-09 DIAGNOSIS — I48.0 PAF (PAROXYSMAL ATRIAL FIBRILLATION) (H): ICD-10-CM

## 2020-09-09 DIAGNOSIS — E66.01 MORBID OBESITY (H): ICD-10-CM

## 2020-09-09 DIAGNOSIS — N18.1 TYPE 2 DM WITH CKD STAGE 1 AND HYPERTENSION (H): ICD-10-CM

## 2020-09-09 DIAGNOSIS — F41.9 ANXIETY AND DEPRESSION: ICD-10-CM

## 2020-09-09 DIAGNOSIS — E66.2 OBESITY HYPOVENTILATION SYNDROME (H): ICD-10-CM

## 2020-09-18 ENCOUNTER — AMBULATORY - HEALTHEAST (OUTPATIENT)
Dept: ADMINISTRATIVE | Facility: CLINIC | Age: 70
End: 2020-09-18

## 2020-09-28 ENCOUNTER — COMMUNICATION - HEALTHEAST (OUTPATIENT)
Dept: CARE COORDINATION | Facility: HOSPITAL | Age: 70
End: 2020-09-28

## 2020-10-02 ENCOUNTER — COMMUNICATION - HEALTHEAST (OUTPATIENT)
Dept: GERIATRICS | Facility: CLINIC | Age: 70
End: 2020-10-02

## 2020-10-07 ENCOUNTER — OFFICE VISIT - HEALTHEAST (OUTPATIENT)
Dept: GERIATRICS | Facility: CLINIC | Age: 70
End: 2020-10-07

## 2020-10-07 DIAGNOSIS — E66.01 MORBID OBESITY (H): ICD-10-CM

## 2020-10-07 DIAGNOSIS — N18.1 TYPE 2 DM WITH CKD STAGE 1 AND HYPERTENSION (H): ICD-10-CM

## 2020-10-07 DIAGNOSIS — I25.709 CORONARY ARTERY DISEASE INVOLVING CORONARY BYPASS GRAFT WITH ANGINA PECTORIS, UNSPECIFIED WHETHER NATIVE OR TRANSPLANTED HEART (H): ICD-10-CM

## 2020-10-07 DIAGNOSIS — I12.9 TYPE 2 DM WITH CKD STAGE 1 AND HYPERTENSION (H): ICD-10-CM

## 2020-10-07 DIAGNOSIS — I50.42 CHRONIC COMBINED SYSTOLIC AND DIASTOLIC CONGESTIVE HEART FAILURE (H): ICD-10-CM

## 2020-10-07 DIAGNOSIS — K21.9 GASTROESOPHAGEAL REFLUX DISEASE WITHOUT ESOPHAGITIS: ICD-10-CM

## 2020-10-07 DIAGNOSIS — E11.22 TYPE 2 DM WITH CKD STAGE 1 AND HYPERTENSION (H): ICD-10-CM

## 2020-10-07 DIAGNOSIS — F32.A ANXIETY AND DEPRESSION: ICD-10-CM

## 2020-10-07 DIAGNOSIS — I10 ESSENTIAL HYPERTENSION: ICD-10-CM

## 2020-10-07 DIAGNOSIS — G81.91 RIGHT HEMIPLEGIA (H): ICD-10-CM

## 2020-10-07 DIAGNOSIS — Z00.01 ENCOUNTER FOR PREVENTATIVE ADULT HEALTH CARE EXAM WITH ABNORMAL FINDINGS: ICD-10-CM

## 2020-10-07 DIAGNOSIS — F41.9 ANXIETY AND DEPRESSION: ICD-10-CM

## 2020-10-07 DIAGNOSIS — I48.0 PAF (PAROXYSMAL ATRIAL FIBRILLATION) (H): ICD-10-CM

## 2020-10-07 DIAGNOSIS — Z86.718 H/O DEEP VENOUS THROMBOSIS: ICD-10-CM

## 2020-10-12 ENCOUNTER — OFFICE VISIT - HEALTHEAST (OUTPATIENT)
Dept: GERIATRICS | Facility: CLINIC | Age: 70
End: 2020-10-12

## 2020-10-12 DIAGNOSIS — H00.014 HORDEOLUM EXTERNUM OF LEFT UPPER EYELID: ICD-10-CM

## 2020-10-12 DIAGNOSIS — I50.42 CHRONIC COMBINED SYSTOLIC AND DIASTOLIC CONGESTIVE HEART FAILURE (H): ICD-10-CM

## 2020-10-12 DIAGNOSIS — N18.1 TYPE 2 DM WITH CKD STAGE 1 AND HYPERTENSION (H): ICD-10-CM

## 2020-10-12 DIAGNOSIS — E11.22 TYPE 2 DM WITH CKD STAGE 1 AND HYPERTENSION (H): ICD-10-CM

## 2020-10-12 DIAGNOSIS — I12.9 TYPE 2 DM WITH CKD STAGE 1 AND HYPERTENSION (H): ICD-10-CM

## 2020-10-19 ENCOUNTER — OFFICE VISIT - HEALTHEAST (OUTPATIENT)
Dept: GERIATRICS | Facility: CLINIC | Age: 70
End: 2020-10-19

## 2020-10-19 DIAGNOSIS — H65.92 OME (OTITIS MEDIA WITH EFFUSION), LEFT: ICD-10-CM

## 2020-10-19 DIAGNOSIS — H92.02 LEFT EAR PAIN: ICD-10-CM

## 2020-10-19 DIAGNOSIS — J01.90 ACUTE NON-RECURRENT SINUSITIS, UNSPECIFIED LOCATION: ICD-10-CM

## 2020-10-20 ENCOUNTER — RECORDS - HEALTHEAST (OUTPATIENT)
Dept: LAB | Facility: CLINIC | Age: 70
End: 2020-10-20

## 2020-10-21 ENCOUNTER — OFFICE VISIT - HEALTHEAST (OUTPATIENT)
Dept: GERIATRICS | Facility: CLINIC | Age: 70
End: 2020-10-21

## 2020-10-21 DIAGNOSIS — R13.12 OROPHARYNGEAL DYSPHAGIA: ICD-10-CM

## 2020-10-21 DIAGNOSIS — J01.90 ACUTE NON-RECURRENT SINUSITIS, UNSPECIFIED LOCATION: ICD-10-CM

## 2020-10-21 DIAGNOSIS — H65.92 OME (OTITIS MEDIA WITH EFFUSION), LEFT: ICD-10-CM

## 2020-10-21 DIAGNOSIS — I50.42 CHRONIC COMBINED SYSTOLIC AND DIASTOLIC CONGESTIVE HEART FAILURE (H): ICD-10-CM

## 2020-10-21 DIAGNOSIS — G81.91 RIGHT HEMIPLEGIA (H): ICD-10-CM

## 2020-10-21 LAB
BASOPHILS # BLD AUTO: 0 THOU/UL (ref 0–0.2)
BASOPHILS NFR BLD AUTO: 0 % (ref 0–2)
EOSINOPHIL # BLD AUTO: 0.2 THOU/UL (ref 0–0.4)
EOSINOPHIL NFR BLD AUTO: 3 % (ref 0–6)
ERYTHROCYTE [DISTWIDTH] IN BLOOD BY AUTOMATED COUNT: 13.8 % (ref 11–14.5)
HBA1C MFR BLD: 7.4 %
HCT VFR BLD AUTO: 35.9 % (ref 35–47)
HGB BLD-MCNC: 11.6 G/DL (ref 12–16)
IMM GRANULOCYTES # BLD: 0 THOU/UL
IMM GRANULOCYTES NFR BLD: 0 %
LYMPHOCYTES # BLD AUTO: 2 THOU/UL (ref 0.8–4.4)
LYMPHOCYTES NFR BLD AUTO: 28 % (ref 20–40)
MCH RBC QN AUTO: 28.9 PG (ref 27–34)
MCHC RBC AUTO-ENTMCNC: 32.3 G/DL (ref 32–36)
MCV RBC AUTO: 90 FL (ref 80–100)
MONOCYTES # BLD AUTO: 0.5 THOU/UL (ref 0–0.9)
MONOCYTES NFR BLD AUTO: 7 % (ref 2–10)
NEUTROPHILS # BLD AUTO: 4.4 THOU/UL (ref 2–7.7)
NEUTROPHILS NFR BLD AUTO: 61 % (ref 50–70)
PLATELET # BLD AUTO: 204 THOU/UL (ref 140–440)
PMV BLD AUTO: 11.1 FL (ref 8.5–12.5)
RBC # BLD AUTO: 4.01 MILL/UL (ref 3.8–5.4)
TSH SERPL DL<=0.005 MIU/L-ACNC: 1.21 UIU/ML (ref 0.3–5)
WBC: 7.2 THOU/UL (ref 4–11)

## 2020-10-22 ENCOUNTER — COMMUNICATION - HEALTHEAST (OUTPATIENT)
Dept: GERIATRICS | Facility: CLINIC | Age: 70
End: 2020-10-22

## 2020-10-26 ENCOUNTER — OFFICE VISIT - HEALTHEAST (OUTPATIENT)
Dept: GERIATRICS | Facility: CLINIC | Age: 70
End: 2020-10-26

## 2020-10-26 DIAGNOSIS — I12.9 TYPE 2 DM WITH CKD STAGE 1 AND HYPERTENSION (H): ICD-10-CM

## 2020-10-26 DIAGNOSIS — S00.222D: ICD-10-CM

## 2020-10-26 DIAGNOSIS — E11.22 TYPE 2 DM WITH CKD STAGE 1 AND HYPERTENSION (H): ICD-10-CM

## 2020-10-26 DIAGNOSIS — N18.1 TYPE 2 DM WITH CKD STAGE 1 AND HYPERTENSION (H): ICD-10-CM

## 2020-10-26 DIAGNOSIS — I50.42 CHRONIC COMBINED SYSTOLIC AND DIASTOLIC CONGESTIVE HEART FAILURE (H): ICD-10-CM

## 2020-10-26 DIAGNOSIS — I25.709 CORONARY ARTERY DISEASE INVOLVING CORONARY BYPASS GRAFT WITH ANGINA PECTORIS, UNSPECIFIED WHETHER NATIVE OR TRANSPLANTED HEART (H): ICD-10-CM

## 2020-10-27 ENCOUNTER — OFFICE VISIT - HEALTHEAST (OUTPATIENT)
Dept: GERIATRICS | Facility: CLINIC | Age: 70
End: 2020-10-27

## 2020-10-27 DIAGNOSIS — I10 ESSENTIAL HYPERTENSION: ICD-10-CM

## 2020-10-27 DIAGNOSIS — R13.12 OROPHARYNGEAL DYSPHAGIA: ICD-10-CM

## 2020-10-27 DIAGNOSIS — F41.9 ANXIETY AND DEPRESSION: ICD-10-CM

## 2020-10-27 DIAGNOSIS — I25.709 CORONARY ARTERY DISEASE INVOLVING CORONARY BYPASS GRAFT WITH ANGINA PECTORIS, UNSPECIFIED WHETHER NATIVE OR TRANSPLANTED HEART (H): ICD-10-CM

## 2020-10-27 DIAGNOSIS — G47.33 OSA ON CPAP: ICD-10-CM

## 2020-10-27 DIAGNOSIS — Z86.73 H/O: CVA (CEREBROVASCULAR ACCIDENT): ICD-10-CM

## 2020-10-27 DIAGNOSIS — F32.A ANXIETY AND DEPRESSION: ICD-10-CM

## 2020-10-27 DIAGNOSIS — G81.91 RIGHT HEMIPLEGIA (H): ICD-10-CM

## 2020-10-27 DIAGNOSIS — I48.0 PAF (PAROXYSMAL ATRIAL FIBRILLATION) (H): ICD-10-CM

## 2020-10-27 DIAGNOSIS — I50.42 CHRONIC COMBINED SYSTOLIC AND DIASTOLIC CONGESTIVE HEART FAILURE (H): ICD-10-CM

## 2020-10-27 DIAGNOSIS — E66.01 MORBID OBESITY (H): ICD-10-CM

## 2020-10-27 DIAGNOSIS — N18.1 TYPE 2 DM WITH CKD STAGE 1 AND HYPERTENSION (H): ICD-10-CM

## 2020-10-27 DIAGNOSIS — I12.9 TYPE 2 DM WITH CKD STAGE 1 AND HYPERTENSION (H): ICD-10-CM

## 2020-10-27 DIAGNOSIS — E11.22 TYPE 2 DM WITH CKD STAGE 1 AND HYPERTENSION (H): ICD-10-CM

## 2020-11-02 ENCOUNTER — OFFICE VISIT - HEALTHEAST (OUTPATIENT)
Dept: GERIATRICS | Facility: CLINIC | Age: 70
End: 2020-11-02

## 2020-11-02 DIAGNOSIS — E11.22 TYPE 2 DM WITH CKD STAGE 1 AND HYPERTENSION (H): ICD-10-CM

## 2020-11-02 DIAGNOSIS — I12.9 TYPE 2 DM WITH CKD STAGE 1 AND HYPERTENSION (H): ICD-10-CM

## 2020-11-02 DIAGNOSIS — S00.222D: ICD-10-CM

## 2020-11-02 DIAGNOSIS — G81.91 RIGHT HEMIPLEGIA (H): ICD-10-CM

## 2020-11-02 DIAGNOSIS — N18.1 TYPE 2 DM WITH CKD STAGE 1 AND HYPERTENSION (H): ICD-10-CM

## 2020-11-02 DIAGNOSIS — E66.01 MORBID OBESITY (H): ICD-10-CM

## 2020-11-18 ENCOUNTER — OFFICE VISIT - HEALTHEAST (OUTPATIENT)
Dept: GERIATRICS | Facility: CLINIC | Age: 70
End: 2020-11-18

## 2020-11-18 DIAGNOSIS — E11.22 TYPE 2 DM WITH CKD STAGE 1 AND HYPERTENSION (H): ICD-10-CM

## 2020-11-18 DIAGNOSIS — I12.9 TYPE 2 DM WITH CKD STAGE 1 AND HYPERTENSION (H): ICD-10-CM

## 2020-11-18 DIAGNOSIS — G81.91 RIGHT HEMIPLEGIA (H): ICD-10-CM

## 2020-11-18 DIAGNOSIS — N18.1 TYPE 2 DM WITH CKD STAGE 1 AND HYPERTENSION (H): ICD-10-CM

## 2020-11-18 DIAGNOSIS — I50.42 CHRONIC COMBINED SYSTOLIC AND DIASTOLIC CONGESTIVE HEART FAILURE (H): ICD-10-CM

## 2020-11-18 DIAGNOSIS — I10 ESSENTIAL HYPERTENSION: ICD-10-CM

## 2020-11-18 DIAGNOSIS — E66.2 OBESITY HYPOVENTILATION SYNDROME (H): ICD-10-CM

## 2020-11-18 DIAGNOSIS — L74.8 APOCRINE CYST: ICD-10-CM

## 2020-11-18 DIAGNOSIS — E66.01 MORBID OBESITY (H): ICD-10-CM

## 2020-11-18 DIAGNOSIS — I25.709 CORONARY ARTERY DISEASE INVOLVING CORONARY BYPASS GRAFT WITH ANGINA PECTORIS, UNSPECIFIED WHETHER NATIVE OR TRANSPLANTED HEART (H): ICD-10-CM

## 2020-11-25 ENCOUNTER — COMMUNICATION - HEALTHEAST (OUTPATIENT)
Dept: GERIATRIC MEDICINE | Facility: CLINIC | Age: 70
End: 2020-11-25

## 2020-12-01 ENCOUNTER — OFFICE VISIT - HEALTHEAST (OUTPATIENT)
Dept: GERIATRICS | Facility: CLINIC | Age: 70
End: 2020-12-01

## 2020-12-01 DIAGNOSIS — I12.9 TYPE 2 DM WITH CKD STAGE 1 AND HYPERTENSION (H): ICD-10-CM

## 2020-12-01 DIAGNOSIS — I25.709 CORONARY ARTERY DISEASE INVOLVING CORONARY BYPASS GRAFT WITH ANGINA PECTORIS, UNSPECIFIED WHETHER NATIVE OR TRANSPLANTED HEART (H): ICD-10-CM

## 2020-12-01 DIAGNOSIS — E11.22 TYPE 2 DM WITH CKD STAGE 1 AND HYPERTENSION (H): ICD-10-CM

## 2020-12-01 DIAGNOSIS — I48.0 PAF (PAROXYSMAL ATRIAL FIBRILLATION) (H): ICD-10-CM

## 2020-12-01 DIAGNOSIS — N18.1 TYPE 2 DM WITH CKD STAGE 1 AND HYPERTENSION (H): ICD-10-CM

## 2020-12-01 DIAGNOSIS — I10 ESSENTIAL HYPERTENSION: ICD-10-CM

## 2020-12-04 ENCOUNTER — COMMUNICATION - HEALTHEAST (OUTPATIENT)
Dept: GERIATRIC MEDICINE | Facility: CLINIC | Age: 70
End: 2020-12-04

## 2020-12-04 ASSESSMENT — ACTIVITIES OF DAILY LIVING (ADL)
DEPENDENT_IADLS:: CLEANING;COOKING;LAUNDRY;SHOPPING;MEAL PREPARATION;MEDICATION MANAGEMENT;MONEY MANAGEMENT;TRANSPORTATION;INCONTINENCE

## 2020-12-08 ENCOUNTER — COMMUNICATION - HEALTHEAST (OUTPATIENT)
Dept: GERIATRIC MEDICINE | Facility: CLINIC | Age: 70
End: 2020-12-08

## 2020-12-10 ENCOUNTER — COMMUNICATION - HEALTHEAST (OUTPATIENT)
Dept: GERIATRIC MEDICINE | Facility: CLINIC | Age: 70
End: 2020-12-10

## 2020-12-14 ENCOUNTER — OFFICE VISIT - HEALTHEAST (OUTPATIENT)
Dept: GERIATRICS | Facility: CLINIC | Age: 70
End: 2020-12-14

## 2020-12-14 DIAGNOSIS — G81.91 RIGHT HEMIPLEGIA (H): ICD-10-CM

## 2020-12-14 DIAGNOSIS — F32.A ANXIETY AND DEPRESSION: ICD-10-CM

## 2020-12-14 DIAGNOSIS — I10 ESSENTIAL HYPERTENSION: ICD-10-CM

## 2020-12-14 DIAGNOSIS — I50.42 CHRONIC COMBINED SYSTOLIC AND DIASTOLIC CONGESTIVE HEART FAILURE (H): ICD-10-CM

## 2020-12-14 DIAGNOSIS — E11.22 TYPE 2 DM WITH CKD STAGE 1 AND HYPERTENSION (H): ICD-10-CM

## 2020-12-14 DIAGNOSIS — R13.12 OROPHARYNGEAL DYSPHAGIA: ICD-10-CM

## 2020-12-14 DIAGNOSIS — I25.709 CORONARY ARTERY DISEASE INVOLVING CORONARY BYPASS GRAFT WITH ANGINA PECTORIS, UNSPECIFIED WHETHER NATIVE OR TRANSPLANTED HEART (H): ICD-10-CM

## 2020-12-14 DIAGNOSIS — E66.2 OBESITY HYPOVENTILATION SYNDROME (H): ICD-10-CM

## 2020-12-14 DIAGNOSIS — I48.0 PAF (PAROXYSMAL ATRIAL FIBRILLATION) (H): ICD-10-CM

## 2020-12-14 DIAGNOSIS — F41.9 ANXIETY AND DEPRESSION: ICD-10-CM

## 2020-12-14 DIAGNOSIS — Z99.3 WHEELCHAIR DEPENDENT: ICD-10-CM

## 2020-12-14 DIAGNOSIS — L74.8 APOCRINE CYST: ICD-10-CM

## 2020-12-14 DIAGNOSIS — I12.9 TYPE 2 DM WITH CKD STAGE 1 AND HYPERTENSION (H): ICD-10-CM

## 2020-12-14 DIAGNOSIS — N18.1 TYPE 2 DM WITH CKD STAGE 1 AND HYPERTENSION (H): ICD-10-CM

## 2020-12-14 DIAGNOSIS — E11.65 HYPERGLYCEMIA DUE TO DIABETES MELLITUS (H): ICD-10-CM

## 2020-12-14 DIAGNOSIS — E66.01 MORBID OBESITY (H): ICD-10-CM

## 2020-12-14 DIAGNOSIS — G47.33 OSA ON CPAP: ICD-10-CM

## 2020-12-23 ENCOUNTER — COMMUNICATION - HEALTHEAST (OUTPATIENT)
Dept: GERIATRIC MEDICINE | Facility: CLINIC | Age: 70
End: 2020-12-23

## 2020-12-31 ENCOUNTER — OFFICE VISIT - HEALTHEAST (OUTPATIENT)
Dept: GERIATRICS | Facility: CLINIC | Age: 70
End: 2020-12-31

## 2020-12-31 DIAGNOSIS — G81.91 RIGHT HEMIPLEGIA (H): ICD-10-CM

## 2020-12-31 DIAGNOSIS — N18.1 TYPE 2 DM WITH CKD STAGE 1 AND HYPERTENSION (H): ICD-10-CM

## 2020-12-31 DIAGNOSIS — I50.42 CHRONIC COMBINED SYSTOLIC AND DIASTOLIC CONGESTIVE HEART FAILURE (H): ICD-10-CM

## 2020-12-31 DIAGNOSIS — U07.1 2019 NOVEL CORONAVIRUS DISEASE (COVID-19): ICD-10-CM

## 2020-12-31 DIAGNOSIS — I12.9 TYPE 2 DM WITH CKD STAGE 1 AND HYPERTENSION (H): ICD-10-CM

## 2020-12-31 DIAGNOSIS — R19.5 LOOSE STOOLS: ICD-10-CM

## 2020-12-31 DIAGNOSIS — I25.709 CORONARY ARTERY DISEASE INVOLVING CORONARY BYPASS GRAFT WITH ANGINA PECTORIS, UNSPECIFIED WHETHER NATIVE OR TRANSPLANTED HEART (H): ICD-10-CM

## 2020-12-31 DIAGNOSIS — E11.22 TYPE 2 DM WITH CKD STAGE 1 AND HYPERTENSION (H): ICD-10-CM

## 2020-12-31 DIAGNOSIS — E66.2 OBESITY HYPOVENTILATION SYNDROME (H): ICD-10-CM

## 2020-12-31 DIAGNOSIS — G47.33 OSA ON CPAP: ICD-10-CM

## 2020-12-31 ASSESSMENT — MIFFLIN-ST. JEOR: SCORE: 1503.79

## 2021-01-02 ENCOUNTER — RECORDS - HEALTHEAST (OUTPATIENT)
Dept: LAB | Facility: CLINIC | Age: 71
End: 2021-01-02

## 2021-01-04 ENCOUNTER — OFFICE VISIT - HEALTHEAST (OUTPATIENT)
Dept: GERIATRICS | Facility: CLINIC | Age: 71
End: 2021-01-04

## 2021-01-04 DIAGNOSIS — R13.12 OROPHARYNGEAL DYSPHAGIA: ICD-10-CM

## 2021-01-04 DIAGNOSIS — I50.42 CHRONIC COMBINED SYSTOLIC AND DIASTOLIC CONGESTIVE HEART FAILURE (H): ICD-10-CM

## 2021-01-04 DIAGNOSIS — U07.1 2019 NOVEL CORONAVIRUS DISEASE (COVID-19): ICD-10-CM

## 2021-01-04 DIAGNOSIS — I25.709 CORONARY ARTERY DISEASE INVOLVING CORONARY BYPASS GRAFT WITH ANGINA PECTORIS, UNSPECIFIED WHETHER NATIVE OR TRANSPLANTED HEART (H): ICD-10-CM

## 2021-01-04 DIAGNOSIS — I10 ESSENTIAL HYPERTENSION: ICD-10-CM

## 2021-01-04 DIAGNOSIS — I12.9 TYPE 2 DM WITH CKD STAGE 1 AND HYPERTENSION (H): ICD-10-CM

## 2021-01-04 DIAGNOSIS — E11.22 TYPE 2 DM WITH CKD STAGE 1 AND HYPERTENSION (H): ICD-10-CM

## 2021-01-04 DIAGNOSIS — G47.33 OSA ON CPAP: ICD-10-CM

## 2021-01-04 DIAGNOSIS — N18.1 TYPE 2 DM WITH CKD STAGE 1 AND HYPERTENSION (H): ICD-10-CM

## 2021-01-04 DIAGNOSIS — I48.0 PAF (PAROXYSMAL ATRIAL FIBRILLATION) (H): ICD-10-CM

## 2021-01-04 LAB
CREAT SERPL-MCNC: 0.82 MG/DL (ref 0.6–1.1)
GFR SERPL CREATININE-BSD FRML MDRD: >60 ML/MIN/1.73M2

## 2021-01-04 ASSESSMENT — MIFFLIN-ST. JEOR: SCORE: 1503.79

## 2021-01-05 ENCOUNTER — COMMUNICATION - HEALTHEAST (OUTPATIENT)
Dept: GERIATRIC MEDICINE | Facility: CLINIC | Age: 71
End: 2021-01-05

## 2021-01-06 ENCOUNTER — COMMUNICATION - HEALTHEAST (OUTPATIENT)
Dept: GERIATRIC MEDICINE | Facility: CLINIC | Age: 71
End: 2021-01-06

## 2021-01-07 ASSESSMENT — MIFFLIN-ST. JEOR: SCORE: 1503.79

## 2021-01-08 ENCOUNTER — OFFICE VISIT - HEALTHEAST (OUTPATIENT)
Dept: GERIATRICS | Facility: CLINIC | Age: 71
End: 2021-01-08

## 2021-01-08 ENCOUNTER — COMMUNICATION - HEALTHEAST (OUTPATIENT)
Dept: GERIATRIC MEDICINE | Facility: CLINIC | Age: 71
End: 2021-01-08

## 2021-01-08 DIAGNOSIS — I10 ESSENTIAL HYPERTENSION: ICD-10-CM

## 2021-01-08 DIAGNOSIS — I12.9 TYPE 2 DM WITH CKD STAGE 1 AND HYPERTENSION (H): ICD-10-CM

## 2021-01-08 DIAGNOSIS — I25.709 CORONARY ARTERY DISEASE INVOLVING CORONARY BYPASS GRAFT WITH ANGINA PECTORIS, UNSPECIFIED WHETHER NATIVE OR TRANSPLANTED HEART (H): ICD-10-CM

## 2021-01-08 DIAGNOSIS — G47.33 OSA ON CPAP: ICD-10-CM

## 2021-01-08 DIAGNOSIS — N18.1 TYPE 2 DM WITH CKD STAGE 1 AND HYPERTENSION (H): ICD-10-CM

## 2021-01-08 DIAGNOSIS — I48.0 PAF (PAROXYSMAL ATRIAL FIBRILLATION) (H): ICD-10-CM

## 2021-01-08 DIAGNOSIS — F41.9 ANXIETY AND DEPRESSION: ICD-10-CM

## 2021-01-08 DIAGNOSIS — E66.01 MORBID OBESITY (H): ICD-10-CM

## 2021-01-08 DIAGNOSIS — F32.A ANXIETY AND DEPRESSION: ICD-10-CM

## 2021-01-08 DIAGNOSIS — R13.12 OROPHARYNGEAL DYSPHAGIA: ICD-10-CM

## 2021-01-08 DIAGNOSIS — E11.22 TYPE 2 DM WITH CKD STAGE 1 AND HYPERTENSION (H): ICD-10-CM

## 2021-01-08 DIAGNOSIS — G81.91 RIGHT HEMIPLEGIA (H): ICD-10-CM

## 2021-01-08 DIAGNOSIS — I50.42 CHRONIC COMBINED SYSTOLIC AND DIASTOLIC CONGESTIVE HEART FAILURE (H): ICD-10-CM

## 2021-01-08 DIAGNOSIS — U07.1 2019 NOVEL CORONAVIRUS DISEASE (COVID-19): ICD-10-CM

## 2021-01-12 ENCOUNTER — COMMUNICATION - HEALTHEAST (OUTPATIENT)
Dept: GERIATRIC MEDICINE | Facility: CLINIC | Age: 71
End: 2021-01-12

## 2021-01-20 ENCOUNTER — COMMUNICATION - HEALTHEAST (OUTPATIENT)
Dept: GERIATRIC MEDICINE | Facility: CLINIC | Age: 71
End: 2021-01-20

## 2021-01-21 ASSESSMENT — MIFFLIN-ST. JEOR: SCORE: 1503.79

## 2021-01-22 ENCOUNTER — COMMUNICATION - HEALTHEAST (OUTPATIENT)
Dept: GERIATRIC MEDICINE | Facility: CLINIC | Age: 71
End: 2021-01-22

## 2021-01-22 ENCOUNTER — OFFICE VISIT - HEALTHEAST (OUTPATIENT)
Dept: GERIATRICS | Facility: CLINIC | Age: 71
End: 2021-01-22

## 2021-01-22 DIAGNOSIS — E11.22 TYPE 2 DM WITH CKD STAGE 1 AND HYPERTENSION (H): ICD-10-CM

## 2021-01-22 DIAGNOSIS — I48.0 PAF (PAROXYSMAL ATRIAL FIBRILLATION) (H): ICD-10-CM

## 2021-01-22 DIAGNOSIS — I12.9 TYPE 2 DM WITH CKD STAGE 1 AND HYPERTENSION (H): ICD-10-CM

## 2021-01-22 DIAGNOSIS — I50.42 CHRONIC COMBINED SYSTOLIC AND DIASTOLIC CONGESTIVE HEART FAILURE (H): ICD-10-CM

## 2021-01-22 DIAGNOSIS — N18.1 TYPE 2 DM WITH CKD STAGE 1 AND HYPERTENSION (H): ICD-10-CM

## 2021-01-22 DIAGNOSIS — I25.709 CORONARY ARTERY DISEASE INVOLVING CORONARY BYPASS GRAFT WITH ANGINA PECTORIS, UNSPECIFIED WHETHER NATIVE OR TRANSPLANTED HEART (H): ICD-10-CM

## 2021-01-22 DIAGNOSIS — G47.33 OSA ON CPAP: ICD-10-CM

## 2021-01-22 DIAGNOSIS — Z86.73 H/O: CVA (CEREBROVASCULAR ACCIDENT): ICD-10-CM

## 2021-01-22 DIAGNOSIS — I10 ESSENTIAL HYPERTENSION: ICD-10-CM

## 2021-01-22 DIAGNOSIS — G81.91 RIGHT HEMIPLEGIA (H): ICD-10-CM

## 2021-01-26 ENCOUNTER — COMMUNICATION - HEALTHEAST (OUTPATIENT)
Dept: GERIATRIC MEDICINE | Facility: CLINIC | Age: 71
End: 2021-01-26

## 2021-01-26 ASSESSMENT — MIFFLIN-ST. JEOR: SCORE: 1516.04

## 2021-01-27 ENCOUNTER — COMMUNICATION - HEALTHEAST (OUTPATIENT)
Dept: GERIATRIC MEDICINE | Facility: CLINIC | Age: 71
End: 2021-01-27

## 2021-01-27 ENCOUNTER — OFFICE VISIT - HEALTHEAST (OUTPATIENT)
Dept: GERIATRICS | Facility: CLINIC | Age: 71
End: 2021-01-27

## 2021-01-27 DIAGNOSIS — R13.12 OROPHARYNGEAL DYSPHAGIA: ICD-10-CM

## 2021-01-27 DIAGNOSIS — G81.91 RIGHT HEMIPARESIS (H): ICD-10-CM

## 2021-01-27 DIAGNOSIS — E78.5 DYSLIPIDEMIA (HIGH LDL; LOW HDL): ICD-10-CM

## 2021-01-27 DIAGNOSIS — E11.22 TYPE 2 DM WITH CKD STAGE 1 AND HYPERTENSION (H): ICD-10-CM

## 2021-01-27 DIAGNOSIS — I10 ESSENTIAL HYPERTENSION: ICD-10-CM

## 2021-01-27 DIAGNOSIS — G47.33 OSA ON CPAP: ICD-10-CM

## 2021-01-27 DIAGNOSIS — Z63.9 FAMILY DYNAMICS PROBLEM: ICD-10-CM

## 2021-01-27 DIAGNOSIS — F41.9 ANXIETY AND DEPRESSION: ICD-10-CM

## 2021-01-27 DIAGNOSIS — I25.709 CORONARY ARTERY DISEASE INVOLVING CORONARY BYPASS GRAFT WITH ANGINA PECTORIS, UNSPECIFIED WHETHER NATIVE OR TRANSPLANTED HEART (H): ICD-10-CM

## 2021-01-27 DIAGNOSIS — I48.0 PAF (PAROXYSMAL ATRIAL FIBRILLATION) (H): ICD-10-CM

## 2021-01-27 DIAGNOSIS — F32.A ANXIETY AND DEPRESSION: ICD-10-CM

## 2021-01-27 DIAGNOSIS — E66.01 MORBID OBESITY (H): ICD-10-CM

## 2021-01-27 DIAGNOSIS — U07.1 2019 NOVEL CORONAVIRUS DISEASE (COVID-19): ICD-10-CM

## 2021-01-27 DIAGNOSIS — Z99.3 WHEELCHAIR DEPENDENT: ICD-10-CM

## 2021-01-27 DIAGNOSIS — E66.2 OBESITY HYPOVENTILATION SYNDROME (H): ICD-10-CM

## 2021-01-27 DIAGNOSIS — I12.9 TYPE 2 DM WITH CKD STAGE 1 AND HYPERTENSION (H): ICD-10-CM

## 2021-01-27 DIAGNOSIS — N18.1 TYPE 2 DM WITH CKD STAGE 1 AND HYPERTENSION (H): ICD-10-CM

## 2021-01-27 DIAGNOSIS — I50.42 CHRONIC COMBINED SYSTOLIC AND DIASTOLIC CONGESTIVE HEART FAILURE (H): ICD-10-CM

## 2021-01-27 SDOH — SOCIAL STABILITY - SOCIAL INSECURITY: PROBLEM RELATED TO PRIMARY SUPPORT GROUP, UNSPECIFIED: Z63.9

## 2021-01-29 ENCOUNTER — AMBULATORY - HEALTHEAST (OUTPATIENT)
Dept: GERIATRICS | Facility: CLINIC | Age: 71
End: 2021-01-29

## 2021-02-03 ENCOUNTER — COMMUNICATION - HEALTHEAST (OUTPATIENT)
Dept: GERIATRIC MEDICINE | Facility: CLINIC | Age: 71
End: 2021-02-03

## 2021-02-05 ENCOUNTER — COMMUNICATION - HEALTHEAST (OUTPATIENT)
Dept: GERIATRIC MEDICINE | Facility: CLINIC | Age: 71
End: 2021-02-05

## 2021-02-09 ENCOUNTER — COMMUNICATION - HEALTHEAST (OUTPATIENT)
Dept: GERIATRIC MEDICINE | Facility: CLINIC | Age: 71
End: 2021-02-09

## 2021-02-18 ENCOUNTER — COMMUNICATION - HEALTHEAST (OUTPATIENT)
Dept: GERIATRIC MEDICINE | Facility: CLINIC | Age: 71
End: 2021-02-18

## 2021-03-02 ENCOUNTER — COMMUNICATION - HEALTHEAST (OUTPATIENT)
Dept: GERIATRIC MEDICINE | Facility: CLINIC | Age: 71
End: 2021-03-02

## 2021-05-26 VITALS
HEART RATE: 63 BPM | DIASTOLIC BLOOD PRESSURE: 53 MMHG | SYSTOLIC BLOOD PRESSURE: 110 MMHG | OXYGEN SATURATION: 95 % | RESPIRATION RATE: 20 BRPM | TEMPERATURE: 97.7 F

## 2021-05-26 VITALS
DIASTOLIC BLOOD PRESSURE: 67 MMHG | HEART RATE: 98 BPM | SYSTOLIC BLOOD PRESSURE: 120 MMHG | OXYGEN SATURATION: 96 % | RESPIRATION RATE: 18 BRPM | TEMPERATURE: 97 F

## 2021-05-26 VITALS
HEART RATE: 61 BPM | SYSTOLIC BLOOD PRESSURE: 113 MMHG | TEMPERATURE: 97.7 F | OXYGEN SATURATION: 97 % | RESPIRATION RATE: 18 BRPM | DIASTOLIC BLOOD PRESSURE: 53 MMHG

## 2021-05-27 VITALS
HEART RATE: 63 BPM | RESPIRATION RATE: 18 BRPM | OXYGEN SATURATION: 94 % | SYSTOLIC BLOOD PRESSURE: 115 MMHG | DIASTOLIC BLOOD PRESSURE: 53 MMHG | TEMPERATURE: 98.7 F

## 2021-05-27 VITALS
OXYGEN SATURATION: 94 % | SYSTOLIC BLOOD PRESSURE: 112 MMHG | RESPIRATION RATE: 18 BRPM | HEART RATE: 76 BPM | DIASTOLIC BLOOD PRESSURE: 53 MMHG | TEMPERATURE: 96.5 F

## 2021-05-27 VITALS
DIASTOLIC BLOOD PRESSURE: 51 MMHG | SYSTOLIC BLOOD PRESSURE: 106 MMHG | HEART RATE: 54 BPM | RESPIRATION RATE: 18 BRPM | TEMPERATURE: 98.9 F | OXYGEN SATURATION: 97 %

## 2021-05-27 VITALS
HEART RATE: 101 BPM | DIASTOLIC BLOOD PRESSURE: 75 MMHG | SYSTOLIC BLOOD PRESSURE: 124 MMHG | OXYGEN SATURATION: 93 % | TEMPERATURE: 98 F | RESPIRATION RATE: 16 BRPM

## 2021-05-27 VITALS
HEART RATE: 61 BPM | SYSTOLIC BLOOD PRESSURE: 112 MMHG | TEMPERATURE: 97.3 F | RESPIRATION RATE: 19 BRPM | DIASTOLIC BLOOD PRESSURE: 59 MMHG | OXYGEN SATURATION: 96 %

## 2021-05-27 VITALS
SYSTOLIC BLOOD PRESSURE: 135 MMHG | TEMPERATURE: 97.8 F | DIASTOLIC BLOOD PRESSURE: 58 MMHG | RESPIRATION RATE: 20 BRPM | OXYGEN SATURATION: 94 % | HEART RATE: 65 BPM

## 2021-05-27 VITALS
OXYGEN SATURATION: 94 % | SYSTOLIC BLOOD PRESSURE: 124 MMHG | TEMPERATURE: 98.4 F | RESPIRATION RATE: 16 BRPM | DIASTOLIC BLOOD PRESSURE: 60 MMHG | HEART RATE: 61 BPM

## 2021-05-27 NOTE — TELEPHONE ENCOUNTER
[facility] Nurse Triage Telephone Note      Provider: GIOVANNI Tomas  Facility: [facility]    Facility Type: [facility]      Call Back Number:  [phone]    Allergies: Aricept [donepezil]; Atorvastatin; Glipizide; and Lisinopril    Reason for call: Lidocaine 5% patch is not covered by patient's insurance.       Verbal Order/Direction given by Provider: TRACI Lidocaine patch.      Provider giving order: GIOVANNI Tomas    Verbal order given to: Eddie Martin RN

## 2021-05-27 NOTE — PROGRESS NOTES
[facility]    Facility:   [facility] [[id number]]   Code Status: FULL CODE      CHIEF COMPLAINT/REASON FOR VISIT:  Chief Complaint   Patient presents with     Review Of Multiple Medical Conditions       HISTORY:      HPI: Zach is a 68 y.o. female who was seen secondary to review of chronic medical conditions.  His history of stroke with hemiparesis.  Did have a swallow study on March 7, 2019 now level 1 diet with honey thick liquids by teaspoon 100% supervision upright in a chair area talk to the dietitian will now change the tube feedings on at night off during the day and watch her weights as well as her response.  Blood sugars in the morning ranging 110-160 7 -184.  She has been normotensive afebrile and also on room air are stable.  She is due for lipid panel A1c as well as a hemoglobin the last hemoglobin was in December 2018.  Not having any pain.  Does need assistance with all ADLs.    Past Medical History:   Diagnosis Date     Anemia      Anxiety      Cancer (H)     Hx of colon cancer     CHF (congestive heart failure) (H)     diastolic     Coronary artery disease     s/p cabg     CVA (cerebral vascular accident) (H)      Depression      Diabetes mellitus (H)     type 2     DVT (deep vein thrombosis) in pregnancy (H)      Granuloma annulare      Hemiplegia (H)     R sided and minimally communicative     Hyperlipidemia      Hypertension      Obesity      Oropharyngeal dysphagia      ANGEL (obstructive sleep apnea)      Parotitis      Paroxysmal atrial fibrillation (H)      PE (pulmonary thromboembolism) (H)      Stroke (H) 12/2018    Sub acute L MCA stroke     Varicose veins of both lower extremities              Family History   Problem Relation Age of Onset     Heart disease Mother      Cancer Father         esophageal     Social History     Socioeconomic History     Marital status: Single     Spouse name: Not on file     Number of children: Not on file      Years of education: Not on file     Highest education level: Not on file   Occupational History     Not on file   Social Needs     Financial resource strain: Not on file     Food insecurity:     Worry: Not on file     Inability: Not on file     Transportation needs:     Medical: Not on file     Non-medical: Not on file   Tobacco Use     Smoking status: Never Smoker     Smokeless tobacco: Never Used   Substance and Sexual Activity     Alcohol use: No     Frequency: Never     Drug use: No     Sexual activity: Not on file   Lifestyle     Physical activity:     Days per week: Not on file     Minutes per session: Not on file     Stress: Not on file   Relationships     Social connections:     Talks on phone: Not on file     Gets together: Not on file     Attends Druze service: Not on file     Active member of club or organization: Not on file     Attends meetings of clubs or organizations: Not on file     Relationship status: Not on file     Intimate partner violence:     Fear of current or ex partner: Not on file     Emotionally abused: Not on file     Physically abused: Not on file     Forced sexual activity: Not on file   Other Topics Concern     Not on file   Social History Narrative     Not on file         Review of Systems  Currently there are no reports of fever chills or fatigue flulike symptoms headache stiff neck chest pain rashes or sores.  History of diabetes CAD hypertension hyperlipidemia anxiety depression sleep apnea left MCA stroke with right hemiplegia      Current Outpatient Medications   Medication Sig     acetaminophen (TYLENOL) 160 mg/5 mL (5 mL) Soln solution 20.3 mL by Enteral Tube route every 6 (six) hours MAX OF 5 DOSES IN 24 HOURS BETWEEN SCHEDULED AND PRN DOSES.  Every 6 Hours; 08:00 AM, 02:00 PM, 08:00 PM, 02:00 AM .     apixaban (ELIQUIS) 5 mg Tab tablet 5 mg by Enteral Tube route 2 (two) times a day .           aspirin 81 mg chewable tablet 81 mg by G-tube route daily.     ferrous  sulfate 300 mg (60 mg iron)/5 mL syrup 300 mg by G-tube route 2 (two) times a day.     furosemide (LASIX) 20 MG tablet 20 mg by G-tube route 2 (two) times a day at 9am and 6pm .           insulin glargine (LANTUS) 100 unit/mL injection Inject 20 Units under the skin 2 (two) times a day. 07:30 AM, 04:30 PM           insulin regular (NOVOLIN R) 100 unit/mL injection Inject under the skin 3 (three) times a day. If Blood Sugar is 200 to 249, give 2 Units. If Blood Sugar is 250 to 299, give 4 Units. If Blood Sugar is 300 to 349, give 6 Units. If Blood Sugar is 350 to 399, give 8 Units. If Blood Sugar is 400 to 449, give 10 Units. If Blood Sugar is greater than 449, give 12 Units.           irbesartan (AVAPRO) 75 MG tablet 1 tablet (75 mg total) by G-tube route 2 (two) times a day.     lansoprazole (PREVACID SOLUTAB) 15 MG disintegrating tablet 15 mg by G-tube route daily.     lidocaine (LIDODERM) 5 % Place 2 patches on the skin daily Remove & Discard patch within 12 hours or as directed by MD  APPLY TO RIGHT AND LEFT SHOULDERS. .     metoprolol tartrate (LOPRESSOR) 50 MG tablet 50 mg by G-tube route 2 (two) times a day .           nystatin (MYCOSTATIN) powder Apply to abdominal fold twice daily.     PARoxetine (PAXIL) 30 MG tablet 60 mg by G-tube route every morning.     rosuvastatin (CRESTOR) 20 MG tablet 20 mg by G-tube route every morning PER HOSPITAL ORDERS GIVE TO PT. IN THE A.M. .       .There were no vitals filed for this visit.  Blood pressure 116/62 pulse 66 respirations 18 temperature 97.9 weight 25 pounds  Physical Exam      Constitutional: No distress.   HENT:   Cardiovascular: Normal rate, regular rhythm and normal heart sounds.   History of CAD status post CABG   Pulmonary/Chest: Breath sounds normal.   History of pulmonary embolism   Abdominal:.   Tube feedings secondary to dysphagia.  History of colon cancer.  Abdominal scars.   Musculoskeletal:   History of left MCA stroke with right  hemiplegia   Dysarthria.  History of stroke   Skin: Skin is warm and dry. No rash noted.   Psychiatric:   History of anxiety and depression         LABS:   Lab Results   Component Value Date    HGB 10.7 (L) 12/20/2018     No results found for this or any previous visit.      No results found for: HGBA1C  No results found for: CHOL  No results found for: HDL  No results found for: LDLCALC  No results found for: TRIG  No components found for: CHOLHDL      ASSESSMENT:      ICD-10-CM    1. Cerebrovascular accident (CVA), unspecified mechanism (H) I63.9    2. Oropharyngeal dysphagia R13.12    3. Right hemiplegia (H) G81.91    4. Type 1 diabetes mellitus with complication (H) E10.8        PLAN:    At this time no further changes are necessary.  At the changing of the tube feedings 200 and night off in the daytime continue with the one-to-one supervision with level 1 diet with honey thick liquids.  Continue to monitor blood pressures and blood sugars and also on April 16 for lipid panel A1c and hemoglobin.      Electronically signed by: Michael Duane Johnson, CNP

## 2021-05-27 NOTE — PROGRESS NOTES
[facility] Care Coordination Contact      Called [facility] to request new ins ID card to be mailed to member.     Cornel Knapp  Care Management Specialist  [facility]  [phone]

## 2021-05-27 NOTE — PATIENT INSTRUCTIONS - HE
Order written for eval from speech therapy and nutritional services to advance oral diet.  Otherwise, resident seems to be doing overall with current therapies.  She will be due for a hemoglobin A1c within the next couple months.  When she transitions away from being 100% on feeding tube nutrition, we will have to carefully monitor that she gets adequate fluids. [facility], showing that patient has Type 2 diabetes mellitis, not Type 1, so the problem list was adjusted for this discrepancy.   
pending

## 2021-05-27 NOTE — PROGRESS NOTES
[facility]    Facility:   ANSWER ROOMING ACTIVITY QUESTION   Code Status: FULL CODE    Recent medical history/chief concerns: Patient is seen by me for review of multiple medical issues.  Nursing staff voices no new concerns regarding resident.  Earlier this month, patient had a swallowing study done.  Honey thick liquids given to her by spoon, and a regular diet were recommended with speech therapy, and a registered dietitian to be involved in her transition from her current nutrition being 100% by tube feedings.  On my visit with resident, resident had no acute concerns.  She did not remember me from my initial visit with her earlier in the year, but she did remember her trip to the hospital to get her swallowing study done.  She denied having any recent problems with breathing, and did not have any problems with pain.  She felt like her mood was all right.  She continues to have near total paralysis of her right upper and lower extremities, and needs assistance with all of her ADLs.    Review of systems: See history of present illness, all others negative.     Current Outpatient Medications   Medication Sig Dispense Refill     acetaminophen (TYLENOL) 160 mg/5 mL (5 mL) Soln solution 20.3 mL by Enteral Tube route every 6 (six) hours MAX OF 5 DOSES IN 24 HOURS BETWEEN SCHEDULED AND PRN DOSES.  Every 6 Hours; 08:00 AM, 02:00 PM, 08:00 PM, 02:00 AM .       apixaban (ELIQUIS) 5 mg Tab tablet 5 mg by Enteral Tube route 2 (two) times a day .             aspirin 81 mg chewable tablet 81 mg by G-tube route daily.       ferrous sulfate 300 mg (60 mg iron)/5 mL syrup 300 mg by G-tube route 2 (two) times a day.       furosemide (LASIX) 20 MG tablet 20 mg by G-tube route 2 (two) times a day at 9am and 6pm .             insulin glargine (LANTUS) 100 unit/mL injection Inject 20 Units under the skin 2 (two) times a day. 07:30 AM, 04:30 PM             insulin regular (NOVOLIN R) 100 unit/mL injection  Inject under the skin 3 (three) times a day. If Blood Sugar is 200 to 249, give 2 Units. If Blood Sugar is 250 to 299, give 4 Units. If Blood Sugar is 300 to 349, give 6 Units. If Blood Sugar is 350 to 399, give 8 Units. If Blood Sugar is 400 to 449, give 10 Units. If Blood Sugar is greater than 449, give 12 Units.             irbesartan (AVAPRO) 75 MG tablet 1 tablet (75 mg total) by G-tube route 2 (two) times a day.  0     lansoprazole (PREVACID SOLUTAB) 15 MG disintegrating tablet 15 mg by G-tube route daily.       lidocaine (LIDODERM) 5 % Place 2 patches on the skin daily Remove & Discard patch within 12 hours or as directed by MD  APPLY TO RIGHT AND LEFT SHOULDERS. .       metoprolol tartrate (LOPRESSOR) 50 MG tablet 50 mg by G-tube route 2 (two) times a day .             nystatin (MYCOSTATIN) powder Apply to abdominal fold twice daily. 15 g 0     PARoxetine (PAXIL) 30 MG tablet 60 mg by G-tube route every morning.       rosuvastatin (CRESTOR) 20 MG tablet 20 mg by G-tube route every morning PER HOSPITAL ORDERS GIVE TO PT. IN THE A.M. .       No current facility-administered medications for this visit.        Past Medical History:   Diagnosis Date     Anemia      Anxiety      Cancer (H)     Hx of colon cancer     CHF (congestive heart failure) (H)     diastolic     Coronary artery disease     s/p cabg     CVA (cerebral vascular accident) (H)      Depression      Diabetes mellitus (H)     type 2     DVT (deep vein thrombosis) in pregnancy (H)      Granuloma annulare      Hemiplegia (H)     R sided and minimally communicative     Hyperlipidemia      Hypertension      Obesity      Oropharyngeal dysphagia      ANGEL (obstructive sleep apnea)      Parotitis      Paroxysmal atrial fibrillation (H)      PE (pulmonary thromboembolism) (H)      Stroke (H) 12/2018    Sub acute L MCA stroke     Varicose veins of both lower extremities       No past surgical history on file.   Social History     Socioeconomic History      Marital status: Single     Spouse name: Not on file     Number of children: Not on file     Years of education: Not on file     Highest education level: Not on file   Occupational History     Not on file   Social Needs     Financial resource strain: Not on file     Food insecurity:     Worry: Not on file     Inability: Not on file     Transportation needs:     Medical: Not on file     Non-medical: Not on file   Tobacco Use     Smoking status: Never Smoker     Smokeless tobacco: Never Used   Substance and Sexual Activity     Alcohol use: No     Frequency: Never     Drug use: No     Sexual activity: Not on file   Lifestyle     Physical activity:     Days per week: Not on file     Minutes per session: Not on file     Stress: Not on file   Relationships     Social connections:     Talks on phone: Not on file     Gets together: Not on file     Attends Gnosticist service: Not on file     Active member of club or organization: Not on file     Attends meetings of clubs or organizations: Not on file     Relationship status: Not on file     Intimate partner violence:     Fear of current or ex partner: Not on file     Emotionally abused: Not on file     Physically abused: Not on file     Forced sexual activity: Not on file   Other Topics Concern     Not on file   Social History Narrative     Not on file       Social History     Tobacco Use   Smoking Status Never Smoker   Smokeless Tobacco Never Used        Exam:   Vitals:    03/05/19 1337 03/23/19 1846   BP:  112/62   Pulse:  64   Resp:  18   Temp:  97.9  F (36.6  C)   SpO2:  95%   Weight: 182 lb 3.2 oz (82.6 kg)        EXAM:   General: Vital signs reviewed.  Patient is in no acute appearing distress.  Breathing appears nonlabored.  Patient is alert and oriented pleasant, with her speech being a little easier to understand from my initial exam.  ENT exam: No abnormal rhinorrhea, or ear drainage.  Patient was able to open her mouth for me to examine, with no intraoral plaques,  or other lesions noted.  Eyes: Pupils are equal and normal size, gaze is consensual, sclera are normal white color and corneas are clear.  Heart: Heart rate is regular without murmur.  Lungs: Lungs are clear to auscultation with good airflow bilaterally.  Abdomen:  Abdomen is soft, nontender.  No palpable abnormal masses or organomegaly.  Bowel sounds are normal.  The feeding tube site shows no skin breakdown, or discoloration.  Skin/extremities: Warm and dry.  No areas of skin breakdown noted by me.  She has slight bilateral ankle edema.  Neuro exam: No new focal abnormalities noted.  She has very slight active movement of her right fingers, and foot.  Similar to my initial exam, she does not quite follow directions as well as normal, flexing her left arm at the elbow when I asked her to flex her left leg at the knee.  She got this request right the second time I asked her.  She was able to actively raise her knee up off the bed briskly for me, and raise her left upper extremity above her head for me.    The only new labs to review were serum glucose checks, most of which were in the 100s, and less often in the 200s.    Approximately 25 minutes was spent on patient management, with greater than 50% of this time being spent on medication reconciliation between electronic medical records, review of past medical history and current medical management, and discussion of management with patient and care staff.  Assessment/Plan   1. Type 1 diabetes mellitus with complication (H)     2. Essential hypertension     3. Coronary artery disease involving coronary bypass graft with angina pectoris, unspecified whether native or transplanted heart (H)     4. Hyperlipidemia, unspecified hyperlipidemia type     5. Cerebrovascular accident (CVA), unspecified mechanism (H)     6. Oropharyngeal dysphagia     7. Right hemiplegia (H)         Patient Instructions   Order written for eval from speech therapy and nutritional services to  advance oral diet.  Otherwise, resident seems to be doing overall with current therapies.  She will be due for a hemoglobin A1c within the next couple months.  When she transitions away from being 100% on feeding tube nutrition, we will have to carefully monitor that she gets adequate fluids.     Mark Agrawal, DO

## 2021-05-28 NOTE — PROGRESS NOTES
[facility]    Facility:   [facility] [[id number]]   Code Status: FULL CODE      CHIEF COMPLAINT/REASON FOR VISIT:  Chief Complaint   Patient presents with     Review Of Multiple Medical Conditions       HISTORY:      HPI: Zach is a 69 y.o. female   I had a chance to revisit secondary to her review of chronic medical conditions.  History of CVA hemiparesis dysphasia currently getting tube feeds at 60 cc an hour speech therapy has been working with her regarding advancing her diet.  Her weight has fluctuated between 181 and 185 pounds I do not know how accurate that is.  Supposed to use CPAP at night.  Blood sugars in the morning ranging 1 75-2 10 in the -191.  Looks like she has been normotensive and afebrile.  Her hemoglobin is at 10.6 is on ferrous sulfate twice daily 1 hour changes at the once daily.  For chronic pain she is on Tylenol every 6 hours.  Her moods apparently have been stable.  Had a lipid panel in April total cholesterol was good see results below.  Is on Eliquis secondary to CVA.    Past Medical History:   Diagnosis Date     Anemia      Anxiety      Cancer (H)     Hx of colon cancer     CHF (congestive heart failure) (H)     diastolic     Coronary artery disease     s/p cabg     CVA (cerebral vascular accident) (H)      Depression      Diabetes mellitus (H)     type 2     DVT (deep vein thrombosis) in pregnancy (H)      Granuloma annulare      Hemiplegia (H)     R sided and minimally communicative     Hyperlipidemia      Hypertension      Obesity      Oropharyngeal dysphagia      ANGEL (obstructive sleep apnea)      Parotitis      Paroxysmal atrial fibrillation (H)      PE (pulmonary thromboembolism) (H)      Stroke (H) 12/2018    Sub acute L MCA stroke     Varicose veins of both lower extremities              Family History   Problem Relation Age of Onset     Heart disease Mother      Cancer Father         esophageal     Social History      Socioeconomic History     Marital status: Single     Spouse name: Not on file     Number of children: Not on file     Years of education: Not on file     Highest education level: Not on file   Occupational History     Not on file   Social Needs     Financial resource strain: Not on file     Food insecurity:     Worry: Not on file     Inability: Not on file     Transportation needs:     Medical: Not on file     Non-medical: Not on file   Tobacco Use     Smoking status: Never Smoker     Smokeless tobacco: Never Used   Substance and Sexual Activity     Alcohol use: No     Frequency: Never     Drug use: No     Sexual activity: Not on file   Lifestyle     Physical activity:     Days per week: Not on file     Minutes per session: Not on file     Stress: Not on file   Relationships     Social connections:     Talks on phone: Not on file     Gets together: Not on file     Attends Latter day service: Not on file     Active member of club or organization: Not on file     Attends meetings of clubs or organizations: Not on file     Relationship status: Not on file     Intimate partner violence:     Fear of current or ex partner: Not on file     Emotionally abused: Not on file     Physically abused: Not on file     Forced sexual activity: Not on file   Other Topics Concern     Not on file   Social History Narrative     Not on file         Review of Systems  Currently there are no reports of fever chills or fatigue flulike symptoms headache stiff neck chest pain rashes or sores.  History of diabetes CAD hypertension hyperlipidemia anxiety depression sleep apnea left MCA stroke with right hemiplegia      Current Outpatient Medications   Medication Sig     acetaminophen (TYLENOL) 160 mg/5 mL (5 mL) Soln solution 20.3 mL by Enteral Tube route every 6 (six) hours MAX OF 5 DOSES IN 24 HOURS BETWEEN SCHEDULED AND PRN DOSES.  Every 6 Hours; 08:00 AM, 02:00 PM, 08:00 PM, 02:00 AM .     apixaban (ELIQUIS) 5 mg Tab tablet 5 mg by  Enteral Tube route 2 (two) times a day .           aspirin 81 mg chewable tablet 81 mg by G-tube route daily.     ferrous sulfate 300 mg (60 mg iron)/5 mL syrup 300 mg by G-tube route daily.            furosemide (LASIX) 20 MG tablet 20 mg by G-tube route 2 (two) times a day at 9am and 6pm .           insulin glargine (LANTUS) 100 unit/mL injection Inject 20 Units under the skin 2 (two) times a day. 07:30 AM, 04:30 PM           insulin regular (NOVOLIN R) 100 unit/mL injection Inject under the skin 3 (three) times a day. If Blood Sugar is 200 to 249, give 2 Units. If Blood Sugar is 250 to 299, give 4 Units. If Blood Sugar is 300 to 349, give 6 Units. If Blood Sugar is 350 to 399, give 8 Units. If Blood Sugar is 400 to 449, give 10 Units. If Blood Sugar is greater than 449, give 12 Units.           irbesartan (AVAPRO) 75 MG tablet 1 tablet (75 mg total) by G-tube route 2 (two) times a day.     lansoprazole (PREVACID SOLUTAB) 15 MG disintegrating tablet 15 mg by G-tube route daily.     metoprolol tartrate (LOPRESSOR) 50 MG tablet 50 mg by G-tube route 2 (two) times a day .           nystatin (MYCOSTATIN) powder Apply to abdominal fold twice daily.     PARoxetine (PAXIL) 30 MG tablet 60 mg by G-tube route every morning.     rosuvastatin (CRESTOR) 20 MG tablet 20 mg by G-tube route every morning PER HOSPITAL ORDERS GIVE TO PT. IN THE A.M. .       .There were no vitals filed for this visit.  Blood pressure 122/69 pulse 62 respirations 18 temperature 97.5 current weight 183 pounds over the past month her weight has been ranging 181-185 pounds  Physical Exam  Constitutional: No distress.   HENT:   Cardiovascular: Normal rate, regular rhythm and normal heart sounds.   History of CAD status post CABG   Pulmonary/Chest: Breath sounds normal.   History of pulmonary embolism   Abdominal:.   Tube feedings secondary to dysphagia.  History of colon cancer.  Abdominal scars.   Musculoskeletal:   History of left MCA stroke with  right hemiplegia   Dysarthria.  History of stroke    Psychiatric:   History of anxiety and depression          LABS:   Lab Results   Component Value Date    HGBA1C 6.4 (H) 04/16/2019     Lab Results   Component Value Date    HGB 10.6 (L) 04/16/2019     No results found for this or any previous visit.      Lab Results   Component Value Date    CHOL 77 04/16/2019     Lab Results   Component Value Date    HDL 19 (L) 04/16/2019     Lab Results   Component Value Date    LDLCALC 38 04/16/2019     Lab Results   Component Value Date    TRIG 98 04/16/2019     No components found for: CHOLHDL      ASSESSMENT:      ICD-10-CM    1. Right hemiplegia (H) G81.91    2. Essential hypertension I10    3. Cerebrovascular accident (CVA), unspecified mechanism (H) I63.9    4. Oropharyngeal dysphagia R13.12        PLAN:    Recently had laboratory studies.  Weight seem to be fine her blood sugars are okay he is on tube feedings also a level 2 diet with honey thick with supervision.  We will also decrease the ferrous sulfate to once daily rather than twice daily with a hemoglobin of 10.6.  Continue to manage and follow.      Electronically signed by: Michael Duane Johnson, CNP

## 2021-05-28 NOTE — PROGRESS NOTES
[facility]    Nursing staff asked me to address what exact type of diabetes patients/resident has, wanting to know whether he had type I or type 2 diabetes.  There was some ambiguity noted in his available medical records.  Patient's son was available in the facility to speak to about this.  He recalls his mom having type 2 diabetes which she started to have been treated in her later adult years.  [facility] which [facility] to have type 2 diabetes.  The problem list, and diagnosis from my previous two encounters was updated to reflect this.    Electronically signed by: Mark Agrawal DO

## 2021-05-29 NOTE — PROGRESS NOTES
[facility]    Facility:   [facility] ([facility] [[id number]]   Code Status: FULL CODE      CHIEF COMPLAINT/REASON FOR VISIT:  Chief Complaint   Patient presents with     Review Of Multiple Medical Conditions       HISTORY:      HPI: Zach is a 69 y.o. female who was seen secondary to review of chronic medical conditions.  She does have a history of CVA with hemiparesis and dysphasia currently getting tube feedings at 60 cc an hour.  The feeding tube stoma area looks pretty good.  Level 2 diet honey thick liquids does need supervision.  Systolic blood pressures from April 1 June first running 114-120.  Her weight is 185 pounds she is been ranging over the past 2 months 195-187 pounds.  Morning time blood sugars ranging 121-2:11 -180.  See results below her most recent laboratory studies.    Past Medical History:   Diagnosis Date     Anemia      Anxiety      Cancer (H)     Hx of colon cancer     CHF (congestive heart failure) (H)     diastolic     Coronary artery disease     s/p cabg     CVA (cerebral vascular accident) (H)      Depression      Diabetes mellitus (H)     type 2     DVT (deep vein thrombosis) in pregnancy (H)      Granuloma annulare      Hemiplegia (H)     R sided and minimally communicative     Hyperlipidemia      Hypertension      Obesity      Oropharyngeal dysphagia      ANGEL (obstructive sleep apnea)      Parotitis      Paroxysmal atrial fibrillation (H)      PE (pulmonary thromboembolism) (H)      Stroke (H) 12/2018    Sub acute L MCA stroke     Varicose veins of both lower extremities              Family History   Problem Relation Age of Onset     Heart disease Mother      Cancer Father         esophageal     Social History     Socioeconomic History     Marital status: Single     Spouse name: Not on file     Number of children: Not on file     Years of education: Not on file     Highest education level: Not on file   Occupational History     Not on file    Social Needs     Financial resource strain: Not on file     Food insecurity:     Worry: Not on file     Inability: Not on file     Transportation needs:     Medical: Not on file     Non-medical: Not on file   Tobacco Use     Smoking status: Never Smoker     Smokeless tobacco: Never Used   Substance and Sexual Activity     Alcohol use: No     Frequency: Never     Drug use: No     Sexual activity: Not on file   Lifestyle     Physical activity:     Days per week: Not on file     Minutes per session: Not on file     Stress: Not on file   Relationships     Social connections:     Talks on phone: Not on file     Gets together: Not on file     Attends Mosque service: Not on file     Active member of club or organization: Not on file     Attends meetings of clubs or organizations: Not on file     Relationship status: Not on file     Intimate partner violence:     Fear of current or ex partner: Not on file     Emotionally abused: Not on file     Physically abused: Not on file     Forced sexual activity: Not on file   Other Topics Concern     Not on file   Social History Narrative     Not on file         Review of Systems  Currently there are no reports of fever chills or fatigue flulike symptoms headache stiff neck chest pain rashes or sores.  History of diabetes CAD hypertension hyperlipidemia anxiety depression sleep apnea left MCA stroke with right hemiplegia             Current Outpatient Medications   Medication Sig     acetaminophen (TYLENOL) 160 mg/5 mL (5 mL) Soln solution 20.3 mL by Enteral Tube route every 6 (six) hours MAX OF 5 DOSES IN 24 HOURS BETWEEN SCHEDULED AND PRN DOSES.  Every 6 Hours; 08:00 AM, 02:00 PM, 08:00 PM, 02:00 AM .     apixaban (ELIQUIS) 5 mg Tab tablet 5 mg by Enteral Tube route 2 (two) times a day .              aspirin 81 mg chewable tablet 81 mg by G-tube route daily.     ferrous sulfate 300 mg (60 mg iron)/5 mL syrup 300 mg by G-tube route daily.               furosemide (LASIX) 20 MG  tablet 20 mg by G-tube route 2 (two) times a day at 9am and 6pm .              insulin glargine (LANTUS) 100 unit/mL injection Inject 20 Units under the skin 2 (two) times a day. 07:30 AM, 04:30 PM              insulin regular (NOVOLIN R) 100 unit/mL injection Inject under the skin 3 (three) times a day. If Blood Sugar is 200 to 249, give 2 Units. If Blood Sugar is 250 to 299, give 4 Units. If Blood Sugar is 300 to 349, give 6 Units. If Blood Sugar is 350 to 399, give 8 Units. If Blood Sugar is 400 to 449, give 10 Units. If Blood Sugar is greater than 449, give 12 Units.              irbesartan (AVAPRO) 75 MG tablet 1 tablet (75 mg total) by G-tube route 2 (two) times a day.     lansoprazole (PREVACID SOLUTAB) 15 MG disintegrating tablet 15 mg by G-tube route daily.     metoprolol tartrate (LOPRESSOR) 50 MG tablet 50 mg by G-tube route 2 (two) times a day .              nystatin (MYCOSTATIN) powder Apply to abdominal fold twice daily.     PARoxetine (PAXIL) 30 MG tablet 60 mg by G-tube route every morning.     rosuvastatin (CRESTOR) 20 MG tablet 20 mg by G-tube route every morning PER HOSPITAL ORDERS GIVE TO PT. IN THE A.M. .                        .There were no vitals filed for this visit.  Blood pressure 114/62 pulse 62 respirations 18 temperature 97.9 weight 185 pounds  Physical Exam  Constitutional: No distress.   HENT:   Cardiovascular: Normal rate, regular rhythm and normal heart sounds.   History of CAD status post CABG   Pulmonary/Chest: Breath sounds normal.   History of pulmonary embolism   Abdominal:.   Tube feedings secondary to dysphagia.  History of colon cancer.  Abdominal scars.   Musculoskeletal:   History of left MCA stroke with right hemiplegia   Dysarthria.  History of stroke    Psychiatric:   History of anxiety and depression          LABS:   Lab Results   Component Value Date    HGB 10.6 (L) 04/16/2019     No results found for this or any previous visit.      Lab Results   Component Value Date     CHOL 77 04/16/2019     Lab Results   Component Value Date    HDL 19 (L) 04/16/2019     Lab Results   Component Value Date    LDLCALC 38 04/16/2019     Lab Results   Component Value Date    TRIG 98 04/16/2019     No components found for: CHOLHDL  Lab Results   Component Value Date    HGBA1C 6.4 (H) 04/16/2019         ASSESSMENT:      ICD-10-CM    1. Oropharyngeal dysphagia R13.12    2. Right hemiplegia (H) G81.91    3. Essential hypertension I10    4. Cerebrovascular accident (CVA), unspecified mechanism (H) I63.9        PLAN:    Blood sugars overall look pretty good her weights are stable.  Getting tube feedings also getting supervision for eating.  Continue to manage and follow her other chronic medical conditions.  No other changes.      Electronically signed by: Michael Duane Johnson, CNP

## 2021-05-29 NOTE — PROGRESS NOTES
[facility]    Facility:   [facility] [[id number]]   Code Status: FULL CODE      CHIEF COMPLAINT/REASON FOR VISIT:  Chief Complaint   Patient presents with     Problem Visit     asked to see       HISTORY:      HPI: Zach is a 69 y.o. female who I was asked to see secondary to G-tube site irritation.  There is an order to cleanse the G-tube site and put some bacitracin and the split gauze on that area twice daily.  Apparently the other day there was some discharge from that site but has a look at it today it really clean and looks wonderful and I am not really sure what happened next but apparently on May 27 which I was never told about she went to [facility] because of the G-tube site and they did an x-ray which showed it was in good place but did not feel there is any drainage as well.  Her blood sugars in the morning ranging 171-250 6 in the -181 now increase the Lantus 22 units twice daily.  Otherwise she has been normotensive with a few blood pressures that they do check with her.  In talking with the patient she does not have any other particular concerns does have a history of CVA but I remember her from the transitional care unit.    Past Medical History:   Diagnosis Date     Anemia      Anxiety      Cancer (H)     Hx of colon cancer     CHF (congestive heart failure) (H)     diastolic     Coronary artery disease     s/p cabg     CVA (cerebral vascular accident) (H)      Depression      Diabetes mellitus (H)     type 2     DVT (deep vein thrombosis) in pregnancy (H)      Granuloma annulare      Hemiplegia (H)     R sided and minimally communicative     Hyperlipidemia      Hypertension      Obesity      Oropharyngeal dysphagia      ANGEL (obstructive sleep apnea)      Parotitis      Paroxysmal atrial fibrillation (H)      PE (pulmonary thromboembolism) (H)      Stroke (H) 12/2018    Sub acute L MCA stroke     Varicose veins of both lower extremities               Family History   Problem Relation Age of Onset     Heart disease Mother      Cancer Father         esophageal     Social History     Socioeconomic History     Marital status: Single     Spouse name: Not on file     Number of children: Not on file     Years of education: Not on file     Highest education level: Not on file   Occupational History     Not on file   Social Needs     Financial resource strain: Not on file     Food insecurity:     Worry: Not on file     Inability: Not on file     Transportation needs:     Medical: Not on file     Non-medical: Not on file   Tobacco Use     Smoking status: Never Smoker     Smokeless tobacco: Never Used   Substance and Sexual Activity     Alcohol use: No     Frequency: Never     Drug use: No     Sexual activity: Not on file   Lifestyle     Physical activity:     Days per week: Not on file     Minutes per session: Not on file     Stress: Not on file   Relationships     Social connections:     Talks on phone: Not on file     Gets together: Not on file     Attends Druze service: Not on file     Active member of club or organization: Not on file     Attends meetings of clubs or organizations: Not on file     Relationship status: Not on file     Intimate partner violence:     Fear of current or ex partner: Not on file     Emotionally abused: Not on file     Physically abused: Not on file     Forced sexual activity: Not on file   Other Topics Concern     Not on file   Social History Narrative     Not on file         Review of Systems  Currently there are no reports of fever chills or fatigue flulike symptoms headache stiff neck chest pain rashes or sores.  History of diabetes CAD hypertension hyperlipidemia anxiety depression sleep apnea left MCA stroke with right hemiplegia       Current Outpatient Medications   Medication Sig     acetaminophen (TYLENOL) 160 mg/5 mL (5 mL) Soln solution 20.3 mL by Enteral Tube route every 6 (six) hours MAX OF 5 DOSES IN 24 HOURS  BETWEEN SCHEDULED AND PRN DOSES.  Every 6 Hours; 08:00 AM, 02:00 PM, 08:00 PM, 02:00 AM .     apixaban (ELIQUIS) 5 mg Tab tablet 5 mg by Enteral Tube route 2 (two) times a day .           aspirin 81 mg chewable tablet 81 mg by G-tube route daily.     ferrous sulfate 300 mg (60 mg iron)/5 mL syrup 300 mg by G-tube route daily.            furosemide (LASIX) 20 MG tablet 20 mg by G-tube route 2 (two) times a day at 9am and 6pm .           insulin glargine (LANTUS) 100 unit/mL injection Inject 22 Units under the skin 2 (two) times a day. 07:30 AM, 04:30 PM           insulin regular (NOVOLIN R) 100 unit/mL injection Inject under the skin 3 (three) times a day. If Blood Sugar is 200 to 249, give 2 Units. If Blood Sugar is 250 to 299, give 4 Units. If Blood Sugar is 300 to 349, give 6 Units. If Blood Sugar is 350 to 399, give 8 Units. If Blood Sugar is 400 to 449, give 10 Units. If Blood Sugar is greater than 449, give 12 Units.           irbesartan (AVAPRO) 75 MG tablet 1 tablet (75 mg total) by G-tube route 2 (two) times a day.     lansoprazole (PREVACID SOLUTAB) 15 MG disintegrating tablet 15 mg by G-tube route daily.     metoprolol tartrate (LOPRESSOR) 50 MG tablet 50 mg by G-tube route 2 (two) times a day .           nystatin (MYCOSTATIN) powder Apply to abdominal fold twice daily.     PARoxetine (PAXIL) 30 MG tablet 60 mg by G-tube route every morning.     rosuvastatin (CRESTOR) 20 MG tablet 20 mg by G-tube route every morning PER HOSPITAL ORDERS GIVE TO PT. IN THE A.M. .       .There were no vitals filed for this visit.  Blood pressure on May 10 was 122/64 pulse 62 respirations 18 temperature 97.5  Physical Exam  Constitutional: No distress.   HENT:   Cardiovascular: Normal rate, regular rhythm and normal heart sounds.   History of CAD status post CABG   Pulmonary/Chest: Breath sounds normal.   History of pulmonary embolism   Abdominal:.   Tube feedings secondary to dysphagia.  History of colon cancer.  Abdominal  scars.   Musculoskeletal:   History of left MCA stroke with right hemiplegia   Dysarthria.  History of stroke    Psychiatric:   History of anxiety and depression          LABS:   Lab Results   Component Value Date    HGB 10.6 (L) 04/16/2019     Lab Results   Component Value Date    ALT 37 12/20/2018    AST 43 (H) 12/20/2018    ALKPHOS 57 12/20/2018    BILITOT 0.4 12/20/2018     No results found for this or any previous visit.          ASSESSMENT:      ICD-10-CM    1. G tube feedings (H) Z93.1    2. Cerebrovascular accident (CVA), unspecified mechanism (H) I63.9    3. Essential hypertension I10    4. Type 2 diabetes mellitus with complication, with long-term current use of insulin (H) E11.8     Z79.4        PLAN:    The G-tube site looks unremarkable and clean today and apparently on the 27th when they sent her to [facility] which I was not even aware of they did check the placement and that was in good position but also no drainage at that point either so we will continue the twice a day dressing changes to the G-tube site otherwise increase the Lantus 22 units twice daily rather than 20 units twice daily keep a close eye on the blood sugars.  She otherwise has been hemodynamically stable normotensive and no other new physical changes.        Electronically signed by: Michael Duane Johnson, CNP

## 2021-05-29 NOTE — TELEPHONE ENCOUNTER
[facility] Nurse Triage Telephone Note      Provider: GIOVANNI Tomas  Facility: [facility]    Facility Type: LT    Caller: Bernadine Van Patton  Call Back Number:  [phone]    Allergies: Aricept [donepezil]; Atorvastatin; Glipizide; and Lisinopril    Reason for call: Nurse calling to report that patient has redness, purulent(yellow) drainage, pain, and hardness around G-tube stoma.  Staff just noticed this today.  Patient is afebrile.       Verbal Order/Direction given by Provider: Cleanse stoma with normal saline, apply bacitractin and a dressing two times a day and PRN until resolved.      Provider giving order: GIOVANNI Tomas    Verbal order given to: Vannessa Martin RN

## 2021-05-30 NOTE — PROGRESS NOTES
[facility]    I had facility care staff repeat vital signs for patient, primarily to recheck the pulse while patient is on beta-blocker therapy.  Her pulse was 52 bpm, and she was asymptomatic with no history of dizziness per care staff, to include while standing.  Blood pressure was 137/68.  SPO2 is 99%.    I feel like the benefits of keeping her on the beta-blocker medication outweigh any risks of bradycardia, or dizziness.  She is asymptomatic with a blood pressure in the 50s, and she definitely needs adequate blood pressure control given her history of a CVA.  No change in medication management recommended.      Electronically signed by: Mark Agrawal DO

## 2021-05-30 NOTE — PROGRESS NOTES
[facility]    Facility:   [facility] [[id number]]   Code Status: FULL CODE    Recent medical history/chief concerns: Patient is seen by me for review of multiple medical issues.  Patient has a solitary acute issue of blurry vision in right eye since her CVA.  She feels like it is worsening.  I spoke to the care staff about how she has been doing since admission to the facility.  Overall, she seems to be doing better.  Spoke to the dietitian, who told me that she no longer required tube feedings.  Nursing staff asked if I could change her medications over from administering through her G-tube, to oral administration.  I felt this was appropriate.  Other than the concern but blurry vision in right eye, patient feels well overall when I visited with her.  She denied having any problems with moving her food.  She is on a regular level 2 diet with honey thick liquids.  No recent breathing problems, problems with pain control, or recent fever.  No new problems with urination or bowel movements per patient.  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  She aspires to live more independently eventually.    Review of systems: See history of present illness, all others negative.     Current Outpatient Medications   Medication Sig Dispense Refill     acetaminophen (TYLENOL) 160 mg/5 mL (5 mL) Soln solution Take 20.3 mL by mouth every 6 (six) hours. MAX OF 5 DOSES IN 24 HOURS BETWEEN SCHEDULED AND PRN DOSES. Every 6 Hours; 08:00 AM, 02:00 PM, 08:00 PM, 02:00 AM              apixaban (ELIQUIS) 5 mg Tab tablet Take 5 mg by mouth 2 (two) times a day.             aspirin 81 mg chewable tablet Chew 81 mg daily.             ferrous sulfate 300 mg (60 mg iron)/5 mL syrup Take 300 mg by mouth daily.             furosemide (LASIX) 20 MG tablet Take 20 mg by mouth 2 (two) times a day at 9am and 6pm.             insulin glargine (LANTUS) 100 unit/mL injection Inject 22 Units under  the skin 2 (two) times a day. 07:30 AM, 04:30 PM             insulin regular (NOVOLIN R) 100 unit/mL injection Inject under the skin 3 (three) times a day. If Blood Sugar is 200 to 249, give 2 Units. If Blood Sugar is 250 to 299, give 4 Units. If Blood Sugar is 300 to 349, give 6 Units. If Blood Sugar is 350 to 399, give 8 Units. If Blood Sugar is 400 to 449, give 10 Units. If Blood Sugar is greater than 449, give 12 Units.             irbesartan (AVAPRO) 75 MG tablet Take 75 mg by mouth 2 (two) times a day.        0     lansoprazole (PREVACID SOLUTAB) 15 MG disintegrating tablet Take 15 mg by mouth daily.             metoprolol tartrate (LOPRESSOR) 50 MG tablet Take 50 mg by mouth 2 (two) times a day.             nystatin (MYCOSTATIN) powder Apply to abdominal fold twice daily. 15 g 0     PARoxetine (PAXIL) 30 MG tablet Take 60 mg by mouth every morning.             rosuvastatin (CRESTOR) 20 MG tablet Take 20 mg by mouth every morning. PER HOSPITAL ORDERS GIVE TO PT. IN THE A.M.              No current facility-administered medications for this visit.        Past Medical History:   Diagnosis Date     Anemia      Anxiety      Cancer (H)     Hx of colon cancer     CHF (congestive heart failure) (H)     diastolic     Coronary artery disease     s/p cabg     CVA (cerebral vascular accident) (H)      Depression      Diabetes mellitus (H)     type 2     DVT (deep vein thrombosis) in pregnancy (H)      Granuloma annulare      Hemiplegia (H)     R sided and minimally communicative     Hyperlipidemia      Hypertension      Obesity      Oropharyngeal dysphagia      ANGEL (obstructive sleep apnea)      Parotitis      Paroxysmal atrial fibrillation (H)      PE (pulmonary thromboembolism) (H)      Stroke (H) 12/2018    Sub acute L MCA stroke     Varicose veins of both lower extremities       No past surgical history on file.   Social History     Socioeconomic History     Marital status: Single     Spouse name: Not on file      "Number of children: Not on file     Years of education: Not on file     Highest education level: Not on file   Occupational History     Not on file   Social Needs     Financial resource strain: Not on file     Food insecurity:     Worry: Not on file     Inability: Not on file     Transportation needs:     Medical: Not on file     Non-medical: Not on file   Tobacco Use     Smoking status: Never Smoker     Smokeless tobacco: Never Used   Substance and Sexual Activity     Alcohol use: No     Frequency: Never     Drug use: No     Sexual activity: Not on file   Lifestyle     Physical activity:     Days per week: Not on file     Minutes per session: Not on file     Stress: Not on file   Relationships     Social connections:     Talks on phone: Not on file     Gets together: Not on file     Attends [employer] service: Not on file     Active member of club or organization: Not on file     Attends meetings of clubs or organizations: Not on file     Relationship status: Not on file     Intimate partner violence:     Fear of current or ex partner: Not on file     Emotionally abused: Not on file     Physically abused: Not on file     Forced sexual activity: Not on file   Other Topics Concern     Not on file   Social History Narrative     Not on file       Social History     Tobacco Use   Smoking Status Never Smoker   Smokeless Tobacco Never Used        Exam:   Vitals:    07/18/19 1335   BP: 96/62   Pulse: (!) 53   Resp: 20   Temp: 98  F (36.7  C)   SpO2: 96%   Weight: 182 lb 6.4 oz (82.7 kg)       EXAM:   General: Vital signs reviewed.  Patient is in no acute appearing distress.  Breathing appears nonlabored.  Patient is alert and oriented to her immediate surroundings.  She did not know the year, telling me the year was \"18\".  She did not know what day of the week it was.  She could not recall what she had for lunch, her last meal before my exam.  HEENT exam: Pupils are equal round and reactive to light with normal consensual " gaze and eye movement.  Normal eye convergence with accommodation.  Corneas are clear bilaterally with normal white sclera bilaterally.  No abnormal nasal or ear drainage.  No intraoral abnormal plaques or abnormal lesions.  Neck: Supple without JVD.  Heart: Heart rate is regular without murmur.  Lungs: Lungs are clear to auscultation with good airflow bilaterally.  Abdomen:  Abdomen is soft, nontender.  No palpable abnormal masses or organomegaly.  Bowel sounds are normal.  The feeding tube site looks good, with no associated skin breakdown or discoloration, or abnormal drainage.  Skin/extremities: Overall warm and dry, with slight bilateral ankle edema, about 1 mm pitting edema.  Neuro exam: Cranial nerves II through XII are intact.  Patient has right-sided paralysis, which has not improved per patient.  She has decreased sensation in her distal right lower extremity, and normal sensation in her left lower extremity.    Recent blood sugars have been excellent, mostly being in the 100s, and rarely in the 90s.    No other recent lab studies.    Approximately 25 minutes was spent on patient management, with greater than 50% of this time being spent on discussion of concerns and management with resident, and care staff.  The remainder of the time was spent on medication and vital sign data reconciliation between electronic medical records, and review of past medical history and current medical management.  Assessment/Plan   1. Essential hypertension     2. Coronary artery disease involving coronary bypass graft with angina pectoris, unspecified whether native or transplanted heart (H)     3. Hyperlipidemia, unspecified hyperlipidemia type     4. Right hemiplegia (H)     5. Cerebrovascular accident (CVA), unspecified mechanism (H)     6. Blurry vision, right eye         Patient Instructions   An order was written to consult in-house eye care provider.  Her gastric tube medications were changed to to oral route.  I do  not think it is currently safe for her to live in assisted living, due to both physical and cognitive limitations.  I think consideration of moving to assisted living should be deferred for at least a couple month as we monitor her progress with her diet, and functional status.  Her blood pressure and pulse were mildly low in exam.  I will have nursing staff recheck her vital signs in about 1 week.     Mark Agrawal, DO

## 2021-05-30 NOTE — PROGRESS NOTES
[facility]    Facility:   [facility] [[id number]]   Code Status: FULL CODE  PCP: Johnson, Michael Duane, CNP   Phone: [phone]   Fax: [phone]      CHIEF COMPLAINT/REASON FOR VISIT:  Chief Complaint   Patient presents with     Discharge Summary       HISTORY COURSE:  Zach is a 68 y.o. Female who was hospitalized December 9 - December 16, 2018 secondary to principal diagnosis of parotiditis.  The CT of the brain and neck did not show any dilation of the left parotid duct or duct stone although was found to have moderate stenosis at the left greater than right proximal internal carotid arteries.  Also found was a subacute large left MCA stroke involving the left basal ganglia without hemorrhagic transformation.  She does have a history of diabetes last A1c in November 8 0.5% along with a history of CAD status post CABG along with hypertension hyperlipidemia anxiety depression paroxysmal atrial fib which was duly diagnosed and started on apixaban.  She recently had a large left MCA stroke out of the timeframe for lytics and unsuccessful thrombectomy and hospitalized at [facility] November 17 - December 7, 2018 with subsequent poor mental status and right-sided deficits.  She was found to have left neck and face swelling and a fever consistent with acute parotiditis.  She was started on broad-spectrum antibiotics including vancomycin and Flagyl ENT was consulted who recommended conservative treatment warm compresses frequent oral cares.  Leukocytosis did resolve and she did finish up her Augmentin.  Regarding the left MCA stroke with deficits including the right hemiplegia and minimal communication her cognition did improve with treatment of infection and able to respond yes and no questions.  She is on apixaban for history of paroxysmal atrial fibrillation.  She also has iron deficiency anemia apparently the iron workup was incomplete.  Regarding her diabetes  A1c was 6.4 she is on tube feedings as well as Lantus and sliding scale.  Regarding history of CAD status post CABG she is continuing with her metoprolol 50 mg twice daily along with furosemide 20 mg twice daily.  She has a history of colon cancer with a lower anterior resection.  For her nutrition she currently has a GJ tube and tube feedings.  Her laboratory studies on December 9 did show a hemoglobin of 10.3 platelets 429 creatinine 0.6 sodium 132 potassium 4.4 albumin 2.9 magnesium 1.8.  She has worked with the various therapy disciplines even to the point of working with speech therapy and now taking all her nutrition and orally and no longer feeding tube but is getting her medications via the feeding tube.  Have had a number of conversations with the dietitian.  Her blood sugars in the morning ranging 87-1 20 towards the p.m. ranging 134-190 even around 4:00 they have been all less than 130.  She has been normotensive and afebrile and also on room air.  She has been a delight to get to know and work with regarding her chronic medical conditions for transitional care stay as well as in the long-term care facility.  Her weight has been stable at 184 pounds.    Review of Systems  Currently there are no reports of fever chills or fatigue flulike symptoms headache stiff neck chest pain rashes or sores.  History of diabetes CAD hypertension hyperlipidemia anxiety depression sleep apnea left MCA stroke with right hemiplegia     There were no vitals filed for this visit.  Blood pressure 96/62 pulse 60 respirations 20 temperature 98.0 weight 184 pounds  Physical Exam  Constitutional: No distress.   HENT:   Cardiovascular: Normal rate, regular rhythm and normal heart sounds.   History of CAD status post CABG   Pulmonary/Chest: Breath sounds normal.   History of pulmonary embolism   Abdominal:.   Tube feedings secondary to dysphagia.  History of colon cancer.  Abdominal scars.   Musculoskeletal:   History of left MCA  stroke with right hemiplegia   Dysarthria.  History of stroke    Psychiatric:   History of anxiety and depression      MEDICATION LIST:  Current Outpatient Medications   Medication Sig     acetaminophen (TYLENOL) 160 mg/5 mL (5 mL) Soln solution Take 20.3 mL by mouth every 6 (six) hours. MAX OF 5 DOSES IN 24 HOURS BETWEEN SCHEDULED AND PRN DOSES. Every 6 Hours; 08:00 AM, 02:00 PM, 08:00 PM, 02:00 AM            apixaban (ELIQUIS) 5 mg Tab tablet Take 5 mg by mouth 2 (two) times a day.           aspirin 81 mg chewable tablet Chew 81 mg daily.           ferrous sulfate 300 mg (60 mg iron)/5 mL syrup Take 300 mg by mouth daily.           furosemide (LASIX) 20 MG tablet Take 20 mg by mouth 2 (two) times a day at 9am and 6pm.           insulin glargine (LANTUS) 100 unit/mL injection Inject 22 Units under the skin 2 (two) times a day. 07:30 AM, 04:30 PM           insulin regular (NOVOLIN R) 100 unit/mL injection Inject under the skin 3 (three) times a day. If Blood Sugar is 200 to 249, give 2 Units. If Blood Sugar is 250 to 299, give 4 Units. If Blood Sugar is 300 to 349, give 6 Units. If Blood Sugar is 350 to 399, give 8 Units. If Blood Sugar is 400 to 449, give 10 Units. If Blood Sugar is greater than 449, give 12 Units.           irbesartan (AVAPRO) 75 MG tablet Take 75 mg by mouth 2 (two) times a day.           lansoprazole (PREVACID SOLUTAB) 15 MG disintegrating tablet Take 15 mg by mouth daily.           metoprolol tartrate (LOPRESSOR) 50 MG tablet Take 50 mg by mouth 2 (two) times a day.           nystatin (MYCOSTATIN) powder Apply to abdominal fold twice daily.     PARoxetine (PAXIL) 30 MG tablet Take 60 mg by mouth every morning.           rosuvastatin (CRESTOR) 20 MG tablet Take 20 mg by mouth every morning. PER HOSPITAL ORDERS GIVE TO PT. IN THE A.M.            Lab Results   Component Value Date    HGBA1C 6.4 (H) 04/16/2019     Lab Results   Component Value Date    CHOL 77 04/16/2019     Lab Results   Component  Value Date    HDL 19 (L) 04/16/2019     Lab Results   Component Value Date    LDLCALC 38 04/16/2019     Lab Results   Component Value Date    TRIG 98 04/16/2019     No components found for: CHOLHDL  Lab Results   Component Value Date    HGB 10.6 (L) 04/16/2019     No results found for this or any previous visit.      Results for orders placed or performed in visit on 12/20/18   Comprehensive Metabolic Panel   Result Value Ref Range    Sodium 136 136 - 145 mmol/L    Potassium 3.9 3.5 - 5.0 mmol/L    Chloride 99 98 - 107 mmol/L    CO2 27 22 - 31 mmol/L    Anion Gap, Calculation 10 5 - 18 mmol/L    Glucose 207 (H) 70 - 125 mg/dL    BUN 26 8 - 28 mg/dL    Creatinine 0.66 0.60 - 1.10 mg/dL    GFR MDRD Af Amer >60 >60 mL/min/1.73m2    GFR MDRD Non Af Amer >60 >60 mL/min/1.73m2    Bilirubin, Total 0.4 0.0 - 1.0 mg/dL    Calcium 9.7 8.5 - 10.5 mg/dL    Protein, Total 7.6 6.0 - 8.0 g/dL    Albumin 3.0 (L) 3.5 - 5.0 g/dL    Alkaline Phosphatase 57 45 - 120 U/L    AST 43 (H) 0 - 40 U/L    ALT 37 0 - 45 U/L           DISCHARGE DIAGNOSIS:    ICD-10-CM    1. Oropharyngeal dysphagia R13.12    2. Right hemiplegia (H) G81.91    3. Hyperlipidemia, unspecified hyperlipidemia type E78.5    4. Essential hypertension I10        MEDICAL EQUIPMENT NEEDS:  None    DISCHARGE PLAN/FACE TO FACE:  I certify that services are/were furnished while this patient was under the care of a physician and that a physician or an allowed non-physician practitioner (NPP), had a face-to-face encounter that meets the physician face-to-face encounter requirements. The encounter was in whole, or in part, related to the primary reason for home health. The patient is confined to his/her home and needs intermittent skilled nursing, physical therapy, speech-language pathology, or the continued need for occupational therapy. A plan of care has been established by a physician and is periodically reviewed by a physician.  Date of Face-to-Face Encounter: January 25,  2019    I certify that, based on my findings, the following services are medically necessary home health services: She will be discharging to [facility] with current medications and treatments.  She will also continue with physical and occupational therapy along with speech therapy.  Her anticipated discharge date is July 30, 2019    My clinical findings support the need for the above skilled services because: (Please write a brief narrative summary that describes what the RN, PT, SLP, or other services will be doing in the home. A list of diagnoses in this section does not meet the CMS requirements.)  She will require the skilled services secondary to her chronic medical conditions along with requiring assistance with basic ADLs managing and following her swallowing and medication management.    This patient is homebound because: (Please write a brief narrative summary describing the functional limitations as to why this patient is homebound and specifically what makes this patient homebound.)  Secondary to multiple chronic medical conditions safety self-care deficits and medication management.    The patient is, or has been, under my care and I have initiated the establishment of the plan of care. This patient will be followed by a physician who will periodically review the plan of care.    Schedule follow up visit with primary care provider within 7 days to reestablish care.  She will follow-up with the primary care doctor at the facility to go over her care medications any future laboratory studies.  She has been a delight to get to know on the long-term care unit.    Discharge coordination care greater than 30 minutes  Electronically signed by: Michael Duane Johnson, CNP

## 2021-05-30 NOTE — PATIENT INSTRUCTIONS - HE
An order was written to consult in-house eye care provider.  Her gastric tube medications were changed to to oral route.  I do not think it is currently safe for her to live in assisted living, due to both physical and cognitive limitations.  I think consideration of moving to assisted living should be deferred for at least a couple month as we monitor her progress with her diet, and functional status.  Her blood pressure and pulse were mildly low in exam.  I will have nursing staff recheck her vital signs in about 1 week.

## 2021-05-31 NOTE — PROGRESS NOTES
[facility]    DATE: 2019    NAME:  Zach Olmos             :  1950  MRN: [id number]  CODE STATUS:  FULL CODE    VISIT TYPE: Problem Visit     FACILITY:  [facility] [[id number]]       CHIEF COMPLAIN/REASON FOR VISIT:    Chief Complaint   Patient presents with     Problem Visit               HISTORY OF PRESENT ILLNESS: Zach Olmos is a 69 y.o. female who was residing at [facility] and recently transferred to [facility] for long term care. She was hospitalized last December for acute parotiditis and Left MCA CVA. She has PMH of type 2 dm, CAD, CABG, HTN, HLD, anxiety, depression, PAF, anemia, colon cancer s/p resection, right sided hemiplegia, GJ tube, dysphagia.     Today Ms. Olmos is seen in her room laying in bed. She says she is not having any pain today and no dizziness. Her bowels are moving fine and no nausea, vomiting, stomach upset. Her appetite is good. She does wake up a lot at night and doesn't sleep very well but she declines a sleep aid and just wants to see how things go. She has some shortness of breath with activity and a cough at times. She doesn't really bring much up though. She denies any medication concerns today. She says she just got off the G tube feeds in the recent few weeks. She was getting her meds through her tube and eating by mouth but she thinks she could take meds if they were crushed in food. She says before her stroke she did not check her blood sugars at home, just at the doctor's office. She did have some therapy today and says she spends all her time in her wheelchair. She says with all the therapy she did her right sided did not regain any strength. She denies trouble with her speech. She was dizzy in and out for a while but this seems to be getting better. She says she does not need tylenol scheduled and just wants to take it as needed. She denies any leg swelling. She does not  recall taking lansoprazole before and thinks this is new. She is not having any stomach upset. She says the iron is new too. She denies any rash or itching. She does want to be a full code and says she is not having any issues with her CPAP machine. She does wear this every night. She wears pads for incontinence and says she has a hard time controlling this since her stroke. She is anxious at times and does feel a little down about her situation. Her family visits often and she moved here from [place] to be closer to them. She denies other concerns today. Per staff she is EZ stand for toilet transfers and to wheelchair. Therapy is going to follow her for a while. She is able to pull up with her left arm though for positioning. Staff report she has orders that need clarified and are wondering why meds need to be given by G tube but is able to take food by mouth. She is on nectar thick liquids. She did not eat anything for breakfast today, just coffee. Staff says she is crying and tearful at times and her family wanted her to stay on the paxil. Her weights at [place] recently were 186-183lbs. Her blood sugars were 141 last night and 162 this morning. She has not had a weight here yet.     REVIEW OF SYSTEMS:  PROBLEMS AND REVIEW OF SYSTEMS:   Today on ROS:   Currently, no fever, chills, or rigors. Does not have any visual or hearing problems. Denies any chest pain, headaches, palpitations, lightheadedness. Denies any GERD symptoms. Denies any nausea, or vomiting.  Positive for weakness, EZ stand for transfers, shortness of breath with exertion, cough intermittent, dysphagia, G tube, urinary incontinence, fecal incontinence, appetite is fair, dizzy at times, wheelchair and bed bound, right sided weakness, insomnia      Allergies   Allergen Reactions     Aricept [Donepezil]      Atorvastatin      Glipizide      Lisinopril      Current Outpatient Medications   Medication Sig     acetaminophen (TYLENOL) 325 MG tablet  Take 650 mg by mouth every 4 (four) hours as needed for pain.     lisinopril (PRINIVIL,ZESTRIL) 10 MG tablet Take 10 mg by mouth daily.     apixaban (ELIQUIS) 5 mg Tab tablet Take 5 mg by mouth 2 (two) times a day.           aspirin 81 mg chewable tablet Chew 81 mg daily.           furosemide (LASIX) 20 MG tablet Take 20 mg by mouth 2 (two) times a day at 9am and 6pm.           insulin glargine (LANTUS) 100 unit/mL injection Inject 22 Units under the skin 2 (two) times a day. 07:30 AM, 04:30 PM           irbesartan (AVAPRO) 75 MG tablet Take 75 mg by mouth 2 (two) times a day.           metoprolol tartrate (LOPRESSOR) 50 MG tablet Take 50 mg by mouth 2 (two) times a day.           nystatin (MYCOSTATIN) powder Apply to abdominal fold twice daily.     PARoxetine (PAXIL) 30 MG tablet Take 60 mg by mouth every morning.           rosuvastatin (CRESTOR) 20 MG tablet Take 20 mg by mouth every morning. PER HOSPITAL ORDERS GIVE TO PT. IN THE A.M.            Past Medical History:    Past Medical History:   Diagnosis Date     Anemia      Anxiety      Cancer (H)     Hx of colon cancer     CHF (congestive heart failure) (H)     diastolic     Coronary artery disease     s/p cabg     CVA (cerebral vascular accident) (H)      Depression      Diabetes mellitus (H)     type 2     DVT (deep vein thrombosis) in pregnancy (H)      Granuloma annulare      Hemiplegia (H)     R sided and minimally communicative     Hyperlipidemia      Hypertension      Obesity      Oropharyngeal dysphagia      ANGEL (obstructive sleep apnea)      Parotitis      Paroxysmal atrial fibrillation (H)      PE (pulmonary thromboembolism) (H)      Stroke (H) 12/2018    Sub acute L MCA stroke     Varicose veins of both lower extremities            PHYSICAL EXAMINATION  Vitals:    07/31/19 0825   BP: 102/51   Pulse: (!) 53   Resp: 18   Temp: 97.8  F (36.6  C)   SpO2: 95%       Today on physical exam:     GENERAL: Awake, Alert, not in any form of acute distress,  answers questions appropriately, follows simple commands, conversant  HEENT: Head is normocephalic with normal hair distribution. No evidence of trauma. Ears: No acute purulent discharge. Eyes: Conjunctivae pink with no scleral jaundice. Nose: Normal mucosa and septum. NECK: Supple with no cervical or supraclavicular lymphadenopathy. Trachea is midline.   CHEST: No tenderness or deformity, no crepitus  LUNG: dim to auscultation with good chest expansion. There are no crackles or wheezes, normal AP diameter. shortness of breath with exertion, congested cough at times  BACK: No kyphosis of the thoracic spine. Symmetric, no curvature, ROM normal, no CVA tenderness, no spinal tenderness   CVS: irregularly irregular rhythm,  2+ pulses symmetric in all extremities.  ABDOMEN: Rounded and soft, nontender to palpation, non distended, no masses, no organomegaly, good bowel sounds, no rebound or guarding, no peritoneal signs.   EXTREMITIES: no edema  SKIN: Warm and dry, no erythema noted.  Skin color, texture, no rashes or lesions.  NEURO/PSYCH: The patient is oriented to person, place and time. Right sided hemiplegia, dysarthria, dysphagia, EZ stand for transfers, dependent for cares, tearful at times, anxious and depressed at times            LABS:   No results found for this or any previous visit (from the past 168 hour(s)).  No results found for this or any previous visit.      Lab Results   Component Value Date    HGB 10.6 (L) 04/16/2019       No results found for: [id number]  Lab Results   Component Value Date    HGBA1C 6.4 (H) 04/16/2019     No results found for: INR, PROTIME  No results found for: [id number]  No results found for: TSH        ASSESSMENT/PLAN:    1. Left MCA CVA: Right sided hemiplegia, dysarthria, dysphagia. Completed tcu therapy. PT, OT, ST following now. Wheelchair and bed bound. EZ stand for transfers. Dependent for cares. On rosuvastatin, aspirin. F/u with neurology prn.   2. Type 2 DM: Accuchecks  141, 162. Hga1c 6.4 on 4/16. On lantus two times a day, novolog ss and accuchecks achs. Will dc novolog ss and change to accuchecks two times a day. Was well controlled at [place] and here so far.   3. CAD, s/p CABG: No chest pain. On aspirin, rosuvastatin. F/u cardiology.   4. HTN: 100s. On irbesartan, metoprolol, lasix.   5. Anxiety and depression: On paroxetine.   6. Dysphagia: G tube placed. Off tube feeding, NDD2, honey thick liquids. Changed meds to crushed and in applesauce and pudding. Changed liquid meds to crushable meds. Once no use of G tube x 6 weeks will discuss GI referral for removal. Changed free water flushes to 30cc qshift for patency only.  7. PAF: Rate controlled 50s. On eliquis, metoprolol.   8. GERD: On lansoprazole but denies reflux or nausea. Will dc and monitor.   9. ANGEL: On CPAP. No concerns.   10. Weights: 186-183lbs at [place], no weight yet here. Monitor.     Tsh, hga1c, cmp, hm1 on 8/7 (no labs since April)    PT, OT, ST following  Electronically signed by: Morenita Mosquera NP    Total floor/unit time spent 35 with 25 time spent on counseling and coordination of care. Counseling was done regarding admission to [facility], transfer orders, clarifications of meds, diabetes management, lab monitoring, g tube management, dysphagia management, pain management. Coordinated care with nursing for clarification of admission orders, pain management, dysphagia management, lab monitoring, specialty care follow up, cva management.

## 2021-05-31 NOTE — PROGRESS NOTES
[facility]    DATE: 2019    NAME:  Zach Olmos             :  1950  MRN: [id number]  CODE STATUS:  FULL CODE    VISIT TYPE: [facility] Care Coordination - Home Visit-Initial Assessment     FACILITY:  [facility] [[id number]]       CHIEF COMPLAIN/REASON FOR VISIT:    Chief Complaint   Patient presents with     [facility] Care Coordination - Home Visit-Initial Assessment               HISTORY OF PRESENT ILLNESS: Zach Olmos is a 69 y.o. female who is a long term care resident of [facility].     Today Ms. Olmos is seen for routine review of systems and initial [facility] assessment. She is seen in her wheelchair after just finishing breakfast. She says she is not having any pain today. She has not been having any issues with headaches. She thinks her breathing is fine and denies any recent cough or shortness of breath. She sleeps well and thinks her appetite is good. She denies any trouble swallowing at all. She says she thinks she is doing fine. She denies any nausea or stomach upset at all. Review of chart showed she is refusing CPAP at times. She does wear this some of the time. Her son became upset on  believing she had not had a bath for some time because Zach said she had not. Staff had noted she had charted to have had one in the last week but he demanded she have one that day. Also noted by staff on occasion to be having some crying or tearful spells. Due to her stroke and aphasia she often has difficulty expressing how she is feeling and it was difficult from them to get from her what was wrong. Staff had requested a consult for Psych to see her. Her blood sugars are controlled 116-154. She has not used her G tube since being here but they are flushing qshift for patency. Her weights are down some since admission 184-178lbs.     REVIEW OF SYSTEMS:  PROBLEMS AND REVIEW OF SYSTEMS:   Today on ROS:   Currently, no fever, chills, or rigors.  Does not have any visual or hearing problems. Denies any chest pain, headaches, palpitations, lightheadedness. Denies any GERD symptoms. Denies any nausea, or vomiting.  Positive for weakness, EZ stand for transfers, dysphagia, G tube, urinary incontinence, fecal incontinence, appetite is good, wheelchair and bed bound, right sided weakness, aphasia, no shortness of breath or cough, refusing cpap at times, tearful episodes at times      Allergies   Allergen Reactions     Aricept [Donepezil]      Atorvastatin      Glipizide      Lisinopril      Current Outpatient Medications   Medication Sig     acetaminophen (TYLENOL) 325 MG tablet Take 650 mg by mouth every 4 (four) hours as needed for pain.     apixaban (ELIQUIS) 5 mg Tab tablet Take 5 mg by mouth 2 (two) times a day.           aspirin 81 mg chewable tablet Chew 81 mg daily.           furosemide (LASIX) 20 MG tablet Take 20 mg by mouth 2 (two) times a day at 9am and 6pm.           insulin glargine (LANTUS) 100 unit/mL injection Inject 22 Units under the skin 2 (two) times a day. 07:30 AM, 04:30 PM           irbesartan (AVAPRO) 75 MG tablet Take 75 mg by mouth 2 (two) times a day.           lisinopril (PRINIVIL,ZESTRIL) 10 MG tablet Take 10 mg by mouth daily.     metoprolol tartrate (LOPRESSOR) 50 MG tablet Take 50 mg by mouth 2 (two) times a day.           nystatin (MYCOSTATIN) powder Apply to abdominal fold twice daily.     PARoxetine (PAXIL) 30 MG tablet Take 60 mg by mouth every morning.           rosuvastatin (CRESTOR) 20 MG tablet Take 20 mg by mouth every morning. PER HOSPITAL ORDERS GIVE TO PT. IN THE A.M.            Past Medical History:    Past Medical History:   Diagnosis Date     Anemia      Anxiety      Cancer (H)     Hx of colon cancer     CHF (congestive heart failure) (H)     diastolic     Coronary artery disease     s/p cabg     CVA (cerebral vascular accident) (H)      Depression      Diabetes mellitus (H)     type 2     DVT (deep vein thrombosis)  in pregnancy (H)      Granuloma annulare      Hemiplegia (H)     R sided and minimally communicative     Hyperlipidemia      Hypertension      Obesity      Oropharyngeal dysphagia      ANGEL (obstructive sleep apnea)      Parotitis      Paroxysmal atrial fibrillation (H)      PE (pulmonary thromboembolism) (H)      Stroke (H) 12/2018    Sub acute L MCA stroke     Varicose veins of both lower extremities        IMMUNIZATIONS:    There is no immunization history on file for this patient.  Above immunizations pulled from [facility]. Facility records also reconciled. Outstanding information sent to [facility] to update [facility].  Future immunizations are not needed at this point as all recommended immunizations are up to date.     PHYSICAL EXAMINATION  Vitals:    08/22/19 0700   BP: 128/72   Pulse: 66   Resp: 18   Temp: 98.5  F (36.9  C)   SpO2: 96%   Weight: 178 lb (80.7 kg)       Today on physical exam:     GENERAL: Awake, Alert, not in any form of acute distress, answers questions appropriately, follows simple commands, conversant  HEENT: Head is normocephalic with normal hair distribution. No evidence of trauma. Ears: No acute purulent discharge. Eyes: Conjunctivae pink with no scleral jaundice. Nose: Normal mucosa and septum. NECK: Supple with no cervical or supraclavicular lymphadenopathy. Trachea is midline.   CHEST: No tenderness or deformity, no crepitus  LUNG: dim to auscultation with good chest expansion. There are no crackles or wheezes, normal AP diameter.  No recent shortness of breath or cough   BACK: No kyphosis of the thoracic spine. Symmetric, no curvature, ROM normal, no CVA tenderness, no spinal tenderness   CVS: irregularly irregular rhythm,  2+ pulses symmetric in all extremities.  ABDOMEN: Rounded and soft, nontender to palpation, non distended, no masses, no organomegaly, good bowel sounds, no rebound or guarding, no peritoneal signs.   EXTREMITIES: no edema  SKIN: Warm  and dry, no erythema noted.  Skin color, texture, no rashes or lesions.  NEURO/PSYCH: The patient is oriented to person, place and time. Right sided hemiplegia, dysarthria, dysphagia, expressive aphasia, EZ stand to slide board for transfers, dependent for cares, tearful at times, anxious and depressed at times            LABS:   No results found for this or any previous visit (from the past 168 hour(s)).  No results found for this or any previous visit.      Lab Results   Component Value Date    WBC 9.5 08/07/2019    HGB 11.8 (L) 08/07/2019    HCT 34.3 (L) 08/07/2019    MCV 88 08/07/2019     08/07/2019       No results found for: [id number]  Lab Results   Component Value Date    HGBA1C 5.7 08/07/2019     No results found for: INR, PROTIME  No results found for: [id number]  Lab Results   Component Value Date    TSH 1.04 08/07/2019           ASSESSMENT/PLAN:    1. Left MCA CVA: Right sided hemiplegia, dysarthria, dysphagia, expressive aphasia. Working with therapy.  Wheelchair and bed bound. EZ stand to slide board for transfers. Dependent for cares. On rosuvastatin, aspirin. F/u with neurology prn. Stable.   2. Type 2 DM: Accuchecks 116-154. Hga1c 6.4 on 4/16. On lantus two times a day, accuchecks bid. Hga1c 5.7 on 8/7. accuchecks controlled, consider reducing lantus.   3. CAD, s/p CABG: No chest pain. On aspirin, rosuvastatin. F/u cardiology.   4. HTN: 120s. On irbesartan, metoprolol, lasix.   5. Anxiety and depression: On paroxetine. Tearful and depression episodes recently, ACP consulted. On max dose of paroxietine at this time so will not make any med changes. Let ACP evaluate.   6. Dysphagia: G tube in place. NDD2, honey thick liquids. Taking meds orally. Patency flushes only, no use since admission. Discussed with her today if no use by next visit will discuss with her and son referral to GI for removal v removing in facility.  7. PAF: Rate controlled 60s. On eliquis, metoprolol.   8.ANGEL: On CPAP.  Refusing at times.   10. Weights: 184-178lbs. Monitor. Appetite good per staff.     Tsh, hga1c, cmp, hm1 on 8/7 - labs stable    PT:  slide board trasnfers with min/mod assist with set up, plan to do more caregiver training this week     OT:  set up ROM program for R UE and will work on getting splint for R hand and then plan to d/c   ST:  wrking on a communication board. Recommending 1-2 weeks.     Electronically signed by: Morenita Mosquera NP    Case Management:  I have reviewed the facility/SNF care plan/MDS which was done 8/6/19, including the falls risk, nutrition and pain screening. I also reviewed the current immunizations, and preventive care.. Future cancer screening is not clinically indicated secondary to age/goals of care.   Patient's desire to return to the community is not assessible due to cognitive impairment.    Information reviewed:  Medications, vital signs, orders, and nursing notes.  The health plan new enrollment has happened. I have reviewed the  MDS, the preventative needs,  and facility care plan. The level of care is appropriate. I have reviewed the code status/advanced directives.

## 2021-05-31 NOTE — PROGRESS NOTES
[facility]      Facility:    [facility] [[id number]]  Code Status: FULL CODE       Chief Complaint/Reason for Visit:  Chief Complaint   Patient presents with     H & P     Admit note to LTC-Stroke Nov 2018, DM, HTN, afib. Previously at different LT facility.       HPI:   Zach is a 69 y.o. female who is seen for new admission note to LTC at [facility]. She was admitted here on 7/30/19, came from [facility]. Her chart was reviewed. She has hx of stroke, ischemic left MCA with hospitalization at [facility] 11/17/18-12/7/18. She has late effects of right hemiparesis, dysphagia, dysarthria. She has a feeding tube but currently getting all nutrition orally and the G tube is being flushed to keep patent. She is on honey thick liquids. She has additional hx of DM, HTN, CAD s/p CABG, ANGEL, colon cancer s/p resection, depression and anxiety.    Today:  She does have some difficulty communicating due to dysarthria, expressive aphasia and slowed responses. She is weak on right side. Per nurses notes, uses slide board and EZ stand. Patient denies chest pain or SOB. No need for oxygen. She denies cough. Denies abdominal pain. No fever. No urinary sx. No reported new vision or hearing problems.       Past Medical History:  Past Medical History:   Diagnosis Date     Anemia      Anxiety      Cancer (H)     Hx of colon cancer     CHF (congestive heart failure) (H)     diastolic     Coronary artery disease     s/p cabg     CVA (cerebral vascular accident) (H)      Depression      Diabetes mellitus (H)     type 2     DVT (deep vein thrombosis) in pregnancy (H)      Granuloma annulare      Hemiplegia (H)     R sided and minimally communicative     Hyperlipidemia      Hypertension      Obesity      Oropharyngeal dysphagia      ANGEL (obstructive sleep apnea)      Parotitis      Paroxysmal atrial fibrillation (H)      PE (pulmonary thromboembolism) (H)      Stroke (H)  12/2018    Sub acute L MCA stroke     Varicose veins of both lower extremities            Surgical History:  No past surgical history on file.    Family History:   Family History   Problem Relation Age of Onset     Heart disease Mother      Cancer Father         esophageal       Social History:    Social History     Socioeconomic History     Marital status: Single     Spouse name: Not on file     Number of children: Not on file     Years of education: Not on file     Highest education level: Not on file   Occupational History     Not on file   Social Needs     Financial resource strain: Not on file     Food insecurity:     Worry: Not on file     Inability: Not on file     Transportation needs:     Medical: Not on file     Non-medical: Not on file   Tobacco Use     Smoking status: Never Smoker     Smokeless tobacco: Never Used   Substance and Sexual Activity     Alcohol use: No     Frequency: Never     Drug use: No     Sexual activity: Not on file   Lifestyle     Physical activity:     Days per week: Not on file     Minutes per session: Not on file     Stress: Not on file   Relationships     Social connections:     Talks on phone: Not on file     Gets together: Not on file     Attends Muslim service: Not on file     Active member of club or organization: Not on file     Attends meetings of clubs or organizations: Not on file     Relationship status: Not on file     Intimate partner violence:     Fear of current or ex partner: Not on file     Emotionally abused: Not on file     Physically abused: Not on file     Forced sexual activity: Not on file   Other Topics Concern     Not on file   Social History Narrative     Not on file        Medications:  Current Outpatient Medications   Medication Sig     acetaminophen (TYLENOL) 325 MG tablet Take 650 mg by mouth every 4 (four) hours as needed for pain.     apixaban (ELIQUIS) 5 mg Tab tablet Take 5 mg by mouth 2 (two) times a day.           aspirin 81 mg chewable tablet  Chew 81 mg daily.           furosemide (LASIX) 20 MG tablet Take 20 mg by mouth 2 (two) times a day at 9am and 6pm.           insulin glargine (LANTUS) 100 unit/mL injection Inject 22 Units under the skin 2 (two) times a day. 07:30 AM, 04:30 PM           irbesartan (AVAPRO) 75 MG tablet Take 75 mg by mouth 2 (two) times a day.           lisinopril (PRINIVIL,ZESTRIL) 10 MG tablet Take 10 mg by mouth daily.     metoprolol tartrate (LOPRESSOR) 50 MG tablet Take 50 mg by mouth 2 (two) times a day.           nystatin (MYCOSTATIN) powder Apply to abdominal fold twice daily.     PARoxetine (PAXIL) 30 MG tablet Take 60 mg by mouth every morning.           rosuvastatin (CRESTOR) 20 MG tablet Take 20 mg by mouth every morning. PER HOSPITAL ORDERS GIVE TO PT. IN THE A.M.              Allergies:  Allergies   Allergen Reactions     Aricept [Donepezil]      Atorvastatin      Glipizide      Lisinopril        Review of Systems:  Pertinent items are noted in HPI.      Physical Exam:   General: Patient is alert female, no distress. lying in bed.   Vitals: /72, Temp 98.5, Pulse 66, RR 18, O2 sat 96% RA.  HEENT: Head is NCAT. Eyes show no injection or icterus. Nares negative. Oropharynx well hydrated.  Neck: Supple. No tenderness or adenopathy. No JVD.  Lungs: Clear bilaterally. No wheezes.  Cardiovascular: Regular rate and rhythm, normal S1, S2.  Back: No spinal or CVA tenderness.  Abdomen: GT site. Soft, no tenderness on exam. Bowel sounds present. No guarding rebound or rigidity.  : Deferred.  Extremities: No LE edema is noted.  Musculoskeletal: Flaccid right side.   Skin: Warm and dry.   Psych: Mood appears good.      Labs:  Lab Results   Component Value Date    WBC 9.5 08/07/2019    HGB 11.8 (L) 08/07/2019    HCT 34.3 (L) 08/07/2019    MCV 88 08/07/2019     08/07/2019     Lab Results   Component Value Date    HGBA1C 5.7 08/07/2019       Assessment/Plan:  1. Stroke. Left MCA ischemic origin, Nov 2018. Residual exp  aphasia, dysphagia. Debilitated. On aspirin, statin.  2. HTN. On metoprolol, irbesartan, lasix. Monitor BPs per protocol.  3. Afib. She is anticoagulated with eliquis.  4. DM. On Lantus insulin. Accuchecks followed. Last A1c at 5.7%.  5. CAD. Hx cabg. No CP.  6. Depression. Hx anxiety. Continue Paxil.  7. Dysphagia. She as GT with just water flushes. Dietary following for nutritional needs. She is on honey thickened liquids, takes nutrition orally.  8. ANGEL. Has CPAP though often refuses per notes.  9. Hx colon cancer. S/p resection.  10. Code status is full code.            Electronically signed by: Stefany Tyelr MD

## 2021-06-01 NOTE — PROGRESS NOTES
[facility]    DATE: 2019    NAME:  Zach Olmos             :  1950  MRN: [id number]  CODE STATUS:  FULL CODE    VISIT TYPE: Problem Visit (drainage g tube)     FACILITY:  [facility] [[id number]]       CHIEF COMPLAIN/REASON FOR VISIT:    Chief Complaint   Patient presents with     Problem Visit     drainage g tube               HISTORY OF PRESENT ILLNESS: Zach Olmos is a 69 y.o. female who is a long term care resident of [facility].     Today Ms. Olmos is seen for concerns of drainage around g tube site again. Staff called yesterday to report this. On exam today Ms. Olmos is seen in bed and says she is not having any pain or discomfort around her G-J tube site. She says her bowels are moving fine and she continues to eat well. She is anxious to get her tube out. She denies fevers, chills, nausea, or other concerns. On exam her tube has hypergranulation tissue again and is bleeding but no purulent drainage or signs of infection. Per staff had called son who reported [facility] but requesting provider to provider call. On further review of records she had her G-J tube placed at [facility] on 18 by IR. Requested staff call [facility] IR to see if can make an appointment with them to pull tube. She has 18 Slovenian 45 cm size tube.     REVIEW OF SYSTEMS:  PROBLEMS AND REVIEW OF SYSTEMS:   Today on ROS:   Currently, no fever, chills, or rigors. Does not have any visual or hearing problems. Denies any chest pain, headaches, palpitations, lightheadedness. Denies any GERD symptoms. Denies any nausea, or vomiting.  Positive for weakness, EZ stand for transfers or slide board, dysphagia, GJ tube, urinary incontinence, fecal incontinence, appetite is good, wheelchair bound, right sided weakness, aphasia, no shortness of breath or cough, refusing cpap at times,  taking all meds and food po, drainage and redness around g tube site      Allergies   Allergen Reactions     Aricept [Donepezil]      Atorvastatin      Glipizide      Lisinopril      Current Outpatient Medications   Medication Sig     acetaminophen (TYLENOL) 325 MG tablet Take 650 mg by mouth every 4 (four) hours as needed for pain.     apixaban (ELIQUIS) 5 mg Tab tablet Take 5 mg by mouth 2 (two) times a day.           aspirin 81 mg chewable tablet Chew 81 mg daily.           furosemide (LASIX) 20 MG tablet Take 20 mg by mouth 2 (two) times a day at 9am and 6pm.           insulin glargine (LANTUS) 100 unit/mL injection Inject 22 Units under the skin 2 (two) times a day. 07:30 AM, 04:30 PM           irbesartan (AVAPRO) 75 MG tablet Take 75 mg by mouth 2 (two) times a day.           lisinopril (PRINIVIL,ZESTRIL) 10 MG tablet Take 10 mg by mouth daily.     metoprolol tartrate (LOPRESSOR) 50 MG tablet Take 50 mg by mouth 2 (two) times a day.           nystatin (MYCOSTATIN) powder Apply to abdominal fold twice daily.     PARoxetine (PAXIL) 30 MG tablet Take 60 mg by mouth every morning.           rosuvastatin (CRESTOR) 20 MG tablet Take 20 mg by mouth every morning. PER HOSPITAL ORDERS GIVE TO PT. IN THE A.M.            triamcinolone (KENALOG) 0.1 % cream Apply 1 application topically 3 (three) times a day.            Past Medical History:    Past Medical History:   Diagnosis Date     Anemia      Anxiety      Cancer (H)     Hx of colon cancer     CHF (congestive heart failure) (H)     diastolic     Coronary artery disease     s/p cabg     CVA (cerebral vascular accident) (H)      Depression      Diabetes mellitus (H)     type 2     DVT (deep vein thrombosis) in pregnancy (H)      Granuloma annulare      Hemiplegia (H)     R sided and minimally communicative     Hyperlipidemia      Hypertension      Obesity      Oropharyngeal dysphagia      ANGEL (obstructive sleep apnea)      Parotitis      Paroxysmal atrial fibrillation  (H)      PE (pulmonary thromboembolism) (H)      Stroke (H) 12/2018    Sub acute L MCA stroke     Varicose veins of both lower extremities        IMMUNIZATIONS:    There is no immunization history on file for this patient.  Above immunizations pulled from [facility]. Facility records also reconciled. Outstanding information sent to [facility] to update [facility].  Future immunizations are not needed at this point as all recommended immunizations are up to date.     PHYSICAL EXAMINATION  Vitals:    09/24/19 2253   BP: 122/54   Pulse: (!) 55   Resp: 20   Temp: 97.7  F (36.5  C)   SpO2: 97%   Weight: 179 lb (81.2 kg)       Today on physical exam:     GENERAL: Awake, Alert, not in any form of acute distress, answers questions appropriately, follows simple commands, conversant  HEENT: Head is normocephalic with normal hair distribution. No evidence of trauma. Ears: No acute purulent discharge. Eyes: Conjunctivae pink with no scleral jaundice. Nose: Normal mucosa and septum. NECK: Supple with no cervical or supraclavicular lymphadenopathy. Trachea is midline.   CHEST: No tenderness or deformity, no crepitus  LUNG: dim to auscultation with good chest expansion. There are no crackles or wheezes, normal AP diameter.  No recent shortness of breath or cough   BACK: No kyphosis of the thoracic spine. Symmetric, no curvature, ROM normal, no CVA tenderness, no spinal tenderness   CVS: irregularly irregular rhythm,  2+ pulses symmetric in all extremities.  ABDOMEN: Rounded and soft, nontender to palpation, non distended, no masses, no organomegaly, good bowel sounds, no rebound or guarding, no peritoneal signs. G-J tube site LUQ, hypergranulation tissue present, sanguinous drainage on drain sponge, nontender, no purulent drainage or erythema present  EXTREMITIES: no edema  SKIN: Warm and dry, no erythema noted.  Skin color, texture, no rashes or lesions.  NEURO/PSYCH: The patient is oriented to person,  place and time. Right sided hemiplegia, dysarthria, dysphagia, expressive aphasia, EZ stand to slide board for transfers, dependent for cares            LABS:   No results found for this or any previous visit (from the past 168 hour(s)).  No results found for this or any previous visit.      Lab Results   Component Value Date    WBC 9.5 08/07/2019    HGB 11.8 (L) 08/07/2019    HCT 34.3 (L) 08/07/2019    MCV 88 08/07/2019     08/07/2019       No results found for: [id number]  Lab Results   Component Value Date    HGBA1C 5.7 08/07/2019     No results found for: INR, PROTIME  No results found for: [id number]  Lab Results   Component Value Date    TSH 1.04 08/07/2019           ASSESSMENT/PLAN:    1. Left MCA CVA: Right sided hemiplegia, dysarthria, dysphagia, expressive aphasia. Completed therapy. Wheelchair and bed bound. EZ stand to slide board for transfers. Dependent for cares. On rosuvastatin, aspirin. F/u with neurology prn. Dysphagia on NDD3, honey thick liquids.   2. Type 2 DM: Accuchecks 103-199. Hga1c 6.4 on 4/16. On lantus two times a day, accuchecks bid. Hga1c 5.7 on 8/7. accuchecks controlled. No changes today. Well controlled.   3. CAD, s/p CABG: No chest pain. On aspirin, rosuvastatin. F/u cardiology.    4. HTN: 120s. On irbesartan, metoprolol, lasix. Stable recently.   5. Anxiety and depression: On paroxetine. ACP following, feels mood stable today.   6. Dysphagia, G-J tube status, Hypergranulation tissue: G-J tube in place. On NDD3, honey thick liquids. Taking meds orally. Not used since admission end of July. Doing well with oral foods, meds. Continues to have issues with drainage, hypergranulation tissue. Continues with triamcinolone cream but will increase to tid. Continue cares until resolved. Needs appt to have removed. Reviewed records and appears was placed at [facility] in November of 2018. 18 Greek 45 cm tube. Will have staff schedule appt today with [facility] IR.   7. PAF: Rate controlled  50s. On eliquis, metoprolol.   8.ANGEL: On CPAP. Refusing at times.   10. Weights: [phone]. Monitor. Appetite good per staff.     Tsh, hga1c, cmp, hm1 on 8/7 - labs stable    PT:  slide board trasnfers with min/mod assist with set up, plan to do more caregiver training this week     OT:  set up ROM program for R UE and will work on getting splint for R hand and then plan to d/c   ST:  wrking on a communication board. Recommending 1-2 weeks.     Electronically signed by: Morenita Mosquera NP    Case Management:  I have reviewed the facility/SNF care plan/MDS which was done 8/6/19, including the falls risk, nutrition and pain screening. I also reviewed the current immunizations, and preventive care.. Future cancer screening is not clinically indicated secondary to age/goals of care.   Patient's desire to return to the community is not assessible due to cognitive impairment.    Information reviewed:  Medications, vital signs, orders, and nursing notes.  The health plan new enrollment has happened. I have reviewed the  MDS, the preventative needs,  and facility care plan. The level of care is appropriate. I have reviewed the code status/advanced directives.

## 2021-06-01 NOTE — TELEPHONE ENCOUNTER
[facility] Nurse Triage Telephone Note      Provider: CHANDRA Pedroza  Facility: [facility]    Facility Type: LTC    Caller: Kathi  Call Back Number:  [phone]    Allergies: Aricept [donepezil]; Atorvastatin; Glipizide; and Lisinopril    Reason for call: Pt has a small amount of serosanguineous drainage from her g tube site today. No redness, no pain. Currently cleaning the area two times a day.   Pt is to have [facility] and [facility]       Verbal Order/Direction given by Provider: continue to monitor the drainage, call with changes.   [facility] for [facility] since they had placed the tube prior     Provider giving order: CHANDRA Pedroza    Verbal order given to: tigist Green RN

## 2021-06-01 NOTE — TELEPHONE ENCOUNTER
[facility] Nurse Triage Telephone Note      Provider: CHANDRA Pedroza  Facility: [facility]    Facility Type: LTC    Caller: Josseline  Call Back Number:  [phone]    Allergies: Aricept [donepezil]; Atorvastatin; Glipizide; and Lisinopril    Reason for call: Nurse reporting that patient has been having redness around G-tube site with yellow drainage.  Patient is afebrile.  Today, drainage was yellow and slightly bloody.  Patient only gets water flushes to her G-tube.       Verbal Order/Direction given by Provider: Cleanse G-tube site with NS, pat dry, apply bacitracin, and cover with a drain sponge.  Change daily and PRN.      Provider giving order: CHANDRA Pedroza    Verbal order given to: Nina Martin RN

## 2021-06-01 NOTE — PROGRESS NOTES
[facility]    DATE: 9/10/2019    NAME:  Zach Olmos             :  1950  MRN: [id number]  CODE STATUS:  FULL CODE    VISIT TYPE: Problem Visit (rash, drainage from g tube)     FACILITY:  [facility] [[id number]]       CHIEF COMPLAIN/REASON FOR VISIT:    Chief Complaint   Patient presents with     Problem Visit     rash, drainage from g tube               HISTORY OF PRESENT ILLNESS: Zach Olmos is a 69 y.o. female who is a long term care resident of [facility].     Today Ms. Olmos is seen for concerns of rash and drainage from G tube site. Staff called provider yesterday evening to update. Orders were given to cleanse site with saline and pat dry and apply bacitracin and cover with drain sponge. Staff had described drainage as purulent and appearing infectious over phone. On exam today Zach is seen in room sitting in wheelchair and reports that she is not having any pain or discomfort around her G tube site. She says they still have not been using it at all. She is able to swallow her meals, food, and pills without any issues. She says she would be more than happy to have tube removed. She does not think her son needs to be called about making the appointment for this because she wants it out and she thinks he will agree with this. She says she just noticed the change in drainage the last day or so. She says it has been bleeding more than draining. She denies any trouble with bowels, fevers, chills, breathing issues, or other concerns recently.     REVIEW OF SYSTEMS:  PROBLEMS AND REVIEW OF SYSTEMS:   Today on ROS:   Currently, no fever, chills, or rigors. Does not have any visual or hearing problems. Denies any chest pain, headaches, palpitations, lightheadedness. Denies any GERD symptoms. Denies any nausea, or vomiting.  Positive for weakness, EZ stand for transfers, dysphagia, G tube, urinary incontinence, fecal incontinence,  appetite is good, wheelchair and bed bound, right sided weakness, aphasia, no shortness of breath or cough, refusing cpap at times, taking all meds and food po, drainage and redness around g tube site      Allergies   Allergen Reactions     Aricept [Donepezil]      Atorvastatin      Glipizide      Lisinopril      Current Outpatient Medications   Medication Sig     triamcinolone (KENALOG) 0.1 % cream Apply 1 application topically 2 (two) times a day.     acetaminophen (TYLENOL) 325 MG tablet Take 650 mg by mouth every 4 (four) hours as needed for pain.     apixaban (ELIQUIS) 5 mg Tab tablet Take 5 mg by mouth 2 (two) times a day.           aspirin 81 mg chewable tablet Chew 81 mg daily.           furosemide (LASIX) 20 MG tablet Take 20 mg by mouth 2 (two) times a day at 9am and 6pm.           insulin glargine (LANTUS) 100 unit/mL injection Inject 22 Units under the skin 2 (two) times a day. 07:30 AM, 04:30 PM           irbesartan (AVAPRO) 75 MG tablet Take 75 mg by mouth 2 (two) times a day.           lisinopril (PRINIVIL,ZESTRIL) 10 MG tablet Take 10 mg by mouth daily.     metoprolol tartrate (LOPRESSOR) 50 MG tablet Take 50 mg by mouth 2 (two) times a day.           nystatin (MYCOSTATIN) powder Apply to abdominal fold twice daily.     PARoxetine (PAXIL) 30 MG tablet Take 60 mg by mouth every morning.           rosuvastatin (CRESTOR) 20 MG tablet Take 20 mg by mouth every morning. PER HOSPITAL ORDERS GIVE TO PT. IN THE A.M.            Past Medical History:    Past Medical History:   Diagnosis Date     Anemia      Anxiety      Cancer (H)     Hx of colon cancer     CHF (congestive heart failure) (H)     diastolic     Coronary artery disease     s/p cabg     CVA (cerebral vascular accident) (H)      Depression      Diabetes mellitus (H)     type 2     DVT (deep vein thrombosis) in pregnancy (H)      Granuloma annulare      Hemiplegia (H)     R sided and minimally communicative     Hyperlipidemia      Hypertension       Obesity      Oropharyngeal dysphagia      ANGEL (obstructive sleep apnea)      Parotitis      Paroxysmal atrial fibrillation (H)      PE (pulmonary thromboembolism) (H)      Stroke (H) 12/2018    Sub acute L MCA stroke     Varicose veins of both lower extremities        IMMUNIZATIONS:    There is no immunization history on file for this patient.  Above immunizations pulled from [facility]. Facility records also reconciled. Outstanding information sent to [facility] to update [facility].  Future immunizations are not needed at this point as all recommended immunizations are up to date.     PHYSICAL EXAMINATION  Vitals:    09/09/19 2026   BP: 108/58   Pulse: (!) 54   Resp: 18   Temp: 97.8  F (36.6  C)   SpO2: 96%   Weight: 180 lb (81.6 kg)       Today on physical exam:     GENERAL: Awake, Alert, not in any form of acute distress, answers questions appropriately, follows simple commands, conversant  HEENT: Head is normocephalic with normal hair distribution. No evidence of trauma. Ears: No acute purulent discharge. Eyes: Conjunctivae pink with no scleral jaundice. Nose: Normal mucosa and septum. NECK: Supple with no cervical or supraclavicular lymphadenopathy. Trachea is midline.   CHEST: No tenderness or deformity, no crepitus  LUNG: dim to auscultation with good chest expansion. There are no crackles or wheezes, normal AP diameter.  No recent shortness of breath or cough   BACK: No kyphosis of the thoracic spine. Symmetric, no curvature, ROM normal, no CVA tenderness, no spinal tenderness   CVS: irregularly irregular rhythm,  2+ pulses symmetric in all extremities.  ABDOMEN: Rounded and soft, nontender to palpation, non distended, no masses, no organomegaly, good bowel sounds, no rebound or guarding, no peritoneal signs. G tube site LUQ, hypergranulation tissue present, sanguinous drainage on drain sponge, nontender, denies recent traumatic event, denies purulent drainage, no issues with  flushing  EXTREMITIES: no edema  SKIN: Warm and dry, no erythema noted.  Skin color, texture, no rashes or lesions.  NEURO/PSYCH: The patient is oriented to person, place and time. Right sided hemiplegia, dysarthria, dysphagia, expressive aphasia, EZ stand to slide board for transfers, dependent for cares            LABS:   No results found for this or any previous visit (from the past 168 hour(s)).  No results found for this or any previous visit.      Lab Results   Component Value Date    WBC 9.5 08/07/2019    HGB 11.8 (L) 08/07/2019    HCT 34.3 (L) 08/07/2019    MCV 88 08/07/2019     08/07/2019       No results found for: [id number]  Lab Results   Component Value Date    HGBA1C 5.7 08/07/2019     No results found for: INR, PROTIME  No results found for: [id number]  Lab Results   Component Value Date    TSH 1.04 08/07/2019           ASSESSMENT/PLAN:    1. Left MCA CVA: Right sided hemiplegia, dysarthria, dysphagia, expressive aphasia. Working with therapy.  Wheelchair and bed bound. EZ stand to slide board for transfers. Dependent for cares. On rosuvastatin, aspirin. F/u with neurology prn. Stable.   2. Type 2 DM: Accuchecks 118-210. Hga1c 6.4 on 4/16. On lantus two times a day, accuchecks bid. Hga1c 5.7 on 8/7. accuchecks controlled. No changes today. Well controlled.   3. CAD, s/p CABG: No chest pain. On aspirin, rosuvastatin. F/u cardiology.    4. HTN: 100s. On irbesartan, metoprolol, lasix. Stable recently.   5. Anxiety and depression: On paroxetine. ACP following, feels mood stable today.   6. Dysphagia, G tube status, Hypergranulation tissue: G tube in place. Upgraded to NDD3, honey thick liquids. Taking meds orally. Not used since admission end of July. Doing well without using G tube. Discussed at length today risks v benefits of keeping tube versus removing and she is for removing tube. Will order gi referral. Asked if son needed to be called and she stated no because he would be in agreement  with this. She wants it removed. Hypergranulation tissue present, bleeding present. No purulent drainage noted, erythema related to inflammation of hypergranulation tissue. Will order kenalog cream two times a day with wound cares, dc bacitracin. Continue cares until resolved. Notify if not improving.   7. PAF: Rate controlled 50-60s. On eliquis, metoprolol.   8.ANGEL: On CPAP. Refusing at times.   10. Weights: [phone]. Monitor. Appetite good per staff.     Tsh, hga1c, cmp, hm1 on 8/7 - labs stable    PT:  slide board trasnfers with min/mod assist with set up, plan to do more caregiver training this week     OT:  set up ROM program for R UE and will work on getting splint for R hand and then plan to d/c   ST:  wrking on a communication board. Recommending 1-2 weeks.     Electronically signed by: Morenita Mosquera NP    Case Management:  I have reviewed the facility/SNF care plan/MDS which was done 8/6/19, including the falls risk, nutrition and pain screening. I also reviewed the current immunizations, and preventive care.. Future cancer screening is not clinically indicated secondary to age/goals of care.   Patient's desire to return to the community is not assessible due to cognitive impairment.    Information reviewed:  Medications, vital signs, orders, and nursing notes.  The health plan new enrollment has happened. I have reviewed the  MDS, the preventative needs,  and facility care plan. The level of care is appropriate. I have reviewed the code status/advanced directives.       Total floor/unit time spent 35 min with 25 min spent on counseling and coordination of care. Counseling regarding dysphagia management, g tube status, hypergranulation tissue, issues with g tube and drainage, cva management, diabetes management. Coordinated care with nursing for wound cares, g tube management, dysphagia management, st need and treatment, gi referral.

## 2021-06-02 VITALS — BODY MASS INDEX: 30.46 KG/M2 | WEIGHT: 185.9 LBS

## 2021-06-02 VITALS — WEIGHT: 182.2 LBS | BODY MASS INDEX: 29.85 KG/M2

## 2021-06-02 VITALS — BODY MASS INDEX: 29.29 KG/M2 | WEIGHT: 178.8 LBS

## 2021-06-02 NOTE — TELEPHONE ENCOUNTER
"[facility] Nurse Triage Telephone Note      Provider: CHANDRA Pedroza  Facility: [facility]    Facility Type: LTC    Caller: Kathi  Call Back Number:  [phone]    Allergies: Aricept [donepezil]; Atorvastatin; Glipizide; and Lisinopril    Reason for call: ST requesting a \"FEES Test\" to better eval swallow.     Verbal Order/Direction given by Provider: OK.    Provider giving order: CHANDRA Pedroza    Verbal order given to: Kathi Linda RN      "

## 2021-06-02 NOTE — TELEPHONE ENCOUNTER
This patient's medication list and chart were reviewed as part of the service provided by [facility].    Assessment/Recommendations:  1. (GI protection):  Due to combo of DOAC and ASA, consider addition of  Omeprazole 20mg daily for gi protection.  Hgb trending down, and due to age, h/o anemia, combo of Eliquis and ASA, pt is at increased risk of bleed.  May be of benefit to check with cardiology if ASA is necessary to continue (if they think CVA was a result of a red clot - a cardioembolic source- then may not be necessary to continue with ASA, however, per chart review, appears may have been a white clot/atherothrombotic source, and may be appropriate to continue ASA).   2. (Depression/anxiety):  Per chart review, pt followed by ACP.  Please discuss with ACP if Paroxetine could be cross-tapered with another agent (such as Sertraline) and d'c Paroxetine due to Paroxetine is highly anticholinergic and should be avoided in elderly.  Increased risk of confusion, sedation, falls, dry mouth, constipation, etc. With Paroxetine use.  3. (HTN/Afib/HF): Noted Furosemide recently reduced from 20mg twice daily to once daily on 10/8.  May be of benefit to follow-up BMP since pt is also on Irbesartan which is K sparing and furosemide dose reduced.  If Bps continue to be well below goal <140/90mmHg and pt continues without heart failure symptoms, may consider further reduction or d'c of furosemide in future with follow-up BMP.  Alternatively, may consider reduction in Avapro dose to 75mg daily, follow-up BMP in one week.   4. (Diabetes):  HgA1c on 8/7/19 = 5.7%.  May consider reduction in Lantus to 13u two times a day and follow-up blood sugars.  A1c currently well below goal <8%.     Kelin Winters, Pharm.D.,[facility] – [place], [place]  Board Certified Geriatric Pharmacist  Medication Therapy Management Pharmacist  [phone]

## 2021-06-02 NOTE — PROGRESS NOTES
[facility]    DATE: 10/31/2019    NAME:  Zach Olmos             :  1950  MRN: [id number]  CODE STATUS:  FULL CODE    VISIT TYPE: Problem Visit (cough, sore throat)     FACILITY:  [facility] [[id number]]       CHIEF COMPLAIN/REASON FOR VISIT:    Chief Complaint   Patient presents with     Problem Visit     cough, sore throat               HISTORY OF PRESENT ILLNESS: Zach Olmos is a 69 y.o. female who is a long term care resident of [facility]. She was recently admitted to [facility] for abdominal pain, G-J tube infection. She was admitted 10/2-10/4. She was sent in for abdominal pain and purulent drainage from G-J tube site. Staff reported she was in so much pain she was crying and unable to eat. During her stay CT showed severe phlegmonous and inflammatory changes along percutaneous gastrostomy tract. She had leukocytosis but vitals were stable and no fever. There was no drainable fluid collection on CT scan. Her apixaban was held and IR removed GJ tube on 10/3. ID was consulted for antibiotic recommendations and was started on vanco/zosyn and later changed to po augmentin. HM1 on discharge showed resolution of leukocytosis. She was discharged back to [facility].     Today Ms. Olmos is seen for concerns of cough and sore throat. She is seen in her room. She says she has been having symptoms since last Thursday but they are now waking her up at night. She has a sore throat and some nasal congestion and drainage. She says the nasal drainage is clear. She has a dry cough and denies any shortness of breath. She has not felt feverish at all. She is not sneezing and no dizziness or headaches with it. She is not having any bowel issues and appetite is ok. Discussed options and will order robitussin and throat lozenges prn. Speech therapy just completed working with her prior to visit and reported she is doing very well with nectar  thick and thin liquid trials. Speech is going to schedule a repeat FEES on Tuesday to see if can upgrade her.     REVIEW OF SYSTEMS:  PROBLEMS AND REVIEW OF SYSTEMS:   Today on ROS:   Currently, no fever, chills, or rigors. Decreased vision. Denies any chest pain, headaches, palpitations, lightheadedness. Denies any GERD symptoms. Positive for weakness, EZ stand for transfers or slide board, dysphagia, urinary incontinence, fecal incontinence, appetite is good, wheelchair bound, right sided weakness, aphasia, no shortness of breath, refusing cpap at times, cough, sore throat, nasal congestion and draiange      Allergies   Allergen Reactions     Aricept [Donepezil]      Atorvastatin      Glipizide      Lisinopril      Current Outpatient Medications   Medication Sig     acetaminophen (TYLENOL) 325 MG tablet Take 650 mg by mouth every 6 (six) hours as needed for pain.            apixaban (ELIQUIS) 5 mg Tab tablet Take 5 mg by mouth 2 (two) times a day.           aspirin 81 mg chewable tablet Chew 81 mg daily.           furosemide (LASIX) 20 MG tablet Take 20 mg by mouth daily.            insulin glargine (LANTUS) 100 unit/mL injection Inject 15 Units under the skin 2 (two) times a day. 07:30 AM, 04:30 PM           irbesartan (AVAPRO) 75 MG tablet Take 75 mg by mouth 2 (two) times a day.           metoprolol tartrate (LOPRESSOR) 50 MG tablet Take 50 mg by mouth 2 (two) times a day.           ondansetron (ZOFRAN) 4 MG tablet Take 4 mg by mouth every 6 (six) hours as needed for nausea.     PARoxetine (PAXIL) 30 MG tablet Take 60 mg by mouth every morning.           rosuvastatin (CRESTOR) 20 MG tablet Take 20 mg by mouth every morning. PER HOSPITAL ORDERS GIVE TO PT. IN THE A.M.            Past Medical History:    Past Medical History:   Diagnosis Date     Anemia      Anxiety      Cancer (H)     Hx of colon cancer     CHF (congestive heart failure) (H)     diastolic     Coronary artery disease     s/p cabg     CVA  (cerebral vascular accident) (H)      Depression      Diabetes mellitus (H)     type 2     DVT (deep vein thrombosis) in pregnancy      Granuloma annulare      Hemiplegia (H)     R sided and minimally communicative     Hyperlipidemia      Hypertension      Obesity      Oropharyngeal dysphagia      ANEGL (obstructive sleep apnea)      Parotitis      Paroxysmal atrial fibrillation (H)      PE (pulmonary thromboembolism) (H)      Stroke (H) 12/2018    Sub acute L MCA stroke     Varicose veins of both lower extremities        IMMUNIZATIONS:    There is no immunization history on file for this patient.  Above immunizations pulled from [facility]. Facility records also reconciled. Outstanding information sent to [facility] to update [facility].  Future immunizations are not needed at this point as all recommended immunizations are up to date.     PHYSICAL EXAMINATION  Vitals:    10/31/19 0700   BP: 110/53   Pulse: 63   Resp: 20   Temp: 97.7  F (36.5  C)   SpO2: 95%       Today on physical exam:     GENERAL: Awake, Alert, not in any form of acute distress, answers questions appropriately, follows simple commands, conversant  HEENT: Head is normocephalic with normal hair distribution. No evidence of trauma. Ears: No acute purulent discharge. Eyes: Conjunctivae pink with no scleral jaundice. Nose: Normal mucosa and septum. NECK: Supple with no cervical or supraclavicular lymphadenopathy. Trachea is midline.   CHEST: No tenderness or deformity, no crepitus  LUNG: dim to auscultation with good chest expansion. There are no crackles or wheezes, normal AP diameter.  No recent shortness of breath or cough   BACK: No kyphosis of the thoracic spine. Symmetric, no curvature, ROM normal, no CVA tenderness, no spinal tenderness   CVS: irregularly irregular rhythm,  2+ pulses symmetric in all extremities.  ABDOMEN: Rounded and soft, nontender to palpation, non distended, no masses, no organomegaly, good bowel  sounds, no rebound or guarding, no peritoneal signs.   EXTREMITIES: no edema  SKIN: Warm and dry, no erythema noted.  Skin color, texture, no rashes or lesions.  NEURO/PSYCH: The patient is oriented to person, place and time. Right sided hemiplegia, dysarthria, dysphagia, expressive aphasia, EZ stand to slide board for transfers, dependent for cares            LABS:   No results found for this or any previous visit (from the past 168 hour(s)).  Results for orders placed or performed in visit on 10/09/19   Basic Metabolic Panel   Result Value Ref Range    Sodium 139 136 - 145 mmol/L    Potassium 4.3 3.5 - 5.0 mmol/L    Chloride 103 98 - 107 mmol/L    CO2 28 22 - 31 mmol/L    Anion Gap, Calculation 8 5 - 18 mmol/L    Glucose 87 70 - 125 mg/dL    Calcium 9.2 8.5 - 10.5 mg/dL    BUN 16 8 - 28 mg/dL    Creatinine 0.83 0.60 - 1.10 mg/dL    GFR MDRD Af Amer >60 >60 mL/min/1.73m2    GFR MDRD Non Af Amer >60 >60 mL/min/1.73m2         Lab Results   Component Value Date    WBC 10.5 10/09/2019    HGB 10.3 (L) 10/09/2019    HCT 31.6 (L) 10/09/2019    MCV 92 10/09/2019     10/09/2019       No results found for: [id number]  Lab Results   Component Value Date    HGBA1C 5.7 08/07/2019     No results found for: INR, PROTIME  No results found for: [id number]  Lab Results   Component Value Date    TSH 1.04 08/07/2019           ASSESSMENT/PLAN:    1.  Viral URI: sore throat, dry cough, nasal congestion and drainage. Ordered robitussin prn, cephacol throat lozenges prn. Monitor temp qshift x 3 days and notify if >100.5.   2. Type 2 DM with HTN: Accuchecks . Hga1c 6.4 on 4/16. On lantus two times a day, accuchecks bid. Hga1c 5.7 on 8/7. Well controlled.   3. CAD, s/p CABG: No chest pain. On aspirin, rosuvastatin. F/u cardiology.    4. H/o Left MCA CVA: Right sided hemiplegia, dysarthria, dysphagia, expressive aphasia. Wheelchair and bed bound. EZ stand to slide board for transfers. Dependent for cares. On rosuvastatin,  aspirin. F/u with neurology prn. Dysphagia on NDD3, honey thick liquids.  5. Anxiety and depression: On paroxetine. ACP following, feels mood stable today.   6. Dysphagia, G-J tube site infection: G-J tube removed. ST working with her and trialing nectar thick and thin liquids. FEES on 11/5. May upgrade soon.   7. PAF: Rate controlled 50s. On eliquis, metoprolol.   8.ANGEL, OHS: On CPAP. Refusing at times.   10. Weights, obesity: [phone]. Monitor. Counseling on heart healthy diet, diabetic diet, healthy lifestyle choices. Encouraged working with therapy and as much physical activity as possible but is limited due to profound weakness, hemiplegia.   11. Chronic CHF: [facility] reported h/o CHF. No echo or records available [facility]. No shortness of breath, no edema noted, on lasix daily. Weekly weights.  No recent concerns.   12. HTN: 110s. On irbesartan, metoprolol, lasix. Stable recently.     Bmp, hm1 on 10/9      Electronically signed by: Morenita Mosquera NP    Case Management:  I have reviewed the facility/SNF care plan/MDS which was done 8/6/19, including the falls risk, nutrition and pain screening. I also reviewed the current immunizations, and preventive care.. Future cancer screening is not clinically indicated secondary to age/goals of care.   Patient's desire to return to the community is not assessible due to cognitive impairment.    Information reviewed:  Medications, vital signs, orders, and nursing notes.  The health plan new enrollment has happened. I have reviewed the  MDS, the preventative needs,  and facility care plan. The level of care is appropriate. I have reviewed the code status/advanced directives.

## 2021-06-02 NOTE — PROGRESS NOTES
[facility]    DATE: 10/8/2019    NAME:  Zach Olmos             :  1950  MRN: [id number]  CODE STATUS:  FULL CODE    VISIT TYPE: Problem Visit (hospital f/u)     FACILITY:  [facility] [[id number]]       CHIEF COMPLAIN/REASON FOR VISIT:    Chief Complaint   Patient presents with     Problem Visit     hospital f/u               HISTORY OF PRESENT ILLNESS: Zach Olmos is a 69 y.o. female who is a long term care resident of [facility]. She was recently admitted to [facility] for abdominal pain, G-J tube infection. She was admitted 10/2-10/4. She was sent in for abdominal pain and purulent drainage from G-J tube site. Staff reported she was in so much pain she was crying and unable to eat. During her stay CT showed severe phlegmonous and inflammatory changes along percutaneous gastrostomy tract. She had leukocytosis but vitals were stable and no fever. There was no drainable fluid collection on CT scan. Her apixaban was held and IR removed GJ tube on 10/3. ID was consulted for antibiotic recommendations and was started on vanco/zosyn and later changed to po augmentin. HM1 on discharge showed resolution of leukocytosis. She was discharged back to [facility].     Today Ms. Olmos is seen for follow up on hospital stay. She is seen laying in bed and reports she is not having any abdominal pain today. She says she is sleeping well and denies any fevers or chills. She thinks her appetite is fine but does say she is a little nauseated today. She thinks she has had some nausea off and on the last few days. She would like something if needed but denies any vomiting. She says otherwise she is doing ok. She thinks her site is still draining some and they are changing this about once a day she thinks. She denies other concerns today. On exam her abdominal site is noted to have saturated dressing with purulent drainage. There is erythema  surrounding the insertion site with no indications of healing. There is foul odor to site. Discussed with nursing and reports that this dressing is ordered to be changed daily but was changed during the night due to drainage.     REVIEW OF SYSTEMS:  PROBLEMS AND REVIEW OF SYSTEMS:   Today on ROS:   Currently, no fever, chills, or rigors. Decreased vision. Denies any chest pain, headaches, palpitations, lightheadedness. Denies any GERD symptoms. Positive for weakness, EZ stand for transfers or slide board, dysphagia,  urinary incontinence, fecal incontinence, appetite is good, wheelchair bound, right sided weakness, aphasia, no shortness of breath or cough, refusing cpap at times, drainage, redness at GJ tube, nausea      Allergies   Allergen Reactions     Aricept [Donepezil]      Atorvastatin      Glipizide      Lisinopril      Current Outpatient Medications   Medication Sig     apixaban (ELIQUIS) 5 mg Tab tablet Take 5 mg by mouth 2 (two) times a day.           aspirin 81 mg chewable tablet Chew 81 mg daily.           acetaminophen (TYLENOL) 325 MG tablet Take 650 mg by mouth every 6 (six) hours as needed for pain.            clindamycin (CLEOCIN) 300 MG capsule Take 300 mg by mouth 3 (three) times a day.     furosemide (LASIX) 20 MG tablet Take 20 mg by mouth daily.            insulin glargine (LANTUS) 100 unit/mL injection Inject 15 Units under the skin 2 (two) times a day. 07:30 AM, 04:30 PM           irbesartan (AVAPRO) 75 MG tablet Take 75 mg by mouth 2 (two) times a day.           Lactobacillus rhamnosus GG (CULTURELLE) 10-15 Billion cell capsule Take 2 capsules by mouth daily.     metoprolol tartrate (LOPRESSOR) 50 MG tablet Take 50 mg by mouth 2 (two) times a day.           ondansetron (ZOFRAN) 4 MG tablet Take 4 mg by mouth every 6 (six) hours as needed for nausea.     PARoxetine (PAXIL) 30 MG tablet Take 60 mg by mouth every morning.           rosuvastatin (CRESTOR) 20 MG tablet Take 20 mg by mouth every  morning. PER HOSPITAL ORDERS GIVE TO PT. IN THE A.M.            Past Medical History:    Past Medical History:   Diagnosis Date     Anemia      Anxiety      Cancer (H)     Hx of colon cancer     CHF (congestive heart failure) (H)     diastolic     Coronary artery disease     s/p cabg     CVA (cerebral vascular accident) (H)      Depression      Diabetes mellitus (H)     type 2     DVT (deep vein thrombosis) in pregnancy      Granuloma annulare      Hemiplegia (H)     R sided and minimally communicative     Hyperlipidemia      Hypertension      Obesity      Oropharyngeal dysphagia      ANGEL (obstructive sleep apnea)      Parotitis      Paroxysmal atrial fibrillation (H)      PE (pulmonary thromboembolism) (H)      Stroke (H) 12/2018    Sub acute L MCA stroke     Varicose veins of both lower extremities        IMMUNIZATIONS:    There is no immunization history on file for this patient.  Above immunizations pulled from [employer]. Facility records also reconciled. Outstanding information sent to [facility] to update [employer].  Future immunizations are not needed at this point as all recommended immunizations are up to date.     PHYSICAL EXAMINATION  Vitals:    10/08/19 0700   BP: 119/54   Pulse: (!) 56   Resp: 17   Temp: 97.5  F (36.4  C)   SpO2: 96%   Weight: 180 lb (81.6 kg)       Today on physical exam:     GENERAL: Awake, Alert, not in any form of acute distress, answers questions appropriately, follows simple commands, conversant  HEENT: Head is normocephalic with normal hair distribution. No evidence of trauma. Ears: No acute purulent discharge. Eyes: Conjunctivae pink with no scleral jaundice. Nose: Normal mucosa and septum. NECK: Supple with no cervical or supraclavicular lymphadenopathy. Trachea is midline.   CHEST: No tenderness or deformity, no crepitus  LUNG: dim to auscultation with good chest expansion. There are no crackles or wheezes, normal AP diameter.  No recent shortness of  breath or cough   BACK: No kyphosis of the thoracic spine. Symmetric, no curvature, ROM normal, no CVA tenderness, no spinal tenderness   CVS: irregularly irregular rhythm,  2+ pulses symmetric in all extremities.  ABDOMEN: Rounded and soft, nontender to palpation, non distended, no masses, no organomegaly, good bowel sounds, no rebound or guarding, no peritoneal signs. G-J tube removed but site LUQ now has surrounding erythema, warmth, large amount of purulent, malodorous, and dark colored drainage saturated dressing  EXTREMITIES: no edema  SKIN: Warm and dry, no erythema noted.  Skin color, texture, no rashes or lesions.  NEURO/PSYCH: The patient is oriented to person, place and time. Right sided hemiplegia, dysarthria, dysphagia, expressive aphasia, EZ stand to slide board for transfers, dependent for cares            LABS:   Recent Results (from the past 168 hour(s))   Basic Metabolic Panel   Result Value Ref Range    Sodium 139 136 - 145 mmol/L    Potassium 4.3 3.5 - 5.0 mmol/L    Chloride 103 98 - 107 mmol/L    CO2 28 22 - 31 mmol/L    Anion Gap, Calculation 8 5 - 18 mmol/L    Glucose 87 70 - 125 mg/dL    Calcium 9.2 8.5 - 10.5 mg/dL    BUN 16 8 - 28 mg/dL    Creatinine 0.83 0.60 - 1.10 mg/dL    GFR MDRD Af Amer >60 >60 mL/min/1.73m2    GFR MDRD Non Af Amer >60 >60 mL/min/1.73m2   HM1 (CBC with Diff)   Result Value Ref Range    WBC 10.5 4.0 - 11.0 thou/uL    RBC 3.43 (L) 3.80 - 5.40 mill/uL    Hemoglobin 10.3 (L) 12.0 - 16.0 g/dL    Hematocrit 31.6 (L) 35.0 - 47.0 %    MCV 92 80 - 100 fL    MCH 30.0 27.0 - 34.0 pg    MCHC 32.6 32.0 - 36.0 g/dL    RDW 13.6 11.0 - 14.5 %    Platelets 241 140 - 440 thou/uL    MPV 10.2 8.5 - 12.5 fL    Neutrophils % 59 50 - 70 %    Lymphocytes % 31 20 - 40 %    Monocytes % 7 2 - 10 %    Eosinophils % 3 0 - 6 %    Basophils % 1 0 - 2 %    Neutrophils Absolute 6.1 2.0 - 7.7 thou/uL    Lymphocytes Absolute 3.3 0.8 - 4.4 thou/uL    Monocytes Absolute 0.7 0.0 - 0.9 thou/uL     Eosinophils Absolute 0.3 0.0 - 0.4 thou/uL    Basophils Absolute 0.1 0.0 - 0.2 thou/uL     No results found for this or any previous visit.      Lab Results   Component Value Date    WBC 10.5 10/09/2019    HGB 10.3 (L) 10/09/2019    HCT 31.6 (L) 10/09/2019    MCV 92 10/09/2019     10/09/2019       No results found for: ERGAKIIX01  Lab Results   Component Value Date    HGBA1C 5.7 08/07/2019     No results found for: INR, PROTIME  No results found for: [id number]  Lab Results   Component Value Date    TSH 1.04 08/07/2019           ASSESSMENT/PLAN:    1. H/o Left MCA CVA: Right sided hemiplegia, dysarthria, dysphagia, expressive aphasia. Completed therapy. Wheelchair and bed bound. EZ stand to slide board for transfers. Dependent for cares. On rosuvastatin, aspirin. F/u with neurology prn. Dysphagia on NDD3, honey thick liquids.   2. Type 2 DM with HTN: Accuchecks 129-229. Hga1c 6.4 on 4/16. On lantus two times a day, accuchecks bid. Hga1c 5.7 on 8/7. Well controlled.   3. CAD, s/p CABG: No chest pain. On aspirin, rosuvastatin. F/u cardiology.    4. HTN: 120s. On irbesartan, metoprolol, lasix. Stable recently.   5. Anxiety and depression: On paroxetine. ACP following, feels mood stable today.   6. Dysphagia, G-J tube site infection: G-J tube removed. No further abdominal pain. However completed augmentin today and large amount of purulent, malodorous drainage, erythema. Apparent infection not resolved with antibiotic treatment. Will start clindamycin three times a day x 7 days and monitor closely. Give lactobacillus x 7 days due to recurrent antibiotic use, prevention of c diff. Some nausea today but no fevers, increased weakness, systemic signs of infection. Will repeat labs on 10/9 to ensure leukocytosis not increasing.  On NDD3, honey thick liquids. Taking meds orally. Not used since admission end of July. Doing well with oral foods, meds. Transfer to hospital if signs of systemic infection with worsening  weakness, malaise, altered mental status, fever. Notify if drainage not improved near or after completion of antibiotics. Notify if any worsening of condition.   7. PAF: Rate controlled 60s. On eliquis, metoprolol.   8.ANGEL, OHS: On CPAP. Refusing at times.   10. Weights, morbid obesity: [phone]. Monitor. Counseling on heart healthy diet, diabetic diet, healthy lifestyle choices. Encouraged working with therapy and as much physical activity as possible but is limited due to profound weakness, hemiplegia.   11. Nausea: ordered zofran prn.   12. Chronic CHF: [facility] h/o CHF. No echo or records available [facility]. No shortness of breath, no edema noted, on lasix two times a day. Will reduce to daily and monitor. Weekly weights.     Bmp, hm1 on 10/9    Pt and ot eval and treat    Electronically signed by: Morenita Mosquera NP    Case Management:  I have reviewed the facility/SNF care plan/MDS which was done 8/6/19, including the falls risk, nutrition and pain screening. I also reviewed the current immunizations, and preventive care.. Future cancer screening is not clinically indicated secondary to age/goals of care.   Patient's desire to return to the community is not assessible due to cognitive impairment.    Information reviewed:  Medications, vital signs, orders, and nursing notes.  The health plan new enrollment has happened. I have reviewed the  MDS, the preventative needs,  and facility care plan. The level of care is appropriate. I have reviewed the code status/advanced directives.     Total floor/unit time spent 35 min with 25 min spent on counseling and coordination of care. Counseling regarding hospital course, med changes, persistent GJ tube site infection and management options, counseling regarding need for probiotic and prevention of c diff. Counseling regarding monitoring for signs of systemic infection. Counseling regarding lab monitoring and  lasix reduction, chf management. coordinated care with nursing for management of wound infection, wound cares, lab monitoring, nausea management, obesity management, when to transfer to ed.

## 2021-06-03 VITALS — WEIGHT: 182.4 LBS | BODY MASS INDEX: 29.88 KG/M2

## 2021-06-03 VITALS
WEIGHT: 179 LBS | HEART RATE: 55 BPM | SYSTOLIC BLOOD PRESSURE: 122 MMHG | OXYGEN SATURATION: 97 % | BODY MASS INDEX: 29.33 KG/M2 | DIASTOLIC BLOOD PRESSURE: 54 MMHG | TEMPERATURE: 97.7 F | RESPIRATION RATE: 20 BRPM

## 2021-06-03 VITALS
BODY MASS INDEX: 29.49 KG/M2 | SYSTOLIC BLOOD PRESSURE: 108 MMHG | RESPIRATION RATE: 18 BRPM | WEIGHT: 180 LBS | HEART RATE: 54 BPM | OXYGEN SATURATION: 96 % | DIASTOLIC BLOOD PRESSURE: 58 MMHG | TEMPERATURE: 97.8 F

## 2021-06-03 VITALS
RESPIRATION RATE: 17 BRPM | OXYGEN SATURATION: 96 % | SYSTOLIC BLOOD PRESSURE: 119 MMHG | BODY MASS INDEX: 29.49 KG/M2 | TEMPERATURE: 97.5 F | WEIGHT: 180 LBS | HEART RATE: 56 BPM | DIASTOLIC BLOOD PRESSURE: 54 MMHG

## 2021-06-03 VITALS — WEIGHT: 178 LBS | BODY MASS INDEX: 29.16 KG/M2

## 2021-06-03 NOTE — PROGRESS NOTES
[facility]    DATE: 2019    NAME:  Zach Olmos             :  1950  MRN: [id number]  CODE STATUS:  FULL CODE    VISIT TYPE: Review Of Multiple Medical Conditions     FACILITY:  [facility] [[id number]]       CHIEF COMPLAIN/REASON FOR VISIT:    Chief Complaint   Patient presents with     Review Of Multiple Medical Conditions               HISTORY OF PRESENT ILLNESS: Zach Olmos is a 69 y.o. female who is a long term care resident of [facility]. She was recently admitted to [facility] for abdominal pain, G-J tube infection. She was admitted 10/2-10/4. She was sent in for abdominal pain and purulent drainage from G-J tube site. Staff reported she was in so much pain she was crying and unable to eat. During her stay CT showed severe phlegmonous and inflammatory changes along percutaneous gastrostomy tract. She had leukocytosis but vitals were stable and no fever. There was no drainable fluid collection on CT scan. Her apixaban was held and IR removed GJ tube on 10/3. ID was consulted for antibiotic recommendations and was started on vanco/zosyn and later changed to po augmentin. HM1 on discharge showed resolution of leukocytosis. She was discharged back to [facility].     Today Ms. Olmos is seen for follow up visit today. She is noted to be in bed still. She says she has been sleeping well. She denies any pain right now and no issues with her bowels recently. She says appetite is fine and no stomach upset. She denies dizziness or other recent concerns. No recent illness or cold symptoms. Per staff no recent concerns and vitals have been stable.     REVIEW OF SYSTEMS:  PROBLEMS AND REVIEW OF SYSTEMS:   Today on ROS:   Currently, no fever, chills, or rigors. Decreased vision. Denies any chest pain, headaches, palpitations, lightheadedness. Denies any GERD symptoms. Positive for weakness, EZ stand for transfers or slide board,  dysphagia, urinary incontinence, fecal incontinence, appetite is good, wheelchair bound, right sided weakness, aphasia, no shortness of breath, refusing cpap at times      Allergies   Allergen Reactions     Aricept [Donepezil]      Atorvastatin      Glipizide      Lisinopril      Current Outpatient Medications   Medication Sig     acetaminophen (TYLENOL) 325 MG tablet Take 650 mg by mouth every 6 (six) hours as needed for pain.            apixaban (ELIQUIS) 5 mg Tab tablet Take 5 mg by mouth 2 (two) times a day.           aspirin 81 mg chewable tablet Chew 81 mg daily.           furosemide (LASIX) 20 MG tablet Take 20 mg by mouth daily.            insulin glargine (LANTUS) 100 unit/mL injection Inject 15 Units under the skin 2 (two) times a day. 07:30 AM, 04:30 PM           irbesartan (AVAPRO) 75 MG tablet Take 75 mg by mouth 2 (two) times a day.           metoprolol tartrate (LOPRESSOR) 50 MG tablet Take 50 mg by mouth 2 (two) times a day.           ondansetron (ZOFRAN) 4 MG tablet Take 4 mg by mouth every 6 (six) hours as needed for nausea.     PARoxetine (PAXIL) 30 MG tablet Take 60 mg by mouth every morning.           rosuvastatin (CRESTOR) 20 MG tablet Take 20 mg by mouth every morning. PER HOSPITAL ORDERS GIVE TO PT. IN THE A.M.            Past Medical History:    Past Medical History:   Diagnosis Date     Anemia      Anxiety      Cancer (H)     Hx of colon cancer     CHF (congestive heart failure) (H)     diastolic     Coronary artery disease     s/p cabg     CVA (cerebral vascular accident) (H)      Depression      Diabetes mellitus (H)     type 2     DVT (deep vein thrombosis) in pregnancy      Granuloma annulare      Hemiplegia (H)     R sided and minimally communicative     Hyperlipidemia      Hypertension      Obesity      Oropharyngeal dysphagia      ANGEL (obstructive sleep apnea)      Parotitis      Paroxysmal atrial fibrillation (H)      PE (pulmonary thromboembolism) (H)      Stroke (H) 12/2018    Sub  acute L MCA stroke     Varicose veins of both lower extremities        IMMUNIZATIONS:    There is no immunization history on file for this patient.  Above immunizations pulled from [facility]. Facility records also reconciled. Outstanding information sent to [facility] to update [facility].  Future immunizations are not needed at this point as all recommended immunizations are up to date.     PHYSICAL EXAMINATION  Vitals:    11/27/19 0700   BP: 117/53   Pulse: (!) 55   Resp: 18   Temp: 97.7  F (36.5  C)   SpO2: 97%   Weight: 179 lb (81.2 kg)       Today on physical exam:     GENERAL: Awake, Alert, not in any form of acute distress, answers questions appropriately, follows simple commands, conversant  HEENT: Head is normocephalic with normal hair distribution. No evidence of trauma. Ears: No acute purulent discharge. Eyes: Conjunctivae pink with no scleral jaundice. Nose: Normal mucosa and septum. NECK: Supple with no cervical or supraclavicular lymphadenopathy. Trachea is midline.   CHEST: No tenderness or deformity, no crepitus  LUNG: dim to auscultation with good chest expansion. There are no crackles or wheezes, normal AP diameter.  No recent shortness of breath or cough   BACK: No kyphosis of the thoracic spine. Symmetric, no curvature, ROM normal, no CVA tenderness, no spinal tenderness   CVS: irregularly irregular rhythm,  2+ pulses symmetric in all extremities.  ABDOMEN: Rounded and soft, nontender to palpation, non distended, no masses, no organomegaly, good bowel sounds, no rebound or guarding, no peritoneal signs.   EXTREMITIES: no edema  SKIN: Warm and dry, no erythema noted.  Skin color, texture, no rashes or lesions.  NEURO/PSYCH: The patient is oriented to person, place and time. Right sided hemiplegia, dysarthria, dysphagia, expressive aphasia, EZ stand to slide board for transfers, dependent for cares            LABS:   No results found for this or any previous visit (from the  past 168 hour(s)).  Results for orders placed or performed in visit on 10/09/19   Basic Metabolic Panel   Result Value Ref Range    Sodium 139 136 - 145 mmol/L    Potassium 4.3 3.5 - 5.0 mmol/L    Chloride 103 98 - 107 mmol/L    CO2 28 22 - 31 mmol/L    Anion Gap, Calculation 8 5 - 18 mmol/L    Glucose 87 70 - 125 mg/dL    Calcium 9.2 8.5 - 10.5 mg/dL    BUN 16 8 - 28 mg/dL    Creatinine 0.83 0.60 - 1.10 mg/dL    GFR MDRD Af Amer >60 >60 mL/min/1.73m2    GFR MDRD Non Af Amer >60 >60 mL/min/1.73m2         Lab Results   Component Value Date    WBC 10.5 10/09/2019    HGB 10.3 (L) 10/09/2019    HCT 31.6 (L) 10/09/2019    MCV 92 10/09/2019     10/09/2019       No results found for: [id number]  Lab Results   Component Value Date    HGBA1C 5.5 11/08/2019     No results found for: INR, PROTIME  No results found for: [id number]  Lab Results   Component Value Date    TSH 1.04 08/07/2019           ASSESSMENT/PLAN:    1. Type 2 DM with HTN: Accuchecks 129-160. Hga1c 6.4 on 4/16. On lantus two times a day, accuchecks bid. Hga1c 5.7 on 8/7. Well controlled.   2. CAD, s/p CABG: No chest pain. On aspirin, rosuvastatin. F/u cardiology.    3. H/o Left MCA CVA: Right sided hemiplegia, dysarthria, dysphagia, expressive aphasia. Wheelchair and bed bound. EZ stand to slide board for transfers. Dependent for cares. On rosuvastatin, aspirin. F/u with neurology prn. Dysphagia on NDD3, nectar thick liquids.  4. Anxiety and depression: On paroxetine. ACP following, feels mood stable today.   5. Dysphagia, G-J tube site infection: G-J tube removed. NDD3 nectar thick liquids.   6. PAF: Rate controlled 50s. On eliquis, metoprolol.   7.ANGEL, OHS: On CPAP. Refusing at times.   8. Weights, obesity: [phone]. Monitor. Counseling on heart healthy diet, diabetic diet, healthy lifestyle choices. Encouraged working with therapy and as much physical activity as possible but is limited due to profound weakness, hemiplegia.   9.  Chronic CHF: [facility] reported h/o CHF. No echo or records available [facility]. No shortness of breath, no edema noted, on lasix daily. Weekly weights.  No recent concerns.   10. HTN: 110s. On irbesartan, metoprolol, lasix. Stable recently.     [facility], 1 on 10/9      Electronically signed by: Morenita Mosquera NP    Case Management:  I have reviewed the facility/SNF care plan/MDS which was done 10/30/19, including the falls risk, nutrition and pain screening. I also reviewed the current immunizations, and preventive care.. Future cancer screening is not clinically indicated secondary to age/goals of care.   Patient's desire to return to the community is not assessible due to cognitive impairment.    Information reviewed:  Medications, vital signs, orders, and nursing notes.  The health plan new enrollment has happened. I have reviewed the  MDS, the preventative needs,  and facility care plan. The level of care is appropriate. I have reviewed the code status/advanced directives.

## 2021-06-03 NOTE — PROGRESS NOTES
[facility]    DATE: 2019    NAME:  Zach Olmos             :  1950  MRN: [id number]  CODE STATUS:  FULL CODE    VISIT TYPE: FVP Care Coordination - Home Visit-Annual Assessment     FACILITY:  [facility] [[id number]]       CHIEF COMPLAIN/REASON FOR VISIT:    Chief Complaint   Patient presents with     FVP Care Coordination - Home Visit-Annual Assessment               HISTORY OF PRESENT ILLNESS: Zach Olmos is a 69 y.o. female who is a long term care resident of [facility]. She was recently admitted to [facility] for abdominal pain, G-J tube infection. She was admitted 10/2-10/4. She was sent in for abdominal pain and purulent drainage from G-J tube site. Staff reported she was in so much pain she was crying and unable to eat. During her stay CT showed severe phlegmonous and inflammatory changes along percutaneous gastrostomy tract. She had leukocytosis but vitals were stable and no fever. There was no drainable fluid collection on CT scan. Her apixaban was held and IR removed GJ tube on 10/3. ID was consulted for antibiotic recommendations and was started on vanco/zosyn and later changed to po augmentin. HM1 on discharge showed resolution of leukocytosis. She was discharged back to [facility].     Today Ms. Olmos is seen for annual assessment. She reports doing well lately. Her appetite is good and no nausea or stomach upset. She is nothaving any issues with her bowels and her g tube site is completely healed now. She denies issues swallowing or recent cough. She has not had any illnesses lately or colds. She sleeps well and no pain today. She says she is really doing quite well and has no complaints. On review of chart her blood sugars have been stable 137-184. Her vitals have been stable. She did have an eye clinic appt on 12/3. Otherwise no recent appointments. She did see acp the in house psychologist on 10/25. She had  labs in november. She did recently have a cold in November and still has orders for cepachol and robitussin on chart.     REVIEW OF SYSTEMS:  PROBLEMS AND REVIEW OF SYSTEMS:   Today on ROS:   Currently, no fever, chills, or rigors. Decreased vision. Denies any chest pain, headaches, palpitations, lightheadedness. Denies any GERD symptoms. Positive for weakness, EZ stand for transfers or slide board, dysphagia, urinary incontinence, fecal incontinence, appetite is good, wheelchair bound, right sided weakness, aphasia, no shortness of breath, refusing cpap at times      Allergies   Allergen Reactions     Aricept [Donepezil]      Atorvastatin      Glipizide      Lisinopril      Current Outpatient Medications   Medication Sig     acetaminophen (TYLENOL) 325 MG tablet Take 650 mg by mouth every 6 (six) hours as needed for pain.            apixaban (ELIQUIS) 5 mg Tab tablet Take 5 mg by mouth 2 (two) times a day.           aspirin 81 mg chewable tablet Chew 81 mg daily.           furosemide (LASIX) 20 MG tablet Take 20 mg by mouth daily.            insulin glargine (LANTUS) 100 unit/mL injection Inject 15 Units under the skin 2 (two) times a day. 07:30 AM, 04:30 PM           irbesartan (AVAPRO) 75 MG tablet Take 75 mg by mouth 2 (two) times a day.           metoprolol tartrate (LOPRESSOR) 50 MG tablet Take 50 mg by mouth 2 (two) times a day.           ondansetron (ZOFRAN) 4 MG tablet Take 4 mg by mouth every 6 (six) hours as needed for nausea.     PARoxetine (PAXIL) 30 MG tablet Take 60 mg by mouth every morning.           rosuvastatin (CRESTOR) 20 MG tablet Take 20 mg by mouth every morning. PER HOSPITAL ORDERS GIVE TO PT. IN THE A.M.            Past Medical History:    Past Medical History:   Diagnosis Date     Anemia      Anxiety      Cancer (H)     Hx of colon cancer     CHF (congestive heart failure) (H)     diastolic     Coronary artery disease     s/p cabg     CVA (cerebral vascular accident) (H)      Depression       Diabetes mellitus (H)     type 2     DVT (deep vein thrombosis) in pregnancy      Granuloma annulare      Hemiplegia (H)     R sided and minimally communicative     Hyperlipidemia      Hypertension      Obesity      Oropharyngeal dysphagia      ANGEL (obstructive sleep apnea)      Parotitis      Paroxysmal atrial fibrillation (H)      PE (pulmonary thromboembolism) (H)      Stroke (H) 12/2018    Sub acute L MCA stroke     Varicose veins of both lower extremities        IMMUNIZATIONS:    There is no immunization history on file for this patient.  Above immunizations pulled from [facility]. Facility records also reconciled. Outstanding information sent to [facility] to update [facility].  Future immunizations are not needed at this point as all recommended immunizations are up to date.     PHYSICAL EXAMINATION  Vitals:    12/03/19 2222   BP: 120/67   Pulse: 98   Resp: 18   Temp: 97  F (36.1  C)   SpO2: 96%       Today on physical exam:     GENERAL: Awake, Alert, not in any form of acute distress, answers questions appropriately, follows simple commands, conversant  HEENT: Head is normocephalic with normal hair distribution. No evidence of trauma. Ears: No acute purulent discharge. Eyes: Conjunctivae pink with no scleral jaundice. Nose: Normal mucosa and septum. NECK: Supple with no cervical or supraclavicular lymphadenopathy. Trachea is midline.   CHEST: No tenderness or deformity, no crepitus  LUNG: dim to auscultation with good chest expansion. There are no crackles or wheezes, normal AP diameter.  No recent shortness of breath or cough   BACK: No kyphosis of the thoracic spine. Symmetric, no curvature, ROM normal, no CVA tenderness, no spinal tenderness   CVS: irregularly irregular rhythm,  2+ pulses symmetric in all extremities.  ABDOMEN: Rounded and soft, nontender to palpation, non distended, no masses, no organomegaly, good bowel sounds, no rebound or guarding, no peritoneal signs.    EXTREMITIES: no edema  SKIN: Warm and dry, no erythema noted.  Skin color, texture, no rashes or lesions.  NEURO/PSYCH: The patient is oriented to person, place and time. Right sided hemiplegia, dysarthria, dysphagia, expressive aphasia, EZ stand to slide board for transfers, dependent for cares            LABS:   No results found for this or any previous visit (from the past 168 hour(s)).  Results for orders placed or performed in visit on 10/09/19   Basic Metabolic Panel   Result Value Ref Range    Sodium 139 136 - 145 mmol/L    Potassium 4.3 3.5 - 5.0 mmol/L    Chloride 103 98 - 107 mmol/L    CO2 28 22 - 31 mmol/L    Anion Gap, Calculation 8 5 - 18 mmol/L    Glucose 87 70 - 125 mg/dL    Calcium 9.2 8.5 - 10.5 mg/dL    BUN 16 8 - 28 mg/dL    Creatinine 0.83 0.60 - 1.10 mg/dL    GFR MDRD Af Amer >60 >60 mL/min/1.73m2    GFR MDRD Non Af Amer >60 >60 mL/min/1.73m2         Lab Results   Component Value Date    WBC 10.5 10/09/2019    HGB 10.3 (L) 10/09/2019    HCT 31.6 (L) 10/09/2019    MCV 92 10/09/2019     10/09/2019       No results found for: [id number]  Lab Results   Component Value Date    HGBA1C 5.5 11/08/2019     No results found for: INR, PROTIME  No results found for: [id number]  Lab Results   Component Value Date    TSH 1.04 08/07/2019           ASSESSMENT/PLAN:    1. Type 2 DM with HTN: Accuchecks 137-184. Hga1c 6.4 on 4/16. On lantus two times a day, accuchecks bid. Hga1c 5.7 on 8/7. hga1c 5.5. Well controlled. No changes today. Will recheck hga1c in few months. No recent low readings. Goal hga1c for age group 6.5-7, with recent CVA may be a little more aggressive in 6-6.5 range. Will reduce to 10u of lantus two times a day.   2. CAD, s/p CABG: No chest pain. On aspirin, rosuvastatin. F/u cardiology.    3. H/o Left MCA CVA: Right sided hemiplegia, dysarthria, dysphagia, expressive aphasia. Wheelchair and bed bound. EZ stand to slide board for transfers. Dependent for cares. On rosuvastatin,  aspirin. F/u with neurology prn. Dysphagia on NDD3, nectar thick liquids.  4. Anxiety and depression: On paroxetine. ACP following, feels mood stable today.   5. Dysphagia: G-J tube removed and site healed, treated for infection. NDD3 nectar thick liquids. No recent changes or concerns.   6. PAF: Rate controlled 70s. On eliquis, metoprolol.   7.ANGEL, OHS: On CPAP. Refusing at times.   8. Weights, obesity: [phone]. Monitor. Counseling on heart healthy diet, diabetic diet, healthy lifestyle choices. Encouraged working with therapy and as much physical activity as possible but is limited due to profound weakness, hemiplegia.   9. Chronic CHF: [facility] reported h/o CHF. No echo or records available [facility]. No shortness of breath, no edema noted, on lasix daily. Weekly weights.  No recent concerns.   10. HTN: 120s. On irbesartan, metoprolol, lasix. Stable recently.     Labs last in November, will check q6 months      Electronically signed by: Morenita Mosquera NP    Case Management:  I have reviewed the facility/SNF care plan/MDS which was done 10/30/19, including the falls risk, nutrition and pain screening. I also reviewed the current immunizations, and preventive care.. Future cancer screening is not clinically indicated secondary to age/goals of care.   Patient's desire to return to the community is not assessible due to cognitive impairment.    Information reviewed:  Medications, vital signs, orders, and nursing notes.  The health plan new enrollment has happened. I have reviewed the  MDS, the preventative needs,  and facility care plan. The level of care is appropriate. I have reviewed the code status/advanced directives.

## 2021-06-03 NOTE — PROGRESS NOTES
[facility]      Facility:    [facility] [[id number]]  Code Status: FULL CODE       Chief Complaint/Reason for Visit:  Chief Complaint   Patient presents with     H & P     Re-admit to LTC.       HPI:   Zach is a 69 y.o. female who is seen for re-admit to LTC at [facility]. She was initially admitted here on 7/30/19, came from [facility]. She has hx of stroke, ischemic left MCA with hospitalization at [facility] 11/17/18-12/7/18. She has late effects of right hemiparesis, dysphagia, dysarthria. She had a feeding tube but was getting nutrition orally and the G tube was being flushed to keep patent. She has additional hx of DM, HTN, CAD s/p CABG, ANGEL, colon cancer s/p resection, depression and anxiety.    She was sent in to the hospital on 10/2/19 due to drainage around the G tube site which was purulent with some blood. She had developed abdominal discomfort and bloating. There is no information in EPIC as she was hospitalized at [facility]. Due to infection of the G tube site, her G tube was removed, she was treated with abx and she was able to return to the facility on 10/4/19.    Today:  She has completed treatment with abx, no further concerns of infection. No fever. She has difficulty communicating due to dysarthria, expressive aphasia and slowed responses. She is weak on right side. She uses slide board and EZ stand. She denies chest pain or SOB. She denies cough. Denies abdominal pain. No fever. No urinary sx. No reported new vision or hearing problems. Unable to due further ROS with patient due to cognitive impairment.       Past Medical History:  Past Medical History:   Diagnosis Date     Anemia      Anxiety      Cancer (H)     Hx of colon cancer     CHF (congestive heart failure) (H)     diastolic     Coronary artery disease     s/p cabg     CVA (cerebral vascular accident) (H)      Depression      Diabetes mellitus (H)     type 2     DVT (deep  vein thrombosis) in pregnancy      Granuloma annulare      Hemiplegia (H)     R sided and minimally communicative     Hyperlipidemia      Hypertension      Obesity      Oropharyngeal dysphagia      ANGEL (obstructive sleep apnea)      Parotitis      Paroxysmal atrial fibrillation (H)      PE (pulmonary thromboembolism) (H)      Stroke (H) 12/2018    Sub acute L MCA stroke     Varicose veins of both lower extremities            Surgical History:  Past Surgical History:   Procedure Laterality Date     CORONARY ARTERY BYPASS GRAFT      x5       Family History:   Family History   Problem Relation Age of Onset     Heart disease Mother      Cancer Father         esophageal       Social History:    Social History     Socioeconomic History     Marital status: Single     Spouse name: Not on file     Number of children: Not on file     Years of education: Not on file     Highest education level: Not on file   Occupational History     Not on file   Social Needs     Financial resource strain: Not on file     Food insecurity:     Worry: Not on file     Inability: Not on file     Transportation needs:     Medical: Not on file     Non-medical: Not on file   Tobacco Use     Smoking status: Never Smoker     Smokeless tobacco: Never Used   Substance and Sexual Activity     Alcohol use: No     Frequency: Never     Drug use: No     Sexual activity: Not on file   Lifestyle     Physical activity:     Days per week: Not on file     Minutes per session: Not on file     Stress: Not on file   Relationships     Social connections:     Talks on phone: Not on file     Gets together: Not on file     Attends Jewish service: Not on file     Active member of club or organization: Not on file     Attends meetings of clubs or organizations: Not on file     Relationship status: Not on file     Intimate partner violence:     Fear of current or ex partner: Not on file     Emotionally abused: Not on file     Physically abused: Not on file     Forced  sexual activity: Not on file   Other Topics Concern     Not on file   Social History Narrative     Not on file       Medications:  Current Outpatient Medications   Medication Sig     acetaminophen (TYLENOL) 325 MG tablet Take 650 mg by mouth every 6 (six) hours as needed for pain.            apixaban (ELIQUIS) 5 mg Tab tablet Take 5 mg by mouth 2 (two) times a day.           aspirin 81 mg chewable tablet Chew 81 mg daily.           furosemide (LASIX) 20 MG tablet Take 20 mg by mouth daily.            insulin glargine (LANTUS) 100 unit/mL injection Inject 15 Units under the skin 2 (two) times a day. 07:30 AM, 04:30 PM           irbesartan (AVAPRO) 75 MG tablet Take 75 mg by mouth 2 (two) times a day.           metoprolol tartrate (LOPRESSOR) 50 MG tablet Take 50 mg by mouth 2 (two) times a day.           ondansetron (ZOFRAN) 4 MG tablet Take 4 mg by mouth every 6 (six) hours as needed for nausea.     PARoxetine (PAXIL) 30 MG tablet Take 60 mg by mouth every morning.           rosuvastatin (CRESTOR) 20 MG tablet Take 20 mg by mouth every morning. PER HOSPITAL ORDERS GIVE TO PT. IN THE A.M.              Allergies:  Allergies   Allergen Reactions     Aricept [Donepezil]      Atorvastatin      Glipizide      Lisinopril         Review of Systems:  Pertinent items are noted in HPI.   Cognitive impairment.      Physical Exam:   General: Patient is alert female, laying in bed, no distress.   Vitals: /53, Temp 98.6, Pulse 53, RR 20, O2 sat 95%RA.  HEENT: Head is NCAT. Eyes show no injection or icterus. Nares negative. Oropharynx well hydrated.  Neck: Supple. No tenderness or adenopathy. No JVD.  Lungs: Clear bilaterally. No wheezes.  Cardiovascular: Regular rate and rhythm, normal S1. S2.  Back: No spinal or CVA tenderness.  Abdomen: G tube site healed. Soft, no tenderness on exam. Bowel sounds present. No guarding rebound or rigidity.  : Deferred.  Extremities: No edema is noted.  Musculoskeletal: Weak right  side.  Skin: G tube site healed over.   Psych: Difficult to assess.      Labs:  Lab Results   Component Value Date    WBC 10.5 10/09/2019    HGB 10.3 (L) 10/09/2019    HCT 31.6 (L) 10/09/2019    MCV 92 10/09/2019     10/09/2019     Results for orders placed or performed in visit on 10/09/19   Basic Metabolic Panel   Result Value Ref Range    Sodium 139 136 - 145 mmol/L    Potassium 4.3 3.5 - 5.0 mmol/L    Chloride 103 98 - 107 mmol/L    CO2 28 22 - 31 mmol/L    Anion Gap, Calculation 8 5 - 18 mmol/L    Glucose 87 70 - 125 mg/dL    Calcium 9.2 8.5 - 10.5 mg/dL    BUN 16 8 - 28 mg/dL    Creatinine 0.83 0.60 - 1.10 mg/dL    GFR MDRD Af Amer >60 >60 mL/min/1.73m2    GFR MDRD Non Af Amer >60 >60 mL/min/1.73m2     Lab Results   Component Value Date    HGBA1C 5.5 11/08/2019         Assessment/Plan:  1. Dysphagia. Secondary to stroke Nov 2018. Previous GT was not being used in LTC facility, became infected, hospitalized for treatment with abx and removal of tube. She is eating orally, honey thick liquids.   2. Stroke. Left MCA ischemic origin, Nov 2018. Residual exp aphasia, dysphagia. On aspirin, statin.  3. HTN. On metoprolol, irbesartan, lasix. Monitor BPs per protocol.  4. Afib. She is anticoagulated with eliquis.  5. DM. On Lantus insulin. Accuchecks followed. Last A1c at 5.5%.  6. CAD. Hx cabg. No CP.  7. Depression. Hx anxiety. Continue Paxil.  8. ANGEL. Has CPAP.  9. Hx colon cancer. S/p resection.  10. Code status is full code.          Electronically signed by: Stefany Tyler MD

## 2021-06-04 VITALS
HEART RATE: 62 BPM | DIASTOLIC BLOOD PRESSURE: 63 MMHG | TEMPERATURE: 98 F | RESPIRATION RATE: 18 BRPM | OXYGEN SATURATION: 96 % | WEIGHT: 203 LBS | BODY MASS INDEX: 31.79 KG/M2 | SYSTOLIC BLOOD PRESSURE: 136 MMHG

## 2021-06-04 VITALS
SYSTOLIC BLOOD PRESSURE: 150 MMHG | WEIGHT: 201 LBS | OXYGEN SATURATION: 99 % | TEMPERATURE: 98.2 F | BODY MASS INDEX: 31.48 KG/M2 | RESPIRATION RATE: 18 BRPM | HEART RATE: 54 BPM | DIASTOLIC BLOOD PRESSURE: 59 MMHG

## 2021-06-04 VITALS
SYSTOLIC BLOOD PRESSURE: 117 MMHG | RESPIRATION RATE: 18 BRPM | BODY MASS INDEX: 29.33 KG/M2 | TEMPERATURE: 97.7 F | WEIGHT: 179 LBS | OXYGEN SATURATION: 97 % | HEART RATE: 55 BPM | DIASTOLIC BLOOD PRESSURE: 53 MMHG

## 2021-06-04 VITALS
SYSTOLIC BLOOD PRESSURE: 118 MMHG | WEIGHT: 185 LBS | TEMPERATURE: 98 F | RESPIRATION RATE: 20 BRPM | DIASTOLIC BLOOD PRESSURE: 78 MMHG | HEART RATE: 68 BPM | OXYGEN SATURATION: 98 % | BODY MASS INDEX: 28.98 KG/M2

## 2021-06-04 VITALS
WEIGHT: 194 LBS | BODY MASS INDEX: 30.38 KG/M2 | RESPIRATION RATE: 22 BRPM | DIASTOLIC BLOOD PRESSURE: 62 MMHG | TEMPERATURE: 97.2 F | OXYGEN SATURATION: 98 % | HEART RATE: 64 BPM | SYSTOLIC BLOOD PRESSURE: 137 MMHG

## 2021-06-04 VITALS
OXYGEN SATURATION: 94 % | BODY MASS INDEX: 29.6 KG/M2 | SYSTOLIC BLOOD PRESSURE: 112 MMHG | WEIGHT: 189 LBS | TEMPERATURE: 98.5 F | RESPIRATION RATE: 18 BRPM | DIASTOLIC BLOOD PRESSURE: 53 MMHG | HEART RATE: 52 BPM

## 2021-06-04 VITALS
RESPIRATION RATE: 24 BRPM | BODY MASS INDEX: 28.88 KG/M2 | OXYGEN SATURATION: 96 % | SYSTOLIC BLOOD PRESSURE: 140 MMHG | HEART RATE: 56 BPM | DIASTOLIC BLOOD PRESSURE: 64 MMHG | HEIGHT: 67 IN | WEIGHT: 184 LBS | TEMPERATURE: 96 F

## 2021-06-04 VITALS
RESPIRATION RATE: 18 BRPM | SYSTOLIC BLOOD PRESSURE: 113 MMHG | BODY MASS INDEX: 29.29 KG/M2 | WEIGHT: 187 LBS | TEMPERATURE: 97.7 F | HEART RATE: 61 BPM | OXYGEN SATURATION: 93 % | DIASTOLIC BLOOD PRESSURE: 53 MMHG

## 2021-06-04 VITALS
TEMPERATURE: 97.9 F | BODY MASS INDEX: 28.83 KG/M2 | SYSTOLIC BLOOD PRESSURE: 143 MMHG | OXYGEN SATURATION: 95 % | HEART RATE: 55 BPM | RESPIRATION RATE: 18 BRPM | WEIGHT: 176 LBS | DIASTOLIC BLOOD PRESSURE: 63 MMHG

## 2021-06-04 NOTE — PROGRESS NOTES
[facility]    DATE: 2019    NAME:  Zach Olmos             :  1950  MRN: [id number]  CODE STATUS:  FULL CODE    VISIT TYPE: Problem Visit (ear pain, pressure)     FACILITY:  [facility] [[id number]]       CHIEF COMPLAIN/REASON FOR VISIT:    Chief Complaint   Patient presents with     Problem Visit     ear pain, pressure               HISTORY OF PRESENT ILLNESS: Zach Olmos is a 69 y.o. female who is a long term care resident of [facility].     Today Ms. Olmos is seen per request for ear pain and pressure. She was requesting exam of ears. She says her it is her left ear that is bothering her more than the right. She denies fevers, pain, nausea, stomach upset, or breathing concerns. No runny nose or nasal congestion. She is not having any cold symptoms. On exam both ears have some dry wax but left ear is noted to have erythema and inflammation in ear canal.     REVIEW OF SYSTEMS:  PROBLEMS AND REVIEW OF SYSTEMS:   Today on ROS:   Currently, no fever, chills, or rigors. Decreased vision. Denies any chest pain, headaches, palpitations, lightheadedness. Denies any GERD symptoms. Positive for weakness, EZ stand for transfers or slide board, dysphagia, urinary incontinence, fecal incontinence, appetite is good, wheelchair bound, right sided weakness, aphasia, no shortness of breath, refusing cpap at times, left ear pain and pressure      Allergies   Allergen Reactions     Aricept [Donepezil]      Atorvastatin      Glipizide      Lisinopril      Current Outpatient Medications   Medication Sig     acetaminophen (TYLENOL) 325 MG tablet Take 650 mg by mouth every 6 (six) hours as needed for pain.            apixaban (ELIQUIS) 5 mg Tab tablet Take 5 mg by mouth 2 (two) times a day.           aspirin 81 mg chewable tablet Chew 81 mg daily.           furosemide (LASIX) 20 MG tablet Take 20 mg by mouth daily.            insulin glargine  (LANTUS) 100 unit/mL injection Inject 15 Units under the skin 2 (two) times a day. 07:30 AM, 04:30 PM           irbesartan (AVAPRO) 75 MG tablet Take 75 mg by mouth 2 (two) times a day.           metoprolol tartrate (LOPRESSOR) 50 MG tablet Take 50 mg by mouth 2 (two) times a day.           ondansetron (ZOFRAN) 4 MG tablet Take 4 mg by mouth every 6 (six) hours as needed for nausea.     PARoxetine (PAXIL) 30 MG tablet Take 60 mg by mouth every morning.           rosuvastatin (CRESTOR) 20 MG tablet Take 20 mg by mouth every morning. PER HOSPITAL ORDERS GIVE TO PT. IN THE A.M.            Past Medical History:    Past Medical History:   Diagnosis Date     Anemia      Anxiety      Cancer (H)     Hx of colon cancer     CHF (congestive heart failure) (H)     diastolic     Coronary artery disease     s/p cabg     CVA (cerebral vascular accident) (H)      Depression      Diabetes mellitus (H)     type 2     DVT (deep vein thrombosis) in pregnancy      Granuloma annulare      Hemiplegia (H)     R sided and minimally communicative     Hyperlipidemia      Hypertension      Obesity      Oropharyngeal dysphagia      ANGEL (obstructive sleep apnea)      Parotitis      Paroxysmal atrial fibrillation (H)      PE (pulmonary thromboembolism) (H)      Stroke (H) 12/2018    Sub acute L MCA stroke     Varicose veins of both lower extremities        IMMUNIZATIONS:    There is no immunization history on file for this patient.  Above immunizations pulled from [facility] [employer]. Facility records also reconciled. Outstanding information sent to [facility] to update [facility] [employer].  Future immunizations are not needed at this point as all recommended immunizations are up to date.     PHYSICAL EXAMINATION  Vitals:    12/11/19 0700   BP: 143/63   Pulse: (!) 55   Resp: 18   Temp: 97.9  F (36.6  C)   SpO2: 95%   Weight: 176 lb (79.8 kg)       Today on physical exam:     GENERAL: Awake, Alert, not in any form of acute distress, answers  questions appropriately, follows simple commands, conversant  HEENT: Head is normocephalic with normal hair distribution. No evidence of trauma. Ears: No acute purulent discharge. Dry wax noted in right ear, dry wax noted in left ear with erythema and inflammation, minimal debris present, TM pearly white Eyes: Conjunctivae pink with no scleral jaundice. Nose: Normal mucosa and septum. NECK: Supple with no cervical or supraclavicular lymphadenopathy. Trachea is midline.   CHEST: No tenderness or deformity, no crepitus  LUNG: dim to auscultation with good chest expansion. There are no crackles or wheezes, normal AP diameter.  No recent shortness of breath or cough   BACK: No kyphosis of the thoracic spine. Symmetric, no curvature, ROM normal, no CVA tenderness, no spinal tenderness   CVS: irregularly irregular rhythm,  2+ pulses symmetric in all extremities.  ABDOMEN: Rounded and soft, nontender to palpation, non distended, no masses, no organomegaly, good bowel sounds, no rebound or guarding, no peritoneal signs.   EXTREMITIES: no edema  SKIN: Warm and dry, no erythema noted.  Skin color, texture, no rashes or lesions.  NEURO/PSYCH: The patient is oriented to person, place and time. Right sided hemiplegia, dysarthria, dysphagia, expressive aphasia, EZ stand to slide board for transfers, dependent for cares            LABS:   No results found for this or any previous visit (from the past 168 hour(s)).  Results for orders placed or performed in visit on 10/09/19   Basic Metabolic Panel   Result Value Ref Range    Sodium 139 136 - 145 mmol/L    Potassium 4.3 3.5 - 5.0 mmol/L    Chloride 103 98 - 107 mmol/L    CO2 28 22 - 31 mmol/L    Anion Gap, Calculation 8 5 - 18 mmol/L    Glucose 87 70 - 125 mg/dL    Calcium 9.2 8.5 - 10.5 mg/dL    BUN 16 8 - 28 mg/dL    Creatinine 0.83 0.60 - 1.10 mg/dL    GFR MDRD Af Amer >60 >60 mL/min/1.73m2    GFR MDRD Non Af Amer >60 >60 mL/min/1.73m2         Lab Results   Component Value Date     WBC 10.5 10/09/2019    HGB 10.3 (L) 10/09/2019    HCT 31.6 (L) 10/09/2019    MCV 92 10/09/2019     10/09/2019       No results found for: [id number]  Lab Results   Component Value Date    HGBA1C 5.5 11/08/2019     No results found for: INR, PROTIME  No results found for: [id number]  Lab Results   Component Value Date    TSH 1.04 08/07/2019           ASSESSMENT/PLAN:    1. Type 2 DM with HTN: Accuchecks 137-157. Hga1c 6.4 on 4/16. On lantus two times a day, accuchecks bid. Hga1c 5.7 on 8/7. hga1c 5.5 on 11/8. Well controlled. Goal hga1c for age group 6.5-7, with recent CVA may be a little more aggressive in 6-6.5 range. previously redcued lantus.   2. CAD, s/p CABG: No chest pain. On aspirin, rosuvastatin. F/u cardiology.    3. H/o Left MCA CVA: Right sided hemiplegia, dysarthria, dysphagia, expressive aphasia. Wheelchair and bed bound. EZ stand to slide board for transfers. Dependent for cares. On rosuvastatin, aspirin. F/u with neurology prn. Dysphagia on NDD3, nectar thick liquids.  4. Anxiety and depression: On paroxetine. ACP following, feels mood stable today.   5. Dysphagia: G-J tube removed and site healed, treated for infection. NDD3 nectar thick liquids. No recent changes or concerns.   6. PAF: Rate controlled 50-70s. On eliquis, metoprolol.   7.ANGEL, OHS: On CPAP. Refusing at times.   8. Weights, obesity: [phone]. Monitor. Counseling on heart healthy diet, diabetic diet, healthy lifestyle choices. Stable.   9. [facility] CH[facility]: [facility] h/o CHF. No echo or records available [facility]. No shortness of breath, no edema noted, on lasix daily. Weekly weights.  No recent concerns.   10. HTN: 140s. On irbesartan, metoprolol, lasix. Stable recently.   11. Left otitis externa: ordered cipro dex two times a day x 7 days.     Labs last in November, will check q6 months      Electronically signed by: Morenita Mosquera NP    Case Management:  I have  reviewed the facility/SNF care plan/MDS which was done 10/30/19, including the falls risk, nutrition and pain screening. I also reviewed the current immunizations, and preventive care.. Future cancer screening is not clinically indicated secondary to age/goals of care.   Patient's desire to return to the community is not assessible due to cognitive impairment.    Information reviewed:  Medications, vital signs, orders, and nursing notes.  The health plan new enrollment has happened. I have reviewed the  MDS, the preventative needs,  and facility care plan. The level of care is appropriate. I have reviewed the code status/advanced directives.

## 2021-06-05 VITALS
DIASTOLIC BLOOD PRESSURE: 67 MMHG | OXYGEN SATURATION: 97 % | SYSTOLIC BLOOD PRESSURE: 143 MMHG | HEART RATE: 64 BPM | TEMPERATURE: 98.5 F | BODY MASS INDEX: 33.2 KG/M2 | WEIGHT: 212 LBS | RESPIRATION RATE: 16 BRPM

## 2021-06-05 VITALS
BODY MASS INDEX: 33.43 KG/M2 | RESPIRATION RATE: 18 BRPM | HEART RATE: 62 BPM | TEMPERATURE: 98.4 F | HEIGHT: 67 IN | WEIGHT: 213 LBS | DIASTOLIC BLOOD PRESSURE: 56 MMHG | OXYGEN SATURATION: 97 % | SYSTOLIC BLOOD PRESSURE: 122 MMHG

## 2021-06-05 VITALS
HEART RATE: 60 BPM | DIASTOLIC BLOOD PRESSURE: 55 MMHG | TEMPERATURE: 97.1 F | HEIGHT: 67 IN | OXYGEN SATURATION: 94 % | RESPIRATION RATE: 20 BRPM | WEIGHT: 215.7 LBS | SYSTOLIC BLOOD PRESSURE: 121 MMHG | BODY MASS INDEX: 33.85 KG/M2

## 2021-06-05 VITALS
BODY MASS INDEX: 33.36 KG/M2 | DIASTOLIC BLOOD PRESSURE: 56 MMHG | RESPIRATION RATE: 18 BRPM | WEIGHT: 213 LBS | TEMPERATURE: 98.3 F | HEART RATE: 60 BPM | OXYGEN SATURATION: 95 % | SYSTOLIC BLOOD PRESSURE: 128 MMHG

## 2021-06-05 VITALS
HEART RATE: 61 BPM | RESPIRATION RATE: 17 BRPM | WEIGHT: 214 LBS | OXYGEN SATURATION: 98 % | DIASTOLIC BLOOD PRESSURE: 65 MMHG | BODY MASS INDEX: 33.52 KG/M2 | TEMPERATURE: 98.4 F | SYSTOLIC BLOOD PRESSURE: 146 MMHG

## 2021-06-05 VITALS
TEMPERATURE: 98.1 F | WEIGHT: 213 LBS | HEIGHT: 67 IN | DIASTOLIC BLOOD PRESSURE: 56 MMHG | BODY MASS INDEX: 33.43 KG/M2 | HEART RATE: 77 BPM | OXYGEN SATURATION: 94 % | SYSTOLIC BLOOD PRESSURE: 124 MMHG | RESPIRATION RATE: 20 BRPM

## 2021-06-05 VITALS
BODY MASS INDEX: 33.43 KG/M2 | HEIGHT: 67 IN | SYSTOLIC BLOOD PRESSURE: 120 MMHG | DIASTOLIC BLOOD PRESSURE: 62 MMHG | HEART RATE: 65 BPM | RESPIRATION RATE: 16 BRPM | TEMPERATURE: 97.2 F | WEIGHT: 213 LBS | OXYGEN SATURATION: 96 %

## 2021-06-05 VITALS
BODY MASS INDEX: 33.43 KG/M2 | DIASTOLIC BLOOD PRESSURE: 58 MMHG | TEMPERATURE: 97.9 F | OXYGEN SATURATION: 93 % | WEIGHT: 213 LBS | HEIGHT: 67 IN | HEART RATE: 60 BPM | RESPIRATION RATE: 16 BRPM | SYSTOLIC BLOOD PRESSURE: 114 MMHG

## 2021-06-05 VITALS
HEART RATE: 79 BPM | SYSTOLIC BLOOD PRESSURE: 156 MMHG | DIASTOLIC BLOOD PRESSURE: 61 MMHG | BODY MASS INDEX: 33.99 KG/M2 | TEMPERATURE: 98 F | RESPIRATION RATE: 19 BRPM | WEIGHT: 217 LBS | OXYGEN SATURATION: 97 %

## 2021-06-05 VITALS
SYSTOLIC BLOOD PRESSURE: 128 MMHG | OXYGEN SATURATION: 95 % | RESPIRATION RATE: 15 BRPM | TEMPERATURE: 98.2 F | DIASTOLIC BLOOD PRESSURE: 60 MMHG | BODY MASS INDEX: 33.52 KG/M2 | HEART RATE: 69 BPM | WEIGHT: 214 LBS

## 2021-06-05 NOTE — PROGRESS NOTES
[facility]    DATE: 2020    NAME:  Zach Olmos             :  1950  MRN: [id number]  CODE STATUS:  FULL CODE    VISIT TYPE: Problem Visit (drainage)     FACILITY:  [facility] [[id number]]       CHIEF COMPLAIN/REASON FOR VISIT:    Chief Complaint   Patient presents with     Problem Visit     drainage               HISTORY OF PRESENT ILLNESS: Zach Olmos is a 69 y.o. female who is a long term care resident of [facility].     Today Ms. Olmos is seen per nursing request for change in left groin abscess. Nursing reports it was bleeding some this morning and there was some drainage on her depends. On exam Zach is seen in bed with nursing present. She says they noticed it started draining some stuff last night. She does not recall what it looked like. They are doing the warm cloths to it a few times per day. She says it still really hurts and now is tender even when they put the washcloth on it. Nursing reports she has been declining tylenol. She says she still does not feel feverish. Examined left groin abscess and has already started to shrink in size. Discussed with nursing and Zach how the subdermal lesion is smaller than the outlined area and is already regressing. There is some sanguinous and purulent drainage noted on her depends. Instructed nursing to allow to drain naturally, may gently express contents but no force to be used to expel contents. Keep area clean and dry as much as possible. Continue with warm packs to assist with drainage.     REVIEW OF SYSTEMS:  PROBLEMS AND REVIEW OF SYSTEMS:   Today on ROS:   Currently, no fever, chills, or rigors. Decreased vision. Denies any chest pain, headaches, palpitations, lightheadedness. Denies any GERD symptoms. Positive for weakness, EZ stand for transfers or slide board, dysphagia, urinary incontinence, fecal incontinence, appetite is good, wheelchair bound, right sided  weakness, aphasia, no shortness of breath, refusing cpap at times, lump in left groin draining      Allergies   Allergen Reactions     Aricept [Donepezil]      Atorvastatin      Glipizide      Lisinopril      Current Outpatient Medications   Medication Sig     acetaminophen (TYLENOL) 325 MG tablet Take 650 mg by mouth every 6 (six) hours as needed for pain.            apixaban (ELIQUIS) 5 mg Tab tablet Take 5 mg by mouth 2 (two) times a day.           aspirin 81 mg chewable tablet Chew 81 mg daily.           furosemide (LASIX) 20 MG tablet Take 20 mg by mouth daily.            insulin glargine (LANTUS) 100 unit/mL injection Inject 10 Units under the skin 2 (two) times a day. 07:30 AM, 04:30 PM     irbesartan (AVAPRO) 75 MG tablet Take 75 mg by mouth 2 (two) times a day.           metoprolol tartrate (LOPRESSOR) 50 MG tablet Take 50 mg by mouth 2 (two) times a day.           ondansetron (ZOFRAN) 4 MG tablet Take 4 mg by mouth every 6 (six) hours as needed for nausea.     PARoxetine (PAXIL) 30 MG tablet Take 60 mg by mouth every morning.           rosuvastatin (CRESTOR) 20 MG tablet Take 20 mg by mouth every morning. PER HOSPITAL ORDERS GIVE TO PT. IN THE A.M.            Past Medical History:    Past Medical History:   Diagnosis Date     Anemia      Anxiety      Cancer (H)     Hx of colon cancer     CHF (congestive heart failure) (H)     diastolic     Coronary artery disease     s/p cabg     CVA (cerebral vascular accident) (H)      Depression      Diabetes mellitus (H)     type 2     DVT (deep vein thrombosis) in pregnancy      Granuloma annulare      Hemiplegia (H)     R sided and minimally communicative     Hyperlipidemia      Hypertension      Obesity      Oropharyngeal dysphagia      ANGEL (obstructive sleep apnea)      Parotitis      Paroxysmal atrial fibrillation (H)      PE (pulmonary thromboembolism) (H)      Stroke (H) 12/2018    Sub acute L MCA stroke     Varicose veins of both lower extremities         IMMUNIZATIONS:    There is no immunization history on file for this patient.  Above immunizations pulled from [facility]. Facility records also reconciled. Outstanding information sent to [facility] to update [facility].  Future immunizations are not needed at this point as all recommended immunizations are up to date.     PHYSICAL EXAMINATION  Vitals:    01/29/20 0700   BP: 113/53   Pulse: 61   Resp: 18   Temp: 97.7  F (36.5  C)   SpO2: 97%       Today on physical exam:     GENERAL: Awake, Alert, not in any form of acute distress, answers questions appropriately, follows simple commands, conversant  HEENT: Head is normocephalic with normal hair distribution. No evidence of trauma. Ears: No acute purulent discharge. Eyes: Conjunctivae pink with no scleral jaundice. Nose: Normal mucosa and septum. NECK: Supple with no cervical or supraclavicular lymphadenopathy. Trachea is midline.   CHEST: No tenderness or deformity, no crepitus  LUNG: dim to auscultation with good chest expansion. There are no crackles or wheezes, normal AP diameter.  No recent shortness of breath or cough   BACK: No kyphosis of the thoracic spine. Symmetric, no curvature, ROM normal, no CVA tenderness, no spinal tenderness   CVS: irregularly irregular rhythm,  2+ pulses symmetric in all extremities.  ABDOMEN: Rounded and soft, nontender to palpation, non distended, no masses, no organomegaly, good bowel sounds, no rebound or guarding, no peritoneal signs.   EXTREMITIES: no edema  SKIN: Warm and dry, Left groin noted firm, circular, subdermal lesion noted, some fluctuance, mobile, tender to palpation, raised surface plaque smaller than subdermal lesion, demarcated with purplish discolored epidermal tissue, surrounding erythema and warmth, size smaller than yesterday, smaller than outline, small amount of purulent and moderate amount of sanguinous drainage noted today  NEURO/PSYCH: The patient is oriented to person,  place and time. Right sided hemiplegia, dysarthria, dysphagia, expressive aphasia, EZ stand to slide board for transfers, dependent for cares            LABS:   No results found for this or any previous visit (from the past 168 hour(s)).  Results for orders placed or performed in visit on 10/09/19   Basic Metabolic Panel   Result Value Ref Range    Sodium 139 136 - 145 mmol/L    Potassium 4.3 3.5 - 5.0 mmol/L    Chloride 103 98 - 107 mmol/L    CO2 28 22 - 31 mmol/L    Anion Gap, Calculation 8 5 - 18 mmol/L    Glucose 87 70 - 125 mg/dL    Calcium 9.2 8.5 - 10.5 mg/dL    BUN 16 8 - 28 mg/dL    Creatinine 0.83 0.60 - 1.10 mg/dL    GFR MDRD Af Amer >60 >60 mL/min/1.73m2    GFR MDRD Non Af Amer >60 >60 mL/min/1.73m2         Lab Results   Component Value Date    WBC 10.5 10/09/2019    HGB 10.3 (L) 10/09/2019    HCT 31.6 (L) 10/09/2019    MCV 92 10/09/2019     10/09/2019       No results found for: [id number]  Lab Results   Component Value Date    HGBA1C 5.5 11/08/2019     No results found for: INR, PROTIME  No results found for: [id number]  Lab Results   Component Value Date    TSH 1.04 08/07/2019           ASSESSMENT/PLAN:    1. Left groin Subdermal Abscess, Cellulitis: unclear etiology, first noticed few days ago, growing rapidly in size with developing cellulitis. Moderately sized. Tender on palpation. No fevers or signs of systemic infection at this time. Monitor closely and notify if fever >100.5 F. Discussed treatment options with Zach and opted for conservative approach.  warm packs four times a day, on clindamycin x 7 days. culturelle with clindamycin for c diff prevention. Outlined lesion and monitor q shift Reports pain controlled with tylenol as needed. Smaller in size today, some sanguinous and purulent drainage. Continue to keep area clean and dry, continue warm packs and allow to drain on own. Did instruct nursing may gently expel contents but no forceful draining. Per [facility] supplies should be in  facility within next few days for bedside sterile I&D if needed. Discussed with Zach will re evaluate in a few days.   2. Type 2 DM with HTN: Accuchecks 133-172 recently. On lantus two times a day, accuchecks bid. Hga1c 5.7 on 8/7. hga1c 5.5 on 11/8. Well controlled. Goal hga1c for age group 6.5-7, with recent CVA may be a little more aggressive in 6-6.5 range. Consider checking hga1c at next visit and depending on results further reduce lantus.   3. CAD, s/p CABG: No chest pain. On aspirin, rosuvastatin. F/u cardiology.    4. H/o Left MCA CVA: Right sided hemiplegia, dysarthria, dysphagia, expressive aphasia. Wheelchair and bed bound. EZ stand to slide board for transfers. Dependent for cares. On rosuvastatin, aspirin. F/u with neurology prn. Dysphagia on NDD3, nectar thick liquids.stable.   5. Anxiety and depression: On paroxetine. ACP following, feels mood stable today.   6. Dysphagia: NDD3 nectar thick liquids. No recent changes or concerns.   6. PAF: Rate controlled 50-70s. On eliquis, metoprolol.   7.ANGEL, OHS: On CPAP. Refusing at times.   8. Weights, obesity: [phone]. Monitor. Counseling on heart healthy diet, diabetic diet, healthy lifestyle choices. Stable.   9. Chronic CHF: [facility] reported h/o CHF. No echo or records available [facility]. No shortness of breath, no edema noted, on lasix daily. Weekly weights.  No recent concerns.   10. HTN: 110s. On irbesartan, metoprolol, lasix. Stable recently.      Labs last in November, will check q6 months      Electronically signed by: Morenita Mosquera NP    Case Management:  I have reviewed the facility/SNF care plan/MDS which was done 10/30/19, including the falls risk, nutrition and pain screening. I also reviewed the current immunizations, and preventive care.. Future cancer screening is not clinically indicated secondary to age/goals of care.   Patient's desire to return to the community is not assessible  due to cognitive impairment.    Information reviewed:  Medications, vital signs, orders, and nursing notes.  The health plan new enrollment has happened. I have reviewed the  MDS, the preventative needs,  and facility care plan. The level of care is appropriate. I have reviewed the code status/advanced directives.

## 2021-06-05 NOTE — PROGRESS NOTES
[facility]    DATE: 2020    NAME:  Zach Olmos             :  1950  MRN: [id number]  CODE STATUS:  FULL CODE    VISIT TYPE: Problem Visit (drainage from abscess)     FACILITY:  [facility] [[id number]]       CHIEF COMPLAIN/REASON FOR VISIT:    Chief Complaint   Patient presents with     Problem Visit     drainage from abscess               HISTORY OF PRESENT ILLNESS: Zach Olmos is a 69 y.o. female who is a long term care resident of [facility].     Today Ms. Olmos is seen per nursing request today as her groin abscess is draining a lot more and is improving in size. They are requesting clarification for wound cares. On exam Ms. Olmos is seen in bed with nursing at bedside. She says she has noticed it is draining a lot as well and seems to be much smaller. She says the warm packs can be irritating at times. They seem to make it drain more. She says the skin is sensitive but the pain is improving. She denies any fevers or chills at all. She is not having any other concerns.     REVIEW OF SYSTEMS:  PROBLEMS AND REVIEW OF SYSTEMS:   Today on ROS:   Currently, no fever, chills, or rigors. Decreased vision. Denies any chest pain, headaches, palpitations, lightheadedness. Denies any GERD symptoms. Positive for weakness, EZ stand for transfers or slide board, dysphagia, urinary incontinence, fecal incontinence, appetite is good, wheelchair bound, right sided weakness, aphasia, no shortness of breath, refusing cpap at times, lump in left groin draining more      Allergies   Allergen Reactions     Aricept [Donepezil]      Atorvastatin      Glipizide      Lisinopril      Current Outpatient Medications   Medication Sig     acetaminophen (TYLENOL) 325 MG tablet Take 650 mg by mouth every 6 (six) hours as needed for pain.            apixaban (ELIQUIS) 5 mg Tab tablet Take 5 mg by mouth 2 (two) times a day.           aspirin 81 mg chewable  tablet Chew 81 mg daily.           furosemide (LASIX) 20 MG tablet Take 20 mg by mouth daily.            insulin glargine (LANTUS) 100 unit/mL injection Inject 10 Units under the skin 2 (two) times a day. 07:30 AM, 04:30 PM     irbesartan (AVAPRO) 75 MG tablet Take 75 mg by mouth 2 (two) times a day.           metoprolol tartrate (LOPRESSOR) 50 MG tablet Take 50 mg by mouth 2 (two) times a day.           ondansetron (ZOFRAN) 4 MG tablet Take 4 mg by mouth every 6 (six) hours as needed for nausea.     PARoxetine (PAXIL) 30 MG tablet Take 60 mg by mouth every morning.           rosuvastatin (CRESTOR) 20 MG tablet Take 20 mg by mouth every morning. PER HOSPITAL ORDERS GIVE TO PT. IN THE A.M.            Past Medical History:    Past Medical History:   Diagnosis Date     Anemia      Anxiety      Cancer (H)     Hx of colon cancer     CHF (congestive heart failure) (H)     diastolic     Coronary artery disease     s/p cabg     CVA (cerebral vascular accident) (H)      Depression      Diabetes mellitus (H)     type 2     DVT (deep vein thrombosis) in pregnancy      Granuloma annulare      Hemiplegia (H)     R sided and minimally communicative     Hyperlipidemia      Hypertension      Obesity      Oropharyngeal dysphagia      ANGEL (obstructive sleep apnea)      Parotitis      Paroxysmal atrial fibrillation (H)      PE (pulmonary thromboembolism) (H)      Stroke (H) 12/2018    Sub acute L MCA stroke     Varicose veins of both lower extremities        IMMUNIZATIONS:    There is no immunization history on file for this patient.  Above immunizations pulled from [facility]. Facility records also reconciled. Outstanding information sent to [facility] to update [facility].  Future immunizations are not needed at this point as all recommended immunizations are up to date.     PHYSICAL EXAMINATION  Vitals:    01/31/20 0700   BP: 112/53   Pulse: 76   Resp: 18   Temp: (!) 96.5  F (35.8  C)   SpO2: 94%       Today  on physical exam:     GENERAL: Awake, Alert, not in any form of acute distress, answers questions appropriately, follows simple commands, conversant  HEENT: Head is normocephalic with normal hair distribution. No evidence of trauma. Ears: No acute purulent discharge. Eyes: Conjunctivae pink with no scleral jaundice. Nose: Normal mucosa and septum. NECK: Supple with no cervical or supraclavicular lymphadenopathy. Trachea is midline.   CHEST: No tenderness or deformity, no crepitus  LUNG: dim to auscultation with good chest expansion. There are no crackles or wheezes, normal AP diameter.  No recent shortness of breath or cough   BACK: No kyphosis of the thoracic spine. Symmetric, no curvature, ROM normal, no CVA tenderness, no spinal tenderness   CVS: irregularly irregular rhythm,  2+ pulses symmetric in all extremities.  ABDOMEN: Rounded and soft, nontender to palpation, non distended, no masses, no organomegaly, good bowel sounds, no rebound or guarding, no peritoneal signs.   EXTREMITIES: no edema  SKIN: Warm and dry, Left groin subdermal lesion noted to be much smaller in size, less firm and more fluctuance, purulent and sanguinous drainage on palpation, mildly tender to palpation and area of erythema and discoloration improving  NEURO/PSYCH: The patient is oriented to person, place and time. Right sided hemiplegia, dysarthria, dysphagia, expressive aphasia, EZ stand to slide board for transfers, dependent for cares            LABS:   No results found for this or any previous visit (from the past 168 hour(s)).  Results for orders placed or performed in visit on 10/09/19   Basic Metabolic Panel   Result Value Ref Range    Sodium 139 136 - 145 mmol/L    Potassium 4.3 3.5 - 5.0 mmol/L    Chloride 103 98 - 107 mmol/L    CO2 28 22 - 31 mmol/L    Anion Gap, Calculation 8 5 - 18 mmol/L    Glucose 87 70 - 125 mg/dL    Calcium 9.2 8.5 - 10.5 mg/dL    BUN 16 8 - 28 mg/dL    Creatinine 0.83 0.60 - 1.10 mg/dL    GFR MDRD Af  Amer >60 >60 mL/min/1.73m2    GFR MDRD Non Af Amer >60 >60 mL/min/1.73m2         Lab Results   Component Value Date    WBC 10.5 10/09/2019    HGB 10.3 (L) 10/09/2019    HCT 31.6 (L) 10/09/2019    MCV 92 10/09/2019     10/09/2019       No results found for: [id number]  Lab Results   Component Value Date    HGBA1C 5.5 11/08/2019     No results found for: INR, PROTIME  No results found for: [id number]  Lab Results   Component Value Date    TSH 1.04 08/07/2019           ASSESSMENT/PLAN:    1. Left groin Subdermal Abscess, Cellulitis: unclear etiology, first noticed few days ago, much improved in size and appearance. Pain improving but still mildly tender especially with warm packs. Several small open areas but no large open wound, draining purulent and sanguinous drainage.  No fevers or signs of systemic infection at this time. Improving on the warm packs four times a day, clindamycin x 7 days. culturelle with clindamycin for c diff prevention. Much smaller than outlined area. Will have staff cleanse with wound cleanser or saline and pat dry, cover with clean foam dressing or tuck abd pads in creases if dressing not stick due to hair. Keep area clean and dry as much as possible. Continue warm packs as contributing to increase in drainage and resolution of size. Supplies arrive for I&D, will keep in facility in case stops draining or fails to resolve with conservative treatments.   2. Type 2 DM with HTN: Accuchecks 133-172 recently. On lantus two times a day, accuchecks bid. Hga1c 5.7 on 8/7. hga1c 5.5 on 11/8. Well controlled. Goal hga1c for age group 6.5-7, with recent CVA may be a little more aggressive in 6-6.5 range. Consider checking hga1c at next visit and depending on results further reduce lantus.   3. CAD, s/p CABG: No chest pain. On aspirin, rosuvastatin. F/u cardiology.    4. H/o Left MCA CVA: Right sided hemiplegia, dysarthria, dysphagia, expressive aphasia. Wheelchair and bed bound. EZ stand to  slide board for transfers. Dependent for cares. On rosuvastatin, aspirin. F/u with neurology prn. Dysphagia on NDD3, nectar thick liquids.stable.   5. Anxiety and depression: On paroxetine. ACP following, feels mood stable today.   6. Dysphagia: NDD3 nectar thick liquids. No recent changes or concerns.   6. PAF: Rate controlled 50-70s. On eliquis, metoprolol.   7.ANGEL, OHS: On CPAP. Refusing at times.   8. Weights, obesity: [phone]. Monitor. Counseling on heart healthy diet, diabetic diet, healthy lifestyle choices. Stable.   9. Chronic CHF: [facility] reported h/o CHF. No echo or records available [facility]. No shortness of breath, no edema noted, on lasix daily. Weekly weights.  No recent concerns.   10. HTN: 110s. On irbesartan, metoprolol, lasix. Stable recently.      Labs last in November, will check q6 months      Electronically signed by: Morenita Mosquera NP    Case Management:  I have reviewed the facility/SNF care plan/MDS which was done 10/30/19, including the falls risk, nutrition and pain screening. I also reviewed the current immunizations, and preventive care.. Future cancer screening is not clinically indicated secondary to age/goals of care.   Patient's desire to return to the community is not assessible due to cognitive impairment.    Information reviewed:  Medications, vital signs, orders, and nursing notes.  The health plan new enrollment has happened. I have reviewed the  MDS, the preventative needs,  and facility care plan. The level of care is appropriate. I have reviewed the code status/advanced directives.

## 2021-06-05 NOTE — PROGRESS NOTES
"[facility]    Facility:   [facility] [[id number]]   Code Status: FULL CODE      CHIEF COMPLAINT/REASON FOR VISIT:  Chief Complaint   Patient presents with     Review Of Multiple Medical Conditions       HISTORY:      HPI: Ms. 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    Subjective/review of systems: Limited by patient's expressive aphasia.  However she can answer yes and no questions appropriately and has very limited other language such as \"I do not know\".  Patient denies headaches change in vision speaking swallowing hearing.  She feels like her swallowing is good and has not had any recent gagging or choking.  She reports her \"some\" depression.  Staff feels she is doing pretty well from a mood standpoint on current SSRI.  She denies chest pain orthopnea PND cough wheezing nausea vomiting diarrhea melena bright red blood per rectum falls injuries skin rash.  She says she is using her CPAP, but it seems from document staff that is quite inconsistent and that even if she starts with it on she often does not leave it on.  Denies numbness weakness tingling but has no ability to use her right side.  No skin rash or breakdown.  Remainder is negative    Past Medical History:   Diagnosis Date     Anemia      Anxiety      Cancer (H)     Hx of colon cancer     CHF (congestive heart failure) (H)     diastolic     Coronary artery disease  "    s/p cabg     CVA (cerebral vascular accident) (H)      Depression      Diabetes mellitus (H)     type 2     DVT (deep vein thrombosis) in pregnancy      Granuloma annulare      Hemiplegia (H)     R sided and minimally communicative     Hyperlipidemia      Hypertension      Obesity      Oropharyngeal dysphagia      ANGEL (obstructive sleep apnea)      Parotitis      Paroxysmal atrial fibrillation (H)      PE (pulmonary thromboembolism) (H)      Stroke (H) 12/2018    Sub acute L MCA stroke     Varicose veins of both lower extremities              Family History   Problem Relation Age of Onset     Heart disease Mother      Cancer Father         esophageal     Social History     Socioeconomic History     Marital status: Single     Spouse name: None     Number of children: None     Years of education: None     Highest education level: None   Occupational History     None   Social Needs     Financial resource strain: None     Food insecurity:     Worry: None     Inability: None     Transportation needs:     Medical: None     Non-medical: None   Tobacco Use     Smoking status: Never Smoker     Smokeless tobacco: Never Used   Substance and Sexual Activity     Alcohol use: No     Frequency: Never     Drug use: No     Sexual activity: None   Lifestyle     Physical activity:     Days per week: None     Minutes per session: None     Stress: None   Relationships     Social connections:     Talks on phone: None     Gets together: None     Attends [employer] service: None     Active member of club or organization: None     Attends meetings of clubs or organizations: None     Relationship status: None     Intimate partner violence:     Fear of current or ex partner: None     Emotionally abused: None     Physically abused: None     Forced sexual activity: None   Other Topics Concern     None   Social History Narrative     None         Review of Systems as above    Vitals:    01/07/20 1224   BP: 140/64   Pulse: (!) 56   Resp: 24  "  Temp: (!) 96  F (35.6  C)   SpO2: 96%   Weight: 184 lb (83.5 kg)   Height: 5' 7\" (1.702 m)       Physical Exam  Patient is alert sitting up in her wheelchair.  She is attentive throughout and appears to be answering questions appropriately with her limited language abilities.  Normocephalic atraumatic sclera clear nonicteric oropharynx is clear neck supple without adenopathy mass heart is regular, slow in the upper 50s but regular with a soft early systolic murmur at the left sternal border.  Chest wall is nontender.  Lungs are clear she moves her easily no rales rhonchi or wheezing.  Abdomen is obese with organomegaly mass or tenderness.  Bowel sounds are present.  PEG tube site noted.  She has dense right hemiplegia.  However she is nonedematous with warm pink hands and feet with good cap refill.  LABS:   Results for ELKE WESLEY (MRN [id number]) as of 1/7/2020 12:35   Ref. Range 10/9/2019 05:55 11/8/2019 06:03   Sodium Latest Ref Range: 136 - 145 mmol/L 139    Potassium Latest Ref Range: 3.5 - 5.0 mmol/L 4.3    Chloride Latest Ref Range: 98 - 107 mmol/L 103    CO2 Latest Ref Range: 22 - 31 mmol/L 28    Anion Gap, Calculation Latest Ref Range: 5 - 18 mmol/L 8    BUN Latest Ref Range: 8 - 28 mg/dL 16    Creatinine Latest Ref Range: 0.60 - 1.10 mg/dL 0.83 0.88   GFR MDRD Af Amer Latest Ref Range: >60 mL/min/1.73m2 >60 >60   GFR MDRD Non Af Amer Latest Ref Range: >60 mL/min/1.73m2 >60 >60   Calcium Latest Ref Range: 8.5 - 10.5 mg/dL 9.2        ASSESSMENT:      ICD-10-CM    1. ANGEL on CPAP G47.33     Z99.89    2. PAF (paroxysmal atrial fibrillation) (H) I48.0    3. Oropharyngeal dysphagia R13.12    4. Coronary artery disease involving coronary bypass graft with angina pectoris, unspecified whether native or transplanted heart (H) I25.709    5. Essential hypertension I10    6. Chronic combined systolic and diastolic congestive heart failure (H) I50.42    7. Obesity hypoventilation syndrome (H) E66.2    8. Right " hemiplegia (H) G81.91    9. Dyslipidemia (high LDL; low HDL) E78.5    10. Morbid obesity (H) E66.01    11. Anxiety and depression F41.9     F32.9    12. H/O deep venous thrombosis Z86.718      Assessment/plan    CVA  Right hemiplegia  Expressive aphasia  Dysphasia     Patient continues to have expressive aphasia.  Her dysphasia has improved, NDD 3 with nectar thick liquids his current diet.  She is on appropriate secondary prevention with apixaban for her paroxysmal atrial fibrillation, blood pressure and lipid control.    Paroxysmal atrial fibrillation     Patient sounds to be in sinus rhythm today mildly bradycardic on her metoprolol 50 twice daily.  Heart rate review shows no worrisome bradycardia.  We will continue the apixaban and metoprolol as current    Coronary artery disease  CABG 2018     Continue aspirin, metoprolol.  I do find an echocardiogram from 2018 showing normal EF 55 to 60%, this was post cabg.    DM 2     Patient is on Lantus 10 units twice daily.  Her sugar control has been excellent.  Her A1c's have been excellent including November 8, 2019 at 5.5.  Fortunately she has had no low blood sugars on record and staff has not noticed any on review with them verbally.   Patient was on metformin up to her stroke time.  It is unclear why it was discontinued but I cannot find evidence of a true allergy.  She was on tube feedings then with an A1c of 8.5 and they may have just decided to go with insulin.  At this point I think we should give some consideration to returning to metformin therapy to see if she can get by without insulin at all.     I will communicate with her primary care provider/Ms. Mosquera to seek her opinion on the matter as well.    Hypertension     Overall control appears adequate we will continue irbesartan 75 twice daily and metoprolol 50 twice daily (along she is not having bradycardia)    ANGEL/CPAP  Obesity     Weight now 184.  I find a weight of 200 pounds 20 months ago.  This weight  loss may decrease her obstructive load somewhat.  At any rate she does not seem to want to use the CPAP consistently.  One could consider retesting at this new weight to see what her pressures are to make sure that it is still appropriate.  She seems like she would rather keep going as current and I am okay with that.    Hyperlipidemia       Lab Results   Component Value Date    CHOL 77 04/16/2019     Lab Results   Component Value Date    HDL 19 (L) 04/16/2019     Lab Results   Component Value Date    LDLCALC 38 04/16/2019     Lab Results   Component Value Date    TRIG 98 04/16/2019     No components found for: CHOLHDL  No changes made in her Crestor--- due to CVA history and coronary artery disease history, though with her remarkably low LDL, consideration of decreased dose will be given, annual FLP may shed some light on that, due in April.    History of DVT/PE     Sounds provoked most likely following her CABG and prolonged TCU stay, however it is a moot point as she is on apixaban for atrial fibrillation at this point anyway    Pain no changes made PRN Tylenol    Constipation patient reports good bowel function              Electronically signed by: Vick Sutton MD

## 2021-06-05 NOTE — PROGRESS NOTES
[facility]    DATE: 2020    NAME:  Zach Olmos             :  1950  MRN: [id number]  CODE STATUS:  FULL CODE    VISIT TYPE: Problem Visit (lump in groin)     FACILITY:  [facility] [[id number]]       CHIEF COMPLAIN/REASON FOR VISIT:    Chief Complaint   Patient presents with     Problem Visit     lump in groin               HISTORY OF PRESENT ILLNESS: Zach Olmos is a 69 y.o. female who is a long term care resident of [facility].     Today Ms. Olmso is seen today per staff request for a recently noted lump in her groin. Staff report today it is tender to touch and now has a discoloration over it. She has not been having any fevers. On exam Zach is seen in bed with nurse manager present. She says she first noticed the lump last Thursday and it is grown in size since then. She thinks she told or the staff noted it in the last few days. Nursing notes yesterday it appeared flesh colored but now today is discolored. Zach denies feeling feverish or ill. She does not have any muscle aches or shortness of breath. She just has pain when someone is touching the lump. She denies any drainage.     REVIEW OF SYSTEMS:  PROBLEMS AND REVIEW OF SYSTEMS:   Today on ROS:   Currently, no fever, chills, or rigors. Decreased vision. Denies any chest pain, headaches, palpitations, lightheadedness. Denies any GERD symptoms. Positive for weakness, EZ stand for transfers or slide board, dysphagia, urinary incontinence, fecal incontinence, appetite is good, wheelchair bound, right sided weakness, aphasia, no shortness of breath, refusing cpap at times, lump in left groin today      Allergies   Allergen Reactions     Aricept [Donepezil]      Atorvastatin      Glipizide      Lisinopril      Current Outpatient Medications   Medication Sig     acetaminophen (TYLENOL) 325 MG tablet Take 650 mg by mouth every 6 (six) hours as needed for pain.             apixaban (ELIQUIS) 5 mg Tab tablet Take 5 mg by mouth 2 (two) times a day.           aspirin 81 mg chewable tablet Chew 81 mg daily.           furosemide (LASIX) 20 MG tablet Take 20 mg by mouth daily.            insulin glargine (LANTUS) 100 unit/mL injection Inject 10 Units under the skin 2 (two) times a day. 07:30 AM, 04:30 PM     irbesartan (AVAPRO) 75 MG tablet Take 75 mg by mouth 2 (two) times a day.           metoprolol tartrate (LOPRESSOR) 50 MG tablet Take 50 mg by mouth 2 (two) times a day.           ondansetron (ZOFRAN) 4 MG tablet Take 4 mg by mouth every 6 (six) hours as needed for nausea.     PARoxetine (PAXIL) 30 MG tablet Take 60 mg by mouth every morning.           rosuvastatin (CRESTOR) 20 MG tablet Take 20 mg by mouth every morning. PER HOSPITAL ORDERS GIVE TO PT. IN THE A.M.            Past Medical History:    Past Medical History:   Diagnosis Date     Anemia      Anxiety      Cancer (H)     Hx of colon cancer     CHF (congestive heart failure) (H)     diastolic     Coronary artery disease     s/p cabg     CVA (cerebral vascular accident) (H)      Depression      Diabetes mellitus (H)     type 2     DVT (deep vein thrombosis) in pregnancy      Granuloma annulare      Hemiplegia (H)     R sided and minimally communicative     Hyperlipidemia      Hypertension      Obesity      Oropharyngeal dysphagia      ANGEL (obstructive sleep apnea)      Parotitis      Paroxysmal atrial fibrillation (H)      PE (pulmonary thromboembolism) (H)      Stroke (H) 12/2018    Sub acute L MCA stroke     Varicose veins of both lower extremities        IMMUNIZATIONS:    There is no immunization history on file for this patient.  Above immunizations pulled from [employer]. Facility records also reconciled. [facility]  Future immunizations are not needed at this point as all recommended immunizations are up to date.     PHYSICAL  EXAMINATION  Vitals:    01/28/20 0700   BP: 113/53   Pulse: 61   Resp: 18   Temp: 97.7  F (36.5  C)   SpO2: 93%   Weight: 187 lb (84.8 kg)       Today on physical exam:     GENERAL: Awake, Alert, not in any form of acute distress, answers questions appropriately, follows simple commands, conversant  HEENT: Head is normocephalic with normal hair distribution. No evidence of trauma. Ears: No acute purulent discharge. Eyes: Conjunctivae pink with no scleral jaundice. Nose: Normal mucosa and septum. NECK: Supple with no cervical or supraclavicular lymphadenopathy. Trachea is midline.   CHEST: No tenderness or deformity, no crepitus  LUNG: dim to auscultation with good chest expansion. There are no crackles or wheezes, normal AP diameter.  No recent shortness of breath or cough   BACK: No kyphosis of the thoracic spine. Symmetric, no curvature, ROM normal, no CVA tenderness, no spinal tenderness   CVS: irregularly irregular rhythm,  2+ pulses symmetric in all extremities.  ABDOMEN: Rounded and soft, nontender to palpation, non distended, no masses, no organomegaly, good bowel sounds, no rebound or guarding, no peritoneal signs.   EXTREMITIES: no edema  SKIN: Warm and dry, Left groin noted firm, circular, subdermal lesion noted, some fluctuance, mobile, tender to palpation, raised surface plaque smaller than subdermal lesion, demarcated with purplish discolored epidermal tissue, surrounding erythema and warmth, surface plaque measures approximately 3-4 x 3-4 cm, subdermal lesion approximately 5-6cm x 5-6cm, marked outline today  NEURO/PSYCH: The patient is oriented to person, place and time. Right sided hemiplegia, dysarthria, dysphagia, expressive aphasia, EZ stand to slide board for transfers, dependent for cares            LABS:   No results found for this or any previous visit (from the past 168 hour(s)).  Results for orders placed or performed in visit on 10/09/19   Basic Metabolic Panel   Result Value Ref Range     Sodium 139 136 - 145 mmol/L    Potassium 4.3 3.5 - 5.0 mmol/L    Chloride 103 98 - 107 mmol/L    CO2 28 22 - 31 mmol/L    Anion Gap, Calculation 8 5 - 18 mmol/L    Glucose 87 70 - 125 mg/dL    Calcium 9.2 8.5 - 10.5 mg/dL    BUN 16 8 - 28 mg/dL    Creatinine 0.83 0.60 - 1.10 mg/dL    GFR MDRD Af Amer >60 >60 mL/min/1.73m2    GFR MDRD Non Af Amer >60 >60 mL/min/1.73m2         Lab Results   Component Value Date    WBC 10.5 10/09/2019    HGB 10.3 (L) 10/09/2019    HCT 31.6 (L) 10/09/2019    MCV 92 10/09/2019     10/09/2019       No results found for: [id number]  Lab Results   Component Value Date    HGBA1C 5.5 11/08/2019     No results found for: INR, PROTIME  No results found for: [id number]  Lab Results   Component Value Date    TSH 1.04 08/07/2019           ASSESSMENT/PLAN:    1. Left groin Subdermal Abscess: unclear etiology, first noticed few days ago, growing rapidly in size with developing cellulitis. Moderately sized. Tender on palpation. No fevers or signs of systemic infection at this time. Monitor closely and notify if fever >100.5 F. Discussed treatment options with Zach and opted for conservative approach. Will treat with warm packs four times a day, started clindamycin x 7 days. culturelle with clindamycin for c diff prevention. Outlined lesion and monitor q shift if enlarging or increase in pain and cellulitis. Reports pain controlled with tylenol as needed. If no improvement or does not begin to drain on own will order supplies for sterile bedside Incision and drainage.   2. Type 2 DM with HTN: Accuchecks 133-172 recently. On lantus two times a day, accuchecks bid. Hga1c 5.7 on 8/7. hga1c 5.5 on 11/8. Well controlled. Goal hga1c for age group 6.5-7, with recent CVA may be a little more aggressive in 6-6.5 range. Consider checking hga1c at next visit and depending on results further reduce lantus.   3. CAD, s/p CABG: No chest pain. On aspirin, rosuvastatin. F/u cardiology.    4. H/o Left MCA  CVA: Right sided hemiplegia, dysarthria, dysphagia, expressive aphasia. Wheelchair and bed bound. EZ stand to slide board for transfers. Dependent for cares. On rosuvastatin, aspirin. F/u with neurology prn. Dysphagia on NDD3, nectar thick liquids.stable.   5. Anxiety and depression: On paroxetine. ACP following, feels mood stable today.   6. Dysphagia: NDD3 nectar thick liquids. No recent changes or concerns.   6. PAF: Rate controlled 50-70s. On eliquis, metoprolol.   7.ANGEL, OHS: On CPAP. Refusing at times.   8. Weights, obesity: [phone]. Monitor. Counseling on heart healthy diet, diabetic diet, healthy lifestyle choices. Stable.   9. [facility] CH[facility]: [facility] h/o CHF. No echo or records available [facility]. No shortness of breath, no edema noted, on lasix daily. Weekly weights.  No recent concerns.   10. HTN: 110s. On irbesartan, metoprolol, lasix. Stable recently.      Labs last in November, will check q6 months      Electronically signed by: Morenita Mosquera NP    Case Management:  I have reviewed the facility/SNF care plan/MDS which was done 10/30/19, including the falls risk, nutrition and pain screening. I also reviewed the current immunizations, and preventive care.. Future cancer screening is not clinically indicated secondary to age/goals of care.   Patient's desire to return to the community is not assessible due to cognitive impairment.    Information reviewed:  Medications, vital signs, orders, and nursing notes.  The health plan new enrollment has happened. I have reviewed the  MDS, the preventative needs,  and facility care plan. The level of care is appropriate. I have reviewed the code status/advanced directives.

## 2021-06-06 NOTE — PROGRESS NOTES
[facility]    DATE: 3/2/2020    NAME:  Zach Olmos             :  1950  MRN: [id number]  CODE STATUS:  FULL CODE    VISIT TYPE: Problem Visit (wound)     FACILITY:  [facility] [[id number]]       CHIEF COMPLAIN/REASON FOR VISIT:    Chief Complaint   Patient presents with     Problem Visit     wound               HISTORY OF PRESENT ILLNESS: Zach Olmos is a 69 y.o. female who is a long term care resident of [facility], [place].     Today Ms. Olmos is seen today for concerns of persistent wound in left groin. Nursing reports concern for scratching the previous pustule she had that had scabbed over. Now it keeps bleeding and draining from time to time. Nursing cleaned it this morning and educated Zach again on not touching this or scratching it. On exam Zach is seen in her room she is about to get up with the aid with EZ stand to the bathroom. She says she noticed the spot in her groin was getting worse a few days ago. She says it is a little tender but does not itch or bother her otherwise. She denies fevers or chills. No swelling to area like before. Discussed with her importance of keeping the area clean and if needed will order another round of antibiotics if starts to appear infected again.     REVIEW OF SYSTEMS:  PROBLEMS AND REVIEW OF SYSTEMS:   Today on ROS:   Currently, no fever, chills, or rigors. Decreased vision. Denies any chest pain, headaches, palpitations, lightheadedness. Denies any GERD symptoms. Positive for weakness, EZ stand for transfers or slide board, dysphagia, urinary incontinence, fecal incontinence, appetite is good, wheelchair bound, right sided weakness, aphasia, no shortness of breath, refusing cpap at times, lesion left groin open again and draining intermittently      Allergies   Allergen Reactions     Aricept [Donepezil]      Atorvastatin      Glipizide      Lisinopril      Current Outpatient Medications    Medication Sig     acetaminophen (TYLENOL) 325 MG tablet Take 650 mg by mouth every 6 (six) hours as needed for pain.            apixaban (ELIQUIS) 5 mg Tab tablet Take 5 mg by mouth 2 (two) times a day.           aspirin 81 mg chewable tablet Chew 81 mg daily.           furosemide (LASIX) 20 MG tablet Take 20 mg by mouth daily.            insulin glargine (LANTUS) 100 unit/mL injection Inject 15 Units under the skin at bedtime. 07:30 AM, 04:30 PM     irbesartan (AVAPRO) 75 MG tablet Take 75 mg by mouth 2 (two) times a day.           metoprolol tartrate (LOPRESSOR) 50 MG tablet Take 25 mg by mouth 2 (two) times a day.      ondansetron (ZOFRAN) 4 MG tablet Take 4 mg by mouth every 6 (six) hours as needed for nausea.     PARoxetine (PAXIL) 30 MG tablet Take 60 mg by mouth every morning.           rosuvastatin (CRESTOR) 20 MG tablet Take 20 mg by mouth every morning. PER HOSPITAL ORDERS GIVE TO PT. IN THE A.M.            Past Medical History:    Past Medical History:   Diagnosis Date     Anemia      Anxiety      Cancer (H)     Hx of colon cancer     CHF (congestive heart failure) (H)     diastolic     Coronary artery disease     s/p cabg     CVA (cerebral vascular accident) (H)      Depression      Diabetes mellitus (H)     type 2     DVT (deep vein thrombosis) in pregnancy      Granuloma annulare      Hemiplegia (H)     R sided and minimally communicative     Hyperlipidemia      Hypertension      Obesity      Oropharyngeal dysphagia      ANGEL (obstructive sleep apnea)      Parotitis      Paroxysmal atrial fibrillation (H)      PE (pulmonary thromboembolism) (H)      Stroke (H) 12/2018    Sub acute L MCA stroke     Varicose veins of both lower extremities        IMMUNIZATIONS:    There is no immunization history on file for this patient.  Above immunizations pulled from [employer]. Facility records also reconciled. Outstanding information sent to [facility] to update [employer].  Future  immunizations are not needed at this point as all recommended immunizations are up to date.     PHYSICAL EXAMINATION  Vitals:    03/02/20 0700   BP: 106/51   Pulse: (!) 54   Resp: 18   Temp: 98.9  F (37.2  C)   SpO2: 97%       Today on physical exam:     GENERAL: Awake, Alert, not in any form of acute distress, answers questions appropriately, follows simple commands, conversant  HEENT: Head is normocephalic with normal hair distribution. No evidence of trauma. Ears: No acute purulent discharge. Eyes: Conjunctivae pink with no scleral jaundice. Nose: Normal mucosa and septum. NECK: Supple with no cervical or supraclavicular lymphadenopathy. Trachea is midline.   CHEST: No tenderness or deformity, no crepitus  LUNG: dim to auscultation with good chest expansion. There are no crackles or wheezes, normal AP diameter.  No recent shortness of breath or cough   BACK: No kyphosis of the thoracic spine. Symmetric, no curvature, ROM normal, no CVA tenderness, no spinal tenderness   CVS: irregularly irregular rhythm,  2+ pulses symmetric in all extremities.  ABDOMEN: Rounded and soft, nontender to palpation, non distended, no masses, no organomegaly, good bowel sounds, no rebound or guarding, no peritoneal signs.   EXTREMITIES: no edema  SKIN: Warm and dry, Left groin small open area with sanguinous drainage, some erythema but no warmth  NEURO/PSYCH: The patient is oriented to person, place and time. Right sided hemiplegia, dysarthria, dysphagia, expressive aphasia, EZ stand to slide board for transfers, dependent for cares            LABS:   Recent Results (from the past 168 hour(s))   Glycosylated Hemoglobin A1C   Result Value Ref Range    Hemoglobin A1c 6.5 (H) <=5.6 %     Results for orders placed or performed in visit on 10/09/19   Basic Metabolic Panel   Result Value Ref Range    Sodium 139 136 - 145 mmol/L    Potassium 4.3 3.5 - 5.0 mmol/L    Chloride 103 98 - 107 mmol/L    CO2 28 22 - 31 mmol/L    Anion Gap,  Calculation 8 5 - 18 mmol/L    Glucose 87 70 - 125 mg/dL    Calcium 9.2 8.5 - 10.5 mg/dL    BUN 16 8 - 28 mg/dL    Creatinine 0.83 0.60 - 1.10 mg/dL    GFR MDRD Af Amer >60 >60 mL/min/1.73m2    GFR MDRD Non Af Amer >60 >60 mL/min/1.73m2         Lab Results   Component Value Date    WBC 10.5 10/09/2019    HGB 10.3 (L) 10/09/2019    HCT 31.6 (L) 10/09/2019    MCV 92 10/09/2019     10/09/2019       No results found for: [id number]  Lab Results   Component Value Date    HGBA1C 6.5 (H) 02/26/2020     No results found for: INR, PROTIME  No results found for: [id number]  Lab Results   Component Value Date    TSH 1.04 08/07/2019           ASSESSMENT/PLAN:    Left groin wound: cellulitis and abscess resolved. Continues to scratch intermittently per nursing report. Sanguinous drainage, per nursing no purulent drainage. Some erythema but no warmth. Will order to cleanse daily pat dry and apply bacitracin. Keep covered with abd or gauze tucked into groin. Keep clean as much as possible and monitor for signs of infection and notify me if develops further edema, warmth, purulent drainage and will treat with PO antibiotics.   Type 2 DM with HTN: Accuchecks  recently. On lantus two times a day, accuchecks bid. Hga1c 5.7 on 8/7. hga1c 5.5 on 11/8. Well controlled. Goal hga1c for age group 6.5-7, with recent CVA may be a little more aggressive in 6-6.5 range. Reduced lantus to 15u daily instead of two times a day (20u total previously). Will need to check hga1c at next visit.   CAD, s/p CABG: No chest pain. On aspirin, rosuvastatin. F/u cardiology.    H/o Left MCA CVA: Right sided hemiplegia, dysarthria, dysphagia, expressive aphasia. Wheelchair and bed bound. EZ stand to slide board for transfers. Dependent for cares. On rosuvastatin, aspirin. F/u with neurology prn. Dysphagia on NDD3, nectar thick liquids. No recent concerns.   Anxiety and depression: On paroxetine. ACP following, feels mood stable today.    Dysphagia: NDD3 nectar thick liquids. No recent changes or concerns.   PAF: Rate controlled 50s. On eliquis, metoprolol. Will reduce metoprolol dose to 25mg two times a day.   ANGEL, OHS: On CPAP. Refusing at times.   Weights, obesity: [phone]. Monitor. Counseling on heart healthy diet, diabetic diet, healthy lifestyle choices. Stable.   Chronic CHF: [facility] reported h/o CHF. No echo or records available [facility]. No shortness of breath, no edema noted, on lasix daily. Weekly weights.  No recent concerns.   HTN: 100s. On irbesartan, metoprolol, lasix. Reduced metoprolol dose.     Labs last in November, will check q6 months      Electronically signed by: Morenita Mosquera NP    Case Management:  I have reviewed the facility/SNF care plan/MDS which was done 1/22/20, including the falls risk, nutrition and pain screening. I also reviewed the current immunizations, and preventive care.. Future cancer screening is not clinically indicated secondary to age/goals of care.   Patient's desire to return to the community is not assessible due to cognitive impairment.    Information reviewed:  Medications, vital signs, orders, and nursing notes.  The health plan new enrollment has happened. I have reviewed the  MDS, the preventative needs,  and facility care plan. The level of care is appropriate. I have reviewed the code status/advanced directives.

## 2021-06-06 NOTE — PROGRESS NOTES
[facility]    DATE: 3/11/2020    NAME:  Zach Olmos             :  1950  MRN: [id number]  CODE STATUS:  FULL CODE    VISIT TYPE: Review Of Multiple Medical Conditions (cva)     FACILITY:  [facility] [[id number]]       CHIEF COMPLAIN/REASON FOR VISIT:    Chief Complaint   Patient presents with     Review Of Multiple Medical Conditions     cva               HISTORY OF PRESENT ILLNESS: Zach Olmos is a 69 y.o. female who is a long term care resident of [facility]     Today Ms. Olmos is seen for follow up on CVA. She is seen alone in her room today. She says she has been doing well and denies any pain today. She is not having any issues with her bowels that she knows of. She denies any recent cough or cold symptoms. She is not having any dizziness or headaches. She is not having any acute concerns. She has been sleeping very well and thinks her appetite is good. She says the lesion on her left groin is not bothering her anymore. She is not sure how big it is or if it is still there. Per nursing she is scheduled for vision appt on 3/17. Her weights recently have been 192-185lbs. Her blood sugars recently have been 127-223.     REVIEW OF SYSTEMS:  PROBLEMS AND REVIEW OF SYSTEMS:   Today on ROS:   Currently, no fever, chills, or rigors. Decreased vision. Denies any chest pain, headaches, palpitations, lightheadedness. Denies any GERD symptoms. Positive for weakness, EZ stand for transfers or slide board, dysphagia, urinary incontinence, fecal incontinence, appetite is good, wheelchair bound, right sided weakness, aphasia, no shortness of breath, refusing cpap at times, lesion left groin      Allergies   Allergen Reactions     Aricept [Donepezil]      Atorvastatin      Glipizide      Lisinopril      Current Outpatient Medications   Medication Sig     acetaminophen (TYLENOL) 325 MG tablet Take 650 mg by mouth every 6 (six) hours as needed for  pain.            apixaban (ELIQUIS) 5 mg Tab tablet Take 5 mg by mouth 2 (two) times a day.           aspirin 81 mg chewable tablet Chew 81 mg daily.           furosemide (LASIX) 20 MG tablet Take 20 mg by mouth daily.            insulin glargine (LANTUS) 100 unit/mL injection Inject 15 Units under the skin at bedtime. 07:30 AM, 04:30 PM     irbesartan (AVAPRO) 75 MG tablet Take 75 mg by mouth 2 (two) times a day.           metoprolol tartrate (LOPRESSOR) 50 MG tablet Take 25 mg by mouth 2 (two) times a day.      ondansetron (ZOFRAN) 4 MG tablet Take 4 mg by mouth every 6 (six) hours as needed for nausea.     PARoxetine (PAXIL) 30 MG tablet Take 60 mg by mouth every morning.           rosuvastatin (CRESTOR) 20 MG tablet Take 20 mg by mouth every morning. PER HOSPITAL ORDERS GIVE TO PT. IN THE A.M.            Past Medical History:    Past Medical History:   Diagnosis Date     Anemia      Anxiety      Cancer (H)     Hx of colon cancer     CHF (congestive heart failure) (H)     diastolic     Coronary artery disease     s/p cabg     CVA (cerebral vascular accident) (H)      Depression      Diabetes mellitus (H)     type 2     DVT (deep vein thrombosis) in pregnancy      Granuloma annulare      Hemiplegia (H)     R sided and minimally communicative     Hyperlipidemia      Hypertension      Obesity      Oropharyngeal dysphagia      ANGEL (obstructive sleep apnea)      Parotitis      Paroxysmal atrial fibrillation (H)      PE (pulmonary thromboembolism) (H)      Stroke (H) 12/2018    Sub acute L MCA stroke     Varicose veins of both lower extremities        IMMUNIZATIONS:    There is no immunization history on file for this patient.  [facility] Facility [facility] also reconciled. Outstanding information sent to [facility] to update [facility] [employer]  Future immunizations are not needed at this point as all recommended immunizations are up to date.     PHYSICAL  EXAMINATION  Vitals:    03/10/20 1930   BP: 118/78   Pulse: 68   Resp: 20   Temp: 98  F (36.7  C)   SpO2: 98%   Weight: 185 lb (83.9 kg)       Today on physical exam:     GENERAL: Awake, Alert, not in any form of acute distress, answers questions appropriately, follows simple commands, conversant  HEENT: Head is normocephalic with normal hair distribution. No evidence of trauma. Ears: No acute purulent discharge. Eyes: Conjunctivae pink with no scleral jaundice. Nose: Normal mucosa and septum. NECK: Supple with no cervical or supraclavicular lymphadenopathy. Trachea is midline.   CHEST: No tenderness or deformity, no crepitus  LUNG: dim to auscultation with good chest expansion. There are no crackles or wheezes, normal AP diameter.  No recent shortness of breath or cough   BACK: No kyphosis of the thoracic spine. Symmetric, no curvature, ROM normal, no CVA tenderness, no spinal tenderness   CVS: irregularly irregular rhythm,  2+ pulses symmetric in all extremities.  ABDOMEN: Rounded and soft, nontender to palpation, non distended, no masses, no organomegaly, good bowel sounds, no rebound or guarding, no peritoneal signs.   EXTREMITIES: no edema  SKIN: Warm and dry, Left groin pustule noted, small in size, no open areas noted, no drainage noted, no erythema or tenderness today  NEURO/PSYCH: The patient is oriented to person, place and time. Right sided hemiplegia, dysarthria, dysphagia, expressive aphasia, EZ stand to slide board for transfers, dependent for cares            LABS:   No results found for this or any previous visit (from the past 168 hour(s)).  Results for orders placed or performed in visit on 10/09/19   Basic Metabolic Panel   Result Value Ref Range    Sodium 139 136 - 145 mmol/L    Potassium 4.3 3.5 - 5.0 mmol/L    Chloride 103 98 - 107 mmol/L    CO2 28 22 - 31 mmol/L    Anion Gap, Calculation 8 5 - 18 mmol/L    Glucose 87 70 - 125 mg/dL    Calcium 9.2 8.5 - 10.5 mg/dL    BUN 16 8 - 28 mg/dL     Creatinine 0.83 0.60 - 1.10 mg/dL    GFR MDRD Af Amer >60 >60 mL/min/1.73m2    GFR MDRD Non Af Amer >60 >60 mL/min/1.73m2         Lab Results   Component Value Date    WBC 10.5 10/09/2019    HGB 10.3 (L) 10/09/2019    HCT 31.6 (L) 10/09/2019    MCV 92 10/09/2019     10/09/2019       No results found for: [id number]  Lab Results   Component Value Date    HGBA1C 6.5 (H) 02/26/2020     No results found for: INR, PROTIME  No results found for: [id number]  Lab Results   Component Value Date    TSH 1.04 08/07/2019           ASSESSMENT/PLAN:    Left groin pustule: Now very small in size, no open area, drainage, no erythema, warmth, or tenderness anymore. Appears to be improving. Continue applying bacitracin daily and monitoring.   Type 2 DM with HTN: Accuchecks 127-223 recently. On lantus two times a day, accuchecks bid. Hga1c 5.7 on 8/7. hga1c 5.5 on 11/8. Reduced lantus to 15u daily instead of two times a day (20u total previously). recheck hga1c 2/26-6.5.   CAD, s/p CABG: No chest pain. On aspirin, rosuvastatin. F/u cardiology.    H/o Left MCA CVA: Right sided hemiplegia, dysarthria, dysphagia, expressive aphasia. Wheelchair and bed bound. EZ stand to slide board for transfers. Dependent for cares. On rosuvastatin, aspirin. F/u with neurology prn. Dysphagia on NDD3, nectar thick liquids. No recent concerns.   Anxiety and depression: On paroxetine. ACP following, feels mood stable today.   Dysphagia: NDD3 nectar thick liquids. Stable recently, no coughing.   PAF: Rate controlled 60s. On eliquis, metoprolol. Previously reduced metoprolol.   ANGEL, OHS: On CPAP. Refusing at times.   Weights, obesity: [phone]. Monitor. Counseling on heart healthy diet, diabetic diet, healthy lifestyle choices. Stable.   Chronic CH[facility]: [facility] h/o CHF. No echo or records available [facility]. No shortness of breath, no edema noted, on lasix daily. Stable weights.  No recent concerns.   HTN: 110s. On irbesartan, metoprolol, lasix. Stable.     Labs last in November, will consider checking at next visit      Electronically signed by: Morenita Mosquera NP    Case Management:  I have reviewed the facility/SNF care plan/MDS which was done 1/22/20, including the falls risk, nutrition and pain screening. I also reviewed the current immunizations, and preventive care.. Future cancer screening is not clinically indicated secondary to age/goals of care.   Patient's desire to return to the community is not assessible due to cognitive impairment.    Information reviewed:  Medications, vital signs, orders, and nursing notes.  The health plan new enrollment has happened. I have reviewed the  MDS, the preventative needs,  and facility care plan. The level of care is appropriate. I have reviewed the code status/advanced directives.

## 2021-06-06 NOTE — TELEPHONE ENCOUNTER
[facility] Nurse Triage Telephone Note      Provider: CHANDRA Pedroza  Facility: [facility]    Facility Type: LT    Caller: Flora   Call Back Number:  [phone]    Allergies: Aricept [donepezil]; Atorvastatin; Glipizide; and Lisinopril    Reason for call: Pt has been having nausea after eating pizza yesterday and used to have a zofran order of 4mg q6h  Prn and was wondering if they could reinstate the order.   Lg BM yesterday, no abd pain or distension. VS stable     Verbal Order/Direction given by Provider: Zofran 4mg q6h prn     Provider giving order: CHANDRA Pedroza    Verbal order given to: PM floor nurse      Katlin Green RN

## 2021-06-08 NOTE — PROGRESS NOTES
[facility]    DATE: 6/3/2020    NAME:  Zach Olmos             :  1950  MRN: [id number]  CODE STATUS:  FULL CODE    VISIT TYPE: Review Of Multiple Medical Conditions (h/o cva, htn)     FACILITY:  [facility] [[id number]]       CHIEF COMPLAIN/REASON FOR VISIT:    Chief Complaint   Patient presents with     Review Of Multiple Medical Conditions     h/o cva, htn               HISTORY OF PRESENT ILLNESS: Zach Olmos is a 70 y.o. female who is a long term care resident of [facility] [place], operated by [facility].     Today Ms. Olmos is seen for review of systems for cva and hypertension. She is seen alone in her room today. She says she has been doing well. She denies any  More issues with rash or the spot in her groin. She says her appetite is good. She is not having any issues with her bowels. She is urinating fine and no dizziness or other concerns. She feels she has been doing well and has no med concerns. We reviewed her blood sugars being elevated lately 202-311. She denies eating any different. Her last hga1c was low so will check hga1c prior to changing med regimen at this time. Her vitals have been stable and weights are up 195-201lbs but she reports not feeling like she has gained weight. She is not short of breath or having swelling in legs.     REVIEW OF SYSTEMS:  PROBLEMS AND REVIEW OF SYSTEMS:   Today on ROS:   Currently, no fever, chills, or rigors. Decreased vision. Denies any chest pain, headaches, palpitations, lightheadedness. Denies any GERD symptoms. Positive for weakness, EZ stand for transfers or slide board, dysphagia, urinary incontinence, fecal incontinence, appetite is good, wheelchair bound, right sided weakness, aphasia, no shortness of breath, refusing cpap at times, elevated blood sugars      Allergies   Allergen Reactions     Aricept [Donepezil]      Atorvastatin      Glipizide      Lisinopril      Current Outpatient Medications    Medication Sig     acetaminophen (TYLENOL) 325 MG tablet Take 650 mg by mouth every 6 (six) hours as needed for pain.            apixaban (ELIQUIS) 5 mg Tab tablet Take 5 mg by mouth 2 (two) times a day.           aspirin 81 mg chewable tablet Chew 81 mg daily.           furosemide (LASIX) 20 MG tablet Take 20 mg by mouth daily.            insulin glargine (LANTUS) 100 unit/mL injection Inject 15 Units under the skin at bedtime. 07:30 AM, 04:30 PM     irbesartan (AVAPRO) 75 MG tablet Take 75 mg by mouth 2 (two) times a day.           metoprolol tartrate (LOPRESSOR) 50 MG tablet Take 25 mg by mouth 2 (two) times a day.      ondansetron (ZOFRAN) 4 MG tablet Take 4 mg by mouth every 6 (six) hours as needed for nausea.     PARoxetine (PAXIL) 30 MG tablet Take 60 mg by mouth every morning.           rosuvastatin (CRESTOR) 20 MG tablet Take 20 mg by mouth every morning. PER HOSPITAL ORDERS GIVE TO PT. IN THE A.M.            Past Medical History:    Past Medical History:   Diagnosis Date     Anemia      Anxiety      Cancer (H)     Hx of colon cancer     CHF (congestive heart failure) (H)     diastolic     Coronary artery disease     s/p cabg     CVA (cerebral vascular accident) (H)      Depression      Diabetes mellitus (H)     type 2     DVT (deep vein thrombosis) in pregnancy      Granuloma annulare      Hemiplegia (H)     R sided and minimally communicative     Hyperlipidemia      Hypertension      Obesity      Oropharyngeal dysphagia      ANGEL (obstructive sleep apnea)      Parotitis      Paroxysmal atrial fibrillation (H)      PE (pulmonary thromboembolism) (H)      Stroke (H) 12/2018    Sub acute L MCA stroke     Varicose veins of both lower extremities        IMMUNIZATIONS:    There is no immunization history on file for this patient.  Above immunizations pulled from [employer]. Facility records also reconciled. Outstanding information sent to [facility] to update [employer].  Future  immunizations are not needed at this point as all recommended immunizations are up to date.     PHYSICAL EXAMINATION  Vitals:    06/03/20 0700   BP: 150/59   Pulse: (!) 54   Resp: 18   Temp: 98.2  F (36.8  C)   SpO2: 99%   Weight: 201 lb (91.2 kg)       Today on physical exam:     GENERAL: Awake, Alert, not in any form of acute distress, answers questions appropriately, follows simple commands, conversant  HEENT: Head is normocephalic with normal hair distribution. No evidence of trauma. Ears: No acute purulent discharge. Eyes: Conjunctivae pink with no scleral jaundice. Nose: Normal mucosa and septum. NECK: Supple with no cervical or supraclavicular lymphadenopathy. Trachea is midline.   CHEST: No tenderness or deformity, no crepitus  LUNG: dim to auscultation with good chest expansion. There are no crackles or wheezes, normal AP diameter.  No recent shortness of breath or cough   BACK: No kyphosis of the thoracic spine. Symmetric, no curvature, ROM normal, no CVA tenderness, no spinal tenderness   CVS: irregularly irregular rhythm,  2+ pulses symmetric in all extremities.  ABDOMEN: Rounded and soft, nontender to palpation, non distended, no masses, no organomegaly, good bowel sounds, no rebound or guarding, no peritoneal signs.   EXTREMITIES: no edema  SKIN: Warm and dry  NEURO/PSYCH: The patient is oriented to person, place and time. Right sided hemiplegia, dysarthria, dysphagia, expressive aphasia, EZ stand to slide board for transfers, dependent for cares            LABS:   No results found for this or any previous visit (from the past 168 hour(s)).  Results for orders placed or performed in visit on 10/09/19   Basic Metabolic Panel   Result Value Ref Range    Sodium 139 136 - 145 mmol/L    Potassium 4.3 3.5 - 5.0 mmol/L    Chloride 103 98 - 107 mmol/L    CO2 28 22 - 31 mmol/L    Anion Gap, Calculation 8 5 - 18 mmol/L    Glucose 87 70 - 125 mg/dL    Calcium 9.2 8.5 - 10.5 mg/dL    BUN 16 8 - 28 mg/dL     Creatinine 0.83 0.60 - 1.10 mg/dL    GFR MDRD Af Amer >60 >60 mL/min/1.73m2    GFR MDRD Non Af Amer >60 >60 mL/min/1.73m2         Lab Results   Component Value Date    WBC 10.5 10/09/2019    HGB 10.3 (L) 10/09/2019    HCT 31.6 (L) 10/09/2019    MCV 92 10/09/2019     10/09/2019       No results found for: [id number]  Lab Results   Component Value Date    HGBA1C 6.5 (H) 02/26/2020     No results found for: INR, PROTIME  No results found for: [id number]  Lab Results   Component Value Date    TSH 1.04 08/07/2019           ASSESSMENT/PLAN:      Type 2 DM with HTN: Accuchecks elevated recently 202-311. On lantus two times a day, accuchecks bid. Hga1c 5.7 on 8/7. hga1c 5.5 on 11/8. hga1c 2/26-6.5. continue lantus. Most recent hga1c was low, will recheck hga1c prior to adjusting meds. Will order on 6/10.   CAD, s/p CABG: No chest pain. On aspirin, rosuvastatin. F/u cardiology.    H/o Left MCA CVA: Right sided hemiplegia, dysarthria, dysphagia, expressive aphasia. Wheelchair and bed bound. EZ stand to slide board for transfers. Dependent for cares. On rosuvastatin, aspirin. F/u with neurology prn. Dysphagia on NDD3, nectar thick liquids. No recent concerns.   Anxiety and depression: On paroxetine. ACP following, feels mood stable today.   Dysphagia: NDD3 nectar thick liquids. No recent concerns.   PAF: Rate controlled 50s. On eliquis, metoprolol. Previously reduced metoprolol.   ANGEL, OHS: On CPAP. Refusing at times.   Weights, obesity: [phone]. Monitor. Counseling on heart healthy diet, diabetic diet,  Weights are climbing and discussed today. Denies any changes recently to diet. Encourage healthier choices.   Chronic CHF: [facility] reported h/o CHF. No echo or records available [facility]. No shortness of breath, no edema noted, on lasix daily. Weights up recently but denies recent increase in shortness of breath or edema. Will monitor  closely.   HTN: 150s. On irbesartan, metoprolol, lasix. Stable.     CMP, hm1, tsh, hga1c on 6/10    Electronically signed by: Morenita Mosquera NP    Case Management:  I have reviewed the facility/SNF care plan/MDS which was done 4/9/20, including the falls risk, nutrition and pain screening. I also reviewed the current immunizations, and preventive care.. Future cancer screening is not clinically indicated secondary to age/goals of care.   Patient's desire to return to the community is not assessible due to cognitive impairment.    Information reviewed:  Medications, vital signs, orders, and nursing notes.  The health plan new enrollment has happened. I have reviewed the  MDS, the preventative needs,  and facility care plan. The level of care is appropriate. I have reviewed the code status/advanced directives.

## 2021-06-08 NOTE — PROGRESS NOTES
[facility]    DATE: 2020    NAME:  Zach Olmos             :  1950  MRN: [id number]  CODE STATUS:  FULL CODE    VISIT TYPE: Review Of Multiple Medical Conditions (cva)     FACILITY:  [facility] [[id number]]       CHIEF COMPLAIN/REASON FOR VISIT:    Chief Complaint   Patient presents with     Review Of Multiple Medical Conditions     cva               HISTORY OF PRESENT ILLNESS: Zach Olmos is a 70 y.o. female who is a long term care resident of [facility].     Today Ms. Olmos is seen today for review of systems for CVA. She is seen alone in her room. She says she has been doing well and not having any concerns. That lesion in her groin has healed and has not come back at all recently. She has not had any itching or rashes at all either. She says her appetite is fine and no bowel concerns. She denies any breathing problems and no cough or fever. She sleeps fine. She has no new concerns recently. Per nursing her weights are stable 190-194lbs. Her vitals are stable. She did recently  Have some sunburn on  and she says today that it is all healed but does still have some peeling skin on her right arm.     REVIEW OF SYSTEMS:  PROBLEMS AND REVIEW OF SYSTEMS:   Today on ROS:   Currently, no fever, chills, or rigors. Decreased vision. Denies any chest pain, headaches, palpitations, lightheadedness. Denies any GERD symptoms. Positive for weakness, EZ stand for transfers or slide board, dysphagia, urinary incontinence, fecal incontinence, appetite is good, wheelchair bound, right sided weakness, aphasia, no shortness of breath, refusing cpap at times, sunburn on right arm peeling and healing      Allergies   Allergen Reactions     Aricept [Donepezil]      Atorvastatin      Glipizide      Lisinopril      Current Outpatient Medications   Medication Sig     acetaminophen (TYLENOL) 325 MG tablet Take 650 mg by mouth every 6 (six) hours as  needed for pain.            apixaban (ELIQUIS) 5 mg Tab tablet Take 5 mg by mouth 2 (two) times a day.           aspirin 81 mg chewable tablet Chew 81 mg daily.           furosemide (LASIX) 20 MG tablet Take 20 mg by mouth daily.            insulin glargine (LANTUS) 100 unit/mL injection Inject 15 Units under the skin at bedtime. 07:30 AM, 04:30 PM     irbesartan (AVAPRO) 75 MG tablet Take 75 mg by mouth 2 (two) times a day.           metoprolol tartrate (LOPRESSOR) 50 MG tablet Take 25 mg by mouth 2 (two) times a day.      ondansetron (ZOFRAN) 4 MG tablet Take 4 mg by mouth every 6 (six) hours as needed for nausea.     PARoxetine (PAXIL) 30 MG tablet Take 60 mg by mouth every morning.           rosuvastatin (CRESTOR) 20 MG tablet Take 20 mg by mouth every morning. PER HOSPITAL ORDERS GIVE TO PT. IN THE A.M.            Past Medical History:    Past Medical History:   Diagnosis Date     Anemia      Anxiety      Cancer (H)     Hx of colon cancer     CHF (congestive heart failure) (H)     diastolic     Coronary artery disease     s/p cabg     CVA (cerebral vascular accident) (H)      Depression      Diabetes mellitus (H)     type 2     DVT (deep vein thrombosis) in pregnancy      Granuloma annulare      Hemiplegia (H)     R sided and minimally communicative     Hyperlipidemia      Hypertension      Obesity      Oropharyngeal dysphagia      ANGEL (obstructive sleep apnea)      Parotitis      Paroxysmal atrial fibrillation (H)      PE (pulmonary thromboembolism) (H)      Stroke (H) 12/2018    Sub acute L MCA stroke     Varicose veins of both lower extremities        IMMUNIZATIONS:    There is no immunization history on file for this patient.  Above immunizations pulled from [facility]. Facility records also reconciled. Outstanding information sent to [facility] to update [facility].  Future immunizations are not needed at this point as all recommended immunizations are up to date.     PHYSICAL  EXAMINATION  Vitals:    05/13/20 0700   BP: 137/62   Pulse: 64   Resp: 22   Temp: 97.2  F (36.2  C)   SpO2: 98%   Weight: 194 lb (88 kg)       Today on physical exam:     GENERAL: Awake, Alert, not in any form of acute distress, answers questions appropriately, follows simple commands, conversant  HEENT: Head is normocephalic with normal hair distribution. No evidence of trauma. Ears: No acute purulent discharge. Eyes: Conjunctivae pink with no scleral jaundice. Nose: Normal mucosa and septum. NECK: Supple with no cervical or supraclavicular lymphadenopathy. Trachea is midline.   CHEST: No tenderness or deformity, no crepitus  LUNG: dim to auscultation with good chest expansion. There are no crackles or wheezes, normal AP diameter.  No recent shortness of breath or cough   BACK: No kyphosis of the thoracic spine. Symmetric, no curvature, ROM normal, no CVA tenderness, no spinal tenderness   CVS: irregularly irregular rhythm,  2+ pulses symmetric in all extremities.  ABDOMEN: Rounded and soft, nontender to palpation, non distended, no masses, no organomegaly, good bowel sounds, no rebound or guarding, no peritoneal signs.   EXTREMITIES: no edema  SKIN: Warm and dry, sunburn, peeling right arm healing  NEURO/PSYCH: The patient is oriented to person, place and time. Right sided hemiplegia, dysarthria, dysphagia, expressive aphasia, EZ stand to slide board for transfers, dependent for cares            LABS:   No results found for this or any previous visit (from the past 168 hour(s)).  Results for orders placed or performed in visit on 10/09/19   Basic Metabolic Panel   Result Value Ref Range    Sodium 139 136 - 145 mmol/L    Potassium 4.3 3.5 - 5.0 mmol/L    Chloride 103 98 - 107 mmol/L    CO2 28 22 - 31 mmol/L    Anion Gap, Calculation 8 5 - 18 mmol/L    Glucose 87 70 - 125 mg/dL    Calcium 9.2 8.5 - 10.5 mg/dL    BUN 16 8 - 28 mg/dL    Creatinine 0.83 0.60 - 1.10 mg/dL    GFR MDRD Af Amer >60 >60 mL/min/1.73m2    GFR  MDRD Non Af Amer >60 >60 mL/min/1.73m2         Lab Results   Component Value Date    WBC 10.5 10/09/2019    HGB 10.3 (L) 10/09/2019    HCT 31.6 (L) 10/09/2019    MCV 92 10/09/2019     10/09/2019       No results found for: [id number]  Lab Results   Component Value Date    HGBA1C 6.5 (H) 02/26/2020     No results found for: INR, PROTIME  No results found for: [id number]  Lab Results   Component Value Date    TSH 1.04 08/07/2019           ASSESSMENT/PLAN:      Type 2 DM with HTN: Accuchecks controlled recently. On lantus two times a day, accuchecks bid. Hga1c 5.7 on 8/7. hga1c 5.5 on 11/8. hga1c 2/26-6.5. continue lantus.   CAD, s/p CABG: No chest pain. On aspirin, rosuvastatin. F/u cardiology.    H/o Left MCA CVA: Right sided hemiplegia, dysarthria, dysphagia, expressive aphasia. Wheelchair and bed bound. EZ stand to slide board for transfers. Dependent for cares. On rosuvastatin, aspirin. F/u with neurology prn. Dysphagia on NDD3, nectar thick liquids. No recent concerns.   Anxiety and depression: On paroxetine. ACP following, feels mood stable today.   Dysphagia: NDD3 nectar thick liquids. Stable recently, no coughing.   PAF: Rate controlled 60s. On eliquis, metoprolol. Previously reduced metoprolol.   ANGEL, OHS: On CPAP. Refusing at times.   Weights, obesity: [phone]. Monitor. Counseling on heart healthy diet, diabetic diet, healthy lifestyle choices. Stable.   Chronic CHF: [facility] reported h/o CHF. No echo or records available [facility]. No shortness of breath, no edema noted, on lasix daily. Stable weights. No recent concerns.   HTN: 130s. On irbesartan, metoprolol, lasix. Stable.     Labs last in November, no repeat labs at this time due to pandemic and avoiding unecessary exposures from lab personnel    Electronically signed by: Morenita Mosquera NP    Case Management:  I have reviewed the facility/SNF care plan/MDS which was done  1/22/20, including the falls risk, nutrition and pain screening. I also reviewed the current immunizations, and preventive care.. Future cancer screening is not clinically indicated secondary to age/goals of care.   Patient's desire to return to the community is not assessible due to cognitive impairment.    Information reviewed:  Medications, vital signs, orders, and nursing notes.  The health plan new enrollment has happened. I have reviewed the  MDS, the preventative needs,  and facility care plan. The level of care is appropriate. I have reviewed the code status/advanced directives.

## 2021-06-09 NOTE — TELEPHONE ENCOUNTER
Dr. Mosquera,     I am a Care Coordinator for [facility].  We contract with [facility] to provide care coordination and case management for [facility] primary care patients.  I completed an initial assessment with Zach Olmos and her Son/MARIAH.  She is currently living at [facility] but hopes to move back into the community and live with her Son, Stiven.  For this to happen, Zach needs several DME items.  I have requested all items needed in Epic.     All order requests have been submitted to you for your approval and signature.  These items are covered by Zach's insurance and meet criteria set forth by [facility], CMS and [place] [facility].       If you agree and approve these orders, please complete, sign and route back to me.  I will complete the order process directly with the vendor.     Please do not hesitate to contact me with your questions.  Thank you.    Carolina Mendez, MICHEL  [facility]  [phone]

## 2021-06-09 NOTE — PROGRESS NOTES
[facility] Care Coordination Contact    Received after visit chart from care coordinator.  Completed following tasks:    Mailed copy of care plan to client.      [facility]: Mailed PCA copy to member. No provider selected for PCA yet. Hold off PCA to [facility] p/CC. Faxed MD Communication to PCP.     Mailed: POC Sig, PCA Sig, 3543 form and JULIA to member with an enclosed return envelope. Mailed to member's son (Stiven Olmos) at [address] [facility] [address].    Ed Lee  Care Management Specialist  [facility]  [phone]

## 2021-06-09 NOTE — TELEPHONE ENCOUNTER
Dr. Mosquera,    I am a Care Coordinator for [facility].  We contract with [facility] to provide care coordination and case management for [facility] primary care patients.  I completed an initial assessment with Zach Olmos and her Son/MARIAH.  She is currently living at [facility] but hopes to move back into the community and live with her Son, Stiven.  For this to happen, Zach needs several DME items.  I have requested all items needed in Epic but I did not see a space to request Thick It Powder.  This will also be needed.      All order requests have been submitted to you for your approval and signature.  These items are covered by Zach's insurance and meet criteria set forth by [facility], CMS and [place] [facility].      If you agree and approve these orders, please complete, sign and route back to me.  I will complete the order process directly with the vendor.    Please do not hesitate to contact me with your questions.  Thank you.    Carolina Mendez, MICHEL  [facility]  [phone]

## 2021-06-09 NOTE — PROGRESS NOTES
[facility] Care Coordination Contact  [facility] Initial Assessment     Home visit for Initial Health Risk Assessment with Zach Olmos completed on  6/30/2020    Type of residence:: Nursing home  Current living arrangement:: I live in a nursing home     Assessment completed with:: Children(son - Stiven)    Current Care Plan  Member currently receiving the following home care services:     Member currently receiving the following community resources: None - Zach is currently living in a NH and is wanting to return to the community and live with her son.    Medication Review  Medication reconciliation completed in Epic: YES  Medication set-up & administration: RN set up daily  Nursing Home staff administers medications  Medication Risk Assessment Medication (1 or more, place referral to MTM):  N/A: No risk factors identified  MTM Referral Placed: No: No risk factors idenified    Mental/Behavioral Health   PHQ-2 Total Score: 2       Mental health DX:: No   Mental health DX how managed:: Medication    Falls Assessment:   Fallen 2 or more times in the past year?: No   Any fall with injury in the past year?: No    ADL/IADL Dependencies:   Dependent ADLs:: Bathing, Dressing, Grooming, Incontinence, Positioning, Toileting, Transfers, Wheelchair-with assist, Eating  Dependent IADLs:: Cleaning, Cooking, Laundry, Shopping, Meal Preparation, Medication Management, Money Management, Incontinence    [facility] sponsored benefits: Shared information re: Silver Sneakers/gym memberships, ASA, Calcium +D.    PCA Assessment completed at visit: Yes Initial PCA Assessment indicated 42 units per day of PCA.    Elderly Waiver Eligibility: Yes - will open to EW    Care Plan & Recommendations: Initial Health Risk Assessment completed with Stiven Olmos - Son/POA.  Zach was unable to participate in assessment due to limited verbal communication due to stroke.  Care Coordinator was notified by [place] that Zach wishes  to return to the community and live with her son.  Zach is currently living in a nursing home.  Zach had a stroke in 2018 which has resulted in paralysis of her R side.  Zach needs 24/7 supervision and needs help with all ADLs and IADLs.  Care Coordinator completed an initial PCA assessment for Zach and she qualifies for 11 hours per day of PCA.  Zach's son, Stiven, is working on finding a PCA and a PCA agency.  During the assessment and discussion with the son, it has been determined that for Zach to return home and live safely in the community she will need several equipment and supplies items.  These include: Shower Chair/Transfer Bench, Nicolás Lift/EZ Lift, Tranfers Belt, Grab bar on L side of toilet, Wheelchair, Hospital Bed, Hand Brace, Thick It Powder, Lift Chair, Pill Box, Blood Sugar Meter and possible bathroom remodel.  Zach will need PCA services and SNV (every other week).  All equipment needs to be in place before Zach can return home.  Zach has had no falls in the NH for hte past year.  Zach has had no hospital or ER visits in the past year.      See [facility] for detailed assessment information.    Follow-Up Plan: Member informed of future contact, plan to f/u with member with a 6 month telephone assessment.  Contact information shared with member and family, encouraged member to call with any questions or concerns at any time.    [facility] continuum providers: Please refer to Health Care Home on the Epic Problem List to view this patient's [facility] Care Plan Summary.    Carolina Mendez, MICHEL  [facility]  [phone]

## 2021-06-09 NOTE — PROGRESS NOTES
Mailed: Lists of dental and PCA providers to member's son(Stiven Olmos) at [address] [facility] [address].    Ed Lee  Care Management Specialist  [facility]  [phone]

## 2021-06-10 NOTE — PROGRESS NOTES
[facility] Care Coordination Contact    I, Care Coordinator, emailed Rosanna at [facility] letting her know I've been trying to contact Zach's NP that sees her in the NF to sign off on several DME orders Zach needs in order to return home.  I asked if Rosanna has a way of contacting the NP to ask her to check her Epic messages or if there is another MD I should be contacting for DME orders.  I requested a return email or call.    MICHEL Barker  [facility]  [phone]

## 2021-06-10 NOTE — PROGRESS NOTES
[facility]    DATE: 2020    NAME:  Zach Olmos             :  1950  MRN: [id number]  CODE STATUS:  FULL CODE    VISIT TYPE: Problem Visit (nosebleeds)     FACILITY:  [facility] [[id number]]       CHIEF COMPLAIN/REASON FOR VISIT:    Chief Complaint   Patient presents with     Problem Visit     nosebleeds               HISTORY OF PRESENT ILLNESS: Zach Olmos is a 70 y.o. female who is a long term care resident of [facility].     Today Ms. Olmos is seen today for concerns of frequent nosebleeds recently. She is seen in her room sitting in wheelchair. She reports that she has had a few nosebleeds lately but not every day. She doesn't think they take a long time to stop. She says that she thinks the air is dry in here. She denies any nose picking. We discussed she is on blood thinners and this could make her higher risk for bleeding issues. We discussed if she has issues with the bleeding stopping she may need to go to the ER for a rhinorocket. We will try doing some daily ocean spray and prn. If she continues to have frequent bleeding will refer to ENt for cauterization. She is agreeable to this. She denies any pain or other concerns. No nasal drainage or sinus congestion today.     REVIEW OF SYSTEMS:  PROBLEMS AND REVIEW OF SYSTEMS:   Today on ROS:   Currently, no fever, chills, or rigors. Decreased vision. Denies any chest pain, headaches, palpitations, lightheadedness. Denies any GERD symptoms. Positive for weakness, EZ stand for transfers or slide board, dysphagia, urinary incontinence, fecal incontinence, appetite is good, wheelchair bound, right sided weakness, aphasia, no shortness of breath, refusing cpap at times, elevated blood sugars, nosebleeds      Allergies   Allergen Reactions     Aricept [Donepezil]      Atorvastatin      Glipizide      Lisinopril      Current Outpatient Medications   Medication Sig     acetaminophen  (TYLENOL) 325 MG tablet Take 650 mg by mouth every 6 (six) hours as needed for pain.            apixaban (ELIQUIS) 5 mg Tab tablet Take 5 mg by mouth 2 (two) times a day.           artificial tears,hypromellose, (GENTEAL; SYSTANE) 0.3 % Gel Administer to both eyes.     aspirin 81 mg chewable tablet Chew 81 mg daily.           furosemide (LASIX) 20 MG tablet Take 20 mg by mouth daily.            insulin glargine (LANTUS) 100 unit/mL injection Inject 20 Units under the skin at bedtime. 07:30 AM, 04:30 PM     irbesartan (AVAPRO) 75 MG tablet Take 75 mg by mouth 2 (two) times a day.           metoprolol tartrate (LOPRESSOR) 50 MG tablet Take 25 mg by mouth 2 (two) times a day.      ondansetron (ZOFRAN) 4 MG tablet Take 4 mg by mouth every 6 (six) hours as needed for nausea.     PARoxetine (PAXIL) 30 MG tablet Take 60 mg by mouth every morning.           rosuvastatin (CRESTOR) 20 MG tablet Take 20 mg by mouth every morning. PER HOSPITAL ORDERS GIVE TO PT. IN THE A.M.            Past Medical History:    Past Medical History:   Diagnosis Date     Anemia      Anxiety      Cancer (H)     Hx of colon cancer     CHF (congestive heart failure) (H)     diastolic     Coronary artery disease     s/p cabg     CVA (cerebral vascular accident) (H)      Depression      Diabetes mellitus (H)     type 2     DVT (deep vein thrombosis) in pregnancy      Granuloma annulare      Hemiplegia (H)     R sided and minimally communicative     Hyperlipidemia      Hypertension      Obesity      Oropharyngeal dysphagia      ANGEL (obstructive sleep apnea)      Parotitis      Paroxysmal atrial fibrillation (H)      PE (pulmonary thromboembolism) (H)      Stroke (H) 12/2018    Sub acute L MCA stroke     Varicose veins of both lower extremities        IMMUNIZATIONS:    There is no immunization history on file for this patient.  Above immunizations pulled from Photos to Photos. Facility records also reconciled. Outstanding information sent to [facility] to update [facility].  Future immunizations are not needed at this point as all recommended immunizations are up to date.     PHYSICAL EXAMINATION  Vitals:    08/17/20 0700   BP: 115/53   Pulse: 63   Resp: 18   Temp: 98.7  F (37.1  C)   SpO2: 94%       Today on physical exam:     GENERAL: Awake, Alert, not in any form of acute distress, answers questions appropriately, follows simple commands, conversant  HEENT: Head is normocephalic with normal hair distribution. No evidence of trauma. Ears: No acute purulent discharge. Eyes: Conjunctivae pink with no scleral jaundice. Nose: Normal mucosa and septum. NECK: Supple with no cervical or supraclavicular lymphadenopathy. Trachea is midline.   CHEST: No tenderness or deformity, no crepitus  LUNG: dim to auscultation with good chest expansion. There are no crackles or wheezes, normal AP diameter.  No recent shortness of breath or cough   BACK: No kyphosis of the thoracic spine. Symmetric, no curvature, ROM normal, no CVA tenderness, no spinal tenderness   CVS: irregularly irregular rhythm,  2+ pulses symmetric in all extremities.  ABDOMEN: Rounded and soft, nontender to palpation, non distended, no masses, no organomegaly, good bowel sounds, no rebound or guarding, no peritoneal signs.   EXTREMITIES: no edema  SKIN: Warm and dry  NEURO/PSYCH: The patient is oriented to person, place and time. Right sided hemiplegia, dysarthria, dysphagia, expressive aphasia, EZ stand to slide board for transfers, dependent for cares            LABS:   No results found for this or any previous visit (from the past 168 hour(s)).  Results for orders placed or performed in visit on 06/10/20   Basic Metabolic Panel   Result Value Ref Range    Sodium 140 136 - 145 mmol/L    Potassium 4.3 3.5 - 5.0 mmol/L    Chloride 103 98 - 107 mmol/L    CO2 29 22 - 31 mmol/L    Anion Gap, Calculation 8 5 - 18 mmol/L    Glucose 121 70 - 125 mg/dL    Calcium 9.2 8.5 - 10.5 mg/dL    BUN 18 8 - 28  mg/dL    Creatinine 0.85 0.60 - 1.10 mg/dL    GFR MDRD Af Amer >60 >60 mL/min/1.73m2    GFR MDRD Non Af Amer >60 >60 mL/min/1.73m2         Lab Results   Component Value Date    WBC 8.1 06/10/2020    HGB 12.3 06/10/2020    HCT 38.0 06/10/2020    MCV 91 06/10/2020     06/10/2020       No results found for: [id number]  Lab Results   Component Value Date    HGBA1C 7.3 (H) 06/10/2020     No results found for: INR, PROTIME  No results found for: [id number]  Lab Results   Component Value Date    TSH 1.24 06/10/2020           ASSESSMENT/PLAN:    Epistaxis: ordered ocean spray daily and prn. If continues to be frequent will consider referral to ENT for cauterization. Send to ED if 30 min or more of uncontrolled bleeding for rhinorocket.   Hyperglycemia, Type 2 DM with HTN: Accuchecks 207-403 recently. On lantus two times a day, accuchecks bid. Hga1c 5.7 on 8/7. hga1c 5.5 on 11/8. hga1c 2/26-6.5. continue lantus. Hga1c up to 7.3 on 6/10. Will increase lantus to 20u.   CAD, s/p CABG: No chest pain. On aspirin, rosuvastatin. F/u cardiology.    H/o Left MCA CVA: Right sided hemiplegia, dysarthria, dysphagia, expressive aphasia. Wheelchair and bed bound. EZ stand to slide board for transfers. Dependent for cares. On rosuvastatin, aspirin. F/u with neurology prn. Dysphagia on NDD3, nectar thick liquids. No recent concerns.   Anxiety and depression: On paroxetine. ACP following, feels mood stable today.   Dysphagia: NDD3 nectar thick liquids. No recent concerns.   PAF: Rate controlled 60s. On eliquis, metoprolol. Previously reduced metoprolol.   ANGEL, OHS: On CPAP. Refusing at times.   Weights, obesity: [phone]. Monitor. Counseling on heart healthy diet, diabetic diet,  Weights are climbing and discussed today. Denies any changes recently to diet. Encourage healthier choices.   Chronic CHF: [facility] reported h/o CHF. No echo or records available as [facility]. No shortness of breath, no edema noted, on lasix daily. No recent concerns.   HTN: 110s. On irbesartan, metoprolol, lasix. Stable.     CMP, hm1, tsh, hga1c on 6/10    Electronically signed by: Morenita Mosquera NP    Case Management:  I have reviewed the facility/SNF care plan/MDS which was done 4/9/20, including the falls risk, nutrition and pain screening. I also reviewed the current immunizations, and preventive care.. Future cancer screening is not clinically indicated secondary to age/goals of care.   Patient's desire to return to the community is not assessible due to cognitive impairment.    Information reviewed:  Medications, vital signs, orders, and nursing notes.  The health plan new enrollment has happened. I have reviewed the  MDS, the preventative needs,  and facility care plan. The level of care is appropriate. I have reviewed the code status/advanced directives.

## 2021-06-10 NOTE — PROGRESS NOTES
[facility] Care Coordination Contact    Care Coordinator received a voicemail from Rosanna at [facility] requesting a return call to discuss Zach's discharge plans.      Care Coordinator called Rosanna back and she shared that the NP that sees Zach will not sign off on any equipment/supply orders until a safe discharge plan is in place which includes proof that the ramp is installed and the bathroom has been remodeled.  Care Coordinator stated that I will let Zach's son, Stiven, know this and then will follow up once I talk to Stiven.    Care Coordinator called Zach's son, Stiven, and let him know what Care Coordinator.  Care Coordinator stated that until all his projects are done at home Care Coordinator cannot go forward with anything.  Care Coordinator encouraged Stiven to keep working on the remodels and ramp and contact Care Coordinator when he is done.    Care Coordinator called Rosanna at [facility] back to update her on the converstation with Stiven.  Care Coordinator will  Keep Rosanna updated on the status of the ramp and remodels at Stiven's home.    MICHEL Barker  [facility]  [phone]

## 2021-06-10 NOTE — TELEPHONE ENCOUNTER
[facility] Nurse Triage Telephone Note      Provider: CHANDRA Pedroza  Facility: [facility]    Facility Type: [facility]    Caller: Román  Call Back Number:  [phone]    Allergies: Aricept [donepezil]; Atorvastatin; Glipizide; and Lisinopril    Reason for call: Nurse calling to report that weight is up to 206#.  Patient has been noted to be eating more and being less active.  Family bring treats in for patient frequently.  No edema noted.  Nurse is requesting a dietary consult for counseling on better eating habits.       Verbal Order/Direction given by Provider: Ok for dietary consult.      Provider giving order: CHANDRA Pedroza    Verbal order given to: Emilia Martin RN

## 2021-06-10 NOTE — PROGRESS NOTES
[facility]    Facility:  [facility] [[id number]]    Code Status:  FULL CODE    Chief Complaint   Patient presents with     Review Of Multiple Medical Conditions   :                    Patient is seen today for a federally mandated regulatory visit                 Patient Active Problem List   Diagnosis     Essential hypertension     Coronary artery disease involving coronary bypass graft with angina pectoris, unspecified whether native or transplanted heart (H)     Dyslipidemia (high LDL; low HDL)     Right hemiplegia (H)     Oropharyngeal dysphagia     PAF (paroxysmal atrial fibrillation) (H)     GERD (gastroesophageal reflux disease)     ANGEL on CPAP     Anxiety and depression     Type 2 DM with CKD stage 1 and hypertension (H)     Chronic combined systolic and diastolic congestive heart failure (H)     Morbid obesity (H)     Obesity hypoventilation syndrome (H)     Granuloma annulare     Varicose veins of both legs with edema     H/O deep venous thrombosis       History:  Ms. 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, expressive aphasia, dysphasia (November 17, 2018), coronary artery disease/CABG (March 2018), DVT/PE (occurring after CABG 2018), DM2 (currently Lantus 10 units twice daily), paroxysmal atrial fibrillation (apixaban 5 twice daily/metoprolol 50 twice daily) hypertension (irbesartan 75 twice daily, metoprolol 50 mg twice daily), CKD 1, GERD, remote colon cancer, hyperlipidemia (Crestor 20), anxiety/depression (paroxetine 60 mg daily) seen today for review of her multiple medical problems/federally mandated regulatory visit      Subjective/ROS:    -augmented by discussion with facility staff involved in direct care  -limited by: Expressive aphasia    Patient says she is feeling okay.  Review of systems is compromised by very poor memory.  She denies headaches change in vision speaking swallowing  hearing nausea vomiting diarrhea melena bright red blood per rectum.  She says she is had some constipation but not recently.  She remains on nectar thick liquids with NDD 3.  Her weight has increased considerably 206 pounds was 184 pounds when I saw her on January 7 although I do see weights a size 200 pounds in the past as well.  There have been no bleeding complications with her apixaban.  She occasionally has heart rates into the 50s but her bradycardia is asymptomatic.  Staff says that she is poorly compliant with CPAP and she does not disagree.  She reports no falls or pain, mood change, remainder is negative.  She had lab work on Maria Del Rosario 10 of this year which is reviewed and looks mostly good.    The most striking issue with vital review is her consistent hypoglycemia basically all blood sugars greater than 200 since mid July.  Patient was once on metformin 1000 mg twice daily.  She has been managed with Lantus insulin currently 18 units.  Her A1c was 5.5 on November 18, 2019, 6.5 on February 26, 2020, and 7.3 on Maria Del Rosario 10, 2020    Past Medical History:   Diagnosis Date     Anemia      Anxiety      Cancer (H)     Hx of colon cancer     CHF (congestive heart failure) (H)     diastolic     Coronary artery disease     s/p cabg     CVA (cerebral vascular accident) (H)      Depression      Diabetes mellitus (H)     type 2     DVT (deep vein thrombosis) in pregnancy      Granuloma annulare      Hemiplegia (H)     R sided and minimally communicative     Hyperlipidemia      Hypertension      Obesity      Oropharyngeal dysphagia      ANGEL (obstructive sleep apnea)      Parotitis      Paroxysmal atrial fibrillation (H)      PE (pulmonary thromboembolism) (H)      Stroke (H) 12/2018    Sub acute L MCA stroke     Varicose veins of both lower extremities      Past Surgical History:   Procedure Laterality Date     CORONARY ARTERY BYPASS GRAFT      x5          Family History   Problem Relation Age of Onset     Heart disease  Mother      Cancer Father         esophageal   :       Social History     Socioeconomic History     Marital status: Single     Spouse name: Not on file     Number of children: Not on file     Years of education: Not on file     Highest education level: Not on file   Occupational History     Not on file   Social Needs     Financial resource strain: Not on file     Food insecurity     Worry: Not on file     Inability: Not on file     Transportation needs     Medical: Not on file     Non-medical: Not on file   Tobacco Use     Smoking status: Never Smoker     Smokeless tobacco: Never Used   Substance and Sexual Activity     Alcohol use: No     Frequency: Never     Drug use: No     Sexual activity: Not on file   Lifestyle     Physical activity     Days per week: Not on file     Minutes per session: Not on file     Stress: Not on file   Relationships     Social connections     Talks on phone: Not on file     Gets together: Not on file     Attends Druze service: Not on file     Active member of club or organization: Not on file     Attends meetings of clubs or organizations: Not on file     Relationship status: Not on file     Intimate partner violence     Fear of current or ex partner: Not on file     Emotionally abused: Not on file     Physically abused: Not on file     Forced sexual activity: Not on file   Other Topics Concern     Not on file   Social History Narrative     Not on file   :        Current Outpatient Medications on File Prior to Visit   Medication Sig Dispense Refill     acetaminophen (TYLENOL) 325 MG tablet Take 650 mg by mouth every 6 (six) hours as needed for pain.              apixaban (ELIQUIS) 5 mg Tab tablet Take 5 mg by mouth 2 (two) times a day.             artificial tears,hypromellose, (GENTEAL; SYSTANE) 0.3 % Gel Administer to both eyes.       aspirin 81 mg chewable tablet Chew 81 mg daily.             furosemide (LASIX) 20 MG tablet Take 20 mg by mouth daily.              insulin glargine  (LANTUS) 100 unit/mL injection Inject 18 Units under the skin at bedtime. 07:30 AM, 04:30 PM       irbesartan (AVAPRO) 75 MG tablet Take 75 mg by mouth 2 (two) times a day.        0     metoprolol tartrate (LOPRESSOR) 50 MG tablet Take 25 mg by mouth 2 (two) times a day.        ondansetron (ZOFRAN) 4 MG tablet Take 4 mg by mouth every 6 (six) hours as needed for nausea.       PARoxetine (PAXIL) 30 MG tablet Take 60 mg by mouth every morning.             rosuvastatin (CRESTOR) 20 MG tablet Take 20 mg by mouth every morning. PER HOSPITAL ORDERS GIVE TO PT. IN THE A.M.              No current facility-administered medications on file prior to visit.    :      Allergies:  Aricept [donepezil]; Atorvastatin; Glipizide; and Lisinopril    Vitals:  There were no vitals taken for this visit.    Vital signs from the facility record:                  Most Recent Vitals Date/Time Taken  Temperature: 98.5  F 08/11/2020 05:20 PM  Pulse: 67 per minute 08/11/2020 05:20 PM  Respirations: 16 per minute 08/11/2020 05:20 PM  Blood Pressure: 133 / 57 mmHg 08/11/2020 05:20 PM  O2 Saturation: 97 % 08/11/2020 05:20 PM  Blood Sugar: 247 mg/dL 08/11/2020 07:36 PM  Weight: 207 lbs / Routine       BMI: 32.42 08/10/2020 02:51 PM  Height: 5ft   There is no height or weight on file to calculate BMI.    Physical exam:    General:  Alert pleasant with word finding difficulty but she can make herself understood with time.  However she has a very poor memory    She has right hemiplegia with right lower extremity edema 2+ to the midshin with some pitting but no venous stasis dermatitis, blistering or weeping normocephalic/atraumatic sclera clear EOMI gaze is conjugate she is attentive.  Her breathing is without tachypnea or accessory muscle use skin is clear and visualized portions    Due to the 2020 Covid 19 pandemic, the patient was visually observed at a 6 foot plus distance.  An observational exam was performed in an effort to keep patient safe  from Covid 19 and other communicable diseases.      Labs:  Lab Results   Component Value Date    WBC 8.1 06/10/2020    HGB 12.3 06/10/2020    HCT 38.0 06/10/2020    MCV 91 06/10/2020     06/10/2020     Results for orders placed or performed in visit on 06/10/20   Basic Metabolic Panel   Result Value Ref Range    Sodium 140 136 - 145 mmol/L    Potassium 4.3 3.5 - 5.0 mmol/L    Chloride 103 98 - 107 mmol/L    CO2 29 22 - 31 mmol/L    Anion Gap, Calculation 8 5 - 18 mmol/L    Glucose 121 70 - 125 mg/dL    Calcium 9.2 8.5 - 10.5 mg/dL    BUN 18 8 - 28 mg/dL    Creatinine 0.85 0.60 - 1.10 mg/dL    GFR MDRD Af Amer >60 >60 mL/min/1.73m2    GFR MDRD Non Af Amer >60 >60 mL/min/1.73m2         Lab Results   Component Value Date    TSH 1.24 06/10/2020     @LASTA!C@  [unfilled]  No results found for: [id number]  No results found for: BNP  [unfilled]        Invalid input(s): PRINTERVAL      Assessment/Plan:      ICD-10-CM    1. Dyslipidemia (high LDL; low HDL)  E78.5    2. Right hemiplegia (H)  G81.91    3. Type 2 DM with CKD stage 1 and hypertension (H)  E11.22     I12.9     N18.1    4. Chronic combined systolic and diastolic congestive heart failure (H)  I50.42    5. Coronary artery disease involving coronary bypass graft with angina pectoris, unspecified whether native or transplanted heart (H)  I25.709    6. Essential hypertension  I10      CVA  Right hemiplegia  Expressive aphasia  Dysphasia                 Patient continues to have expressive aphasia.  Her dysphasia has improved, NDD 3 with nectar thick liquids remains her current diet.  She is on appropriate secondary prevention with apixaban for her paroxysmal atrial fibrillation, blood pressure--- metoprolol irbesartan and lipid control--- Crestor.  She also takes aspirin 81 mg daily for cardiac indication     Paroxysmal atrial fibrillation  Bradycardia   Bradycardia has been mild into the 50s and asymptomatic.  Thus I make no changes in her metoprolol.                       Coronary artery disease  CABG 2018                 Continue aspirin, metoprolol.  I do find an echocardiogram from 2018 showing normal EF 55 to 60%, this was post cabg.     DM 2                 Patient's recent control has been poor.  She is not showing any sign of infection.  Staff says she is not eating any differently than she has in the past.  I do not identify any certain trigger for worsening of the sugars.  Her A1c has been on a steady climb over the last 9 months currently at 7.3 (still quite close to target) and with her current blood sugars there is no reason to believe that it is going to stabilize there.  Her Lantus was twice daily now just once daily 18 units.    She seems a good candidate to me for Glucophage/metformin.  She was once on it and tolerated it thousand milligrams twice daily.  It may help her metabolic syndrome.  It may also help her weight which has risen significantly over the past few months.  It appears that the metformin was dropped at the time of her stroke and tube feedings although I do not find a clear reason why, perhaps because she required insulin for hyperglycemia of tube feeding??  There is an outside chance she could be controlled with oral hypoglycemic agents and no Lantus at all?  The initial goal is simply to reestablish metformin treatment.  No changes will be made in her Lantus.  We will recheck her A1c in 3 months 11/15/2020.     Weight gain      Question dietary indiscretion?  Question exogenous insulin effect?  She is quite inactive, thus caloric modification is really the only thing would be expected make much difference.  I have some hope that metformin could lead to some improvement.  TSH good in June         hypertension                 Overall control appears adequate we will continue irbesartan 75 twice daily and metoprolol 50 twice daily (along she is not having bradycardia)     ANGEL/CPAP  Obesity                 Weight is back up.  She might  require higher pressure?  It is a moot point as she does not wish to use the treatment.  I discussed the high risk of heart attacks, strokes, potential for earlier death.     Hyperlipidemia                   Lab Results   Component Value Date    CHOL 77 04/16/2019     Lab Results   Component Value Date    HDL 19 (L) 04/16/2019     Lab Results   Component Value Date    LDLCALC 38 04/16/2019     Lab Results   Component Value Date    TRIG 98 04/16/2019     No components found for: CHOLHDL    Just had blood work in June, but no FLP at that time.  No changes made in her Crestor--- due to CVA history and coronary artery disease history, though with her remarkably low LDL, would recheck FLP.  That can probably wait until her A1c rechecked November 15, 2020     History of DVT/PE                 Sounds provoked most likely following her CABG and prolonged TCU stay, however it is a moot point as she is on apixaban for atrial fibrillation at this point anyway     Pain   no changes made PRN Tylenol     Constipation   patient reports good bowel function without any stool softeners.           Case discussed with:    Facility staff           Vick Sutton MD

## 2021-06-10 NOTE — PROGRESS NOTES
Received: POC Sig page, PCA Sig page, and JULIA from member. Faxed PCA Sig page to health plan. Faxed JULIA form to [facility] medical record.    Ed Lee  Care Management Specialist  [facility]  [phone]

## 2021-06-11 NOTE — PROGRESS NOTES
[facility]    DATE: 2020    NAME:  Zach Olmos             :  1950  MRN: [id number]  CODE STATUS:  FULL CODE    VISIT TYPE: Review Of Multiple Medical Conditions (cva, hemiplegia, htn)     FACILITY:  [facility] [[id number]]       CHIEF COMPLAIN/REASON FOR VISIT:    Chief Complaint   Patient presents with     Review Of Multiple Medical Conditions     cva, hemiplegia, htn               HISTORY OF PRESENT ILLNESS: Zach Olmos is a 70 y.o. female who is a long term care resident of [facility], [place].     Today Ms. Olmos is seen for federally mandated visit  And review of systems for Cva, hemiplegia, and hypertension. She is seen sitting in her wheelchair in her room today. She says she feels well today. She is not having any appetite concerns and says she has been eating well. She thinks her bowels are moving regularly. She says she is not having any urinary issues. She is sleeping very well. She denies any further rashes or spots in her groin. She thinks her weakness is about the same. No new issues with headaches or visual changes. She thinks overall she is doing fine. Her blood sugars recently have been 161-226. Her weights are 207-203lbs and other vitals stable. No further nosebleeds.    REVIEW OF SYSTEMS:  PROBLEMS AND REVIEW OF SYSTEMS:   Today on ROS:   Currently, no fever, chills, or rigors. Decreased vision. Denies any chest pain, headaches, palpitations, lightheadedness. Denies any GERD symptoms. Positive for weakness, EZ stand for transfers or slide board, dysphagia, urinary incontinence, fecal incontinence, appetite good, wheelchair bound, right sided weakness stable, aphasia, no shortness of breath,  cpap      Allergies   Allergen Reactions     Aricept [Donepezil]      Atorvastatin      Glipizide      Lisinopril      Current Outpatient Medications   Medication Sig     acetaminophen (TYLENOL) 325 MG tablet Take 650 mg by mouth  every 6 (six) hours as needed for pain.            apixaban (ELIQUIS) 5 mg Tab tablet Take 5 mg by mouth 2 (two) times a day.           artificial tears,hypromellose, (GENTEAL; SYSTANE) 0.3 % Gel Administer to both eyes.     aspirin 81 mg chewable tablet Chew 81 mg daily.           furosemide (LASIX) 20 MG tablet Take 20 mg by mouth daily.            insulin glargine (LANTUS) 100 unit/mL injection Inject 20 Units under the skin at bedtime. 07:30 AM, 04:30 PM     irbesartan (AVAPRO) 75 MG tablet Take 75 mg by mouth 2 (two) times a day.           metoprolol tartrate (LOPRESSOR) 50 MG tablet Take 25 mg by mouth 2 (two) times a day.      ondansetron (ZOFRAN) 4 MG tablet Take 4 mg by mouth every 6 (six) hours as needed for nausea.     PARoxetine (PAXIL) 30 MG tablet Take 60 mg by mouth every morning.           rosuvastatin (CRESTOR) 20 MG tablet Take 20 mg by mouth every morning. PER HOSPITAL ORDERS GIVE TO PT. IN THE A.M.            Past Medical History:    Past Medical History:   Diagnosis Date     Anemia      Anxiety      Cancer (H)     Hx of colon cancer     CHF (congestive heart failure) (H)     diastolic     Coronary artery disease     s/p cabg     CVA (cerebral vascular accident) (H)      Depression      Diabetes mellitus (H)     type 2     DVT (deep vein thrombosis) in pregnancy      Granuloma annulare      Hemiplegia (H)     R sided and minimally communicative     Hyperlipidemia      Hypertension      Obesity      Oropharyngeal dysphagia      ANGEL (obstructive sleep apnea)      Parotitis      Paroxysmal atrial fibrillation (H)      PE (pulmonary thromboembolism) (H)      Stroke (H) 12/2018    Sub acute L MCA stroke     Varicose veins of both lower extremities        IMMUNIZATIONS:    There is no immunization history on file for this patient.  Above immunizations pulled from [employer]. Facility records also reconciled. Outstanding information sent to [facility] to update [employer].   Future immunizations are not needed at this point as all recommended immunizations are up to date.     PHYSICAL EXAMINATION  Vitals:    09/09/20 0700   BP: 136/63   Pulse: 62   Resp: 18   Temp: 98  F (36.7  C)   SpO2: 96%   Weight: 203 lb (92.1 kg)       Today on physical exam:     GENERAL: Awake, Alert, not in any form of acute distress, answers questions appropriately, follows simple commands, conversant  HEENT: Head is normocephalic with normal hair distribution. No evidence of trauma. Ears: No acute purulent discharge. Eyes: Conjunctivae pink with no scleral jaundice. Nose: Normal mucosa and septum. NECK: Supple with no cervical or supraclavicular lymphadenopathy. Trachea is midline. Decreased vision and hearing  CHEST: No tenderness or deformity, no crepitus  LUNG: dim to auscultation with good chest expansion. There are no crackles or wheezes, normal AP diameter.  No recent shortness of breath or cough   BACK: No kyphosis of the thoracic spine. Symmetric, no curvature, ROM normal, no CVA tenderness, no spinal tenderness   CVS: irregularly irregular rhythm,  2+ pulses symmetric in all extremities.  ABDOMEN: Rounded and soft, nontender to palpation, non distended, no masses, no organomegaly, good bowel sounds, no rebound or guarding, no peritoneal signs.   EXTREMITIES: no edema, atraumatic  SKIN: Warm and dry, no rash today  NEURO/PSYCH: The patient is oriented to person, place and time. Forgetful at times, Right sided hemiplegia, dysarthria, dysphagia, expressive aphasia, EZ stand to slide board for transfers, dependent for cares            LABS:   No results found for this or any previous visit (from the past 168 hour(s)).  Results for orders placed or performed in visit on 06/10/20   Basic Metabolic Panel   Result Value Ref Range    Sodium 140 136 - 145 mmol/L    Potassium 4.3 3.5 - 5.0 mmol/L    Chloride 103 98 - 107 mmol/L    CO2 29 22 - 31 mmol/L    Anion Gap, Calculation 8 5 - 18 mmol/L    Glucose 121 70  - 125 mg/dL    Calcium 9.2 8.5 - 10.5 mg/dL    BUN 18 8 - 28 mg/dL    Creatinine 0.85 0.60 - 1.10 mg/dL    GFR MDRD Af Amer >60 >60 mL/min/1.73m2    GFR MDRD Non Af Amer >60 >60 mL/min/1.73m2         Lab Results   Component Value Date    WBC 8.1 06/10/2020    HGB 12.3 06/10/2020    HCT 38.0 06/10/2020    MCV 91 06/10/2020     06/10/2020       No results found for: [id number]  Lab Results   Component Value Date    HGBA1C 7.3 (H) 06/10/2020     No results found for: INR, PROTIME  No results found for: [id number]  Lab Results   Component Value Date    TSH 1.24 06/10/2020           ASSESSMENT/PLAN:      Type 2 DM with HTN: Accuchecks better controlled 161-226. On lantus two times a day, accuchecks bid. Hga1c 5.7 on 8/7. hga1c 5.5 on 11/8. hga1c 2/26-6.5. continue lantus. Hga1c up to 7.3 on 6/10. Stable on increased lantus.   CAD, s/p CABG: No chest pain or recent concerns. On aspirin, rosuvastatin. F/u cardiology.    H/o Left MCA CVA: Right sided hemiplegia, dysarthria, dysphagia, expressive aphasia. Wheelchair and bed bound. EZ stand to slide board for transfers. Dependent for cares. On rosuvastatin, aspirin. F/u with neurology prn. Dysphagia on NDD3, nectar thick liquids. No recent changes. Stable.    Anxiety and depression: On paroxetine. ACP following, feels mood stable today.   Dysphagia: NDD3 nectar thick liquids. No recent concerns.   PAF: Rate controlled 60s. On eliquis, metoprolol. Well controlled.    ANGEL, OHS: On CPAP. Refusing at times. No concerns today.   Weights, obesity: [phone]. Monitor. Counseling on heart healthy diet, diabetic diet,  Continues to have weight gain. Immobility and increased intake. Counseling provided   Chronic CH[facility]: [facility] h/o CHF. No echo or records available [facility]. No shortness of breath, no edema noted, on lasix daily. No recent concerns.   HTN: 130s. On irbesartan,  metoprolol, lasix. Stable.     CMP, hm1, tsh, hga1c on 6/10    Electronically signed by: Morenita Mosquera NP    Case Management:  I have reviewed the facility/SNF care plan/MDS which was done 7/2/20, including the falls risk, nutrition and pain screening. I also reviewed the current immunizations, and preventive care.. Future cancer screening is not clinically indicated secondary to age/goals of care.   Patient's desire to return to the community is not assessible due to cognitive impairment.    Information reviewed:  Medications, vital signs, orders, and nursing notes.  The health plan new enrollment has happened. I have reviewed the  MDS, the preventative needs,  and facility care plan. The level of care is appropriate. I have reviewed the code status/advanced directives.

## 2021-06-11 NOTE — TELEPHONE ENCOUNTER
This patient's medication list and chart were reviewed as part of the service provided by [facility].    Assessment/Recommendations:  1. (Diabetes):  Pt appears to have been restarted on Metformin early August.  Currently on 500mg twice daily.  If pt currently tolerating Metformin without adverse effects, please consider increasing to 500mg qam and 1000mg qpm for 1 week, and if this is tolerated, then increase to 1000mg twice daily.  May allow future reduction in lantus dose.  2. (GI protection):  Noted pt continues on ASA 81mg daily due to h/o left MCA CVA and Eliquis for Afib.  Continue to monitor signs/sx of bleeding, Hgb.  Due to combo of Eliquis and ASA, pt at further increased risk of bleed, and may require PPI such as Omeprazole for gi protection.   3. (Depression/anxiety):  Per chart review, pt followed by ACP.  Please discuss with ACP if Paroxetine could be cross-tapered with another agent (such as Sertraline) and d'c Paroxetine due to Paroxetine is highly anticholinergic and should be avoided in elderly.  Increased risk of confusion, sedation, falls, dry mouth, constipation, etc. With Paroxetine use.  4. (Heart failure):  Noted on problem list combined systolic and diastolic HF.  In systolic heart failure, ER formulation of metoprolol is preferred due to improved outcomes with ER formulation vs IR.  Consider changing from Metoprolol IR 25mg twice daily to Metoprolol ER 50mg once daily.      Kelin Winters Pharm.D.,[employer] [facility] [employer]  Board Certified Geriatric Pharmacist  Medication Therapy Management Pharmacist  [phone]

## 2021-06-12 NOTE — PROGRESS NOTES
[facility]     DATE: 10/7/2020     NAME:  Zach Olmos             :  1950  MRN: [id number]  CODE STATUS:  FULL CODE     VISIT TYPE: Review Of Multiple Medical Conditions (htn, dm), annual wellness exam     FACILITY:  [facility] [[id number]]        Assessment and Plan:   Type 2 DM with HTN: Accuchecks rather well controlled 187-243. On lantus daily, accuchecks bid. Hga1c 5.7 on . hga1c 5.5 on . hga1c -6.5. Hga1c up to 7.3 on 6/10 and increased lantus at that time. Per pharmacy review and recommendations will reduce lantus and increase metformin. Will recheck hga1c on 10/21. Will determine if need further lantus adjustment at that time. Discussed with her importance of diabetic foot and eye exams today. Per facility staff will be rescheduling eye exams soon, podiatry now rounding again in facility, last visit in October.   CAD, s/p CABG: No chest pain or recent concerns. On aspirin, rosuvastatin. F/u cardiology prn, no recent follow ups.  per pharmacy recommendations will discontinue aspirin as also on eliquis. Cmp, tsh on 10/21.   H/o Left MCA CVA, Right sided hemiplegia, dysphagia: Right sided hemiplegia, dysarthria, dysphagia, expressive aphasia. Wheelchair and bed bound. EZ stand to slide board for transfers. Dependent for cares. On rosuvastatin, eliquis, aspirin. F/u with neurology prn. Dysphagia on NDD3, nectar thick liquids. Per pharmacy will discontinue aspirin. Continue eliquis. ST did recently re evaluate and ordered NDD3 with thin liquid trials in isolation. Continue to monitor for signs of aspiration. Vitals stable and no recent coughing or respiratory concerns.   Anxiety and depression: On paroxetine. ACP following, feels mood stable today. Son recently visited in person on 10/3. No recent depression concerns.   PAF: Rate controlled 60s. On eliquis, metoprolol. per pharmacy  recommendations will change metoprolol to XR formulation due to improved outcomes.   ANGEL, OHS: On CPAP. Intermittently wears. No recent concerns.   Weights, obesity: [phone].Counseling on heart healthy diet, diabetic diet, lifestyle changes. Dietary consult for dietary counseling, reduced portion sizes. Unable to participate in physical activity but encouraged to be as active as possible.   Chronic CHF: [facility] reported h/o CHF. No echo or records available [facility]. No shortness of breath, no edema noted, on lasix daily. No recent exacerbations or concerns. Appears euvolemic today.   HTN: 140s. On irbesartan, metoprolol, lasix. Changed metoprolol to XR.    Encounter for preventative adult health care exam with abnormal findings    Patient has been advised of split billing requirements and indicates understanding: Yes          The patient's current medical problems were reviewed.    I have had an Advance Directives discussion with the patient.  The following are part of a depression follow up plan for the patient:  under care of mental health counselor, coping support assessment and coping support management  The following high BMI interventions were performed this visit: weight monitoring and dietary needs education  The following health maintenance schedule was reviewed with the patient and provided in printed form in the after visit summary:   Health Maintenance   Topic Date Due     DEPRESSION ACTION PLAN  1950     HEPATITIS C SCREENING  1950     HEART FAILURE ACTION PLAN  1950     DIABETIC FOOT EXAM  1950     MAMMOGRAM  1950     DIABETIC EYE EXAM  1950     MICROALBUMIN  1950     TD 18+ HE  04/13/1966     COLORECTAL CANCER SCREENING  04/13/1966     HEPATITIS B VACCINES (1 of 3 - Risk 3-dose series) 04/13/1967     ZOSTER VACCINES (1 of 2) 04/13/1998     Pneumococcal Vaccine:  65+ Years (1 of 1 - PPSV23) 04/13/2013     DXA SCAN  04/13/2013     LIPID  04/16/2020     INFLUENZA VACCINE RULE BASED (1) 08/01/2020     ALT  08/07/2020     BMP  12/10/2020     A1C  12/10/2020     CBC  06/10/2021     MEDICARE ANNUAL WELLNESS VISIT  10/07/2021     FALL RISK ASSESSMENT  10/08/2021     ADVANCE CARE PLANNING  10/07/2025     TSH  Completed     Pneumococcal Vaccine: Pediatrics (0 to 5 Years) and At-Risk Patients (6 to 64 Years)  Aged Out        Subjective:   Chief Complaint: Zach Olmos is an 70 y.o. female here for an Annual Wellness visit.     HPI:  Zach Olmos is a 70 y.o. female who is a long term care resident of [facility] and is seen today  For annual wellness visit and review of systems. She is seen in her wheelchair today about ready to eat breakfast. She says her appetite is good and she sleeps very well. She has not had any more issues with  Bloody noses and has not been wanting to use the ocean spray. Staff are wondering if this can be discontinued. She denies any headaches or visual changes. We did discuss with her diabetes the importance of regular eye and foot exams. Per staff eye doctor should be resuming in house rounds soon so she will have an eye exam soon. She says her bowels are moving fine and the spot in her groin is all healed up and not bothering her any more. She denies any itching or rash issues today. She thinks her memory is good and no further issues with words than usual. Her swallowing is doing well and thinks she is doing great with the thin liquids at times. She hopes to have these all the time soon. She denies any recent illnesses. She is glad she gets to see her son sometimes now. She denies dizziness or mood concerns. Staff report she recently had a stye on her left eyelid but appears to be resolving now. It is no longer bothering her. She denies any visual changes. Per pharmacist review and recommendations to consider stopping aspirin due to being  on eliquis and cva several years ago. Pharmacist also recommended changing metoprolol to ER formulation due to improved outcomes. Her lantus was recommended to be reduced and maximize metformin dosing for improved insulin sensitivity and diabetic control.     Review of Systems: Currently, no fever, chills, or rigors. Decreased vision. Denies any chest pain, headaches, palpitations, lightheadedness. Denies any GERD symptoms. Positive for weakness, EZ stand for transfers or slide board, dysphagia, urinary incontinence, fecal incontinence, appetite good, wheelchair bound, right sided weakness stable, aphasia, no shortness of breath,  cpap, forgetfulness    Patient Care Team:  Morenita Mosquera NP as PCP - General (Nurse Practitioner)  Sunita Blanchard RN as Utilization Review RN (Primary Care - CC)  Miesha Pineda as Case Management Specialist (Primary Care - CC)  Rosanna Sinha as Case Management Specialist  [facility] as Nursing Home Facility (Generic Provider)  Morenita Mosquera NP as Assigned PCP  Carolina Mendez SW as Lead Care Coordinator (Primary Care - CC)     Patient Active Problem List   Diagnosis     Essential hypertension     Coronary artery disease involving coronary bypass graft with angina pectoris, unspecified whether native or transplanted heart (H)     Dyslipidemia (high LDL; low HDL)     Right hemiplegia (H)     Oropharyngeal dysphagia     PAF (paroxysmal atrial fibrillation) (H)     GERD (gastroesophageal reflux disease)     ANGEL on CPAP     Anxiety and depression     Type 2 DM with CKD stage 1 and hypertension (H)     Chronic combined systolic and diastolic congestive heart failure (H)     Morbid obesity (H)     Obesity hypoventilation syndrome (H)     Granuloma annulare     Varicose veins of both legs with edema     H/O deep venous thrombosis     Epistaxis     Encounter for preventative adult health care exam with abnormal findings     Past Medical History:   Diagnosis  Date     Anemia      Anxiety      Cancer (H)     Hx of colon cancer     CHF (congestive heart failure) (H)     diastolic     Coronary artery disease     s/p cabg     CVA (cerebral vascular accident) (H)      Depression      Diabetes mellitus (H)     type 2     DVT (deep vein thrombosis) in pregnancy      Granuloma annulare      Hemiplegia (H)     R sided and minimally communicative     Hyperlipidemia      Hypertension      Obesity      Oropharyngeal dysphagia      ANGEL (obstructive sleep apnea)      Parotitis      Paroxysmal atrial fibrillation (H)      PE (pulmonary thromboembolism) (H)      Stroke (H) 12/2018    Sub acute L MCA stroke     Varicose veins of both lower extremities       Past Surgical History:   Procedure Laterality Date     CORONARY ARTERY BYPASS GRAFT      x5      Family History   Problem Relation Age of Onset     Heart disease Mother      Cancer Father         esophageal      Social History     Socioeconomic History     Marital status: Single     Spouse name: Not on file     Number of children: Not on file     Years of education: Not on file     Highest education level: Not on file   Occupational History     Not on file   Social Needs     Financial resource strain: Not on file     Food insecurity     Worry: Not on file     Inability: Not on file     Transportation needs     Medical: Not on file     Non-medical: Not on file   Tobacco Use     Smoking status: Never Smoker     Smokeless tobacco: Never Used   Substance and Sexual Activity     Alcohol use: No     Frequency: Never     Drug use: No     Sexual activity: Not on file   Lifestyle     Physical activity     Days per week: Not on file     Minutes per session: Not on file     Stress: Not on file   Relationships     Social connections     Talks on phone: Not on file     Gets together: Not on file     Attends Religion service: Not on file     Active member of club or organization: Not on file     Attends meetings of clubs or organizations: Not on  file     Relationship status: Not on file     Intimate partner violence     Fear of current or ex partner: Not on file     Emotionally abused: Not on file     Physically abused: Not on file     Forced sexual activity: Not on file   Other Topics Concern     Incontinent Yes     Toileting independently No     Cognitive impairment No     Vision impairment Yes     Hearing impairment Yes     Dentures Not Asked   Social History Narrative     Not on file      Current Outpatient Medications   Medication Sig Dispense Refill     metoprolol succinate (TOPROL-XL) 50 MG 24 hr tablet Take 50 mg by mouth daily.       acetaminophen (TYLENOL) 325 MG tablet Take 650 mg by mouth every 6 (six) hours as needed for pain.              apixaban (ELIQUIS) 5 mg Tab tablet Take 5 mg by mouth 2 (two) times a day.             artificial tears,hypromellose, (GENTEAL; SYSTANE) 0.3 % Gel Administer 1 drop to both eyes 4 (four) times a day.        furosemide (LASIX) 20 MG tablet Take 20 mg by mouth daily.              insulin glargine (LANTUS) 100 unit/mL injection Inject 15 Units under the skin at bedtime.        irbesartan (AVAPRO) 75 MG tablet Take 75 mg by mouth 2 (two) times a day.        0     metFORMIN (GLUCOPHAGE) 500 MG tablet Take 500 mg by mouth 2 (two) times a day with meals. 1000mg in am and 500mg in evening       ondansetron (ZOFRAN) 4 MG tablet Take 4 mg by mouth every 6 (six) hours as needed for nausea.       PARoxetine (PAXIL) 30 MG tablet Take 60 mg by mouth every morning.             rosuvastatin (CRESTOR) 20 MG tablet Take 20 mg by mouth every morning. PER HOSPITAL ORDERS GIVE TO PT. IN THE A.M.              No current facility-administered medications for this visit.       Objective:   Vital Signs:   Visit Vitals  /67   Pulse 64   Temp 98.5  F (36.9  C)   Resp 16   Wt 212 lb (96.2 kg)   SpO2 97%   BMI 33.20 kg/m           VisionScreening:  Declines vision screening due to upcoming eye exam    PHYSICAL EXAM    GENERAL:  Awake, Alert, not in any form of acute distress, answers questions appropriately, follows simple commands, conversant, weakness  HEENT: Head is normocephalic with normal hair distribution. No evidence of trauma. Ears: No acute purulent discharge. Eyes: Conjunctivae pink with no scleral jaundice. Nose: Normal mucosa and septum. NECK: Supple with no cervical or supraclavicular lymphadenopathy. Trachea is midline. Decreased vision and hearing, left eyelid small flesh colored lesion appears resolving, no erythema, discoloration, irritation noted  CHEST: No tenderness or deformity, no crepitus  LUNG: dim to auscultation with good chest expansion. There are no crackles or wheezes, normal AP diameter.  No recent shortness of breath or cough   BACK: No kyphosis of the thoracic spine. Symmetric, no curvature, ROM normal, no CVA tenderness, no spinal tenderness   CVS: irregularly irregular rhythm,  2+ pulses symmetric in all extremities.  ABDOMEN: Rounded and soft, nontender to palpation, non distended, no masses, no organomegaly, good bowel sounds, no rebound or guarding, no peritoneal signs.   EXTREMITIES: no edema, atraumatic  SKIN: Warm and dry, no rash today  NEURO/PSYCH: The patient is oriented to person, place and time. Forgetful, Right sided hemiplegia, dysarthria, dysphagia, expressive aphasia, EZ stand to slide board for transfers, dependent for cares, 0/3 word recall however difficult to determine if was aphasia related and not memory related      Assessment Results 10/7/2020   Activities of Daily Living 5-6 - Severe functional impairment   Instrumental Activities of Daily Living 5-6 - Severe functional impairment   Get Up and Go Score 12 seconds or more   Mini Cog Total Score 0            A Mini-Cog score of 0-2 suggests the possibility of dementia, score of 3-5 suggests no dementia  Fall Risk not completed for medical reasons.  Cognitive Screen not completed due to mental handicap.     Identified Health Risks:    Obesity, heart disease, fall risk, functional decline, potential cognitive impairment

## 2021-06-12 NOTE — PROGRESS NOTES
[facility]    DATE: 10/21/2020    NAME:  Zach Olmos             :  1950  MRN: [id number]  CODE STATUS:  FULL CODE    VISIT TYPE: Problem Visit (f/u on ear pain, jaw pain)     FACILITY:  [facility] [[id number]]       CHIEF COMPLAIN/REASON FOR VISIT:    Chief Complaint   Patient presents with     Problem Visit     f/u on ear pain, jaw pain               HISTORY OF PRESENT ILLNESS: Zach Olmos is a 70 y.o. female who is a long term care resident of [facility].     Today Ms. Olmos is seen for follow up on ear and jaw pain today. She is seen in bed today in her room. She says the ear drops and nasal spray do seem to be helping. She is not feeling as much pressure in her ear. She thinks the jaw pain is better as well. She is able to point to where it was hurting today. She is not having any issues hearing out of the left ear. She says she is not coughing or having any sore throat. She denies any runny nose or nasal congestion. She denies pain with pressure applied to sinus cavities today. She denies any pain or issues in her mouth  Or with chewing. She thinks she is getting better. Staff report she has been complaining less of pain and pressure in her left ear. She has not complained of the jaw pain recently. Her temps have been normal as have her other vitals. She has not been noted to have any issues chewing.     REVIEW OF SYSTEMS:  PROBLEMS AND REVIEW OF SYSTEMS:   Today on ROS:   Currently, no fever, chills, or rigors. Decreased vision. Denies any chest pain, headaches, palpitations, lightheadedness. Denies any GERD symptoms. Positive for weakness, EZ stand for transfers or slide board, dysphagia, urinary incontinence, fecal incontinence, appetite good, wheelchair bound, right sided weakness stable, aphasia, no shortness of breath,  Cpap, improving left ear and jaw pain      Allergies   Allergen Reactions     Aricept [Donepezil]       Atorvastatin      Glipizide      Lisinopril      Current Outpatient Medications   Medication Sig     acetaminophen (TYLENOL) 325 MG tablet Take 650 mg by mouth every 6 (six) hours as needed for pain.            apixaban (ELIQUIS) 5 mg Tab tablet Take 5 mg by mouth 2 (two) times a day.           artificial tears,hypromellose, (GENTEAL; SYSTANE) 0.3 % Gel Administer 1 drop to both eyes 4 (four) times a day.      furosemide (LASIX) 20 MG tablet Take 20 mg by mouth daily.            insulin glargine (LANTUS) 100 unit/mL injection Inject 15 Units under the skin at bedtime.      irbesartan (AVAPRO) 75 MG tablet Take 75 mg by mouth 2 (two) times a day.           metFORMIN (GLUCOPHAGE) 500 MG tablet Take 500 mg by mouth 2 (two) times a day with meals. 1000mg in am and 500mg in evening     metoprolol succinate (TOPROL-XL) 50 MG 24 hr tablet Take 50 mg by mouth daily.     ondansetron (ZOFRAN) 4 MG tablet Take 4 mg by mouth every 6 (six) hours as needed for nausea.     PARoxetine (PAXIL) 30 MG tablet Take 60 mg by mouth every morning.           rosuvastatin (CRESTOR) 20 MG tablet Take 20 mg by mouth every morning. PER HOSPITAL ORDERS GIVE TO PT. IN THE A.M.            Past Medical History:    Past Medical History:   Diagnosis Date     Anemia      Anxiety      Cancer (H)     Hx of colon cancer     CHF (congestive heart failure) (H)     diastolic     Coronary artery disease     s/p cabg     CVA (cerebral vascular accident) (H)      Depression      Diabetes mellitus (H)     type 2     DVT (deep vein thrombosis) in pregnancy      Granuloma annulare      Hemiplegia (H)     R sided and minimally communicative     Hyperlipidemia      Hypertension      Obesity      Oropharyngeal dysphagia      ANGEL (obstructive sleep apnea)      Parotitis      Paroxysmal atrial fibrillation (H)      PE (pulmonary thromboembolism) (H)      Stroke (H) 12/2018    Sub acute L MCA stroke     Varicose veins of both lower extremities         IMMUNIZATIONS:    There is no immunization history on file for this patient.  Above immunizations pulled from [facility]. Facility records also reconciled. Outstanding information sent to [facility] to update [facility].  Future immunizations are not needed at this point as all recommended immunizations are up to date.     PHYSICAL EXAMINATION  Vitals:    10/21/20 0700   BP: 146/65   Pulse: 61   Resp: 17   Temp: 98.4  F (36.9  C)   SpO2: 98%   Weight: 214 lb (97.1 kg)       Today on physical exam:     GENERAL: Awake, Alert, not in any form of acute distress, answers most questions appropriately, follows simple commands  HEENT: Head is normocephalic with normal hair distribution. No evidence of trauma. Ears: No acute purulent discharge. Eyes: Conjunctivae pink with no scleral jaundice. Nose: Normal mucosa and septum. NECK: Supple with no cervical or supraclavicular lymphadenopathy. Trachea is midline. Decreased vision and hearing, left ear pinna no signs of erythema, ecchymosis, edema, or inflammation, no pain with manipulation of pinna, ear canal clear, minimal cerumen present, TM pearly gray, no signs of inflammation or infection on left  CHEST: No tenderness or deformity, no crepitus  LUNG: dim to auscultation with good chest expansion. There are no crackles or wheezes, normal AP diameter.  No recent shortness of breath or cough   BACK: No kyphosis of the thoracic spine. Symmetric, no curvature, ROM normal, no CVA tenderness, no spinal tenderness   CVS: irregularly irregular rhythm,  2+ pulses symmetric in all extremities.  ABDOMEN: Rounded and soft, nontender to palpation, non distended, no masses, no organomegaly, good bowel sounds, no rebound or guarding, no peritoneal signs.   EXTREMITIES: no edema, atraumatic  SKIN: Warm and dry, no rash today  NEURO/PSYCH: The patient is oriented to person, place and time. Forgetful at times, Right sided hemiplegia, dysarthria, dysphagia,  expressive aphasia, EZ stand to slide board for transfers, dependent for cares            LABS:   No results found for this or any previous visit (from the past 168 hour(s)).  Results for orders placed or performed in visit on 06/10/20   Basic Metabolic Panel   Result Value Ref Range    Sodium 140 136 - 145 mmol/L    Potassium 4.3 3.5 - 5.0 mmol/L    Chloride 103 98 - 107 mmol/L    CO2 29 22 - 31 mmol/L    Anion Gap, Calculation 8 5 - 18 mmol/L    Glucose 121 70 - 125 mg/dL    Calcium 9.2 8.5 - 10.5 mg/dL    BUN 18 8 - 28 mg/dL    Creatinine 0.85 0.60 - 1.10 mg/dL    GFR MDRD Af Amer >60 >60 mL/min/1.73m2    GFR MDRD Non Af Amer >60 >60 mL/min/1.73m2         Lab Results   Component Value Date    WBC 8.1 06/10/2020    HGB 12.3 06/10/2020    HCT 38.0 06/10/2020    MCV 91 06/10/2020     06/10/2020       No results found for: [id number]  Lab Results   Component Value Date    HGBA1C 7.3 (H) 06/10/2020     No results found for: INR, PROTIME  No results found for: [id number]  Lab Results   Component Value Date    TSH 1.24 06/10/2020           ASSESSMENT/PLAN:    Left ear effusion, Sinusitis: left ear and jaw pain appear improved with flonase and ciprodex. On exam the effusion and inflammation in left ear appear resolving. No signs of mastoiditis, no pain with  Pressure to sinus cavities. No cervical lymphadenopathy noted today. Continue flonase and ciprodex for 7 days. Notify if begins developing symptoms again after completed.   Type 2 DM with HTN: Accuchecks better controlled 100-200 recently. On lantus two times a day, accuchecks bid. Hga1c 5.7 on 8/7. hga1c 5.5 on 11/8. hga1c 2/26-6.5. continue lantus. Hga1c up to 7.3 on 6/10.   CAD, s/p CABG: No chest pain or recent concerns. On aspirin, rosuvastatin. F/u cardiology.    H/o Left MCA CVA: Right sided hemiplegia, dysarthria, dysphagia, expressive aphasia. Wheelchair and bed bound. EZ stand to slide board for transfers. Dependent for cares. On rosuvastatin,  aspirin. F/u with neurology prn. Dysphagia on NDD3, nectar thick liquids, trialing thin liquids. No current concerns.   Anxiety and depression: On paroxetine. ACP following, feels mood stable today.   Dysphagia: NDD3 nectar thick liquids. No recent concerns.   PAF: Rate controlled 60s. On eliquis, metoprolol. Well controlled.    ANGEL, OHS: On CPAP. Refusing at times. No concerns today.   Weights, obesity: [phone]. Monitor. Counseling on heart healthy diet, diabetic diet,  Continues to have weight gain. Immobility and increased intake. Counseling provided   Chronic CH[facility]: [facility] h/o CHF. No echo or records available [facility]. No shortness of breath, no edema noted today, on lasix daily. Appears euvolemic on exam   HTN: 140s. On irbesartan, metoprolol, lasix. Stable.     CMP, hm1, tsh, hga1c on 6/10    Electronically signed by: Morenita Mosquera NP    Case Management:  I have reviewed the facility/SNF care plan/MDS which was done 9/24/20, including the falls risk, nutrition and pain screening. I also reviewed the current immunizations, and preventive care.. Future cancer screening is not clinically indicated secondary to age/goals of care.   Patient's desire to return to the community is not assessible due to cognitive impairment.    Information reviewed:  Medications, vital signs, orders, and nursing notes.  The health plan new enrollment has happened. I have reviewed the  MDS, the preventative needs,  and facility care plan. The level of care is appropriate. I have reviewed the code status/advanced directives.

## 2021-06-12 NOTE — PROGRESS NOTES
[facility]    DATE: 10/26/2020    NAME:  Zach Olmos             :  1950  MRN: [id number]  CODE STATUS:  FULL CODE    VISIT TYPE: Problem Visit (eye blister)     FACILITY:  [facility] [[id number]]       CHIEF COMPLAIN/REASON FOR VISIT:    Chief Complaint   Patient presents with     Problem Visit     eye blister               HISTORY OF PRESENT ILLNESS: Zach Olmos is a 70 y.o. female who is a long term care resident of [facility].     Today Ms. Olmos is seen today per nursing request for assessment of blister on left eyelid. This appeared a few weeks ago and initially was felt to be improving but now is larger in size and more fluid filled again. She denies any pain or discomfort from it. She has not been noted to be scratching this area. She is seen in bed today with nursing at bedside. She says her eyelid does not bother her at all and she cannot tell there is anything there. However on palpation of the blister she does report tenderness. We discussed whether they have been doing warm packs and she is not sure. Nursing says they are intermittently. We discussed options for treatment and decided to treat with warm packs three times daily for 7 days and if no improvement to update me and we will discuss alternative options for treatment. She verbalized understanding. She does not want to have to go out for an appointment if she does not have to.       REVIEW OF SYSTEMS:  PROBLEMS AND REVIEW OF SYSTEMS:   Today on ROS:   Currently, no fever, chills, or rigors. Decreased vision. Denies any chest pain, headaches, palpitations, lightheadedness. Denies any GERD symptoms. Positive for weakness, EZ stand for transfers or slide board, dysphagia, urinary incontinence, fecal incontinence, appetite good, wheelchair bound, right sided weakness stable, aphasia, no shortness of breath,  Cpap, no further ear or jaw pain, blister on left  eyelid      Allergies   Allergen Reactions     Aricept [Donepezil]      Atorvastatin      Glipizide      Lisinopril      Current Outpatient Medications   Medication Sig     acetaminophen (TYLENOL) 325 MG tablet Take 650 mg by mouth every 6 (six) hours as needed for pain.            apixaban (ELIQUIS) 5 mg Tab tablet Take 5 mg by mouth 2 (two) times a day.           artificial tears,hypromellose, (GENTEAL; SYSTANE) 0.3 % Gel Administer 1 drop to both eyes 4 (four) times a day.      furosemide (LASIX) 20 MG tablet Take 20 mg by mouth daily.            insulin glargine (LANTUS) 100 unit/mL injection Inject 15 Units under the skin at bedtime.      irbesartan (AVAPRO) 75 MG tablet Take 75 mg by mouth 2 (two) times a day.           metFORMIN (GLUCOPHAGE) 500 MG tablet Take 500 mg by mouth 2 (two) times a day with meals. 1000mg in am and 500mg in evening     metoprolol succinate (TOPROL-XL) 50 MG 24 hr tablet Take 50 mg by mouth daily.     ondansetron (ZOFRAN) 4 MG tablet Take 4 mg by mouth every 6 (six) hours as needed for nausea.     PARoxetine (PAXIL) 30 MG tablet Take 60 mg by mouth every morning.           rosuvastatin (CRESTOR) 20 MG tablet Take 20 mg by mouth every morning. PER HOSPITAL ORDERS GIVE TO PT. IN THE A.M.            Past Medical History:    Past Medical History:   Diagnosis Date     Anemia      Anxiety      Cancer (H)     Hx of colon cancer     CHF (congestive heart failure) (H)     diastolic     Coronary artery disease     s/p cabg     CVA (cerebral vascular accident) (H)      Depression      Diabetes mellitus (H)     type 2     DVT (deep vein thrombosis) in pregnancy      Granuloma annulare      Hemiplegia (H)     R sided and minimally communicative     Hyperlipidemia      Hypertension      Obesity      Oropharyngeal dysphagia      ANGEL (obstructive sleep apnea)      Parotitis      Paroxysmal atrial fibrillation (H)      PE (pulmonary thromboembolism) (H)      Stroke (H) 12/2018    Sub acute L MCA  stroke     Varicose veins of both lower extremities        IMMUNIZATIONS:    There is no immunization history on file for this patient.  Above immunizations pulled from [facility]. Facility records also reconciled. Outstanding information sent to [facility] to update [facility].  Future immunizations are not needed at this point as all recommended immunizations are up to date.     PHYSICAL EXAMINATION  Vitals:    10/26/20 0700   BP: 124/60   Pulse: 61   Resp: 16   Temp: 98.4  F (36.9  C)   SpO2: 94%       Today on physical exam:     GENERAL: Awake, Alert, not in any form of acute distress, answers most questions appropriately, follows simple commands  HEENT: Head is normocephalic with normal hair distribution. No evidence of trauma. Ears: No acute purulent discharge. Eyes: Conjunctivae pink with no scleral jaundice. Nose: Normal mucosa and septum. NECK: Supple with no cervical or supraclavicular lymphadenopathy. Trachea is midline. Decreased vision and hearing, left eyelid small fluid filled vesicle noted, no erythema, no warmth, no drainage, no changes to vision, tender to palpation but nontender without palpation  CHEST: No tenderness or deformity, no crepitus  LUNG: dim to auscultation with good chest expansion. There are no crackles or wheezes, normal AP diameter.  No recent shortness of breath or cough   BACK: No kyphosis of the thoracic spine. Symmetric, no curvature, ROM normal, no CVA tenderness, no spinal tenderness   CVS: irregularly irregular rhythm,  2+ pulses symmetric in all extremities.  ABDOMEN: Rounded and soft, nontender to palpation, non distended, no masses, no organomegaly, good bowel sounds, no rebound or guarding, no peritoneal signs.   EXTREMITIES: no edema, atraumatic  SKIN: Warm and dry, no rash today  NEURO/PSYCH: The patient is oriented to person, place and time. Forgetful at times, Right sided hemiplegia, dysarthria, dysphagia, expressive aphasia, EZ stand to  slide board for transfers, dependent for cares            LABS:   Recent Results (from the past 168 hour(s))   Glycosylated Hemoglobin A1C   Result Value Ref Range    Hemoglobin A1c 7.4 (H) <=5.6 %   Thyroid Stimulating Hormone (TSH)   Result Value Ref Range    TSH 1.21 0.30 - 5.00 uIU/mL   HM1 (CBC with Diff)   Result Value Ref Range    WBC 7.2 4.0 - 11.0 thou/uL    RBC 4.01 3.80 - 5.40 mill/uL    Hemoglobin 11.6 (L) 12.0 - 16.0 g/dL    Hematocrit 35.9 35.0 - 47.0 %    MCV 90 80 - 100 fL    MCH 28.9 27.0 - 34.0 pg    MCHC 32.3 32.0 - 36.0 g/dL    RDW 13.8 11.0 - 14.5 %    Platelets 204 140 - 440 thou/uL    MPV 11.1 8.5 - 12.5 fL    Neutrophils % 61 50 - 70 %    Lymphocytes % 28 20 - 40 %    Monocytes % 7 2 - 10 %    Eosinophils % 3 0 - 6 %    Basophils % 0 0 - 2 %    Immature Granulocyte % 0 <=0 %    Neutrophils Absolute 4.4 2.0 - 7.7 thou/uL    Lymphocytes Absolute 2.0 0.8 - 4.4 thou/uL    Monocytes Absolute 0.5 0.0 - 0.9 thou/uL    Eosinophils Absolute 0.2 0.0 - 0.4 thou/uL    Basophils Absolute 0.0 0.0 - 0.2 thou/uL    Immature Granulocyte Absolute 0.0 <=0.0 thou/uL     Results for orders placed or performed in visit on 06/10/20   Basic Metabolic Panel   Result Value Ref Range    Sodium 140 136 - 145 mmol/L    Potassium 4.3 3.5 - 5.0 mmol/L    Chloride 103 98 - 107 mmol/L    CO2 29 22 - 31 mmol/L    Anion Gap, Calculation 8 5 - 18 mmol/L    Glucose 121 70 - 125 mg/dL    Calcium 9.2 8.5 - 10.5 mg/dL    BUN 18 8 - 28 mg/dL    Creatinine 0.85 0.60 - 1.10 mg/dL    GFR MDRD Af Amer >60 >60 mL/min/1.73m2    GFR MDRD Non Af Amer >60 >60 mL/min/1.73m2         Lab Results   Component Value Date    WBC 7.2 10/21/2020    HGB 11.6 (L) 10/21/2020    HCT 35.9 10/21/2020    MCV 90 10/21/2020     10/21/2020       No results found for: [id number]  Lab Results   Component Value Date    HGBA1C 7.4 (H) 10/21/2020     No results found for: INR, PROTIME  No results found for: [id number]  Lab Results   Component Value Date     TSH 1.21 10/21/2020           ASSESSMENT/PLAN:    Left eyelid Blister: small fluid filled blister noted on left eyelid, no warmth, no drainage, no erythema. No signs of infection. nontender unless being palpated. Will order warm packs three times a day x 7 days for reabsorption. If no improvement or enlarging will discuss further options next week. May need referral to ophthalmology for drainage.   Type 2 DM with HTN: Accuchecks controlled 193-258. On lantus two times a day, accuchecks bid. Hga1c 5.7 on 8/7. hga1c 5.5 on 11/8. hga1c 2/26-6.5. continue lantus. Hga1c up to 7.3 on 6/10.   CAD, s/p CABG: No chest pain or recent concerns. On aspirin, rosuvastatin. F/u cardiology.    H/o Left MCA CVA: Right sided hemiplegia, dysarthria, dysphagia, expressive aphasia. Wheelchair and bed bound. EZ stand to slide board for transfers. Dependent for cares. On rosuvastatin, aspirin. F/u with neurology prn. Dysphagia on NDD3, nectar thick liquids, trialing thin liquids. No current concerns.   Anxiety and depression: On paroxetine. ACP following, feels mood stable today.   Dysphagia: NDD3 nectar thick liquids. No recent concerns.   PAF: Rate controlled 60s. On eliquis, metoprolol. Well controlled.    ANGEL, OHS: On CPAP. Refusing at times. No concerns today.   Weights, obesity: [phone]. Monitor. Counseling on heart healthy diet, diabetic diet,  Continues to have weight gain. Immobility and increased intake. Counseling provided   Chronic CHF: [facility] h/o CHF. No echo or records available [facility]. No shortness of breath, no edema noted today, on lasix daily. Appears euvolemic on exam   HTN: 120s. On irbesartan, metoprolol, lasix. Stable.     CMP, hm1, tsh, hga1c on 6/10    Electronically signed by: Morenita Mosquera NP    Case Management:  I have reviewed the facility/SNF care plan/MDS which was done 9/24/20, including the falls risk,  nutrition and pain screening. I also reviewed the current immunizations, and preventive care.. Future cancer screening is not clinically indicated secondary to age/goals of care.   Patient's desire to return to the community is not assessible due to cognitive impairment.    Information reviewed:  Medications, vital signs, orders, and nursing notes.  The health plan new enrollment has happened. I have reviewed the  MDS, the preventative needs,  and facility care plan. The level of care is appropriate. I have reviewed the code status/advanced directives.

## 2021-06-12 NOTE — PROGRESS NOTES
[facility]   Chronic Care Management Note      Facility: [facility]    Facility Type: LTC    Allergies:   Allergies   Allergen Reactions     Aricept [Donepezil]      Atorvastatin      Glipizide      Lisinopril        Reason for call: Chronic Care Management and Care Coordination  **Due to COVID-19 Pandemic, This Assessment was carried out over telephone with the facility team and patient**    Case Management:  I have reviewed the facility/SNF care plan/MDS which was done 9/24/20, including the falls risk, nutrition and pain screening. I also reviewed the current immunizations, and preventive care.. Future cancer screening is not clinically indicated secondary to age/goals of care.   Patient's desire to return to the community is present, but is not able due to care needs .    Advance Directive Discussion:    I reviewed the current advanced directives as reflected in EPIC and the facility chart. I did review the advance directives with the resident.     Team Discussion:  I communicated with the appropriate disciplines involved with the Plan of Care:   Nursing   and       Patient Goal:  Patient's goal is pain control and comfort.    Information reviewed:  Medications, vital signs, orders, and nursing notes.    Consent for care management from Zach. Patient and family aware of potential for cost sharing.   Care coordinator: Morenita Mosquera NP  Availability 24/7 via facility staff, [facility] office number [phone], virtual visits also available via OOTU and google duo.       Medications reviewed and reconciled 10/27/20      1. Chronic combined systolic and diastolic congestive heart failure (H)     2. Coronary artery disease involving coronary bypass graft with angina pectoris, unspecified whether native or transplanted heart (H)     3. Essential hypertension     4. Oropharyngeal dysphagia     5. PAF (paroxysmal atrial fibrillation) (H)     6.  ANGEL on CPAP     7. Type 2 DM with CKD stage 1 and hypertension (H)     8. Anxiety and depression     9. Morbid obesity (H)     10. Right hemiplegia (H)     11. H/O: CVA (cerebrovascular accident)         Expected outcomes and prognosis: Prognosis good, expected outcome maintenance of current health status.      Treatment Goals: Maintain euvolemic status with adequate CHF management to prevent hospitalizations. Rate management of Atrial fibrillation in  range. Prevent DVT, PE, or CVA from Atrial fibrillation with appropriate anticoagulation management. Prevent bleeding risk with appropriate anticoagulation management and monitoring. CAD management in order to prevent MI or worsening heart failure. Adequate blood pressure management with goal of SBP <160 or >90. Compliance with CPAP for ANGEL. Appropriate management of depression with optimization of mood stability, noo suicidal ideation, PHQ9 goal 9 or less. Diabetic management with goal HGa1c of 7. Adherence to diabetic diet and prevention of hypo and hyperglycemia. Safe environment maintained with prevention of falls and injury  As much as possible. Adherence to COVID 19 infection control guidelines per MD and CDC recommendations. Weight loss management, lifestyle modification with optimization of weight.     Treatment Interventions, Symptom Management: Weekly weights, notify for weight change > 5lbs, notify for signs of fluid overload including increased shortness of breath, hypoxia, increased leg edema. Continue current regimen for A fib management. Regular follow ups with cardiology at least annually or more if recommended. Encourage adherence to cardiac diet, educate on importance of lifestyle modifications in management of comorbidities. Monitor for signs and symptoms of bleeding or clotting issues.  Encourage weight loss program and obesity management with dietitian. ACP counseling, volunteer services as needed for depression and anxiety management.  Continue current med regimen not felt appropriate for GDR at this time due to concerns of mood instability at times. Continue current diabetes regimen with routine labs q6 months and hga1c q3 months.     Follow up: PCP q30-60 days, chronic care management monthly. F/u with  Neurology prn. Eye exam annually, dentist bi annually. Podiatry q3-6  Months.       Electronically signed by:  Morenita Mosquera NP

## 2021-06-12 NOTE — PROGRESS NOTES
"  [facility]   Telephone Visit      CHIEF COMPLAINT/REASON FOR VISIT:  Chief Complaint   Patient presents with     Problem Visit     stye on eye     Zach Olmos is a 70 y.o. female who is being evaluated via a billable telephone visit due to the restrictions of the Covid-19 pandemic.     The patient has been notified of the following:     \"This is a telephone visit call between you and your physician/provider. We have found that certain health care needs can be provided without the need for a physical exam.  This service lets us provide the care you need with a short phone conversation.  If a prescription is necessary we can send it to the facility team.  If lab work is needed we can place an order through the facility team to have that test done at a later time.    If during the course of the call the physician/provider feels a telephone visit is not appropriate, you will not be charged for this service.\"     Zach Olmos complains of    Chief Complaint   Patient presents with     Problem Visit     stye on eye       HPI:   Zach is a 70 y.o. female who is a long term care resident of [facility].     Today nursing notified of persistent stye on her left eyelid. She was complaining more about this over the weekend and nursing had notified manager. Nursing reports today it is more flesh colored and clear. She says it is not painful and no changes to her vision. It does itch at times. She denies of any drainage, crusting or other concerns. Nursing denies any signs of infection. We discussed ordering warm moist cloth or warm pack three times daily to assist with reabsorption.     I have reviewed and updated the patient's Past Medical History, Social History, Family History and Medication List.    ALLERGIES  Aricept [donepezil], Atorvastatin, Glipizide, and Lisinopril    Review of Systems  Currently, no fever, chills, or rigors. Decreased vision. Denies any chest pain, " headaches, palpitations, lightheadedness. Denies any GERD symptoms. Positive for weakness, EZ stand for transfers or slide board, dysphagia, urinary incontinence, fecal incontinence, appetite good, wheelchair bound, right sided weakness stable, aphasia, no shortness of breath,  cpap, forgetfulness, sty on eye    Vitals:    10/12/20 0700   BP: 124/75   Pulse: (!) 101   Resp: 16   Temp: 98  F (36.7  C)   SpO2: 93%         Labs:  None applicable    Additional provider notes: discussed with nursing    Assessment/Plan:    ICD-10-CM    1. 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 Hordeolum externum of left upper eyelid  H00.014    3. Chronic combined systolic and diastolic congestive heart failure (H)  I50.42           Phone call duration:  15 minutes, additional 10 min spent in counseling and coordination of care.     Morenita Mosquera NP

## 2021-06-12 NOTE — PROGRESS NOTES
"  [facility]   Telephone Visit      CHIEF COMPLAINT/REASON FOR VISIT:  Chief Complaint   Patient presents with     Problem Visit     left ear pain     Zach Olmos is a 70 y.o. female who is being evaluated via a billable telephone visit due to the restrictions of the Covid-19 pandemic.     The patient has been notified of the following:     \"This is a telephone visit call between you and your physician/provider. We have found that certain health care needs can be provided without the need for a physical exam.  This service lets us provide the care you need with a short phone conversation.  If a prescription is necessary we can send it to the facility team.  If lab work is needed we can place an order through the facility team to have that test done at a later time.    If during the course of the call the physician/provider feels a telephone visit is not appropriate, you will not be charged for this service.\"     Zach Olmos complains of    Chief Complaint   Patient presents with     Problem Visit     left ear pain       HPI:   Zach is a 70 y.o. female who is a long term care resident of [facility].     Nursing notes she has been complaining of left ear pain over the weekend. She is even complaining when her outer ear is manipulated. Nursing notes they examined her ear with otoscope and did not see any redness or inflammation. She is complaining of pain again today. She is not having any fever or cough. Nursing brought phone into her room and Zach was asked if she could describe the pain in her ear. She says it just hurts. She is also pointing to her jaw line per nursing report. She denies having any nasal congestion, runny nose, sore throat, cough, fever, headache, sinus pressure but she is noted by nursing to be a poor historian. She has difficulty with verbal communication. Zach does say the pain is in her ear and around it. She is unable to give further description. " She thinks it has been there a few days. She can still hear ok. She is not sure if it is pressure she feels. She denies difficulty sleeping or laying on the ear. She denies any other problems today.     I have reviewed and updated the patient's Past Medical History, Social History, Family History and Medication List.    ALLERGIES  Aricept [donepezil], Atorvastatin, Glipizide, and Lisinopril    Review of Systems   Currently, no fever, chills, or rigors. Decreased vision. Denies any chest pain, headaches, palpitations, lightheadedness. Denies any GERD symptoms. Positive for weakness, EZ stand for transfers or slide board, dysphagia, urinary incontinence, fecal incontinence, appetite good, wheelchair bound, right sided weakness stable, aphasia, no shortness of breath,  cpap, forgetfulness, left ear pain, left jaw pain    Vitals:    10/19/20 0700   BP: 112/59   Pulse: 61   Resp: 19   Temp: 97.3  F (36.3  C)   SpO2: 96%         Labs:  N/A    Assessment/Plan:    ICD-10-CM    1. Acute non-recurrent sinusitis, unspecified location  J01.90    2. OME (otitis media with effusion), left  H65.92    3. Left ear pain  H92.02       DC ocean spray  Flonase 50mcg/spray 1 spray each nostril daily x 7 days dx sinusitis  Ciprodex 0.3%/0.1% 4 gtts in left ear bid x 7 days dx Otitis media effusion  Please notify if symptoms of left ear pain not improved within 2-3 days      Phone call duration:  20 minutes with additional 5 min spent on counseling and coordination of care for sinusitis, ear pain.    Morenita Mosquera NP

## 2021-06-12 NOTE — TELEPHONE ENCOUNTER
[facility] Nurse Triage Telephone Note      Provider: CHANDRA Pedroza  Facility: [facility]    Facility Type: LTC    Caller: Emilia  Call Back Number:  [phone]    Allergies: Aricept [donepezil], Atorvastatin, Glipizide, and Lisinopril    Reason for call: Nurse calling to report Heme 1, TSH, and HgbA1c results from 10/21/20.  Notable meds:  Eliquis 5mg two times a day, Lasix 20mg daily, Irbesartan 75mg two times a day, Metformin 1000mg Q AM and 500mg Q HS, Metoprolol XR 50mg daily.       Verbal Order/Direction given by Provider: No new orders.      Provider giving order: CHANDRA Pedroza    Verbal order given to: Libby Martin RN

## 2021-06-12 NOTE — TELEPHONE ENCOUNTER
[facility] Nurse Triage Telephone Note      Provider: CHANDRA Pedroza  Facility: [facility]    Facility Type: [facility]    Caller: Román(nurse manager)  Call Back Number:  [phone]    Allergies: Aricept [donepezil], Atorvastatin, Glipizide, and Lisinopril    Reason for call: Nurse calling on behalf patient's family request to upgrade patient's diet.  Patient's family has been noted to be bringing in food that does not comply with patient's current diet orders which are diabetic/NDD3 with nectar thick liquids.  Patient's son feels that her diet can be advanced.  Of note, the ombudsman and  have been involved.         Verbal Order/Direction given by Provider: Speech therapy to eval and treat.  Make sure the Ombudsman is aware of family non-compliance and going against the medical directive.        Provider giving order: LILLIAN Suggs    Verbal order given to: Román Martin RN

## 2021-06-12 NOTE — PROGRESS NOTES
[facility]    DATE: 2020    NAME:  Zach Olmos             :  1950  MRN: [id number]  CODE STATUS:  FULL CODE    VISIT TYPE: Problem Visit (blisters on eye lids)     FACILITY:  [facility] [[id number]]       CHIEF COMPLAIN/REASON FOR VISIT:    Chief Complaint   Patient presents with     Problem Visit     blisters on eye lids               HISTORY OF PRESENT ILLNESS: Zach Olmos is a 70 y.o. female who is a long term care resident of [facility].     Today Ms. Olmos is seen for new blisters on her eyelids. She started with the one small one on her left eyelid but now this is enlarging. She developed a new small one to the right of her right eyelid. Staff are concerned that the warm packs are not working. On exam she is seen in her wheelchair. She says she was not sure if the blisters were getting worse or developing new ones. She now has 3 new small ones on her left eyelid in addition to the original blister on exam. She says they do  Not hurt unless touched. They are not open or draining. She says sometimes they are uncomfortable or itching but there is no redness or warmth. She denies every having anything like this before. We discussed different options and that we have tried conservative treatment but at this time with new and worsening blisters and no identifiable cause for this we will refer her to an eye doctor. She agrees to this and thinks her son can take her. We reviewed her blood sugars and she has been running high 206-317 recently and will also adjust her lantus today. Discussed with nursing staff given the new and worsening blisters, no signs of infection and not responding to conservative treatments will refer to ophthalmology for further evaluation.       REVIEW OF SYSTEMS:  PROBLEMS AND REVIEW OF SYSTEMS:   Today on ROS:   Currently, no fever, chills, or rigors. Decreased vision. Denies any chest pain, headaches,  palpitations, lightheadedness. Denies any GERD symptoms. Positive for weakness, EZ stand for transfers or slide board, dysphagia, urinary incontinence, fecal incontinence, appetite good, wheelchair bound, right sided weakness stable, aphasia, no shortness of breath,  Cpap, no further ear or jaw pain, blisters on both eyelids      Allergies   Allergen Reactions     Aricept [Donepezil]      Atorvastatin      Glipizide      Lisinopril      Current Outpatient Medications   Medication Sig     acetaminophen (TYLENOL) 325 MG tablet Take 650 mg by mouth every 6 (six) hours as needed for pain.            apixaban (ELIQUIS) 5 mg Tab tablet Take 5 mg by mouth 2 (two) times a day.           artificial tears,hypromellose, (GENTEAL; SYSTANE) 0.3 % Gel Administer 1 drop to both eyes 4 (four) times a day.      furosemide (LASIX) 20 MG tablet Take 20 mg by mouth daily.            insulin glargine (LANTUS) 100 unit/mL injection Inject 15 Units under the skin at bedtime.      irbesartan (AVAPRO) 75 MG tablet Take 75 mg by mouth 2 (two) times a day.           metFORMIN (GLUCOPHAGE) 500 MG tablet Take 500 mg by mouth 2 (two) times a day with meals. 1000mg in am and 500mg in evening     metoprolol succinate (TOPROL-XL) 50 MG 24 hr tablet Take 50 mg by mouth daily.     ondansetron (ZOFRAN) 4 MG tablet Take 4 mg by mouth every 6 (six) hours as needed for nausea.     PARoxetine (PAXIL) 30 MG tablet Take 60 mg by mouth every morning.           rosuvastatin (CRESTOR) 20 MG tablet Take 20 mg by mouth every morning. PER HOSPITAL ORDERS GIVE TO PT. IN THE A.M.            Past Medical History:    Past Medical History:   Diagnosis Date     Anemia      Anxiety      Cancer (H)     Hx of colon cancer     CHF (congestive heart failure) (H)     diastolic     Coronary artery disease     s/p cabg     CVA (cerebral vascular accident) (H)      Depression      Diabetes mellitus (H)     type 2     DVT (deep vein thrombosis) in pregnancy      Granuloma  annulare      Hemiplegia (H)     R sided and minimally communicative     Hyperlipidemia      Hypertension      Obesity      Oropharyngeal dysphagia      ANGEL (obstructive sleep apnea)      Parotitis      Paroxysmal atrial fibrillation (H)      PE (pulmonary thromboembolism) (H)      Stroke (H) 12/2018    Sub acute L MCA stroke     Varicose veins of both lower extremities        IMMUNIZATIONS:    There is no immunization history on file for this patient.  Above immunizations pulled from [facility] [employer]. Facility records also reconciled. Outstanding information sent to [facility] to update [facility] [employer].  Future immunizations are not needed at this point as all recommended immunizations are up to date.     PHYSICAL EXAMINATION  Vitals:    11/02/20 0700   BP: 135/58   Pulse: 65   Resp: 20   Temp: 97.8  F (36.6  C)   SpO2: 94%       Today on physical exam:     GENERAL: Awake, Alert, not in any form of acute distress, answers most questions appropriately, follows simple commands  HEENT: Head is normocephalic with normal hair distribution. No evidence of trauma. Ears: No acute purulent discharge. Eyes: Conjunctivae pink with no scleral jaundice. Nose: Normal mucosa and septum. NECK: Supple with no cervical or supraclavicular lymphadenopathy. Trachea is midline. Decreased vision and hearing, left eyelid enlarging fluid filled vesicle noted with 3 new small clear filled vesicles now present on left eyelid, right eyelid has one moderate sized clear filled vesicle to the right edge of eyelid, no erythema, no warmth, no drainage, no changes to vision, tender to palpation but nontender without palpation  CHEST: No tenderness or deformity, no crepitus  LUNG: dim to auscultation with good chest expansion. There are no crackles or wheezes, normal AP diameter.  No recent shortness of breath or cough   BACK: No kyphosis of the thoracic spine. Symmetric, no curvature, ROM normal, no CVA tenderness, no spinal tenderness    CVS: irregularly irregular rhythm,  2+ pulses symmetric in all extremities.  ABDOMEN: Rounded and soft, nontender to palpation, non distended, no masses, no organomegaly, good bowel sounds, no rebound or guarding, no peritoneal signs.   EXTREMITIES: no edema, atraumatic  SKIN: Warm and dry, no rash today  NEURO/PSYCH: The patient is oriented to person, place and time. Forgetful at times, Right sided hemiplegia, dysarthria, dysphagia, expressive aphasia, EZ stand to slide board for transfers, dependent for cares            LABS:   No results found for this or any previous visit (from the past 168 hour(s)).  Results for orders placed or performed in visit on 06/10/20   Basic Metabolic Panel   Result Value Ref Range    Sodium 140 136 - 145 mmol/L    Potassium 4.3 3.5 - 5.0 mmol/L    Chloride 103 98 - 107 mmol/L    CO2 29 22 - 31 mmol/L    Anion Gap, Calculation 8 5 - 18 mmol/L    Glucose 121 70 - 125 mg/dL    Calcium 9.2 8.5 - 10.5 mg/dL    BUN 18 8 - 28 mg/dL    Creatinine 0.85 0.60 - 1.10 mg/dL    GFR MDRD Af Amer >60 >60 mL/min/1.73m2    GFR MDRD Non Af Amer >60 >60 mL/min/1.73m2         Lab Results   Component Value Date    WBC 7.2 10/21/2020    HGB 11.6 (L) 10/21/2020    HCT 35.9 10/21/2020    MCV 90 10/21/2020     10/21/2020       No results found for: [id number]  Lab Results   Component Value Date    HGBA1C 7.4 (H) 10/21/2020     No results found for: INR, PROTIME  No results found for: [id number]  Lab Results   Component Value Date    TSH 1.21 10/21/2020           ASSESSMENT/PLAN:    Bilateral eyelid blisters: multiple new vesicles today with enlarging blister on left eyelid, no warmth, no drainage, no erythema. No signs of infection. nontender unless being palpated. Previously attempted warm packs three times a day x 7 days for reabsorption with no improvement. Reports discomfort and or itching at times. Not appear to be a stye, did not respond to treatment for styes. Unclear if dermatitis but no  recent med changes, has artificial tears but no other eye drops. No lotions or new soaps around eyes. No redness or signs of inflammation with dermatitis. Unclear if could be viral but does not have drainage from eyes, does not follow nerve line. Unclear etiology at this time. Refer to ophthalmology for further evaluation.   Type 2 DM with HTN: Accuchecks controlled 206-317 recently. On lantus, accuchecks two times a day. Hga1c 5.7 on 8/7. hga1c 5.5 on 11/8. hga1c 2/26-6.5. continue lantus. Hga1c up to 7.3 on 6/10.  Will increase lantus to 18u. Monitor closely.   CAD, s/p CABG: No chest pain or recent concerns. On aspirin, rosuvastatin. F/u cardiology.    H/o Left MCA CVA: Right sided hemiplegia, dysarthria, dysphagia, expressive aphasia. Wheelchair and bed bound. EZ stand to slide board for transfers. Dependent for cares. On rosuvastatin, aspirin. F/u with neurology prn. Dysphagia on NDD3, nectar thick liquids, trialing thin liquids. No current concerns.   Anxiety and depression: On paroxetine. ACP following, feels mood stable today.   Dysphagia: NDD3 nectar thick liquids. No recent concerns.   PAF: Rate controlled 60s. On eliquis, metoprolol. Well controlled.    ANGEL, OHS: On CPAP. Refusing at times. No concerns today.   Weights, obesity: [phone]. Monitor. Counseling on heart healthy diet, diabetic diet,  Continues to have weight gain. Immobility and increased intake. Counseling provided   Chronic CHF: [facility] reported h/o CHF. No echo or records available [facility]. No shortness of breath, no edema noted today, on lasix daily. Appears euvolemic on exam   HTN: 120s. On irbesartan, metoprolol, lasix. Stable.     CMP, hm1, tsh, hga1c on 6/10    Electronically signed by: Morenita Mosquera NP    Case Management:  I have reviewed the facility/SNF care plan/MDS which was done 9/24/20, including the falls risk, nutrition and pain  screening. I also reviewed the current immunizations, and preventive care.. Future cancer screening is not clinically indicated secondary to age/goals of care.   Patient's desire to return to the community is not assessible due to cognitive impairment.    Information reviewed:  Medications, vital signs, orders, and nursing notes.  The health plan new enrollment has happened. I have reviewed the  MDS, the preventative needs,  and facility care plan. The level of care is appropriate. I have reviewed the code status/advanced directives.

## 2021-06-13 NOTE — PROGRESS NOTES
[facility] Care Coordination Contact    Called adult son Stiven to schedule annual HRA home visit. HRA has been scheduled for Friday, December 4 at 2:00pm.     MICHEL Barker  [facility]  [phone]

## 2021-06-13 NOTE — TELEPHONE ENCOUNTER
Rakesh Mosquera,    I am the [facility] Coordiantor for Zach Olmos.  She would like to return to the community and live with her son.  For her health and safety needs she will need these equipment/supplies prior to going home.  Her son has completed all home modifications to allow Zach to be safe in the home.  I was witness to these modifications which include a ramp and bathroom remodel (widening doorway and walk in shower).  Please let me know if you have any questions.  If you agree please sign off on all orders.  Thank you!    MICHEL Barker  [employer]  [phone]

## 2021-06-13 NOTE — PROGRESS NOTES
[facility]    DATE: 2020    NAME:  Zach Olmos             :  1950  MRN: [id number]  CODE STATUS:  FULL CODE    VISIT TYPE: Problem Visit (hyperglycemia)     FACILITY:  [facility] [[id number]]       CHIEF COMPLAIN/REASON FOR VISIT:    Chief Complaint   Patient presents with     Problem Visit     hyperglycemia               HISTORY OF PRESENT ILLNESS: Zach Olmos is a 70 y.o. female who is a long term care resident of [facility].     Today Ms. Olmos is seen for concerns of hyperglycemia. She has been running 179-305 recently but mostly running in 200s. Her other vitals have been stable. She is currently taking 22u of lantus daily. She is seen in her wheelchair today and has no concerns. She feels good. She went to an eye appt on Friday. She denies any changes to her appetite or vision. She is not having any pain or bowel concerns.       REVIEW OF SYSTEMS:  PROBLEMS AND REVIEW OF SYSTEMS:   Today on ROS:   Currently, no fever, chills, or rigors. Decreased vision. Denies any chest pain, headaches, palpitations, lightheadedness. Denies any GERD symptoms. Positive for weakness, EZ stand for transfers or slide board, dysphagia, urinary incontinence, fecal incontinence, appetite good, wheelchair bound, right sided weakness stable, aphasia, no shortness of breath,  Cpap use      Allergies   Allergen Reactions     Aricept [Donepezil]      Atorvastatin      Glipizide      Lisinopril      Current Outpatient Medications   Medication Sig     acetaminophen (TYLENOL) 325 MG tablet Take 650 mg by mouth every 6 (six) hours as needed for pain.            apixaban (ELIQUIS) 5 mg Tab tablet Take 5 mg by mouth 2 (two) times a day.           artificial tears,hypromellose, (GENTEAL; SYSTANE) 0.3 % Gel Administer 1 drop to both eyes 4 (four) times a day.      furosemide (LASIX) 20 MG tablet Take 20 mg by mouth daily.            insulin glargine  (LANTUS) 100 unit/mL injection Inject 24 Units under the skin at bedtime.      irbesartan (AVAPRO) 75 MG tablet Take 75 mg by mouth 2 (two) times a day.           metFORMIN (GLUCOPHAGE) 500 MG tablet Take 500 mg by mouth 2 (two) times a day with meals. 1000mg in am and 500mg in evening     metoprolol succinate (TOPROL-XL) 50 MG 24 hr tablet Take 50 mg by mouth daily.     ondansetron (ZOFRAN) 4 MG tablet Take 4 mg by mouth every 6 (six) hours as needed for nausea.     PARoxetine (PAXIL) 30 MG tablet Take 60 mg by mouth every morning.           rosuvastatin (CRESTOR) 20 MG tablet Take 20 mg by mouth every morning. PER HOSPITAL ORDERS GIVE TO PT. IN THE A.M.            Past Medical History:    Past Medical History:   Diagnosis Date     Anemia      Anxiety      Cancer (H)     Hx of colon cancer     CHF (congestive heart failure) (H)     diastolic     Coronary artery disease     s/p cabg     CVA (cerebral vascular accident) (H)      Depression      Diabetes mellitus (H)     type 2     DVT (deep vein thrombosis) in pregnancy      Granuloma annulare      Hemiplegia (H)     R sided and minimally communicative     Hyperlipidemia      Hypertension      Obesity      Oropharyngeal dysphagia      ANGEL (obstructive sleep apnea)      Parotitis      Paroxysmal atrial fibrillation (H)      PE (pulmonary thromboembolism) (H)      Stroke (H) 12/2018    Sub acute L MCA stroke     Varicose veins of both lower extremities        IMMUNIZATIONS:    There is no immunization history on file for this patient.  Above immunizations pulled from [facility]. Facility records also reconciled. Outstanding information sent to [facility] to update [facility].  Future immunizations are not needed at this point as all recommended immunizations are up to date.     PHYSICAL EXAMINATION  Vitals:    12/14/20 0700   BP: 156/61   Pulse: 79   Resp: 19   Temp: 98  F (36.7  C)   SpO2: 97%   Weight: 217 lb (98.4 kg)       Today on physical  exam:     GENERAL: Awake, Alert, not in any form of acute distress, answers most questions appropriately, follows simple commands  HEENT: Head is normocephalic with normal hair distribution. No evidence of trauma. Ears: No acute purulent discharge. Eyes: Conjunctivae pink with no scleral jaundice. Nose: Normal mucosa and septum. NECK: Supple with no cervical or supraclavicular lymphadenopathy. Trachea is midline. Decreased vision and hearing, left eyelid cyst   CHEST: No tenderness or deformity, no crepitus  LUNG: dim to auscultation with good chest expansion. There are no crackles or wheezes, normal AP diameter.  No recent shortness of breath or cough   BACK: No kyphosis of the thoracic spine. Symmetric, no curvature, ROM normal, no CVA tenderness, no spinal tenderness   CVS: irregularly irregular rhythm,  2+ pulses symmetric in all extremities.  ABDOMEN: Rounded and soft, nontender to palpation, non distended, no masses, no organomegaly, good bowel sounds, no rebound or guarding, no peritoneal signs.   EXTREMITIES: no edema, atraumatic  SKIN: Warm and dry, no rash today  NEURO/PSYCH: The patient is oriented to person, place and time. Forgetful at times, Right sided hemiplegia, dysarthria, dysphagia, expressive aphasia, EZ stand to slide board for transfers, dependent for cares            LABS:   No results found for this or any previous visit (from the past 168 hour(s)).  Results for orders placed or performed in visit on 06/10/20   Basic Metabolic Panel   Result Value Ref Range    Sodium 140 136 - 145 mmol/L    Potassium 4.3 3.5 - 5.0 mmol/L    Chloride 103 98 - 107 mmol/L    CO2 29 22 - 31 mmol/L    Anion Gap, Calculation 8 5 - 18 mmol/L    Glucose 121 70 - 125 mg/dL    Calcium 9.2 8.5 - 10.5 mg/dL    BUN 18 8 - 28 mg/dL    Creatinine 0.85 0.60 - 1.10 mg/dL    GFR MDRD Af Amer >60 >60 mL/min/1.73m2    GFR MDRD Non Af Amer >60 >60 mL/min/1.73m2         Lab Results   Component Value Date    WBC 7.2 10/21/2020     HGB 11.6 (L) 10/21/2020    HCT 35.9 10/21/2020    MCV 90 10/21/2020     10/21/2020       No results found for: TMEPMNXW70  Lab Results   Component Value Date    HGBA1C 7.4 (H) 10/21/2020     No results found for: INR, PROTIME  No results found for: QTRZXKTK94SW  Lab Results   Component Value Date    TSH 1.21 10/21/2020           ASSESSMENT/PLAN:      Hyperglycemia, Type 2 DM with HTN: Accuchecks 179-305, mostly running in 200s. On lantus, accuchecks two times a day. Hga1c 5.7 on 8/7. hga1c 5.5 on 11/8. hga1c 2/26-6.5. continue lantus. Hga1c up to 7.3 on 6/10.  hga1c 7.4 on 10/21. Increase lantus today to 24 unit(s) daily. Continue to encourage diabetic diet and monitoring carb and sugar intake.   CAD, s/p CABG: No chest pain or recent concerns. On aspirin, rosuvastatin. F/u cardiology.    H/o Left MCA CVA: Right sided hemiplegia, dysarthria, dysphagia, expressive aphasia. Wheelchair and bed bound. EZ stand to slide board for transfers. Dependent for cares. On rosuvastatin, aspirin. Dysphagia on NDD3, nectar thick and some thin liquids. Continue to monitor. F/u with  Neuro prn. No recent changes or concerns.   Anxiety and depression: On paroxetine. ACP following, feels mood stable today.   Dysphagia: NDD3 nectar thick liquids with some thin liquids. Being monitored by staff for signs of aspiration. No recent coughing or choking episodes.   PAF: Rate controlled 70s. On eliquis, metoprolol. Well controlled.    ANGEL, OHS: On CPAP. Refusing at times. No concerns today.   Weights, obesity: [phone]. Monitor. Counseling on heart healthy diet, diabetic diet,  Continues to have weight gain. Immobility and increased intake. Counseling provided again, dietary monitoring. Encourage improved dietary habits.   Chronic CHF: [facility] reported h/o CHF. No echo or records available [facility]. on lasix daily. Appears euvolemic on  exam. Has had weight gain recently but does not appear fluid overloaded. No shortness of breath or edema.   HTN: 150s. On irbesartan, metoprolol, lasix. Stable.   Vitreal hemorrhage: injections q4-6 weeks. Last eye appt 11/11.   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 No further treatment at this time.     Labs in october    Electronically signed by: Morenita Mosquera NP    Case Management:  I have reviewed the facility/SNF care plan/MDS which was done 9/24/20, including the falls risk, nutrition and pain screening. I also reviewed the current immunizations, and preventive care.. Future cancer screening is not clinically indicated secondary to age/goals of care.   Patient's desire to return to the community is not assessible due to cognitive impairment.    Information reviewed:  Medications, vital signs, orders, and nursing notes.  The health plan new enrollment has happened. I have reviewed the  MDS, the preventative needs,  and facility care plan. The level of care is appropriate. I have reviewed the code status/advanced directives.

## 2021-06-13 NOTE — PROGRESS NOTES
[facility] Care Coordination Contact    Mailed: POC, PCA signature pages, JULIA form, and [id number] form to member included a stamped return envelope. Mailed all forms to member's son (Stiven Olmos) at:  [address] [place] [address]     Ed Lee  Care Management Specialist  [facility]  [phone]

## 2021-06-13 NOTE — PROGRESS NOTES
[facility]    Facility:  [facility] [[id number]]    Code Status:  Full    Chief Complaint   Patient presents with     Review Of Multiple Medical Conditions   :                    Patient is seen today for a federally mandated regulatory visit                 Patient Active Problem List   Diagnosis     Essential hypertension     Coronary artery disease involving coronary bypass graft with angina pectoris, unspecified whether native or transplanted heart (H)     Dyslipidemia (high LDL; low HDL)     Right hemiplegia (H)     Oropharyngeal dysphagia     PAF (paroxysmal atrial fibrillation) (H)     GERD (gastroesophageal reflux disease)     ANGEL on CPAP     Anxiety and depression     Type 2 DM with CKD stage 1 and hypertension (H)     Chronic combined systolic and diastolic congestive heart failure (H)     Morbid obesity (H)     Obesity hypoventilation syndrome (H)     Granuloma annulare     Varicose veins of both legs with edema     H/O deep venous thrombosis     Epistaxis     Hordeolum externum (stye)     Sinusitis     Blister of eyelid     H/O: CVA (cerebrovascular accident)     Apocrine cyst       History:  Ms. 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, expressive aphasia, dysphasia (November 17, 2018), coronary artery disease/CABG (March 2018), DVT/PE (occurring after CABG 2018), DM2 (currently Lantus 10 units twice daily), paroxysmal atrial fibrillation (apixaban 5 twice daily/metoprolol 50 twice daily) hypertension (irbesartan 75 twice daily, metoprolol 50 mg twice daily), CKD 1, GERD, remote colon cancer, hyperlipidemia (Crestor 20), anxiety/depression (paroxetine 60 mg daily) seen today for review of her multiple medical problems/federally mandated regulatory visit      Subjective/ROS:    -augmented by discussion with facility staff involved in direct care  -limited by: Expressive aphasia    Patient is in  "good spirits today.  She says that she is feeling well.  She is disappointed with her vision and has been in for an injection for vitreal hemorrhage with her ophthalmologist.    She was restarted on Metformin October 7 and has tolerated it.  Nonetheless her sugars have been a bit high Ms. Mosquera just increased her Lantus to 22 units which she is also tolerated well.    She reports that she otherwise is \"the same\".  She has expressive aphasia but can answer most questions.  She denies headaches difficulty swallowing chest pain cough shortness of breath orthopnea PND chest pain abdominal pain.  No falls or injuries.  No skin rashes or skin breakdown.  She is on apixaban and has not had any bleeding complications.  She continues her Lasix and says that her swelling is \"the same\".  She remains on Paxil and feels like her mood is stable.  She still occasionally has asymptomatic bradycardia to the 50s.  Her A1c was 5.5 on November 18, 2019, 6.5 on February 26, 2020, and 7.3 on Maria Del Rosario 10, 2020, 7.4 on October 21.  TSH at that time was 1.21.  Hemoglobin stable 11.6.  Creatinine looking good at 0.85    Past Medical History:   Diagnosis Date     Anemia      Anxiety      Cancer (H)     Hx of colon cancer     CHF (congestive heart failure) (H)     diastolic     Coronary artery disease     s/p cabg     CVA (cerebral vascular accident) (H)      Depression      Diabetes mellitus (H)     type 2     DVT (deep vein thrombosis) in pregnancy      Granuloma annulare      Hemiplegia (H)     R sided and minimally communicative     Hyperlipidemia      Hypertension      Obesity      Oropharyngeal dysphagia      ANGEL (obstructive sleep apnea)      Parotitis      Paroxysmal atrial fibrillation (H)      PE (pulmonary thromboembolism) (H)      Stroke (H) 12/2018    Sub acute L MCA stroke     Varicose veins of both lower extremities      Past Surgical History:   Procedure Laterality Date     CORONARY ARTERY BYPASS GRAFT      x5          Family " History   Problem Relation Age of Onset     Heart disease Mother      Cancer Father         esophageal   :       Social History     Socioeconomic History     Marital status: Single     Spouse name: Not on file     Number of children: Not on file     Years of education: Not on file     Highest education level: Not on file   Occupational History     Not on file   Social Needs     Financial resource strain: Not on file     Food insecurity     Worry: Not on file     Inability: Not on file     Transportation needs     Medical: Not on file     Non-medical: Not on file   Tobacco Use     Smoking status: Never Smoker     Smokeless tobacco: Never Used   Substance and Sexual Activity     Alcohol use: No     Frequency: Never     Drug use: No     Sexual activity: Not on file   Lifestyle     Physical activity     Days per week: Not on file     Minutes per session: Not on file     Stress: Not on file   Relationships     Social connections     Talks on phone: Not on file     Gets together: Not on file     Attends Hinduism service: Not on file     Active member of club or organization: Not on file     Attends meetings of clubs or organizations: Not on file     Relationship status: Not on file     Intimate partner violence     Fear of current or ex partner: Not on file     Emotionally abused: Not on file     Physically abused: Not on file     Forced sexual activity: Not on file   Other Topics Concern     Incontinent Yes     Toileting independently No     Cognitive impairment No     Vision impairment Yes     Hearing impairment Yes     Dentures Not Asked   Social History Narrative     Not on file   :        Current Outpatient Medications on File Prior to Visit   Medication Sig Dispense Refill     acetaminophen (TYLENOL) 325 MG tablet Take 650 mg by mouth every 6 (six) hours as needed for pain.              apixaban (ELIQUIS) 5 mg Tab tablet Take 5 mg by mouth 2 (two) times a day.             artificial tears,hypromellose, (GENTEAL;  SYSTANE) 0.3 % Gel Administer 1 drop to both eyes 4 (four) times a day.        furosemide (LASIX) 20 MG tablet Take 20 mg by mouth daily.              insulin glargine (LANTUS) 100 unit/mL injection Inject 22 Units under the skin at bedtime.        irbesartan (AVAPRO) 75 MG tablet Take 75 mg by mouth 2 (two) times a day.        0     metFORMIN (GLUCOPHAGE) 500 MG tablet Take 500 mg by mouth 2 (two) times a day with meals. 1000mg in am and 500mg in evening       metoprolol succinate (TOPROL-XL) 50 MG 24 hr tablet Take 50 mg by mouth daily.       ondansetron (ZOFRAN) 4 MG tablet Take 4 mg by mouth every 6 (six) hours as needed for nausea.       PARoxetine (PAXIL) 30 MG tablet Take 60 mg by mouth every morning.             rosuvastatin (CRESTOR) 20 MG tablet Take 20 mg by mouth every morning. PER HOSPITAL ORDERS GIVE TO PT. IN THE A.M.              No current facility-administered medications on file prior to visit.    :      Allergies:  Aricept [donepezil], Atorvastatin, Glipizide, and Lisinopril    Vitals: Weight is up 10 pounds since I last saw her at 217.  She has a temperature of 98.3 heart rate 65 respirations 15 blood pressure 140/64 O2 sats 90% on room air most blood sugars are in the 200s  Physical exam:    Patient is alert and attentive she is up in her wheelchair in her room.  She has very significant expressive aphasia but often finds a way to answer simple questions slowly but effectively.  She has dense right hemiplegia and asks for help getting her foot back up on her wheelchair tray.  She is calm appears comfortable without accessory muscle use or tachypnea gaze is conjugate sclera clear purposeful movements of right arm or right leg noted.  Minimal ankle edema asymmetric to the left.  Warm hands and feet with good cap refill.  Skin is clear other visualized areas.    Due to the 2020 Covid 19 pandemic, the patient was visually observed at a 6 foot plus distance.  An observational exam was performed in  an effort to keep patient safe from Covid 19 and other communicable diseases.      Labs:  Lab Results   Component Value Date    WBC 7.2 10/21/2020    HGB 11.6 (L) 10/21/2020    HCT 35.9 10/21/2020    MCV 90 10/21/2020     10/21/2020     Results for orders placed or performed in visit on 06/10/20   Basic Metabolic Panel   Result Value Ref Range    Sodium 140 136 - 145 mmol/L    Potassium 4.3 3.5 - 5.0 mmol/L    Chloride 103 98 - 107 mmol/L    CO2 29 22 - 31 mmol/L    Anion Gap, Calculation 8 5 - 18 mmol/L    Glucose 121 70 - 125 mg/dL    Calcium 9.2 8.5 - 10.5 mg/dL    BUN 18 8 - 28 mg/dL    Creatinine 0.85 0.60 - 1.10 mg/dL    GFR MDRD Af Amer >60 >60 mL/min/1.73m2    GFR MDRD Non Af Amer >60 >60 mL/min/1.73m2         Lab Results   Component Value Date    TSH 1.21 10/21/2020     @LASTA!C@  [unfilled]  No results found for: UGDDDMHK66  No results found for: BNP  @LASTAmicus TherapeuticsTX@        Invalid input(s): PRINTERVAL      Assessment/Plan:      ICD-10-CM    1. Essential hypertension  I10    2. Coronary artery disease involving coronary bypass graft with angina pectoris, unspecified whether native or transplanted heart (H)  I25.709    3. PAF (paroxysmal atrial fibrillation) (H)  I48.0    4. Type 2 DM with CKD stage 1 and hypertension (H)  E11.22     I12.9     N18.1      CVA  Right hemiplegia  Expressive aphasia  Dysphasia                 NDD 3 with nectar thick liquids continuous.  Expressive aphasia appears to be stable. She is on appropriate secondary prevention with apixaban for her paroxysmal atrial fibrillation, blood pressure--- metoprolol irbesartan and lipid control--- Crestor.  She also takes aspirin 81 mg daily for cardiac indication     Paroxysmal atrial fibrillation  Bradycardia   Infrequent bradycardia into the 50s without symptoms.  Metoprolol Exar 50 daily continues.                      Coronary artery disease  CABG 2018                 Continue aspirin, metoprolol.  I do find an echocardiogram from  2018 showing normal EF 55 to 60%, this was post cabg.     DM 2                 Patient's blood sugars have stabilized with an A1c 7.4 this time similar to last check at 7.3.  However significantly increased as noted above.  Thus Ms. Mosquera has increased her Lantus.  She was once on Metformin at thousand twice daily and she recalls tolerating it.  I do not find any note suggesting that she could not tolerate it.  It seems reasonable to increase Metformin as part of her overall treatment plan    -Recommend increasing Metformin back to her prior dose over timeUnless there is a reason not to.  -Ms. Mosquera is managing sugars and has recently increased her Lantus which appears appropriate although still not ideal control.  She is not having any hypoglycemic spells suggesting that she could tolerate some increase in medication in all likelihood    It appears that the metformin was dropped at the time of her stroke and tube feedings although I do not find a clear reason why, perhaps because she required insulin for hyperglycemia of tube feeding??       Weight gain      Question dietary indiscretion?  Question exogenous insulin effect?  She is quite inactive, thus caloric modification is really the only thing would be expected make much difference.  I had some hope that restarting Metformin could help but it has not yet.  TSH good at last check, 1.21         hypertension                 Overall control appears adequate we will continue irbesartan 75 twice daily and metoprolol 50 twice daily.  Monitor for any worsening of bradycardia.     ANGEL/CPAP  Obesity                 Weight is back up.  She might require higher pressure?  It is a moot point as she does not wish to use the treatment.  I discussed the high risk of heart attacks, strokes, potential for earlier death.     Hyperlipidemia                     Lab Results   Component Value Date    CHOL 77 04/16/2019     Lab Results   Component Value Date    HDL 19 (L) 04/16/2019      Lab Results   Component Value Date    LDLCALC 38 04/16/2019     Lab Results   Component Value Date    TRIG 98 04/16/2019     No components found for: CHOLHDL    Remains on Crestor.  Thus I recommend at least annual FLP and consideration of ALT     History of DVT/PE                 Sounds provoked most likely following her CABG and prolonged TCU stay, however it is a moot point as she is on apixaban for atrial fibrillation at this point anyway     Pain   no changes made PRN Tylenol     Constipation   patient reports good bowel function without any stool softeners.           Case discussed with:    Facility staff           Vick Sutton MD

## 2021-06-13 NOTE — PROGRESS NOTES
[facility] Care Coordination Contact  [facility] Initial Assessment     Home visit for Initial Health Risk Assessment with Zach Olmos completed on  12/4/2020    Type of residence:: Nursing home  Current living arrangement:: I live in a nursing home     Assessment completed with: Patient, Family    Current Care Plan  Member currently receiving the following home care services:     Member currently receiving the following community resources: None    Medication Review  Medication reconciliation completed in Epic: YES  Medication set-up & administration: RN set up weekly  Nursing Home staff administers medications  Medication Risk Assessment Medication (1 or more, place referral to MTM):  N/A: No risk factors identified  MTM Referral Placed: No: No risk factors idenified    Mental/Behavioral Health   PHQ-2 Total Score: 1       Mental health DX:: No        Falls Assessment:   Fallen 2 or more times in the past year?: Yes   Any fall with injury in the past year?: No    ADL/IADL Dependencies:   Dependent ADLs:: Bathing, Dressing, Eating, Grooming, Incontinence, Toileting, Wheelchair-with assist, Transfers, Positioning  Dependent IADLs:: Cleaning, Cooking, Laundry, Shopping, Meal Preparation, Medication Management, Money Management, Transportation, Incontinence    [facility] sponsored benefits: Shared information re: Silver Sneakers/gym memberships, ASA, Calcium +D.    PCA Assessment completed at visit: Yes Initial PCA Assessment indicated 42 units per day of PCA.    Elderly Waiver Eligibility: Yes - will open to EW    Care Plan & Recommendations: Initial Health Risk Assessment and Initial PCA assessment.  Assessment was completed via phone with Zach and her son Stiven.  Zach is currently in a nursing home but would like to return home to the community and live with her son, Stiven.  Zach qualifies for 10.5 hours per day of PCA.  Zach needs a lot of equipment/supplies ordered before she can  return home.  She needs: an EZ lift, transfer belt, shower chair, fitted wheelchair, hospital bed, hand brace, Thick It powder, lift chair, pill box, and possibly wall bumpers for house dempsey while Zach uses the wheelchair.  Zach needs full assistance with all ADLs and IADLs.  Zach's daughter and possibly son will be her PCA.   Zach is currently in a NF and has had a few falls with no injury.  No ER or hospital stays in the past year.    See [facility] for detailed assessment information.    Follow-Up Plan: Member informed of future contact, plan to f/u with member with a 6 month telephone assessment.  Contact information shared with member and family, encouraged member to call with any questions or concerns at any time.    [place] care continuum providers: Please refer to Health Care Home on the Epic Problem List to view this patient's [facility] Care Plan Summary.    MICHEL Barker  [facility]  [phone]

## 2021-06-13 NOTE — PROGRESS NOTES
[facility]    DATE: 2020    NAME:  Zach Olmos             :  1950  MRN: [id number]  CODE STATUS:  FULL CODE    VISIT TYPE: Review Of Multiple Medical Conditions (h/o cva, diabetes, hypertension)     FACILITY:  [facility] [[id number]]       CHIEF COMPLAIN/REASON FOR VISIT:    Chief Complaint   Patient presents with     Review Of Multiple Medical Conditions     h/o cva, diabetes, hypertension               HISTORY OF PRESENT ILLNESS: Zach Olmos is a 70 y.o. female who is a long term care resident of [facility].     Today Ms. Olmos is seen today for federally mandated visit and review of systems for h/o CVA, diabetes, hypertension. She is seen in her room today in wheelchair. She says she has been doing well. She denies any recent illness or concerns. She has no pain today  And is sleeping well. Her appetite has been great and no reflux or heartburn issues. She has no bowel or bladder concerns today. She denies any recent rashes. She thinks her eye blisters are the same. She is not having any other concerns. She does think she recently went out for an appointment but not sure what. Nursing says she saw the eye doctor recently and had injection for vitreal hemorrhage. Eye doctor felt blisters were apocrine cyst and to continue with compresses as needed. No other treatment orders. Her blood sugars have been running 150-316.      REVIEW OF SYSTEMS:  PROBLEMS AND REVIEW OF SYSTEMS:   Today on ROS:   Currently, no fever, chills, or rigors. Decreased vision. Denies any chest pain, headaches, palpitations, lightheadedness. Denies any GERD symptoms. Positive for weakness, EZ stand for transfers or slide board, dysphagia, urinary incontinence, fecal incontinence, appetite good, wheelchair bound, right sided weakness stable, aphasia, no shortness of breath,  Cpap, blisters on both eyelids      Allergies   Allergen Reactions     Aricept  [Donepezil]      Atorvastatin      Glipizide      Lisinopril      Current Outpatient Medications   Medication Sig     acetaminophen (TYLENOL) 325 MG tablet Take 650 mg by mouth every 6 (six) hours as needed for pain.            apixaban (ELIQUIS) 5 mg Tab tablet Take 5 mg by mouth 2 (two) times a day.           artificial tears,hypromellose, (GENTEAL; SYSTANE) 0.3 % Gel Administer 1 drop to both eyes 4 (four) times a day.      furosemide (LASIX) 20 MG tablet Take 20 mg by mouth daily.            insulin glargine (LANTUS) 100 unit/mL injection Inject 22 Units under the skin at bedtime.      irbesartan (AVAPRO) 75 MG tablet Take 75 mg by mouth 2 (two) times a day.           metFORMIN (GLUCOPHAGE) 500 MG tablet Take 500 mg by mouth 2 (two) times a day with meals. 1000mg in am and 500mg in evening     metoprolol succinate (TOPROL-XL) 50 MG 24 hr tablet Take 50 mg by mouth daily.     ondansetron (ZOFRAN) 4 MG tablet Take 4 mg by mouth every 6 (six) hours as needed for nausea.     PARoxetine (PAXIL) 30 MG tablet Take 60 mg by mouth every morning.           rosuvastatin (CRESTOR) 20 MG tablet Take 20 mg by mouth every morning. PER HOSPITAL ORDERS GIVE TO PT. IN THE A.M.            Past Medical History:    Past Medical History:   Diagnosis Date     Anemia      Anxiety      Cancer (H)     Hx of colon cancer     CHF (congestive heart failure) (H)     diastolic     Coronary artery disease     s/p cabg     CVA (cerebral vascular accident) (H)      Depression      Diabetes mellitus (H)     type 2     DVT (deep vein thrombosis) in pregnancy      Granuloma annulare      Hemiplegia (H)     R sided and minimally communicative     Hyperlipidemia      Hypertension      Obesity      Oropharyngeal dysphagia      ANGEL (obstructive sleep apnea)      Parotitis      Paroxysmal atrial fibrillation (H)      PE (pulmonary thromboembolism) (H)      Stroke (H) 12/2018    Sub acute L MCA stroke     Varicose veins of both lower extremities         IMMUNIZATIONS:    There is no immunization history on file for this patient.  Above immunizations pulled from [facility]. Facility records also reconciled. Outstanding information sent to [facility] to update [facility].  Future immunizations are not needed at this point as all recommended immunizations are up to date.     PHYSICAL EXAMINATION  Vitals:    11/18/20 0700   BP: 128/60   Pulse: 69   Resp: 15   Temp: 98.2  F (36.8  C)   SpO2: 95%   Weight: 214 lb (97.1 kg)       Today on physical exam:     GENERAL: Awake, Alert, not in any form of acute distress, answers most questions appropriately, follows simple commands  HEENT: Head is normocephalic with normal hair distribution. No evidence of trauma. Ears: No acute purulent discharge. Eyes: Conjunctivae pink with no scleral jaundice. Nose: Normal mucosa and septum. NECK: Supple with no cervical or supraclavicular lymphadenopathy. Trachea is midline. Decreased vision and hearing, left eyelid cyst   CHEST: No tenderness or deformity, no crepitus  LUNG: dim to auscultation with good chest expansion. There are no crackles or wheezes, normal AP diameter.  No recent shortness of breath or cough   BACK: No kyphosis of the thoracic spine. Symmetric, no curvature, ROM normal, no CVA tenderness, no spinal tenderness   CVS: irregularly irregular rhythm,  2+ pulses symmetric in all extremities.  ABDOMEN: Rounded and soft, nontender to palpation, non distended, no masses, no organomegaly, good bowel sounds, no rebound or guarding, no peritoneal signs.   EXTREMITIES: no edema, atraumatic  SKIN: Warm and dry, no rash today  NEURO/PSYCH: The patient is oriented to person, place and time. Forgetful at times, Right sided hemiplegia, dysarthria, dysphagia, expressive aphasia, EZ stand to slide board for transfers, dependent for cares            LABS:   No results found for this or any previous visit (from the past 168 hour(s)).  Results for orders placed  or performed in visit on 06/10/20   Basic Metabolic Panel   Result Value Ref Range    Sodium 140 136 - 145 mmol/L    Potassium 4.3 3.5 - 5.0 mmol/L    Chloride 103 98 - 107 mmol/L    CO2 29 22 - 31 mmol/L    Anion Gap, Calculation 8 5 - 18 mmol/L    Glucose 121 70 - 125 mg/dL    Calcium 9.2 8.5 - 10.5 mg/dL    BUN 18 8 - 28 mg/dL    Creatinine 0.85 0.60 - 1.10 mg/dL    GFR MDRD Af Amer >60 >60 mL/min/1.73m2    GFR MDRD Non Af Amer >60 >60 mL/min/1.73m2         Lab Results   Component Value Date    WBC 7.2 10/21/2020    HGB 11.6 (L) 10/21/2020    HCT 35.9 10/21/2020    MCV 90 10/21/2020     10/21/2020       No results found for: [id number]  Lab Results   Component Value Date    HGBA1C 7.4 (H) 10/21/2020     No results found for: INR, PROTIME  No results found for: [id number]  Lab Results   Component Value Date    TSH 1.21 10/21/2020           ASSESSMENT/PLAN:      Type 2 DM with HTN: Accuchecks controlled 150-300, only occasional in 200-300 range. On lantus, accuchecks two times a day. Hga1c 5.7 on 8/7. hga1c 5.5 on 11/8. hga1c 2/26-6.5. continue lantus. Hga1c up to 7.3 on 6/10.  hga1c 7.4 on 10/21. Increase lantus to 22u daily.   CAD, s/p CABG: No chest pain or recent concerns. On aspirin, rosuvastatin. F/u cardiology.    H/o Left MCA CVA: Right sided hemiplegia, dysarthria, dysphagia, expressive aphasia. Wheelchair and bed bound. EZ stand to slide board for transfers. Dependent for cares. On rosuvastatin, aspirin. Dysphagia on NDD3, nectar thick liquids, trialing thin liquids. Continue to monitor. F/u with  Neuro prn.   Anxiety and depression: On paroxetine. ACP following, feels mood stable today.   Dysphagia: NDD3 nectar thick liquids. No recent concerns.   PAF: Rate controlled 60s. On eliquis, metoprolol. Well controlled.    ANGEL, OHS: On CPAP. Refusing at times. No concerns today.   Weights, obesity: [phone]. Monitor. Counseling on heart healthy  diet, diabetic diet,  Continues to have weight gain. Immobility and increased intake. Counseling provided again today. Dietary to follow.   Chronic CHF: [facility] reported h/o CHF. No echo or records available [facility]. No shortness of breath, no edema noted today, on lasix daily. Appears euvolemic on exam   HTN: 120s. On irbesartan, metoprolol, lasix. Stable.   Vitreal hemorrhage: injections q4-6 weeks. Last eye appt 11/11.   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 No further treatment at this time.     CMP, hm1, tsh, hga1c on 6/10    Electronically signed by: Morenita Mosquera NP    Case Management:  I have reviewed the facility/SNF care plan/MDS which was done 9/24/20, including the falls risk, nutrition and pain screening. I also reviewed the current immunizations, and preventive care.. Future cancer screening is not clinically indicated secondary to age/goals of care.   Patient's desire to return to the community is not assessible due to cognitive impairment.    Information reviewed:  Medications, vital signs, orders, and nursing notes.  The health plan new enrollment has happened. I have reviewed the  MDS, the preventative needs,  and facility care plan. The level of care is appropriate. I have reviewed the code status/advanced directives.

## 2021-06-14 NOTE — PROGRESS NOTES
Care Coordinator received a return message from Barbara Palumbo ([phone]) at [facility] stating they have all the paperwork to open Zach to Elderly Waiver.  She stated they cannot change the living arrangement to the community until they confirm Zach has left the facility.  She stated the facility will need to send them a [id number] once Zach leaves the facility.  She stated otherwise there are no ucodes prohibiting Care Coordinator to enter the EW screening in [place].    MICHEL Barker  [facility]  [phone]

## 2021-06-14 NOTE — PROGRESS NOTES
[facility]    DATE: 2021    NAME:  Zach Olmos             :  1950  MRN: [id number]  CODE STATUS:  FULL CODE    VISIT TYPE: Discharge Summary     FACILITY:  [facility] [[id number]]       CHIEF COMPLAIN/REASON FOR VISIT:    Chief Complaint   Patient presents with     Discharge Summary               HISTORY OF PRESENT ILLNESS: Zach Olmos is a 70 y.o. female who is a long term care resident of [facility].     LTC summary:   Ms. Olmos had CVA in . She was at TCU and then LTC at [facility]. She later transferred to [facility] in 2019. She has right sided hemiparesis, expressive aphasia, dysphagia. She is totally dependent for all cares and is lift for transfers. She is wheelchair and bed bound. She is on dysphagia diet NDD3 with nectar thick liquids. She is diabetic with blood sugar checks twice daily and has been on lantus. Her hga1c most recently was 7.4 in October. Her lantus has since been adjusted. She does follow with cardiology for CAD and CHF, but these have been stable. She is on lasix daily. Her weights over the past 2 years have been trending up 184-217lbs, most likely due to immobility and increased intake. She has been counseled on heart healthy, diabetic diet but she is not always compliant with this. Her depression and anxiety have been stable on paroxetine. She does have history of vitreal hemorrhage and has been following with eye doctor for injections every 4-6 weeks. She recently recovered from COVID 19 infection and has been undergoing therapy at [facility] for deconditioning. Her son has been discussing taking her home for quite some time but has previously had inappropriate and unsafe discharge plans. However, he has a care coordinator involved and has made adaptations to the home including a ramp and wheelchair accessibility.  and therapy from [facility]  did a home evaluation last week and do feel the home is appropriately adapted for her needs. Her daughter will also be moving into the home so that while the son is working night shift she will be with the daughter and vice versa. She will have 24 hour care provided by family. Son is coming in today to the facility to be trained on glucometer and blood glucose monitoring as well as dietary adaptations. He will also need to be trained on incontinence cares and equipment needed for home. Hospital bed, wheelchair, and lift have been ordered per [facility] therapy department. See previous encounter for f2f and equipment orders. All other equipment requests are not covered by insurance nor do they require a prescription and will need to be purchased per the family. There have been family dynamic concerns previously, so home health  will be ordered to be following up after discharge and ensure Zach has appropriate and safe care at home. She will also have HH PT, Ot, HHA, RN. She will need to establish with a community primary care provider. She will need to follow up with endocrinology in 4-6 weeks for diabetes management. She will need to re-establish with neurology for CVA management. She will need cardiology follow up in 4-6 weeks for CAD and CHF management at home. She will need daily weights or at minimum weekly weights for CHF monitoring. She will need to continue to follow up with eye doctor as recommended for her injections. She will also need podiatry follow ups for diabetic foot care. All of these were being managed in house and will need to be established for outpatient management. She should be seen by community primary care provider within 1-2 weeks in order to continue Home health plan of care as she will be discharging from my care when she leaves the facility.           REVIEW OF SYSTEMS:  PROBLEMS AND REVIEW OF SYSTEMS:   Today on ROS:   Currently, no fever, chills, or rigors. Decreased vision.  Denies any chest pain, headaches, palpitations, lightheadedness. Denies any GERD symptoms. Positive for EZ stand for transfers, dysphagia, urinary incontinence, fecal incontinence, appetite good, wheelchair bound, right sided weakness, aphasia, no shortness of breath,  Cpap use, no recent cough, no recent nausea or vomiting, totally dependent for cares. Forgetful at times. Unable to obtain further ROS due to cognition and aphasia      Allergies   Allergen Reactions     Aricept [Donepezil]      Atorvastatin      Glipizide      Lisinopril      Current Outpatient Medications   Medication Sig     acetaminophen (TYLENOL) 325 MG tablet Take 650 mg by mouth every 6 (six) hours as needed for pain.            apixaban (ELIQUIS) 5 mg Tab tablet Take 5 mg by mouth 2 (two) times a day.           artificial tears,hypromellose, (GENTEAL; SYSTANE) 0.3 % Gel Administer 1 drop to both eyes 4 (four) times a day.      furosemide (LASIX) 20 MG tablet Take 20 mg by mouth daily.            insulin glargine (LANTUS) 100 unit/mL injection Inject 24 Units under the skin at bedtime.      irbesartan (AVAPRO) 75 MG tablet Take 75 mg by mouth 2 (two) times a day.           metFORMIN (GLUCOPHAGE) 500 MG tablet Take 500 mg by mouth 2 (two) times a day with meals. 1000mg in am and 500mg in evening     metoprolol succinate (TOPROL-XL) 50 MG 24 hr tablet Take 50 mg by mouth daily.     PARoxetine (PAXIL) 30 MG tablet Take 60 mg by mouth every morning.           rosuvastatin (CRESTOR) 20 MG tablet Take 20 mg by mouth every morning. PER HOSPITAL ORDERS GIVE TO PT. IN THE A.M.            Past Medical History:    Past Medical History:   Diagnosis Date     Anemia      Anxiety      Cancer (H)     Hx of colon cancer     CHF (congestive heart failure) (H)     diastolic     Coronary artery disease     s/p cabg     CVA (cerebral vascular accident) (H)      Depression      Diabetes mellitus (H)     type 2     DVT (deep vein thrombosis) in pregnancy       "Granuloma annulare      Hemiplegia (H)     R sided and minimally communicative     Hyperlipidemia      Hypertension      Obesity      Oropharyngeal dysphagia      ANGEL (obstructive sleep apnea)      Parotitis      Paroxysmal atrial fibrillation (H)      PE (pulmonary thromboembolism) (H)      Stroke (H) 12/2018    Sub acute L MCA stroke     Varicose veins of both lower extremities        IMMUNIZATIONS:    There is no immunization history on file for this patient.  Above immunizations pulled from [facility]. Facility records also reconciled. Outstanding information sent to [facility] to update [facility].  Future immunizations are not needed at this point as all recommended immunizations are up to date.     PHYSICAL EXAMINATION  Vitals:    01/26/21 1628   BP: 121/55   Pulse: 60   Resp: 20   Temp: 97.1  F (36.2  C)   SpO2: 94%   Weight: 215 lb 11.2 oz (97.8 kg)   Height: 5' 7\" (1.702 m)       Today on physical exam:     GENERAL: Awake, Alert, not in any form of acute distress, answers most questions appropriately, follows simple commands  HEENT: Head is normocephalic with normal hair distribution. No evidence of trauma. Ears: No acute purulent discharge. Eyes: Conjunctivae pink with no scleral jaundice. Nose: Normal mucosa and septum. NECK: Supple with no cervical or supraclavicular lymphadenopathy. Trachea is midline. Decreased vision and hearing, left eyelid aprocrine cyst   CHEST: No tenderness or deformity, no crepitus  LUNG: dim to auscultation with good chest expansion. There are no crackles or wheezes, normal AP diameter.  No recent shortness of breath or cough   BACK: No kyphosis of the thoracic spine. Symmetric, no curvature, ROM normal, no CVA tenderness, no spinal tenderness   CVS: irregularly irregular rhythm,  2+ pulses symmetric in all extremities.  ABDOMEN: Rounded and soft, nontender to palpation, non distended, no masses, no organomegaly, good bowel sounds, no rebound or " guarding, no peritoneal signs.   EXTREMITIES: no edema, atraumatic  SKIN: Warm and dry, no rash today  NEURO/PSYCH: The patient is oriented to person, place, usually  time. Forgetful at times, Right sided hemiparesis, dysarthria, dysphagia, expressive aphasia, EZ stand, dependent for all cares            LABS:   No results found for this or any previous visit (from the past 168 hour(s)).  Results for orders placed or performed in visit on 06/10/20   Basic Metabolic Panel   Result Value Ref Range    Sodium 140 136 - 145 mmol/L    Potassium 4.3 3.5 - 5.0 mmol/L    Chloride 103 98 - 107 mmol/L    CO2 29 22 - 31 mmol/L    Anion Gap, Calculation 8 5 - 18 mmol/L    Glucose 121 70 - 125 mg/dL    Calcium 9.2 8.5 - 10.5 mg/dL    BUN 18 8 - 28 mg/dL    Creatinine 0.85 0.60 - 1.10 mg/dL    GFR MDRD Af Amer >60 >60 mL/min/1.73m2    GFR MDRD Non Af Amer >60 >60 mL/min/1.73m2         Lab Results   Component Value Date    WBC 7.2 10/21/2020    HGB 11.6 (L) 10/21/2020    HCT 35.9 10/21/2020    MCV 90 10/21/2020     10/21/2020       No results found for: [id number]  Lab Results   Component Value Date    HGBA1C 7.4 (H) 10/21/2020     No results found for: INR, PROTIME  No results found for: [id number]  Lab Results   Component Value Date    TSH 1.21 10/21/2020           ASSESSMENT/PLAN:    COVID 19 infection: resolved. Recommend continuing therapy as continues to be deconditioned from baseline.   Type 2 DM: Accuchecks stable, twice daily checks. On lantus. Hga1c 5.7 on 8/7. hga1c 5.5 on 11/8. hga1c 2/26-6.5. Hga1c up to 7.3 on 6/10.  hga1c 7.4 on 10/21. Increase in a1c related to weight gain, lantus adjusted. Will need endocrinology follow up after discharge and is due for a1c at next visit. Diabetic diet.   CAD, s/p CABG: No chest pain or recent concerns. On aspirin, rosuvastatin. F/u cardiology in 4-6 weeks.   H/o Left MCA CVA, Right sided hemiparesis: Right sided hemiparesis, dysarthria, dysphagia, expressive aphasia.  Wheelchair and bed bound. EZ stand. Dependent for cares, incontinent of urine and stool. On rosuvastatin, aspirin. Dysphagia on NDD3, nectar thick liquids. F/u neuro in 4-6 weeks to re-establish care for CVA.   Anxiety and depression: On paroxetine.   Dysphagia: NDD3 nectar thick liquids. Staff teaching family today about diet modifications, blood glucose monitoring.   PAF: Rate controlled 80s. On eliquis, metoprolol. Stable. Will need f/u with cardiology in 4-6  Weeks.   ANGEL, OHS: On CPAP. Refusing at times. Needs to continue and encourage compliance with this at home.   Morbid Obesity: [phone]. Monitor. Counseling on heart healthy diet, diabetic diet, continue to Encourage improved dietary habits. Needs dietary compliance, continues to gain weight.   Chronic CHF:  No echo or records available [facility] on lasix daily. Has continued to be euvolemic despite weight gain. Felt to be related to increased intake and immobility. Needs cardiology follow up since now leaving facility with 24 hour care and monitoring. F/u in 4-6 weeks. Needs daily weights or at minimum weekly  At home, notify primary or cardiology for any weight gain >2lbs in 24 hours or 5lbs in 7  Days.   HTN: 120s. On irbesartan, metoprolol, lasix. F/u with cardiology in 4-6  Weeks.   Vitreal hemorrhage: injections q4-6 weeks. No recent changes to vision or noted concerns. Needs f/u with eye doctor.       Electronically signed by: Morenita Mosquera NP    Total floor/unit time spent 47 min with >50% time spent on counseling and coordination of care. Counseling regarding discharge instructions, primary care and specialty  Follow ups to establish care. Coordinated care with nursing, therapy,  for discharge planning, lift for home, wheelchair, hospital bed, home health services, primary care follow up.     Please evaluate Zach Olmos for admission to Home  "Health.    Face to Face Attestation and Initial Plan of Care    The face-to-face encounter occurred on date: 1/27/21  Face to Face encounter was with: Morenita Mosquera    Please provide brief clinical summary of reason for visit and need for home care. Deconditioning after CVA, covid 19 infection, moving from long term care (where lived for 2 years) to home environment, family needs ongoing teaching in providing care, equipment use, diabetes and chf management    Please identify which of the following home health disciplines the patient will need AND describe the skilled services that you would like the home health agency to perform: SKILLED NURSING (RN): complex med management and Other teach family diabetes monitoring and management, PHYSICAL THERAPY: strength training and gait training, OCCUPATIONAL THERAPY: ADLs and home safety, MEDICAL SOCIAL WORK and HOME HEALTH AIDE    Homebound Status (describe the functional limitations that support this patient is confined to his/her home. Medicaid recipients are not required to be homebound.):wheelchair, bed bound, cva, right sided hemiparesis    Name of physician who will be responsible for the ongoing home health plan of care (CMS requires the referring physician to provide the specific name of the community physician instead of a title, such as \"PCP\"): Needs to find and establish with primary in community    Requested Start of Care Date: Within 48 hours    Other information to assist the home health agency in developing the initial Plan of Care:    I certify that services are/were furnished while this patient was under the care of a physician and that a physician or an allowed non-physician practitioner (NPP), had a face-to-face encounter that meets the physician face-to-face encounter requirements. The encounter was in whole, or in part, related to the primary reason for home health. The patient is confined to his/her home and needs intermittent skilled nursing, physical " therapy, speech-language pathology, or the continued need for occupational therapy. A plan of care has been established by a physician and is periodically reviewed by a physician.    Post Discharge Medication Reconciliation Status: discharge medications reconciled, continue medications without change

## 2021-06-14 NOTE — PROGRESS NOTES
[facility] Care Coordination Contact    Care Coordinator called Alisia Mckeon at [facility] and left a message to follow up on the status of Zach's EW application.  On 1/7/2021 she emailed Care Coordinator stating they received the paperwork and it would be processed.  Care Coordinator has not hear anything since so is asking for an update.  Care Coordinator is requesting a return call.    MICHEL Barker  [facility]  [phone]

## 2021-06-14 NOTE — TELEPHONE ENCOUNTER
Rakesh Sutton,    I am a [facility] Care Coordinator for the patient, Zach Olmos : 1950.  She is a nursing home resident at [facility].  She typically sees Morenita Mosquera NP but we are to she is on leave.  Zach is planning to return to the community to live with her son.  We are in need of several equipment/supply orders.  Below are a list of the equipment/supplies Zach needs to return home and I have sent Epic orders as well.  Please let me know if you have any questions or concerns.  Let me know if you can write orders for them, thank you!    EZ Stand  Transfer Belt  Shower Chair  Fitted Wheelchair  Hospital Bed  Hand Brace (R)  Thick It Powder  Lift Chair  Pill Box  Blood Sugar Meter      MICHEL Barker  [facility]  [phone]

## 2021-06-14 NOTE — PROGRESS NOTES
[facility] Care Coordination Contact    Care Coordinator emailed Rosanna at [facility] asking if she received CC's e-mail from 12/22/20 about getting in contact with Morenita Mosquera NP for equipment/supplies needed for Zach.  Care Coordinator requested a return email.    Rosanna emailed Care Coordinator back stating that Morenita Mosquera typically doesn't respond in Epic but via email.  She gave Care Coordinator Morenita's email address.  She also asked if there was anything she could help get for Zach.    Care Coordinator emailed Rosanna back giving her the list of equipment/supplies needed for Zach and stated Care Coordinator will send the same list to Morenita Mosquera NP as well and we can work together to get the equipment/supplies needed for Zach to return home to the community.    CC emailed Morenita Mosquera NP with the list of equipment/supplies needed for Zach.    CC received a return email stating Morenita Mosquera is on FMLA.    Care Coordinator reviewed Zach's chart and found that Dr. Sutton has seen Zach as well.  Care Coordinator emailed Dr. Sutton to see if he can sign off on the orders needed for Zach instead.    Carolina Mendez, MICHEL  [facility]  [phone]

## 2021-06-14 NOTE — PROGRESS NOTES
[facility] Care Coordination Contact    Care Coordinator received a return Epic message from Morenita Mosquera NP that is seeing Zach at the nursing home.  She sent a message in response to Care Coordinators order requests for equipment/supplies needed for Zach to return home to the community.  See email response below.        ----- Message -----  From: Morenita Mosquera NP  Sent: 1/8/2021   8:39 PM CST  To: FRANCIS Barker, *  Subject: RE: Supply/Equipment Orders                      Hello,  This is not an appropriate discharge plan at this time. Please communicate and work with the facility staff.   Therapy will determine what equipment is appropriate and assist with paperwork completion. Most of these equipment items require very specific documentation from therapy and face to face encounters with  provider. Other items on the list are not prescription and will need to be purchased by the family.   In future if you require paperwork from providers please contact our office staff and not us directly.   Thank you      Care Coordinator responded to Morenita's message (see below)    Rakesh Xavier,  Thank you for the response.  I am sorry for contacting you, I was not aware there is office staff to contact.  Do you have their contact information?  Also, can you please let me know your thoughts on why this is not an appropriate discharge plan at this time?  I am of course just trying to follow the member's wishes but know that you and the facility have more information on her needs.  Please let me know to better understand.  Thank you so much.      Carolina Mendez JOHN  [facility]  [phone]        Carolina Mendez [facility]  [phone]

## 2021-06-14 NOTE — PROGRESS NOTES
[facility] Care Coordination Contact    Care Coordinator received a return e-mail from [facility] stating they cannot send out gloves to member as there is a nationwide shortage of gloves.  She then attached the quote for the Thick It, Thermometer and   Pill box.  Asking Care Coordinator to authorize those items.  Also asking if Thick It will be a monthly order.    Care Coordinator emailed [facility] back stating that yes, Thick It will be monthly.  Care Coordinator will authorize all EW items.      MICHEL Barker  [facility]  [phone]

## 2021-06-14 NOTE — PROGRESS NOTES
[facility] Care Coordination Contact    Care Coordinator received an e-mail today from Rosanna Fleming at [facility] stating:      Zach will be discharging tomorrow at 10am to home. I have faxed a referral for home care through [facility] for OT, PT, HHA and nursing. The bed and wheelchair are being worked on today with signed orders from Morenita. We will send her home with a glucose monitor and her wrist brace, that is hers, custom made for her.    Care Coordinator emailed Rosanna back thanking her for the updates.    Carolina Mendez, MICHEL  [facility]  [phone]

## 2021-06-14 NOTE — PROGRESS NOTES
[facility] Care Coordination Contact    Care Coordinator received a call from Zach's son, Stiven, asking about getting some diabetes education for when Zach comes back home.  Care Coordinator suggested he contact Zach's PCP and request this as typically the clinics will provide that education.  He also asked if she can get nursing visits when she returns home and Care Coordinator stated yes we can get those services in place when she returns home.  Care Coordinator stated that it looks like Zach may be able to discharge home next week as long as [facility] can approve her for the Elderly Waiver and as long as we can get all the equipment/supplies Zach needs by next week.  Care Coordinator will keep him updated.    MICHEL Barker  [facility]  [phone]

## 2021-06-14 NOTE — PROGRESS NOTES
[facility] Care Coordination Contact    Care Coordinator emailed Rosanna Alegria at [facility] in regards to Morenita Mosquera NP's epic message.  Care Coordinator asked if the therapy staff can do an evaluation on Zach to determine what equipment/supplies Zach will need to return home and then if therapy staff can start the paperwork for these items needed.  Care Coordinator also asked if there is a care conference for Zach coming up.  Care Coordinator requested a return e-mail.    MICHEL Barker  [facility]  [phone]

## 2021-06-14 NOTE — PROGRESS NOTES
[facility]    DATE: 2021    NAME:  Zach Olmos             :  1950  MRN: [id number]  CODE STATUS:  FULL CODE    VISIT TYPE: Problem Visit (discharge planning, f2f for equipment)     FACILITY:  [facility] [[id number]]       CHIEF COMPLAIN/REASON FOR VISIT:    Chief Complaint   Patient presents with     Problem Visit     discharge planning, f2f for equipment               HISTORY OF PRESENT ILLNESS: Zach Olmos is a 70 y.o. female who is a long term care resident of [facility].     Today Ms. Olmos is seen for face to face for equipment orders. Therapy and [place]  had a home evaluation with Zach and her family. Her son has made many modifications to the home to make this wheelchair safe. They now have safe and appropriate care arranged for her including family caregivers . Her family is working with therapy on caregiver training and proper equipment use. She is in need of lift, wheelchair, and hospital bed. Therapy and [place]  both feel this is now a safe and appropriate discharge plan. They are planning on her moving to her son's home sometime next week once all arrangements have been made and equipment needed will be available. She is seen in her wheelchair today [facility] She says she feels great. She thinks her home evaluation went well and is very much looking forward to moving home with her family. She denies any pain, cough, or breathing concerns recently.     DME Order  Hospital Bed   Hospital bed is required for body positioning, to allow for safe transfers to wheelchair and standing and frequent changes in body position, not feasible in an ordinary bed.     1. Does the patient require positioning of the body in ways not feasible with an ordinary bed due to a medical condition that is expected to last at least 1 month? Yes    2. Does the patient require,  for the alleviation of pain, positioning of the body in ways not feasible with an ordinary bed? No    3. Does the patient require the head of the bed to be elevated more than 30 degrees most of the time due to congestive heart failure, chronic pulmonary disease, or aspiration? Yes    4. Does the patient require traction that can only be attached to a hospital bed? No    5. Does the patient require a bed height different than a fixed height hospital bed to permit transfers to chair, wheelchair, or standing position? Yes    6. Does the patient require frequent changes in body position and/or have an immediate need for change in body position? Yes    For trapeze- Patient must meet standard hospital bed criteria also:   1. Does patient need this device to sit up because of a respiratory condition, for change in body position for other medical reasons, or to get in or out of bed? No    My patient requires body positioning not feasible in an ordinary bed due to CHF, CVA, right hemiparesis and inability to position self-related to patient requiring the head of the bed more than 30 degrees most of the time and SOB.   Additionally, my patient needs to be frequently repositioned due to right hemiparesis and symptoms listed above    Date of face to face encounter: 1/22/21 1100am    Morenita Mosquera NP NPI [id number]    DME Order  Wheelchair    Wheelchair Face to Face documentation  Length of need: 99 (lifetime)  Diagnosis:   1. Chronic combined systolic and diastolic congestive heart failure (H)     2. Coronary artery disease involving coronary bypass graft with angina pectoris, unspecified whether native or transplanted heart (H)     3. Essential hypertension     4. ANGEL on CPAP     5. PAF (paroxysmal atrial fibrillation) (H)     6. 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 Right hemiplegia (H)     8. H/O: CVA (cerebrovascular accident)          This patient has significant mobility limitations that impair her  activities of daily living including toileting, dressing, and bathing in a customary home.  She has had weakness, falls, poor endurance while attempting to perform MRADLs.  The patient's cannot walk with an assistive device such as a cane or walker.  Her current home has adequate space in entry doorway, bedrooms, bathroom, and kitchen for maneuvering a manual wheelchair.  The patient will use this wheelchair daily and it will improve her participation in MRADLs as she will be able to get out of bed and move about her home efficiently.  She is willing to use a manual wheelchair.  The patient does have sufficient upper extremity strength to propel the wheelchair but has 24 hour supervision who will propel her in the wheelchair.  She is anticipated to spend at least 10 hours/day in the wheelchair.        Face to face with Zach Olmos in regards to a judith height light weight wheelchair.  Patient requires a wheelchair to use within the home due to the following limitations in mobility:  inability to stand without a mechanical lift, Right Hemiplegia, weakness and fatigue resulting from hemiplegia from cerebral infarction and abnormalities of gait.  Patient has impairment in ability to complete mobility related ADLs from standing.  Pt would not be able to safely complete mobility and mobility related ADLs with use of a cane, walker or crutches.  Patient has expressed willingness to propel wheelchair.  Pt has physical ability to propel wheelchair.  Patient s home can accommodate use of the wheelchair for mobility.  Patient requires the use of a judith height wheelchair due to his short stature.  Pt requires a light weight wheelchair because patient is unable to propel a standard wheelchair.      Please contact with questions [phone], Fax: [phone].    Morenita Mosquera NP, NPI [id number]    DME Order  Mechanical Lift  Patient requires a mechanical lift to use within the home due to the following limitations in mobility:   inability to stand without a mechanical lift, Right Hemiplegia, weakness and fatigue resulting from hemiplegia from cerebral infarction and abnormalities of gait. The lift is required to transfer patient from a bed to chair, wheelchair to a commode and any other ways of transfer. Without the use of the lift, the patient would be confined to bed.         REVIEW OF SYSTEMS:  PROBLEMS AND REVIEW OF SYSTEMS:   Today on ROS:   Currently, no fever, chills, or rigors. Decreased vision. Denies any chest pain, headaches, palpitations, lightheadedness. Denies any GERD symptoms. Positive for  EZ stand for transfers, dysphagia, urinary incontinence, fecal incontinence, appetite good, wheelchair bound, right sided weakness, aphasia, no shortness of breath,  Cpap use, fatigue, cough improved, no recent nausea or vomiting, weakness improving      Allergies   Allergen Reactions     Aricept [Donepezil]      Atorvastatin      Glipizide      Lisinopril      Current Outpatient Medications   Medication Sig     acetaminophen (TYLENOL) 325 MG tablet Take 650 mg by mouth every 6 (six) hours as needed for pain.            apixaban (ELIQUIS) 5 mg Tab tablet Take 5 mg by mouth 2 (two) times a day.           artificial tears,hypromellose, (GENTEAL; SYSTANE) 0.3 % Gel Administer 1 drop to both eyes 4 (four) times a day.      furosemide (LASIX) 20 MG tablet Take 20 mg by mouth daily.            insulin glargine (LANTUS) 100 unit/mL injection Inject 24 Units under the skin at bedtime.      irbesartan (AVAPRO) 75 MG tablet Take 75 mg by mouth 2 (two) times a day.           metFORMIN (GLUCOPHAGE) 500 MG tablet Take 500 mg by mouth 2 (two) times a day with meals. 1000mg in am and 500mg in evening     metoprolol succinate (TOPROL-XL) 50 MG 24 hr tablet Take 50 mg by mouth daily.     ondansetron (ZOFRAN) 4 MG tablet Take 4 mg by mouth every 6 (six) hours as needed for nausea.     PARoxetine (PAXIL) 30 MG tablet Take 60 mg by mouth every  "morning.           rosuvastatin (CRESTOR) 20 MG tablet Take 20 mg by mouth every morning. PER HOSPITAL ORDERS GIVE TO PT. IN THE A.M.            Past Medical History:    Past Medical History:   Diagnosis Date     Anemia      Anxiety      Cancer (H)     Hx of colon cancer     CHF (congestive heart failure) (H)     diastolic     Coronary artery disease     s/p cabg     CVA (cerebral vascular accident) (H)      Depression      Diabetes mellitus (H)     type 2     DVT (deep vein thrombosis) in pregnancy      Granuloma annulare      Hemiplegia (H)     R sided and minimally communicative     Hyperlipidemia      Hypertension      Obesity      Oropharyngeal dysphagia      ANGEL (obstructive sleep apnea)      Parotitis      Paroxysmal atrial fibrillation (H)      PE (pulmonary thromboembolism) (H)      Stroke (H) 12/2018    Sub acute L MCA stroke     Varicose veins of both lower extremities        IMMUNIZATIONS:    There is no immunization history on file for this patient.  Above immunizations pulled from [employer]. Facility records also reconciled. Outstanding information sent to [facility] to update [facility].  Future immunizations are not needed at this point as all recommended immunizations are up to date.     PHYSICAL EXAMINATION  Vitals:    01/21/21 2026   BP: 124/56   Pulse: 77   Resp: 20   Temp: 98.1  F (36.7  C)   SpO2: 94%   Weight: 213 lb (96.6 kg)   Height: 5' 7\" (1.702 m)       Today on physical exam:     GENERAL: Awake, Alert, not in any form of acute distress, answers most questions appropriately, follows simple commands  HEENT: Head is normocephalic with normal hair distribution. No evidence of trauma. Ears: No acute purulent discharge. Eyes: Conjunctivae pink with no scleral jaundice. Nose: Normal mucosa and septum. NECK: Supple with no cervical or supraclavicular lymphadenopathy. Trachea is midline. Decreased vision and hearing, left eyelid cyst   CHEST: No tenderness or deformity, " no crepitus  LUNG: dim to auscultation with good chest expansion. There are no crackles or wheezes, normal AP diameter.  No recent shortness of breath or cough   BACK: No kyphosis of the thoracic spine. Symmetric, no curvature, ROM normal, no CVA tenderness, no spinal tenderness   CVS: irregularly irregular rhythm,  2+ pulses symmetric in all extremities.  ABDOMEN: Rounded and soft, nontender to palpation, non distended, no masses, no organomegaly, good bowel sounds, no rebound or guarding, no peritoneal signs.   EXTREMITIES: no edema, atraumatic  SKIN: Warm and dry, no rash today  NEURO/PSYCH: The patient is oriented to person, place, usually  time. Forgetful at times, Right sided hemiplegia, dysarthria, dysphagia, expressive aphasia, EZ stand,dependent for all cares            LABS:   No results found for this or any previous visit (from the past 168 hour(s)).  Results for orders placed or performed in visit on 06/10/20   Basic Metabolic Panel   Result Value Ref Range    Sodium 140 136 - 145 mmol/L    Potassium 4.3 3.5 - 5.0 mmol/L    Chloride 103 98 - 107 mmol/L    CO2 29 22 - 31 mmol/L    Anion Gap, Calculation 8 5 - 18 mmol/L    Glucose 121 70 - 125 mg/dL    Calcium 9.2 8.5 - 10.5 mg/dL    BUN 18 8 - 28 mg/dL    Creatinine 0.85 0.60 - 1.10 mg/dL    GFR MDRD Af Amer >60 >60 mL/min/1.73m2    GFR MDRD Non Af Amer >60 >60 mL/min/1.73m2         Lab Results   Component Value Date    WBC 7.2 10/21/2020    HGB 11.6 (L) 10/21/2020    HCT 35.9 10/21/2020    MCV 90 10/21/2020     10/21/2020       No results found for: [id number]  Lab Results   Component Value Date    HGBA1C 7.4 (H) 10/21/2020     No results found for: INR, PROTIME  No results found for: [id number]  Lab Results   Component Value Date    TSH 1.21 10/21/2020           ASSESSMENT/PLAN:    COVID 19 infection: improving. Completed isolation. Working with PT, Ot on deconditioning after infection.    Type 2 DM with HTN: Accuchecks stable. On lantus,  accuchecks two times a day. Hga1c 5.7 on 8/7. hga1c 5.5 on 11/8. hga1c 2/26-6.5. continue lantus. Hga1c up to 7.3 on 6/10.  hga1c 7.4 on 10/21. Overall stable.   CAD, s/p CABG: No chest pain or recent concerns. On aspirin, rosuvastatin. F/u cardiology.    H/o Left MCA CVA: Right sided hemiplegia, dysarthria, dysphagia, expressive aphasia. Wheelchair and bed bound. EZ stand. Dependent for cares, incontinent of urine and stool. On rosuvastatin, aspirin. Dysphagia on NDD3, nectar thick liquids. F/u neuro prn. Per  and therapy home [place] went well yesterday  And feel the son now has an appropriate and safe environment and plan for caring for her at home. Equipment orders completed today. Plan on discharge next week.   Anxiety and depression: On paroxetine. ACP following, no recent mood concerns.   Dysphagia: NDD3 nectar thick liquids. No recent concerns.   PAF: Rate controlled 80s. On eliquis, metoprolol. stable  ANGEL, OHS: On CPAP. Refusing at times. No concerns today.   Weights, obesity: [phone]. Monitor. Counseling on heart healthy diet, diabetic diet, continue to Encourage improved dietary habits. Monitor. Labs have been stable.   Chronic CH[facility]: [facility] reported h/o CHF. No echo or records available [facility]. on lasix daily. Has continued to be euvolemic despite weight gain. Felt to be related to increased intake and immobility.   HTN: 120s. On irbesartan, metoprolol, lasix. No recent concerns.    Vitreal hemorrhage: injections q4-6 weeks. No recent changes to vision or noted concerns.       Labs in october    Electronically signed by: Morenita Mosquera NP    Case Management:  I have reviewed the facility/SNF care plan/MDS which was done 12/17/20, including the falls risk, nutrition and pain screening. I also reviewed the current immunizations, and preventive care.. Future cancer screening is not  clinically indicated secondary to age/goals of care.   Patient's desire to return to the community is not assessible due to cognitive impairment.    Information reviewed:  Medications, vital signs, orders, and nursing notes.  The health plan new enrollment has happened. I have reviewed the  MDS, the preventative needs,  and facility care plan. The level of care is appropriate. I have reviewed the code status/advanced directives.

## 2021-06-14 NOTE — PROGRESS NOTES
[facility] Care Coordination Contact    Care Coordinator emailed [employer] with a list of equipment/supplies that are needed for Zach to return home and asked for them to send orders to Dr. Sutton.    [employer] emailed back with updates on what is covered and what is not per Care Coordinators list.  See below:    EZ Stand     ( This will be a standard Nicolás style sling  )  Transfer Belt  Shower Chair  Fitted Wheelchair  (  Requires a face to face or virtual with MD  )  Hospital Bed          (  Requires a face to face or virtual with MD  )  Hand Brace (R)  Thick It Powder     (  We cannot provide thru [employer], only [employer]  )  Lift Chair               (  Requires a face to face or virtual with MD  )  Pill Box  Blood Sugar Meter   (  Glucometers are pharmacy only.  So we are not allowed to supply  )    Care Coordinator emailed [employer] back thanking them for the update and asked if an EZ lift could be covered under Elderly Waiver?      MICHEL Barker  [facility]  [phone]

## 2021-06-14 NOTE — PROGRESS NOTES
[facility] Care Coordination Contact    Completed following tasks: Mailed copy of care plan to client, Updated services in access and Submitted referrals/auths for Thick It $30.00 monthly, Pill box $25.00, Thermometer forehead scan $95.00, Wheel chair tray $80.00, Over bed table $95.00.     Order placed with [facility] (p: [phone]; f: [phone]) for: thick it, pill box, thermometer forehead scan, wheel chair tray, over bed table.  Order placed on 1/28/21. Access updated.  As required, authorization submitted to health plan.    [employer]:  Emailed completed PCA assessment to [employer].  Faxed copy of PCA assessment to [employer] Agency and mailed copy to member.  Faxed MD Communication to PCP.     Ed Lee  Care Management Specialist  [facility]  [phone]

## 2021-06-14 NOTE — PROGRESS NOTES
"[facility] Care Coordination Contact    Care Coordinator received an email from Rosanna Fleming at [facility] stating:     \"Home eval went well for Zach Olmos today. The therapy department came with and thought it would be an appropriate and safe discharge. With that being said, out therapy department can and will work on getting the Hospital bed and the wheelchair ordered. I am going to need you to order the lift. I will have Morenita sign orders for next week and would like to coordinate a discharge day with you and therapy so these things call fall into place. Please let me know what your thoughts are. Thank you\"    Care Coordinator then emailed [facility] to see how quickly a enrique lift can be ordered and delivered to member.  [employer] emailed back stating they have the lifts in stock so as long as they can get the paperwork needed back from the PCP they can go quickly.  [employer] attached a form that the NP needs to complete.      Care Coordinator then emailed Rosanna back attaching the form that Morenita Mosquera NP needs to complete for hte enrique lift.  Care Coordinator stated the quicker this paperwork can be completed the quicker the enrique lift can be ordered and be ready for delivery when Zach returns home next week.  Care Coordinator stated I am still waiting to hear back from [facility] regarding approval to open Zach to the Elderly Waiver.    MICHEL Barker  [facility]  [phone]    "

## 2021-06-14 NOTE — PROGRESS NOTES
Received: POC Sig page, PCA Sig page, and JULIA from member. Faxed PCA Sig page to health plan and provider. Faxed JULIA form to [facility] medical record.       Ed Lee  Care Management Specialist  [facility]  [phone]

## 2021-06-14 NOTE — PROGRESS NOTES
[facility]    DATE: 2020    NAME:  Zach Olmos             :  1950  MRN: [id number]  CODE STATUS:  FULL CODE    VISIT TYPE: Problem Visit (positive covid)     FACILITY:  [facility] [[id number]]       CHIEF COMPLAIN/REASON FOR VISIT:    Chief Complaint   Patient presents with     Problem Visit     positive covid               HISTORY OF PRESENT ILLNESS: Zach Olmos is a 70 y.o. female who is a long term care resident of [facility].     Today Ms. Olmos is seen for testing positive for covid. Staff report she did have some loose stools but otherwise has not been complaining of any symptoms. She was tested on Monday. She is seen in her room in bed today. She says she is actually feeling pretty  Good. She denies any cough, shortness of breath, sore throat, congestion, or runny nose. She has been sleeping well and appetite is ok. She denies any diarrhea today but did have some yesterday. She is not nauseated or having any urinary concerns. She says she feels otherwise fine. Staff report she did have some family visit recently but unsure if they were positive for covid. Unclear where covid infection source from.       REVIEW OF SYSTEMS:  PROBLEMS AND REVIEW OF SYSTEMS:   Today on ROS:   Currently, no fever, chills, or rigors. Decreased vision. Denies any chest pain, headaches, palpitations, lightheadedness. Denies any GERD symptoms. Positive for weakness, EZ stand for transfers or slide board, dysphagia, urinary incontinence, fecal incontinence, appetite good, wheelchair bound, right sided weakness stable, aphasia, no shortness of breath,  Cpap use, loose stools      Allergies   Allergen Reactions     Aricept [Donepezil]      Atorvastatin      Glipizide      Lisinopril      Current Outpatient Medications   Medication Sig     acetaminophen (TYLENOL) 325 MG tablet Take 650 mg by mouth every 6 (six) hours as needed for pain.             apixaban (ELIQUIS) 5 mg Tab tablet Take 5 mg by mouth 2 (two) times a day.           artificial tears,hypromellose, (GENTEAL; SYSTANE) 0.3 % Gel Administer 1 drop to both eyes 4 (four) times a day.      furosemide (LASIX) 20 MG tablet Take 20 mg by mouth daily.            insulin glargine (LANTUS) 100 unit/mL injection Inject 24 Units under the skin at bedtime.      irbesartan (AVAPRO) 75 MG tablet Take 75 mg by mouth 2 (two) times a day.           metFORMIN (GLUCOPHAGE) 500 MG tablet Take 500 mg by mouth 2 (two) times a day with meals. 1000mg in am and 500mg in evening     metoprolol succinate (TOPROL-XL) 50 MG 24 hr tablet Take 50 mg by mouth daily.     ondansetron (ZOFRAN) 4 MG tablet Take 4 mg by mouth every 6 (six) hours as needed for nausea.     PARoxetine (PAXIL) 30 MG tablet Take 60 mg by mouth every morning.           rosuvastatin (CRESTOR) 20 MG tablet Take 20 mg by mouth every morning. PER HOSPITAL ORDERS GIVE TO PT. IN THE A.M.            Past Medical History:    Past Medical History:   Diagnosis Date     Anemia      Anxiety      Cancer (H)     Hx of colon cancer     CHF (congestive heart failure) (H)     diastolic     Coronary artery disease     s/p cabg     CVA (cerebral vascular accident) (H)      Depression      Diabetes mellitus (H)     type 2     DVT (deep vein thrombosis) in pregnancy      Granuloma annulare      Hemiplegia (H)     R sided and minimally communicative     Hyperlipidemia      Hypertension      Obesity      Oropharyngeal dysphagia      ANGEL (obstructive sleep apnea)      Parotitis      Paroxysmal atrial fibrillation (H)      PE (pulmonary thromboembolism) (H)      Stroke (H) 12/2018    Sub acute L MCA stroke     Varicose veins of both lower extremities        IMMUNIZATIONS:    There is no immunization history on file for this patient.  Above immunizations pulled from [employer]. Facility records also reconciled. Outstanding information sent to [facility] to update  [facility].  Future immunizations are not needed at this point as all recommended immunizations are up to date.     PHYSICAL EXAMINATION  Vitals:    12/31/20 1051   BP: 122/56   Pulse: 62   Resp: 18   Temp: 98.4  F (36.9  C)   SpO2: 97%   Weight: 213 lb (96.6 kg)   Height: 5' 7\" (1.702 m)       Today on physical exam:     GENERAL: Awake, Alert, not in any form of acute distress, answers most questions appropriately, follows simple commands  HEENT: Head is normocephalic with normal hair distribution. No evidence of trauma. Ears: No acute purulent discharge. Eyes: Conjunctivae pink with no scleral jaundice. Nose: Normal mucosa and septum. NECK: Supple with no cervical or supraclavicular lymphadenopathy. Trachea is midline. Decreased vision and hearing, left eyelid cyst   CHEST: No tenderness or deformity, no crepitus  LUNG: dim to auscultation with good chest expansion. There are no crackles or wheezes, normal AP diameter.  No recent shortness of breath or cough   BACK: No kyphosis of the thoracic spine. Symmetric, no curvature, ROM normal, no CVA tenderness, no spinal tenderness   CVS: irregularly irregular rhythm,  2+ pulses symmetric in all extremities.  ABDOMEN: Rounded and soft, nontender to palpation, non distended, no masses, no organomegaly, good bowel sounds, no rebound or guarding, no peritoneal signs.   EXTREMITIES: no edema, atraumatic  SKIN: Warm and dry, no rash today  NEURO/PSYCH: The patient is oriented to person, place and time. Forgetful at times, Right sided hemiplegia, dysarthria, dysphagia, expressive aphasia, EZ stand to slide board for transfers, dependent for cares            LABS:   No results found for this or any previous visit (from the past 168 hour(s)).  Results for orders placed or performed in visit on 06/10/20   Basic Metabolic Panel   Result Value Ref Range    Sodium 140 136 - 145 mmol/L    Potassium 4.3 3.5 - 5.0 mmol/L    Chloride 103 98 - 107 mmol/L    CO2 29 22 - 31 mmol/L    " Anion Gap, Calculation 8 5 - 18 mmol/L    Glucose 121 70 - 125 mg/dL    Calcium 9.2 8.5 - 10.5 mg/dL    BUN 18 8 - 28 mg/dL    Creatinine 0.85 0.60 - 1.10 mg/dL    GFR MDRD Af Amer >60 >60 mL/min/1.73m2    GFR MDRD Non Af Amer >60 >60 mL/min/1.73m2         Lab Results   Component Value Date    WBC 7.2 10/21/2020    HGB 11.6 (L) 10/21/2020    HCT 35.9 10/21/2020    MCV 90 10/21/2020     10/21/2020       No results found for: [id number]  Lab Results   Component Value Date    HGBA1C 7.4 (H) 10/21/2020     No results found for: INR, PROTIME  No results found for: [id number]  Lab Results   Component Value Date    TSH 1.21 10/21/2020           ASSESSMENT/PLAN:    COVID 19 infection: some loose stools, denies other symptoms. o2 sats and temps stable. Continue to monitor closely. Notify if becomes hypoxic, develops cough, shortness of breath, or other symptoms. Will order zinc and vitamin c daily x 7 days. No other treatment warranted at this time due to minimal symptoms. Isolation for 10 days from day tested positive per MD guidelines.   Type 2 DM with HTN: Accuchecks stable. On lantus, accuchecks two times a day. Hga1c 5.7 on 8/7. hga1c 5.5 on 11/8. hga1c 2/26-6.5. continue lantus. Hga1c up to 7.3 on 6/10.  hga1c 7.4 on 10/21.previously adjusted lantus and has been controlled. Continue to encourage diet compliance.   CAD, s/p CABG: No chest pain or recent concerns. On aspirin, rosuvastatin. F/u cardiology.    H/o Left MCA CVA: Right sided hemiplegia, dysarthria, dysphagia, expressive aphasia. Wheelchair and bed bound. EZ stand to slide board for transfers. Dependent for cares. On rosuvastatin, aspirin. Dysphagia on NDD3, nectar thick and some thin liquids. Continue to monitor. F/u with  Neuro prn. Stable.   Anxiety and depression: On paroxetine. ACP following, no recent mood concerns. Family recently visited.   Dysphagia: NDD3 nectar thin liquids. No recent concerns.   PAF: Rate controlled 70s. On eliquis,  metoprolol. Well controlled.    ANGEL, OHS: On CPAP. Refusing at times. No concerns today.   Weights, obesity: [phone]. Monitor. Counseling on heart healthy diet, diabetic diet,  Continues to have weight gain. Immobility and increased intake. Counseling provided again, dietary monitoring. Encourage improved dietary habits.   [facility]ronic CH[facility]: [facility] h/[facility] CHF. No echo or records available [facility]. on lasix daily. Appears euvolemic on exam. Has had weight gain recently but does not appear fluid overloaded. No shortness of breath or edema. Monitor closely with covid infection.   HTN: 150s. On irbesartan, metoprolol, lasix. Stable.   Vitreal hemorrhage: injections q4-6 weeks. Last eye appt 11/11.       Labs in october    Electronically signed by: Morenita Mosquera NP    Case Management:  I have reviewed the facility/SNF care plan/MDS which was done 9/24/20, including the falls risk, nutrition and pain screening. I also reviewed the current immunizations, and preventive care.. Future cancer screening is not clinically indicated secondary to age/goals of care.   Patient's desire to return to the community is not assessible due to cognitive impairment.    Information reviewed:  Medications, vital signs, orders, and nursing notes.  The health plan new enrollment has happened. I have reviewed the  MDS, the preventative needs,  and facility care plan. The level of care is appropriate. I have reviewed the code status/advanced directives.

## 2021-06-14 NOTE — PROGRESS NOTES
[facility]    DATE: 2021    NAME:  Zach Olmos             :  1950  MRN: [id number]  CODE STATUS:  FULL CODE    VISIT TYPE: Problem Visit (covid infection, fatigue)     FACILITY:  [facility] [[id number]]       CHIEF COMPLAIN/REASON FOR VISIT:    Chief Complaint   Patient presents with     Problem Visit     covid infection, fatigue               HISTORY OF PRESENT ILLNESS: Zach Olmos is a 70 y.o. female who is a long term care resident of [facility].     Today Ms. Olmos is seen for COVID infection, fatigue. Per staff she has been afebrile and not having shortness of breath. She has had fatigue, nausea, and loss of taste and smell. She is eating ok and no loose stools noted. Her oxygen levels have been fine and she is not complaining of anything. She was noted to have a mild dry cough this morning but nothing noted since. She seems to be doing very well and vitals are all stable. She is seen in her room on COVID unit. She says she feels great. She denies any pain or problems. She denies any nausea right now but thinks she has had some off and on. No vomiting though. She thinks her taste might be different but unable to really describe. She does feel tired but no other concerns today.       REVIEW OF SYSTEMS:  PROBLEMS AND REVIEW OF SYSTEMS:   Today on ROS:   Currently, no fever, chills, or rigors. Decreased vision. Denies any chest pain, headaches, palpitations, lightheadedness. Denies any GERD symptoms. Positive for weakness, EZ stand for transfers or slide board, dysphagia, urinary incontinence, fecal incontinence, appetite good, wheelchair bound, right sided weakness stable, aphasia, no shortness of breath,  Cpap use, no further loose stools, fatigue, loss of taste and smell, intermittent nausea      Allergies   Allergen Reactions     Aricept [Donepezil]      Atorvastatin      Glipizide      Lisinopril      Current Outpatient  Medications   Medication Sig     acetaminophen (TYLENOL) 325 MG tablet Take 650 mg by mouth every 6 (six) hours as needed for pain.            apixaban (ELIQUIS) 5 mg Tab tablet Take 5 mg by mouth 2 (two) times a day.           artificial tears,hypromellose, (GENTEAL; SYSTANE) 0.3 % Gel Administer 1 drop to both eyes 4 (four) times a day.      furosemide (LASIX) 20 MG tablet Take 20 mg by mouth daily.            insulin glargine (LANTUS) 100 unit/mL injection Inject 24 Units under the skin at bedtime.      irbesartan (AVAPRO) 75 MG tablet Take 75 mg by mouth 2 (two) times a day.           metFORMIN (GLUCOPHAGE) 500 MG tablet Take 500 mg by mouth 2 (two) times a day with meals. 1000mg in am and 500mg in evening     metoprolol succinate (TOPROL-XL) 50 MG 24 hr tablet Take 50 mg by mouth daily.     ondansetron (ZOFRAN) 4 MG tablet Take 4 mg by mouth every 6 (six) hours as needed for nausea.     PARoxetine (PAXIL) 30 MG tablet Take 60 mg by mouth every morning.           rosuvastatin (CRESTOR) 20 MG tablet Take 20 mg by mouth every morning. PER HOSPITAL ORDERS GIVE TO PT. IN THE A.M.            Past Medical History:    Past Medical History:   Diagnosis Date     Anemia      Anxiety      Cancer (H)     Hx of colon cancer     CHF (congestive heart failure) (H)     diastolic     Coronary artery disease     s/p cabg     CVA (cerebral vascular accident) (H)      Depression      Diabetes mellitus (H)     type 2     DVT (deep vein thrombosis) in pregnancy      Granuloma annulare      Hemiplegia (H)     R sided and minimally communicative     Hyperlipidemia      Hypertension      Obesity      Oropharyngeal dysphagia      ANGEL (obstructive sleep apnea)      Parotitis      Paroxysmal atrial fibrillation (H)      PE (pulmonary thromboembolism) (H)      Stroke (H) 12/2018    Sub acute L MCA stroke     Varicose veins of both lower extremities        IMMUNIZATIONS:    There is no immunization history on file for this patient.  Above  "immunizations pulled from [facility]. Facility records also reconciled. Outstanding information sent to [facility] to update [facility].  Future immunizations are not needed at this point as all recommended immunizations are up to date.     PHYSICAL EXAMINATION  Vitals:    01/04/21 0335   BP: 120/62   Pulse: 65   Resp: 16   Temp: 97.2  F (36.2  C)   SpO2: 96%   Weight: 213 lb (96.6 kg)   Height: 5' 7\" (1.702 m)       Today on physical exam:     GENERAL: Awake, Alert, not in any form of acute distress, answers most questions appropriately, follows simple commands  HEENT: Head is normocephalic with normal hair distribution. No evidence of trauma. Ears: No acute purulent discharge. Eyes: Conjunctivae pink with no scleral jaundice. Nose: Normal mucosa and septum. NECK: Supple with no cervical or supraclavicular lymphadenopathy. Trachea is midline. Decreased vision and hearing, left eyelid cyst   CHEST: No tenderness or deformity, no crepitus  LUNG: dim to auscultation with good chest expansion. There are no crackles or wheezes, normal AP diameter.  No recent shortness of breath or cough   BACK: No kyphosis of the thoracic spine. Symmetric, no curvature, ROM normal, no CVA tenderness, no spinal tenderness   CVS: irregularly irregular rhythm,  2+ pulses symmetric in all extremities.  ABDOMEN: Rounded and soft, nontender to palpation, non distended, no masses, no organomegaly, good bowel sounds, no rebound or guarding, no peritoneal signs.   EXTREMITIES: no edema, atraumatic  SKIN: Warm and dry, no rash today  NEURO/PSYCH: The patient is oriented to person, place and time. Forgetful at times, Right sided hemiplegia, dysarthria, dysphagia, expressive aphasia, EZ stand to slide board for transfers, dependent for cares            LABS:   Recent Results (from the past 168 hour(s))   Creatinine   Result Value Ref Range    Creatinine 0.82 0.60 - 1.10 mg/dL    GFR MDRD Af Amer >60 >60 mL/min/1.73m2    GFR " MDRD Non Af Amer >60 >60 mL/min/1.73m2     Results for orders placed or performed in visit on 06/10/20   Basic Metabolic Panel   Result Value Ref Range    Sodium 140 136 - 145 mmol/L    Potassium 4.3 3.5 - 5.0 mmol/L    Chloride 103 98 - 107 mmol/L    CO2 29 22 - 31 mmol/L    Anion Gap, Calculation 8 5 - 18 mmol/L    Glucose 121 70 - 125 mg/dL    Calcium 9.2 8.5 - 10.5 mg/dL    BUN 18 8 - 28 mg/dL    Creatinine 0.85 0.60 - 1.10 mg/dL    GFR MDRD Af Amer >60 >60 mL/min/1.73m2    GFR MDRD Non Af Amer >60 >60 mL/min/1.73m2         Lab Results   Component Value Date    WBC 7.2 10/21/2020    HGB 11.6 (L) 10/21/2020    HCT 35.9 10/21/2020    MCV 90 10/21/2020     10/21/2020       No results found for: [id number]  Lab Results   Component Value Date    HGBA1C 7.4 (H) 10/21/2020     No results found for: INR, PROTIME  No results found for: [id number]  Lab Results   Component Value Date    TSH 1.21 10/21/2020           ASSESSMENT/PLAN:    COVID 19 infection: no further loose stools. Some intermittent nausea, no vomiting. Appetite fine. Fatigue, loss of taste and smell, dry cough intermittent. Vitals stable, no hypoxia and afebrile. Appears to be doing very well, minimally symptomatic. On covid unit. On zinc and vitamin c will extend one more week. Will add vitamin d daily per covid treatment recommendations. Does not meet criteria for dexamethasone, remdesivir, or bamlanivamab treatment at this time. Will order proair prn x 14 days. May discontinue isolation 10 days post positive test. Notify with any changes. No need for further anticoagulation as on eliquis.   Type 2 DM with HTN: Accuchecks stable. On lantus, accuchecks two times a day. Hga1c 5.7 on 8/7. hga1c 5.5 on 11/8. hga1c 2/26-6.5. continue lantus. Hga1c up to 7.3 on 6/10.  hga1c 7.4 on 10/21.previously adjusted lantus and has been controlled. Continue to encourage diet compliance.   CAD, s/p CABG: No chest pain or recent concerns. On aspirin, rosuvastatin.  F/u cardiology.    H/o Left MCA CVA: Right sided hemiplegia, dysarthria, dysphagia, expressive aphasia. Wheelchair and bed bound. EZ stand to slide board for transfers. Dependent for cares. On rosuvastatin, aspirin. Dysphagia on NDD3, nectar thick liquids. F/u neuro prn. Has been stable for some time.   Anxiety and depression: On paroxetine. ACP following, no recent mood concerns.   Dysphagia: NDD3 nectar thick liquids.   PAF: Rate controlled 70s. On eliquis, metoprolol. stable  ANGEL, OHS: On CPAP. Refusing at times. No concerns today.   Weights, obesity: [phone]. Monitor. Counseling on heart healthy diet, diabetic diet,  Continues to have weight gain. Immobility and increased intake. Counseling provided again, dietary monitoring. Encourage improved dietary habits.   Chronic CH[facility]: [facility] h/o CHF. No echo or records available [facility]. on lasix daily. Appears euvolemic on exam. Has had weight gain recently but does not appear fluid overloaded. No shortness of breath or edema. Monitor closely with covid infection.   HTN: 120s. On irbesartan, metoprolol, lasix. Stable.   Vitreal hemorrhage: injections q4-6 weeks. Last eye appt 11/11.       Labs in october    Electronically signed by: Morenita Mosquera NP    Case Management:  I have reviewed the facility/SNF care plan/MDS which was done 9/24/20, including the falls risk, nutrition and pain screening. I also reviewed the current immunizations, and preventive care.. Future cancer screening is not clinically indicated secondary to age/goals of care.   Patient's desire to return to the community is not assessible due to cognitive impairment.    Information reviewed:  Medications, vital signs, orders, and nursing notes.  The health plan new enrollment has happened. I have reviewed the  MDS, the preventative needs,  and facility care plan. The level of care is  appropriate. I have reviewed the code status/advanced directives.

## 2021-06-14 NOTE — PROGRESS NOTES
[facility] Care Coordination Contact    Care Coordinator received a fax with MD orders for the lift chair.  Care Coordinator also received an e-mail from Rosanna angeles [facility] stating she is planning Zach's discharge date of 1/28/21.      Care Coordinator entered an LTC screening into MMIS to open Zach to EW as of 1/28/21.      Care Coordinator emailed [facility] to place order for the enrique lift and also other items to be paid by MA or EW.  List includes: transfer belt, Thick It powder, Pill box, Incontinence supplies - 6 pull ups per day (size Xlarge), Gloves (6 per day), Thermometer.    Care Coordinator updated Zach's Plan of Care.    Carolina Mendez, MICHEL  [facility]  [phone]

## 2021-06-14 NOTE — PROGRESS NOTES
[facility] Care Coordination Contact    Care Coordinator emailed Rosanna Fleming at [facility].  E-mail sent is below:      Rakesh Marte,    I am the [facility] Care Coordinator for Zach Olmos.  I have completed another assessment for Zach Olmos with plans to have her return home soon.  Stiven, the son, has completed all of the home remodels/renovations needed to bring her home safely.  I am trying to contact Morenita Mosquera NP in regards to signing orders for equipment and supplies Zach will need upon returning to the community.  She is not responding to my Epic messages and order requests.  Any help to get these orders signed would be greatly appreciated.  Thank you so much, Rosanna!  Happy Holidays!      Carolina Mendez, MICHEL  [facility]  [phone]

## 2021-06-14 NOTE — PROGRESS NOTES
[facility]    DATE: 2021    NAME:  Zach Olmos             :  1950  MRN: [id number]  CODE STATUS:  FULL CODE    VISIT TYPE: Problem Visit (covid, dry cough, nausea)     FACILITY:  [facility] [[id number]]       CHIEF COMPLAIN/REASON FOR VISIT:    Chief Complaint   Patient presents with     Problem Visit     covid, dry cough, nausea               HISTORY OF PRESENT ILLNESS: Zach Olmos is a 70 y.o. female who is a long term care resident of [facility].     Today Ms. Olmos is seen for follow up on COVID infection, dry cough, nausea. Per nursing staff she will be moving back to long term care today as she has completed her isolation for COVID infection. She is seen in her room alone today. She just finished getting ready with the aid. She says she feels good. She may have a little throat irritation and dry cough but denies any shortness of breath. She thinks otherwise she has been doing well. She does feel a little tired and maybe a little weaker than before. She is not sure. She denies any fever or chills. She denies any other recent concerns. Discussed with staff will order PT, OT eval and treat as she appears deconditioned from COVID infection.     Also of note there have been multiple messages from a community Care coordinator requesting equipment orders for discharge to son's home. This has been discussed many times with , therapy, and they have discussed with son. He has been unrealistic about her care needs and has not completed any Caregiver, transfer, or equipment training. She requires 24/7 care, dysphagia diet, incontinence cares, lift for transfers, etc. A home assessment also needs to be completed from therapy staff before deeming home safe for discharge. It is not appropriate to be ordering this equipment at this time as many measures need to be taken first to ensure safe and appropriate discharge plans are  in place. Facility staff need to be coordinating this as they are the most well versed in patient care needs. Most of orders requested by [facility] coordinator are not prescription anyways and family would need to purchase. Discussed this with  and therapy again today. PT, Ot eval and treat orders were given due to deconditioning from COVID as patient will need to return to baseline anyways prior to considering a discharge. Defer to therapy for evaluation of son's home and if this will be a safe and appropriate discharge plan. Will await therapy  Recommendations for what equipment would be needed if she is to discharge.       REVIEW OF SYSTEMS:  PROBLEMS AND REVIEW OF SYSTEMS:   Today on ROS:   Currently, no fever, chills, or rigors. Decreased vision. Denies any chest pain, headaches, palpitations, lightheadedness. Denies any GERD symptoms. Positive for weakness, EZ stand for transfers, dysphagia, urinary incontinence, fecal incontinence, appetite good, wheelchair bound, right sided weakness, aphasia, no shortness of breath,  Cpap use, fatigue, loss of taste and smell, intermittent nausea, dry cough      Allergies   Allergen Reactions     Aricept [Donepezil]      Atorvastatin      Glipizide      Lisinopril      Current Outpatient Medications   Medication Sig     acetaminophen (TYLENOL) 325 MG tablet Take 650 mg by mouth every 6 (six) hours as needed for pain.            apixaban (ELIQUIS) 5 mg Tab tablet Take 5 mg by mouth 2 (two) times a day.           artificial tears,hypromellose, (GENTEAL; SYSTANE) 0.3 % Gel Administer 1 drop to both eyes 4 (four) times a day.      furosemide (LASIX) 20 MG tablet Take 20 mg by mouth daily.            insulin glargine (LANTUS) 100 unit/mL injection Inject 24 Units under the skin at bedtime.      irbesartan (AVAPRO) 75 MG tablet Take 75 mg by mouth 2 (two) times a day.           metFORMIN (GLUCOPHAGE) 500 MG tablet Take 500 mg by mouth 2 (two) times a day with meals.  "1000mg in am and 500mg in evening     metoprolol succinate (TOPROL-XL) 50 MG 24 hr tablet Take 50 mg by mouth daily.     ondansetron (ZOFRAN) 4 MG tablet Take 4 mg by mouth every 6 (six) hours as needed for nausea.     PARoxetine (PAXIL) 30 MG tablet Take 60 mg by mouth every morning.           rosuvastatin (CRESTOR) 20 MG tablet Take 20 mg by mouth every morning. PER HOSPITAL ORDERS GIVE TO PT. IN THE A.M.            Past Medical History:    Past Medical History:   Diagnosis Date     Anemia      Anxiety      Cancer (H)     Hx of colon cancer     CHF (congestive heart failure) (H)     diastolic     Coronary artery disease     s/p cabg     CVA (cerebral vascular accident) (H)      Depression      Diabetes mellitus (H)     type 2     DVT (deep vein thrombosis) in pregnancy      Granuloma annulare      Hemiplegia (H)     R sided and minimally communicative     Hyperlipidemia      Hypertension      Obesity      Oropharyngeal dysphagia      ANGEL (obstructive sleep apnea)      Parotitis      Paroxysmal atrial fibrillation (H)      PE (pulmonary thromboembolism) (H)      Stroke (H) 12/2018    Sub acute L MCA stroke     Varicose veins of both lower extremities        IMMUNIZATIONS:    There is no immunization history on file for this patient.  Above immunizations pulled from [facility]. Facility records also reconciled. Outstanding information sent to [facility] to update [facility].  Future immunizations are not needed at this point as all recommended immunizations are up to date.     PHYSICAL EXAMINATION  Vitals:    01/07/21 2345   BP: 114/58   Pulse: 60   Resp: 16   Temp: 97.9  F (36.6  C)   SpO2: 93%   Weight: 213 lb (96.6 kg)   Height: 5' 7\" (1.702 m)       Today on physical exam:     GENERAL: Awake, Alert, not in any form of acute distress, answers most questions appropriately, follows simple commands  HEENT: Head is normocephalic with normal hair distribution. No evidence of trauma. Ears: No " acute purulent discharge. Eyes: Conjunctivae pink with no scleral jaundice. Nose: Normal mucosa and septum. NECK: Supple with no cervical or supraclavicular lymphadenopathy. Trachea is midline. Decreased vision and hearing, left eyelid cyst   CHEST: No tenderness or deformity, no crepitus  LUNG: dim to auscultation with good chest expansion. There are no crackles or wheezes, normal AP diameter.  No recent shortness of breath or cough   BACK: No kyphosis of the thoracic spine. Symmetric, no curvature, ROM normal, no CVA tenderness, no spinal tenderness   CVS: irregularly irregular rhythm,  2+ pulses symmetric in all extremities.  ABDOMEN: Rounded and soft, nontender to palpation, non distended, no masses, no organomegaly, good bowel sounds, no rebound or guarding, no peritoneal signs.   EXTREMITIES: no edema, atraumatic  SKIN: Warm and dry, no rash today  NEURO/PSYCH: The patient is oriented to person, place, usually  time. Forgetful at times, Right sided hemiplegia, dysarthria, dysphagia, expressive aphasia, EZ stand, increasing weakness, dependent for all cares            LABS:   Recent Results (from the past 168 hour(s))   Creatinine   Result Value Ref Range    Creatinine 0.82 0.60 - 1.10 mg/dL    GFR MDRD Af Amer >60 >60 mL/min/1.73m2    GFR MDRD Non Af Amer >60 >60 mL/min/1.73m2     Results for orders placed or performed in visit on 06/10/20   Basic Metabolic Panel   Result Value Ref Range    Sodium 140 136 - 145 mmol/L    Potassium 4.3 3.5 - 5.0 mmol/L    Chloride 103 98 - 107 mmol/L    CO2 29 22 - 31 mmol/L    Anion Gap, Calculation 8 5 - 18 mmol/L    Glucose 121 70 - 125 mg/dL    Calcium 9.2 8.5 - 10.5 mg/dL    BUN 18 8 - 28 mg/dL    Creatinine 0.85 0.60 - 1.10 mg/dL    GFR MDRD Af Amer >60 >60 mL/min/1.73m2    GFR MDRD Non Af Amer >60 >60 mL/min/1.73m2         Lab Results   Component Value Date    WBC 7.2 10/21/2020    HGB 11.6 (L) 10/21/2020    HCT 35.9 10/21/2020    MCV 90 10/21/2020     10/21/2020        No results found for: [id number]  Lab Results   Component Value Date    HGBA1C 7.4 (H) 10/21/2020     No results found for: INR, PROTIME  No results found for: [id number]  Lab Results   Component Value Date    TSH 1.21 10/21/2020           ASSESSMENT/PLAN:    COVID 19 infection: intermittent nausea, no vomiting. Dry cough, fatigue, increased weakness, loss of taste and smell, dry cough. Vitals stable, afebrile. Completed covid isolation today and will be returning to long term care floor. Continue vitamin c, zinc, vit d for 14 days total. No other treatments were required. proair prn for 14 days. On eliquis for VTE prophylaxis. Due to deconditioning with infection ordered PT, OT eval and treat.   Type 2 DM with HTN: Accuchecks stable. On lantus, accuchecks two times a day. Hga1c 5.7 on 8/7. hga1c 5.5 on 11/8. hga1c 2/26-6.5. continue lantus. Hga1c up to 7.3 on 6/10.  hga1c 7.4 on 10/21. No recent concerns.   CAD, s/p CABG: No chest pain or recent concerns. On aspirin, rosuvastatin. F/u cardiology.    H/o Left MCA CVA: Right sided hemiplegia, dysarthria, dysphagia, expressive aphasia. Wheelchair and bed bound. EZ stand. Dependent for cares, incontinent of urine and stool. On rosuvastatin, aspirin. Dysphagia on NDD3, nectar thick liquids. F/u neuro prn. PT, OT eval and treat for deconditioning and potential evaluation for discharge to son's home. Therapy to determine if safe and appropriate discharge plan in place and what equipment would be needed if discharging. Son needs to complete caregiver training. Therapy reports considering home eval.   Anxiety and depression: On paroxetine. ACP following, no recent mood concerns.   Dysphagia: NDD3 nectar thick liquids. No recent concerns.   PAF: Rate controlled 80s. On eliquis, metoprolol. stable  ANGEL, OHS: On CPAP. Refusing at times. No concerns today.   Weights, obesity: [phone]. Monitor. Counseling on  heart healthy diet, diabetic diet, continue to Encourage improved dietary habits. Monitor. Labs have been stable.   Chronic CHF: [facility] reported h/o CHF. No echo or records available as [facility] with [facility]. on lasix daily. Has continued to be euvolemic despite weight gain. Felt to be related to increased intake and immobility.   HTN: 110s. On irbesartan, metoprolol, lasix. No recent concerns.    Vitreal hemorrhage: injections q4-6 weeks. No recent changes to vision or noted concerns.       Labs in october    Electronically signed by: Morenita Mosquera NP    Case Management:  I have reviewed the facility/SNF care plan/MDS which was done 12/17/20, including the falls risk, nutrition and pain screening. I also reviewed the current immunizations, and preventive care.. Future cancer screening is not clinically indicated secondary to age/goals of care.   Patient's desire to return to the community is not assessible due to cognitive impairment.    Information reviewed:  Medications, vital signs, orders, and nursing notes.  The health plan new enrollment has happened. I have reviewed the  MDS, the preventative needs,  and facility care plan. The level of care is appropriate. I have reviewed the code status/advanced directives.

## 2021-06-15 NOTE — PROGRESS NOTES
[facility] Care Coordination    Care Coordinator received several e-mails from [facility] ([facility]) stating that Morenita Mosquera NP from the nursing home is refusing to sign off on any orders for supplies and equipment that Zach needs since she is no longer in the facility.  [facility]ach [facility] and [facility] shared that the [facility] will not send orders to them.  The patient will need to go directly to the VA/PCP to get orders placed for the lift chair, pull ups, bath bench, transfer belt and blood pressure machine.  [facility] cannot bypass insurance and use waiver funds since these items are covered by insurance.    Care Coordinator contacted Zach's son, Stiven, to let him know he needs to contact the [facility] for all orders.    Carolina Mendez, MICHEL  [facility]  [phone]

## 2021-06-15 NOTE — PROGRESS NOTES
DME/Supply (Thick-it, Pill box, Thermometer forehead scan, Wheel chair tray, Over bed tray) dilivered to member p/RITIKA Alvarez.    Ed Lee  Care Management Specialist  [facility]  [phone]

## 2021-06-15 NOTE — PROGRESS NOTES
[facility] Care Coordination    Completed following tasks:     Updated services in access and Submitted referrals/auths for EASY STAND $2,095.00, and SLING $179.00 with [facility].      Ed Lee  Care Management Specialist  [facility]  [phone]

## 2021-06-15 NOTE — PROGRESS NOTES
Disenrollment completed. [facility] forwarding to:[email or web address] to forward to the new entity.    Ed Lee  Care Management Specialist  [facility]  [phone]

## 2021-06-15 NOTE — PROGRESS NOTES
[facility] Care Coordination    Care Coordinator received an e-mail from Kelsey  the home care OT from [facility].  She stated that she saw Zach yesterday for an evaluation and reported that the enrique lift Zach received does not work with one staff.  You need two staff and Zach does not have that.  She shared that the family rented an EZ lift but it is very expensive and the family is not happy.  She asked if there is any way they can get an EZ lift instead.  She also stated that Zach needs a rolling shower chair and a kyphotic back for the wheelchair.  Asking if Care Coordinator can order these.    Care Coordinator e-mailed Kelsey back stating I would contact [facility] about the EZ lift and also place the orders for the two other items she requested.  Care Coordinator will follow up with her with any updates.    Care Coordinator e-mailed [facility] asking if there is any way the EZ lift can be covered now that OT is stating the enrique lift will not work.  Care Coordinator also requested the orders for the rolling shower chair and the kyphotic back for the wheelchair.  Care Coordinator requested a return e-mail.    Carolina Mendez [facility]  [phone]

## 2021-06-15 NOTE — PROGRESS NOTES
[facility] Care Coordination    No longer active with [facility] community case management effective 3/1/2021.  Reason for community disenrollment: [facility] to [facility]     MICHEL Barker  [facility]  [phone]

## 2021-06-15 NOTE — PROGRESS NOTES
Care Coordinator received a quote from [facility] for the EZ Stand Lift for $2274.      Care Coordinator reviewed purchase with manager, Brandi Giraldo, stating the lift fits in Zach's EW budget and OT is recommending an EZ lift for Zach.  Manager states it is OK to order.    Care Coordinator added the EZ stand lift to the plan of care and tasked to CMS to submit to [facility].    Care Coordinator e-mailed Emily at [facility] back to approve EZ Stand Lift to be ordered.    MICHEL Barker  [facility]  [phone]

## 2021-06-15 NOTE — PROGRESS NOTES
[facility] Care Coordination    Care Coordinator received email below for [facility] today.      Carolina,       We could get an power sit to stand . It would have to go thru [facility]. With [facility] it is a little easier.  If you can get an auth, we can provide.  Quote attached.  --RITIKA Valenzuela      Care Coordinator then emailed Kelsey - OT to ask if what [facility] is proposing will work for Zach.  Care Coordinator stated that she has room in her EW budget to purchase this .  Care Coordinator requested a return e-mail.    Carolina Mendez, [facility]  [phone]

## 2021-06-18 NOTE — LETTER
Letter by Johnson, Michael Duane, CNP at      Author: Johnson, Michael Duane, CNP Service: -- Author Type: --    Filed:  Encounter Date: 2/20/2019 Status: (Other)         Patient: Zach Olmos   MR Number: [id number]   YOB: 1950   Date of Visit: 2/20/2019     [facility]    Facility:   [facility] [[id number]]   Code Status: FULL CODE      CHIEF COMPLAINT/REASON FOR VISIT:  Chief Complaint   Patient presents with   ? Review Of Multiple Medical Conditions       HISTORY:      HPI: Zach is a 68 y.o. female who had a chance to visit with secondary to 1 through review of chronic medical conditions.  I remember her from the transitional care unit when she was hospitalized initially December 9 - December 16, 2018 secondary to parotiditis.  She is also found to have a subacute large left MCA stroke involving the left basal ganglia without hemorrhagic transformation.  She does have diabetes along with hypertension and dysphasia.  Has been working with speech therapy and had a chance to connect with a speech therapist and they want to do a video swallow.  Currently running at 60 cc an hour tube feedings.  She has been normotensive and afebrile.  Blood sugars in the morning ranging 120-198 towards the p.m. 130-164 moods have been stable.  No heartburn or reflux.  Is on Prevacid.  Does use CPAP to sleep.  Does need assistance with all ADLs.    Past Medical History:   Diagnosis Date   ? Anemia    ? Anxiety    ? Cancer (H)     Hx of colon cancer   ? CHF (congestive heart failure) (H)     diastolic   ? Coronary artery disease     s/p cabg   ? CVA (cerebral vascular accident) (H)    ? Depression    ? Diabetes mellitus (H)     type 2   ? DVT (deep vein thrombosis) in pregnancy (H)    ? Granuloma annulare    ? Hemiplegia (H)     R sided and minimally communicative   ? Hyperlipidemia    ? Hypertension    ? Obesity    ? Oropharyngeal dysphagia    ? ANGEL (obstructive sleep  apnea)    ? Parotitis    ? Paroxysmal atrial fibrillation (H)    ? PE (pulmonary thromboembolism) (H)    ? Stroke (H) 12/2018    Sub acute L MCA stroke   ? Varicose veins of both lower extremities              Family History   Problem Relation Age of Onset   ? Heart disease Mother    ? Cancer Father         esophageal     Social History     Socioeconomic History   ? Marital status: Single     Spouse name: Not on file   ? Number of children: Not on file   ? Years of education: Not on file   ? Highest education level: Not on file   Occupational History   ? Not on file   Social Needs   ? Financial resource strain: Not on file   ? Food insecurity:     Worry: Not on file     Inability: Not on file   ? Transportation needs:     Medical: Not on file     Non-medical: Not on file   Tobacco Use   ? Smoking status: Never Smoker   ? Smokeless tobacco: Never Used   Substance and Sexual Activity   ? Alcohol use: No     Frequency: Never   ? Drug use: No   ? Sexual activity: Not on file   Lifestyle   ? Physical activity:     Days per week: Not on file     Minutes per session: Not on file   ? Stress: Not on file   Relationships   ? Social connections:     Talks on phone: Not on file     Gets together: Not on file     Attends Jainism service: Not on file     Active member of club or organization: Not on file     Attends meetings of clubs or organizations: Not on file     Relationship status: Not on file   ? Intimate partner violence:     Fear of current or ex partner: Not on file     Emotionally abused: Not on file     Physically abused: Not on file     Forced sexual activity: Not on file   Other Topics Concern   ? Not on file   Social History Narrative   ? Not on file         Review of Systems  Currently there are no reports of fever chills or fatigue flulike symptoms headache stiff neck chest pain rashes or sores.  History of diabetes CAD hypertension hyperlipidemia anxiety depression sleep apnea left MCA stroke with right  hemiplegia    Current Outpatient Medications   Medication Sig   ? acetaminophen (TYLENOL) 160 mg/5 mL (5 mL) Soln solution 20.3 mL by Enteral Tube route every 6 (six) hours MAX OF 5 DOSES IN 24 HOURS BETWEEN SCHEDULED AND PRN DOSES.  Every 6 Hours; 08:00 AM, 02:00 PM, 08:00 PM, 02:00 AM .   ? apixaban (ELIQUIS) 5 mg Tab tablet 5 mg by Enteral Tube route 2 (two) times a day .         ? aspirin 81 mg chewable tablet 81 mg by G-tube route daily.   ? ferrous sulfate 300 mg (60 mg iron)/5 mL syrup 300 mg by G-tube route 2 (two) times a day.   ? furosemide (LASIX) 20 MG tablet 20 mg by G-tube route 2 (two) times a day at 9am and 6pm .         ? insulin glargine (LANTUS) 100 unit/mL injection Inject 22 Units under the skin 2 (two) times a day. 07:30 AM, 04:30 PM         ? insulin regular (NOVOLIN R) 100 unit/mL injection Inject under the skin 4 (four) times a day If Blood Sugar is 150 to 199, give 1 Units.  If Blood Sugar is 200 to 249, give 2 Units.  If Blood Sugar is 250 to 299, give 3 Units.  If Blood Sugar is 300 to 349, give 4 Units.  If Blood Sugar is greater than 349, give 5 Units.  injection  Special Instructions: DX: Diabetes  Every 6 Hours; 07:30 AM, 01:30 PM, 07:30 PM, 01:30 AM .   ? lansoprazole (PREVACID SOLUTAB) 15 MG disintegrating tablet 15 mg by G-tube route daily.   ? lidocaine (LIDODERM) 5 % Place 2 patches on the skin daily Remove & Discard patch within 12 hours or as directed by MD  APPLY TO RIGHT AND LEFT SHOULDERS. .   ? metoprolol tartrate (LOPRESSOR) 50 MG tablet 50 mg by G-tube route 2 (two) times a day .         ? PARoxetine (PAXIL) 30 MG tablet 60 mg by G-tube route every morning.   ? rosuvastatin (CRESTOR) 20 MG tablet 20 mg by G-tube route every morning PER HOSPITAL ORDERS GIVE TO PT. IN THE A.M. .       .There were no vitals filed for this visit.  Blood pressure 140/72 pulse 68 temperature 98.4  Physical Exam     Constitutional: No distress.   HENT:   Cardiovascular: Normal rate, regular  rhythm and normal heart sounds.   History of CAD status post CABG   Pulmonary/Chest: Breath sounds normal.   History of pulmonary embolism   Abdominal:. There is no tenderness. There is no guarding.   Tube feedings secondary to dysphagia.  History of colon cancer.  Abdominal scars.   Musculoskeletal:   History of left MCA stroke with right hemiplegia   Dysarthria.  History of stroke   Skin: Skin is warm and dry. No rash noted.   Psychiatric:   History of anxiety and depression      LABS:   Lab Results   Component Value Date    HGB 10.7 (L) 12/20/2018     No results found for: HGBA1C  No results found for: CHOL  No results found for: HDL  No results found for: LDLCALC  No results found for: TRIG  No components found for: CHOLHDL  No results found for this or any previous visit.          ASSESSMENT:      ICD-10-CM    1. Oropharyngeal dysphagia R13.12    2. Right hemiplegia (H) G81.91    3. Type 1 diabetes mellitus with complication (H) E10.8    4. Hyperlipidemia, unspecified hyperlipidemia type E78.5        PLAN:    No further changes at this time.  Does look like we are still trying to look for lipid panel will try to get that later on the spring with her next A1c check.  Otherwise seems to be pretty stable at this time.  Also continue work with speech therapy as well as get a video swallow.    .    Electronically signed by: Michael Duane Johnson, CNP

## 2021-06-18 NOTE — LETTER
Letter by Johnson, Michael Duane, CNP at      Author: Johnson, Michael Duane, CNP Service: -- Author Type: --    Filed:  Encounter Date: 1/4/2019 Status: (Other)         Patient: Zach Olmos   MR Number: [id number]   YOB: 1950   Date of Visit: 1/4/2019     [facility]    Facility:   [facility] [[id number]]   Code Status: FULL CODE  PCP: Provider, No Primary Care   Phone: None   Fax: None      CHIEF COMPLAINT/REASON FOR VISIT:  Chief Complaint   Patient presents with   ? Discharge Summary       HISTORY COURSE:  Zach is a 68 y.o. female who I was asked to visit with secondary to hospitalization December 9 - December 16, 2018 secondary to principal diagnosis of parotiditis.  The CT of the brain and neck did not show any dilation of the left parotid duct or duct stone although was found to have moderate stenosis at the left greater than right proximal internal carotid arteries.  Also found was a subacute large left MCA stroke involving the left basal ganglia without hemorrhagic transformation.  She does have a history of diabetes last A1c in November 8 0.5% along with a history of CAD status post CABG along with hypertension hyperlipidemia anxiety depression paroxysmal atrial fib which was duly diagnosed and started on apixaban.  She recently had a large left MCA stroke out of the timeframe for lytics and unsuccessful thrombectomy and hospitalized at [facility] November 17 - December 7, 2018 with subsequent poor mental status and right-sided deficits.  She was found to have left neck and face swelling and a fever consistent with acute parotiditis.  She was started on broad-spectrum antibiotics including vancomycin and Flagyl ENT was consulted who recommended conservative treatment warm compresses frequent oral cares.  Leukocytosis did resolve and she did finish up her Augmentin.  Regarding the left MCA stroke with deficits including the right  hemiplegia and minimal communication her cognition did improve with treatment of infection and able to respond yes and no questions.  She is on apixaban for history of paroxysmal atrial fibrillation.  She also has iron deficiency anemia apparently the iron workup was incomplete.  Regarding her diabetes A1c was 6.4 she is on tube feedings as well as Lantus and sliding scale.  Regarding history of CAD status post CABG she is continuing with her metoprolol 50 mg twice daily along with furosemide 20 mg twice daily.  She has a history of colon cancer with a lower anterior resection.  For her nutrition she currently has a GJ tube and tube feedings.  Her laboratory studies on December 9 did show a hemoglobin of 10.3 platelets 429 creatinine 0.6 sodium 132 potassium 4.4 albumin 2.9 magnesium 1.8.  She has been able to participate with the rehabilitation services and has made good progress and therefore now be discharging to long-term care next-door at [address] [place] [address].  They do feel that she would benefit from skilled therapy and she is also on day #21.  During her stay we have made a number of adjustments to her insulin now currently we will increase her Lantus 22 units twice daily plus sliding scale and that blood sugars actually have significantly improved with this particular regimen but still just slightly high for example in the morning have been still running 190-230 which is an improvement as the evening time or 8 PM comes around the sugars have been running anywhere from 145-180.  She does get her tube feedings.  She currently is on Eliquis for her stroke.  She has been normotensive and afebrile and also on air.  Avapro was initiated 75 mg twice a day on December 20.  She also did have a number of laboratory studies performed December 20 see results below.  She has been very pleasant to work with.  Does have a good demeanor.  She also has a wonderful decorations in her room [place] time.  I had a chance to talk to  her about her chronic medical conditions including her recent laboratory studies as well as her diabetes and insulin as well as the rehabilitation process and now will be discharging to long-term care.  She did not have any questions.  Review of Systems  Currently there are no reports of fever chills or fatigue flulike symptoms headache stiff neck chest pain rashes or sores.  History of diabetes CAD hypertension hyperlipidemia anxiety depression sleep apnea left MCA stroke with right hemiplegia  Vitals:    01/04/19 1044   BP: 128/64   Pulse: 63   Resp: 18   Temp: 98.8  F (37.1  C)   SpO2: 94%       Physical Exam    Constitutional: No distress.   HENT:   Cardiovascular: Normal rate, regular rhythm and normal heart sounds.   History of CAD status post CABG   Pulmonary/Chest: Breath sounds normal.   History of pulmonary embolism   Abdominal:. There is no tenderness. There is no guarding.   Tube feedings secondary to dysphagia.  History of colon cancer.  Abdominal scars.   Musculoskeletal:   History of left MCA stroke with right hemiplegia   Lymphadenopathy:     She has no cervical adenopathy.   Neurological: She is alert.   Dysarthria.  History of stroke   Skin: Skin is warm and dry. No rash noted.   Psychiatric:   History of anxiety and depression     Lab Results   Component Value Date    HGB 10.7 (L) 12/20/2018     No results found for: HGBA1C  No results found for this or any previous visit.      Lab Results   Component Value Date    ALT 37 12/20/2018    AST 43 (H) 12/20/2018    ALKPHOS 57 12/20/2018    BILITOT 0.4 12/20/2018       MEDICATION LIST:  Current Outpatient Medications   Medication Sig   ? acetaminophen (TYLENOL) 160 mg/5 mL (5 mL) Soln solution 20.3 mL by Enteral Tube route every 6 (six) hours MAX OF 5 DOSES IN 24 HOURS BETWEEN SCHEDULED AND PRN DOSES.  Every 6 Hours; 08:00 AM, 02:00 PM, 08:00 PM, 02:00 AM .   ? apixaban (ELIQUIS) 5 mg Tab tablet 5 mg by Enteral Tube route 2 (two) times a day .         ?  aspirin 81 mg chewable tablet 81 mg by G-tube route daily.   ? ferrous sulfate 300 mg (60 mg iron)/5 mL syrup 300 mg by G-tube route 2 (two) times a day.   ? furosemide (LASIX) 20 MG tablet 20 mg by G-tube route 2 (two) times a day at 9am and 6pm .         ? insulin glargine (LANTUS) 100 unit/mL injection Inject 22 Units under the skin 2 (two) times a day. 07:30 AM, 04:30 PM         ? insulin regular (NOVOLIN R) 100 unit/mL injection Inject under the skin 4 (four) times a day If Blood Sugar is 150 to 199, give 1 Units.  If Blood Sugar is 200 to 249, give 2 Units.  If Blood Sugar is 250 to 299, give 3 Units.  If Blood Sugar is 300 to 349, give 4 Units.  If Blood Sugar is greater than 349, give 5 Units.  injection  Special Instructions: DX: Diabetes  Every 6 Hours; 07:30 AM, 01:30 PM, 07:30 PM, 01:30 AM .   ? lansoprazole (PREVACID SOLUTAB) 15 MG disintegrating tablet 15 mg by G-tube route daily.   ? lidocaine (LIDODERM) 5 % Place 2 patches on the skin daily Remove & Discard patch within 12 hours or as directed by MD  APPLY TO RIGHT AND LEFT SHOULDERS. .   ? metoprolol tartrate (LOPRESSOR) 50 MG tablet 50 mg by G-tube route 2 (two) times a day .         ? PARoxetine (PAXIL) 30 MG tablet 60 mg by G-tube route every morning.   ? rosuvastatin (CRESTOR) 20 MG tablet 20 mg by G-tube route every morning PER HOSPITAL ORDERS GIVE TO PT. IN THE A.M. .       DISCHARGE DIAGNOSIS:    ICD-10-CM    1. Cerebrovascular accident (CVA), unspecified mechanism (H) I63.9    2. Essential hypertension I10    3. Type 1 diabetes mellitus with complication (H) E10.8    4. Right hemiplegia (H) G81.91        MEDICAL EQUIPMENT NEEDS:  None    DISCHARGE PLAN/FACE TO FACE:  I certify that services are/were furnished while this patient was under the care of a physician and that a physician or an allowed non-physician practitioner (NPP), had a face-to-face encounter that meets the physician face-to-face encounter requirements. The encounter was in  whole, or in part, related to the primary reason for home health. The patient is confined to his/her home and needs intermittent skilled nursing, physical therapy, speech-language pathology, or the continued need for occupational therapy. A plan of care has been established by a physician and is periodically reviewed by a physician.  Date of Face-to-Face Encounter: January 4, 2019    I certify that, based on my findings, the following services are medically necessary home health services: She will be discharging to [address] [place] [address] with current medications as well as physical and occupational therapy and speech therapy.  Discharge date has been set for January 4, 2019.    My clinical findings support the need for the above skilled services because: (Please write a brief narrative summary that describes what the RN, PT, SLP, or other services will be doing in the home. A list of diagnoses in this section does not meet the CMS requirements.)  She will require the skilled services at the long-term [facility] facility secondary to her subacute left stroke along with medication management generalized debility safety rehabilitation and self-care deficits.    This patient is homebound because: (Please write a brief narrative summary describing the functional limitations as to why this patient is homebound and specifically what makes this patient homebound.)  Secondary to multiple comorbid chronic conditions including diabetes hypertension CAD A. fib sleep apnea recent stroke self-care deficits rehabilitation and medication management    The patient is, or has been, under my care and I have initiated the establishment of the plan of care. This patient will be followed by a physician who will periodically review the plan of care.    Schedule follow up visit with primary care provider within 7 days to reestablish care.  She will follow-up with the primary care doctor at the facility to go over her labs and any future  laboratory studies along with her current medications and treatment modalities.  She is made progress while in the transitional care unit.  She has been a delight to get to know.  She did not have any further questions regarding her stay on the transitional care unit nor her discharge to the long-term care.  Continue to monitor her blood sugars as well as her overall status.    Discharge coordination of care greater than 30 minutes  Electronically signed by: Michael Duane Johnson, CNP

## 2021-06-18 NOTE — LETTER
"Letter by Mark Agrawal DO at      Author: Mark Agrawal DO Service: -- Author Type: --    Filed:  Encounter Date: 1/4/2019 Status: (Other)         Patient: Zach Olmos   MR Number: [id number]   YOB: 1950   Date of Visit: 1/4/2019     [facility]    Facility:   [facility] [[id number]]   Code Status: FULL CODE    Chief concern: Patient is seen by me today for admission to the long-term care facility.    Reason for admission: Patient is seen by me for admission to the long-term care facility, after being discharged from the TCU at [facility].  We had a good first meeting.  She seems to understand the circumstances of why she needs to be in a long-term care facility after suffering a CVA this past December.  She told me that she was living with a family member prior to her stroke.  We were able to have a verbal conversation, with me being able to understand most of what she said.    Review of systems: See history of present illness, all others negative.     The medication list from the nursing home orders in the [facility] EMR, and the medications listed in the Epic EMR were reconciled, and any new medications to include in Epic are listed as a \"historical medication\".    Current Outpatient Medications   Medication Sig Dispense Refill   ? acetaminophen (TYLENOL) 160 mg/5 mL (5 mL) Soln solution 20.3 mL by Enteral Tube route every 6 (six) hours MAX OF 5 DOSES IN 24 HOURS BETWEEN SCHEDULED AND PRN DOSES.  Every 6 Hours; 08:00 AM, 02:00 PM, 08:00 PM, 02:00 AM .     ? apixaban (ELIQUIS) 5 mg Tab tablet 5 mg by Enteral Tube route 2 (two) times a day .           ? aspirin 81 mg chewable tablet 81 mg by G-tube route daily.     ? ferrous sulfate 300 mg (60 mg iron)/5 mL syrup 300 mg by G-tube route 2 (two) times a day.     ? furosemide (LASIX) 20 MG tablet 20 mg by G-tube route 2 (two) times a day at 9am and 6pm .           ? insulin glargine (LANTUS) 100 " unit/mL injection Inject 22 Units under the skin 2 (two) times a day. 07:30 AM, 04:30 PM           ? insulin regular (NOVOLIN R) 100 unit/mL injection Inject under the skin 4 (four) times a day If Blood Sugar is 150 to 199, give 1 Units.  If Blood Sugar is 200 to 249, give 2 Units.  If Blood Sugar is 250 to 299, give 3 Units.  If Blood Sugar is 300 to 349, give 4 Units.  If Blood Sugar is greater than 349, give 5 Units.  injection  Special Instructions: DX: Diabetes  Every 6 Hours; 07:30 AM, 01:30 PM, 07:30 PM, 01:30 AM .     ? lansoprazole (PREVACID SOLUTAB) 15 MG disintegrating tablet 15 mg by G-tube route daily.     ? lidocaine (LIDODERM) 5 % Place 2 patches on the skin daily Remove & Discard patch within 12 hours or as directed by MD  APPLY TO RIGHT AND LEFT SHOULDERS. .     ? metoprolol tartrate (LOPRESSOR) 50 MG tablet 50 mg by G-tube route 2 (two) times a day .           ? PARoxetine (PAXIL) 30 MG tablet 60 mg by G-tube route every morning.     ? rosuvastatin (CRESTOR) 20 MG tablet 20 mg by G-tube route every morning PER HOSPITAL ORDERS GIVE TO PT. IN THE A.M. .       No current facility-administered medications for this visit.      Past Medical History:   Diagnosis Date   ? Anemia    ? Anxiety    ? Cancer (H)     Hx of colon cancer   ? CHF (congestive heart failure) (H)     diastolic   ? Coronary artery disease     s/p cabg   ? CVA (cerebral vascular accident) (H)    ? Depression    ? Diabetes mellitus (H)     type 2   ? DVT (deep vein thrombosis) in pregnancy (H)    ? Granuloma annulare    ? Hemiplegia (H)     R sided and minimally communicative   ? Hyperlipidemia    ? Hypertension    ? Obesity    ? Oropharyngeal dysphagia    ? ANGEL (obstructive sleep apnea)    ? Parotitis    ? Paroxysmal atrial fibrillation (H)    ? PE (pulmonary thromboembolism) (H)    ? Stroke (H) 12/2018    Sub acute L MCA stroke   ? Varicose veins of both lower extremities       No past surgical history on file.   Social History      Tobacco Use   ? Smoking status: Never Smoker   ? Smokeless tobacco: Never Used   Substance Use Topics   ? Alcohol use: No     Frequency: Never   ? Drug use: No        Family History   Problem Relation Age of Onset   ? Heart disease Mother    ? Cancer Father         esophageal       Vitals:    01/04/19 1419 01/04/19 1617   BP:  117/69   Pulse:  71   Resp:  18   Temp:  98  F (36.7  C)   SpO2:  94%   Weight: 178 lb 12.8 oz (81.1 kg)        EXAM:   General: Vital signs reviewed.  Patient is in no acute appearing distress.  Breathing appears nonlabored.  Patient is alert and oriented to her immediate surroundings.  She could not tell me what year it was.  She had a very pleasant affect, denying having any problems with her mood.  She made very good eye contact.  She admitted to me though of having some difficulty with seeing, having difficulty following either my outstretched finger in front of her, or a light held in front of her.  She had difficulty understanding some commands, especially relating to movement when I asked her to move either her nonparalyzed left upper and lower extremities, or try moving her right extremities.  She usually moves her left extremities when I asked her to attempt moving her right side extremities.  Her speech was clear, though slower than normal.  HEENT: Pupils are equal round and reactive to light.  Her gaze is consensual, but she had difficulty following a finger or light source.  No rhinorrhea or abnormal ear drainage.  No intraoral lesions or plaques.  Neck: No JVD.  Heart: Heart rate is regular without murmur.  Lungs: Lungs are clear to auscultation with good airflow bilaterally.  Abdomen:  Abdomen is soft, nontender.  No palpable abnormal masses or organomegaly.  Bowel sounds are normal.  Skin/extremities: Warm and dry with no ankle edema noted.  Neurological exam: Patient has total paralysis of right upper extremity and right lower extremity on my exam.  She seems to be able to  move her left upper and lower extremities well.  I visited with her while she was lying in bed.  She requires nutrition using a feeding tube due to dysphasia.  When I asked her to protrude her tongue, she was unable to do this.  She had occasional difficulty with choosing the words she wanted to used to express her thoughts.  She sometimes seemed to not understand my question.    Approximately 60 minutes was spent on patient management, with greater than 50% of this time being spent on medication reconciliation between electronic medical records, review of past medical history and current medical management, and discussion of management with patient and care staff.  Assessment/Plan   1. Cerebrovascular accident (CVA), unspecified mechanism (H)     2. Right hemiplegia (H)     3. Type 1 diabetes mellitus with complication (H)     4. Essential hypertension     5. Coronary artery disease involving coronary bypass graft with angina pectoris, unspecified whether native or transplanted heart (H)     6. Hyperlipidemia, unspecified hyperlipidemia type     7. Oropharyngeal dysphagia         Patient Instructions   No changes needed for now in current management.  She seemed comfortable, and in a good mood when I met with her.  She definitely qualifies for care in a long-term care facility with full-time skilled nursing due to her cognitive and physical challenges.  She is currently full CODE STATUS.  It would be a good idea to further evaluate this issue if a responsible party for the patient is in the facility during any provider visit.  We will leave her at full CODE STATUS for now.     Mark Agrawal,

## 2021-06-18 NOTE — LETTER
Letter by Johnson, Michael Duane, CNP at      Author: Johnson, Michael Duane, CNP Service: -- Author Type: --    Filed:  Encounter Date: 1/2/2019 Status: (Other)         Patient: Zach Olmos   MR Number: [id number]   YOB: 1950   Date of Visit: 1/2/2019     [facility]    Facility:   [facility] [[id number]]   Code Status: FULL CODE      CHIEF COMPLAINT/REASON FOR VISIT:  Chief Complaint   Patient presents with   ? Follow Up     rehab       HISTORY:      HPI: Zach is a 68 y.o. female who I had the pleasure to revisit with secondary to her hospitalization December 9 - December 16, 2018 secondary to a principal diagnosis of parotiditis and was found to have a subacute large left MCA stroke involving the left basal ganglia without hemorrhagic transformation.  She currently is actually doing pretty good from a therapy standpoint is making pretty good progress and looks like she will eventually be going to a long-term care facility.  Had a chance to address that with her.  She has been normotensive and afebrile.  Also during her stay Avapro was started 75 mg twice daily also most recently the Lantus was increased to 20 units twice daily since most of her blood sugars now have been running 192-250 in the p.m. 150-230.  She did have a number of laboratory studies performed December 20 see results below.  She is getting tube feeding.  She is not having any discomfort.  She is on Eliquis for anticoagulation also ferrous sulfate twice daily for the anemia.  Her moods have been stable.    Past Medical History:   Diagnosis Date   ? Anemia    ? Anxiety    ? Cancer (H)     Hx of colon cancer   ? CHF (congestive heart failure) (H)     diastolic   ? Coronary artery disease     s/p cabg   ? CVA (cerebral vascular accident) (H)    ? Depression    ? Diabetes mellitus (H)     type 2   ? DVT (deep vein thrombosis) in pregnancy (H)    ? Granuloma annulare    ? Hemiplegia (H)      R sided and minimally communicative   ? Hyperlipidemia    ? Hypertension    ? Obesity    ? Oropharyngeal dysphagia    ? ANGEL (obstructive sleep apnea)    ? Parotitis    ? Paroxysmal atrial fibrillation (H)    ? PE (pulmonary thromboembolism) (H)    ? Stroke (H) 12/2018    Sub acute L MCA stroke   ? Varicose veins of both lower extremities              Family History   Problem Relation Age of Onset   ? Heart disease Mother    ? Cancer Father         esophageal     Social History     Socioeconomic History   ? Marital status: Unknown     Spouse name: Not on file   ? Number of children: Not on file   ? Years of education: Not on file   ? Highest education level: Not on file   Social Needs   ? Financial resource strain: Not on file   ? Food insecurity - worry: Not on file   ? Food insecurity - inability: Not on file   ? Transportation needs - medical: Not on file   ? Transportation needs - non-medical: Not on file   Occupational History   ? Not on file   Tobacco Use   ? Smoking status: Never Smoker   ? Smokeless tobacco: Never Used   Substance and Sexual Activity   ? Alcohol use: No     Frequency: Never   ? Drug use: No   ? Sexual activity: Not on file   Other Topics Concern   ? Not on file   Social History Narrative   ? Not on file         Review of Systems  Currently there are no reports of fever chills or fatigue flulike symptoms headache stiff neck chest pain rashes or sores.  History of diabetes CAD hypertension hyperlipidemia anxiety depression sleep apnea left MCA stroke with right hemiplegia    Current Outpatient Medications   Medication Sig   ? acetaminophen (TYLENOL) 160 mg/5 mL (5 mL) Soln solution 20.3 mL by Enteral Tube route every 6 (six) hours MAX OF 5 DOSES IN 24 HOURS BETWEEN SCHEDULED AND PRN DOSES.  Every 6 Hours; 08:00 AM, 02:00 PM, 08:00 PM, 02:00 AM .   ? apixaban (ELIQUIS) 5 mg Tab tablet 5 mg by Enteral Tube route 2 (two) times a day .         ? aspirin 81 mg chewable tablet 81 mg by G-tube  route daily.   ? ferrous sulfate 300 mg (60 mg iron)/5 mL syrup 300 mg by G-tube route 2 (two) times a day.   ? furosemide (LASIX) 20 MG tablet 20 mg by G-tube route 2 (two) times a day at 9am and 6pm .         ? insulin glargine (LANTUS) 100 unit/mL injection Inject 20 Units under the skin 2 (two) times a day. 07:30 AM, 04:30 PM         ? insulin regular (NOVOLIN R) 100 unit/mL injection Inject under the skin 4 (four) times a day If Blood Sugar is 150 to 199, give 1 Units.  If Blood Sugar is 200 to 249, give 2 Units.  If Blood Sugar is 250 to 299, give 3 Units.  If Blood Sugar is 300 to 349, give 4 Units.  If Blood Sugar is greater than 349, give 5 Units.  injection  Special Instructions: DX: Diabetes  Every 6 Hours; 07:30 AM, 01:30 PM, 07:30 PM, 01:30 AM .   ? lansoprazole (PREVACID SOLUTAB) 15 MG disintegrating tablet 15 mg by G-tube route daily.   ? lidocaine (LIDODERM) 5 % Place 2 patches on the skin daily Remove & Discard patch within 12 hours or as directed by MD  APPLY TO RIGHT AND LEFT SHOULDERS. .   ? metoprolol tartrate (LOPRESSOR) 50 MG tablet 50 mg by G-tube route 2 (two) times a day .         ? PARoxetine (PAXIL) 30 MG tablet 60 mg by G-tube route every morning.   ? rosuvastatin (CRESTOR) 20 MG tablet 20 mg by G-tube route every morning PER HOSPITAL ORDERS GIVE TO PT. IN THE A.M. .       .  Vitals:    01/02/19 1022   BP: 125/67   Pulse: 64   Resp: 16   Temp: 98.1  F (36.7  C)   SpO2: 97%       Physical Exam    Constitutional: No distress.   HENT:    Eyes: Pupils are equal, round, and reactive to light.    Cardiovascular: Normal rate, regular rhythm and normal heart sounds.   History of CAD status post CABG   Pulmonary/Chest: Breath sounds normal.   History of pulmonary embolism   Abdominal:. There is no tenderness. There is no guarding.   Tube feedings secondary to dysphagia.  History of colon cancer.  Abdominal scars.   Musculoskeletal:   History of left MCA stroke with right  hemiplegia   Lymphadenopathy:     She has no cervical adenopathy.   Neurological: She is alert.   Dysarthria.  History of stroke   Skin: Skin is warm and dry. No rash noted.   Psychiatric:   History of anxiety and depression     LABS:   Lab Results   Component Value Date    HGB 10.7 (L) 12/20/2018     No results found for this or any previous visit.      No results found for: HGBA1C      ASSESSMENT:      ICD-10-CM    1. Type 1 diabetes mellitus with complication (H) E10.8    2. Right hemiplegia (H) G81.91    3. Oropharyngeal dysphagia R13.12    4. Essential hypertension I10        PLAN:    Increasing Lantus 20 units twice daily continue to monitor the blood sugars continue to monitor the blood pressures.  She is done well from a therapy standpoint looks like pretty soon they will be discharging her to a long-term care facility.    .    Electronically signed by: Michael Duane Johnson, CNP

## 2021-06-19 NOTE — LETTER
Letter by Morenita Mosquera NP at      Author: Morenita Mosquera NP Service: -- Author Type: --    Filed:  Encounter Date: 10/31/2019 Status: Signed         Patient: Zach Olmos   MR Number: [id number]   YOB: 1950   Date of Visit: 10/31/2019                 [facility]    DATE: 10/31/2019    NAME:  Zach Olmos             :  1950  MRN: [id number]  CODE STATUS:  FULL CODE    VISIT TYPE: Problem Visit (cough, sore throat)     FACILITY:  [facility] [[id number]]       CHIEF COMPLAIN/REASON FOR VISIT:    Chief Complaint   Patient presents with   ? Problem Visit     cough, sore throat               HISTORY OF PRESENT ILLNESS: Zach Olmos is a 69 y.o. female who is a long term care resident of [facility]. She was recently admitted to [facility] for abdominal pain, G-J tube infection. She was admitted 10/2-10/4. She was sent in for abdominal pain and purulent drainage from G-J tube site. Staff reported she was in so much pain she was crying and unable to eat. During her stay CT showed severe phlegmonous and inflammatory changes along percutaneous gastrostomy tract. She had leukocytosis but vitals were stable and no fever. There was no drainable fluid collection on CT scan. Her apixaban was held and IR removed GJ tube on 10/3. ID was consulted for antibiotic recommendations and was started on vanco/zosyn and later changed to po augmentin. HM1 on discharge showed resolution of leukocytosis. She was discharged back to [facility].     Today Ms. Olmos is seen for concerns of cough and sore throat. She is seen in her room. She says she has been having symptoms since last Thursday but they are now waking her up at night. She has a sore throat and some nasal congestion and drainage. She says the nasal drainage is clear. She has a dry cough and denies any shortness of breath. She has not felt feverish at all. She is not sneezing and no dizziness  or headaches with it. She is not having any bowel issues and appetite is ok. Discussed options and will order robitussin and throat lozenges prn. Speech therapy just completed working with her prior to visit and reported she is doing very well with nectar thick and thin liquid trials. Speech is going to schedule a repeat FEES on Tuesday to see if can upgrade her.     REVIEW OF SYSTEMS:  PROBLEMS AND REVIEW OF SYSTEMS:   Today on ROS:   Currently, no fever, chills, or rigors. Decreased vision. Denies any chest pain, headaches, palpitations, lightheadedness. Denies any GERD symptoms. Positive for weakness, EZ stand for transfers or slide board, dysphagia, urinary incontinence, fecal incontinence, appetite is good, wheelchair bound, right sided weakness, aphasia, no shortness of breath, refusing cpap at times, cough, sore throat, nasal congestion and draiange      Allergies   Allergen Reactions   ? Aricept [Donepezil]    ? Atorvastatin    ? Glipizide    ? Lisinopril      Current Outpatient Medications   Medication Sig   ? acetaminophen (TYLENOL) 325 MG tablet Take 650 mg by mouth every 6 (six) hours as needed for pain.          ? apixaban (ELIQUIS) 5 mg Tab tablet Take 5 mg by mouth 2 (two) times a day.         ? aspirin 81 mg chewable tablet Chew 81 mg daily.         ? furosemide (LASIX) 20 MG tablet Take 20 mg by mouth daily.          ? insulin glargine (LANTUS) 100 unit/mL injection Inject 15 Units under the skin 2 (two) times a day. 07:30 AM, 04:30 PM         ? irbesartan (AVAPRO) 75 MG tablet Take 75 mg by mouth 2 (two) times a day.         ? metoprolol tartrate (LOPRESSOR) 50 MG tablet Take 50 mg by mouth 2 (two) times a day.         ? ondansetron (ZOFRAN) 4 MG tablet Take 4 mg by mouth every 6 (six) hours as needed for nausea.   ? PARoxetine (PAXIL) 30 MG tablet Take 60 mg by mouth every morning.         ? rosuvastatin (CRESTOR) 20 MG tablet Take 20 mg by mouth every morning. PER HOSPITAL ORDERS GIVE TO PT. IN  THE A.M.            Past Medical History:    Past Medical History:   Diagnosis Date   ? Anemia    ? Anxiety    ? Cancer (H)     Hx of colon cancer   ? CHF (congestive heart failure) (H)     diastolic   ? Coronary artery disease     s/p cabg   ? CVA (cerebral vascular accident) (H)    ? Depression    ? Diabetes mellitus (H)     type 2   ? DVT (deep vein thrombosis) in pregnancy    ? Granuloma annulare    ? Hemiplegia (H)     R sided and minimally communicative   ? Hyperlipidemia    ? Hypertension    ? Obesity    ? Oropharyngeal dysphagia    ? ANGEL (obstructive sleep apnea)    ? Parotitis    ? Paroxysmal atrial fibrillation (H)    ? PE (pulmonary thromboembolism) (H)    ? Stroke (H) 12/2018    Sub acute L MCA stroke   ? Varicose veins of both lower extremities        IMMUNIZATIONS:    There is no immunization history on file for this patient.  Above immunizations pulled from [facility]. Facility records also reconciled. Outstanding information sent to [facility] to update [facility].  Future immunizations are not needed at this point as all recommended immunizations are up to date.     PHYSICAL EXAMINATION  Vitals:    10/31/19 0700   BP: 110/53   Pulse: 63   Resp: 20   Temp: 97.7  F (36.5  C)   SpO2: 95%       Today on physical exam:     GENERAL: Awake, Alert, not in any form of acute distress, answers questions appropriately, follows simple commands, conversant  HEENT: Head is normocephalic with normal hair distribution. No evidence of trauma. Ears: No acute purulent discharge. Eyes: Conjunctivae pink with no scleral jaundice. Nose: Normal mucosa and septum. NECK: Supple with no cervical or supraclavicular lymphadenopathy. Trachea is midline.   CHEST: No tenderness or deformity, no crepitus  LUNG: dim to auscultation with good chest expansion. There are no crackles or wheezes, normal AP diameter.  No recent shortness of breath or cough   BACK: No kyphosis of the thoracic spine. Symmetric, no  curvature, ROM normal, no CVA tenderness, no spinal tenderness   CVS: irregularly irregular rhythm,  2+ pulses symmetric in all extremities.  ABDOMEN: Rounded and soft, nontender to palpation, non distended, no masses, no organomegaly, good bowel sounds, no rebound or guarding, no peritoneal signs.   EXTREMITIES: no edema  SKIN: Warm and dry, no erythema noted.  Skin color, texture, no rashes or lesions.  NEURO/PSYCH: The patient is oriented to person, place and time. Right sided hemiplegia, dysarthria, dysphagia, expressive aphasia, EZ stand to slide board for transfers, dependent for cares            LABS:   No results found for this or any previous visit (from the past 168 hour(s)).  Results for orders placed or performed in visit on 10/09/19   Basic Metabolic Panel   Result Value Ref Range    Sodium 139 136 - 145 mmol/L    Potassium 4.3 3.5 - 5.0 mmol/L    Chloride 103 98 - 107 mmol/L    CO2 28 22 - 31 mmol/L    Anion Gap, Calculation 8 5 - 18 mmol/L    Glucose 87 70 - 125 mg/dL    Calcium 9.2 8.5 - 10.5 mg/dL    BUN 16 8 - 28 mg/dL    Creatinine 0.83 0.60 - 1.10 mg/dL    GFR MDRD Af Amer >60 >60 mL/min/1.73m2    GFR MDRD Non Af Amer >60 >60 mL/min/1.73m2         Lab Results   Component Value Date    WBC 10.5 10/09/2019    HGB 10.3 (L) 10/09/2019    HCT 31.6 (L) 10/09/2019    MCV 92 10/09/2019     10/09/2019       No results found for: [id number]  Lab Results   Component Value Date    HGBA1C 5.7 08/07/2019     No results found for: INR, PROTIME  No results found for: [id number]  Lab Results   Component Value Date    TSH 1.04 08/07/2019           ASSESSMENT/PLAN:    1.  Viral URI: sore throat, dry cough, nasal congestion and drainage. Ordered robitussin prn, cephacol throat lozenges prn. Monitor temp qshift x 3 days and notify if >100.5.   2. Type 2 DM with HTN: Accuchecks . Hga1c 6.4 on 4/16. On lantus two times a day, accuchecks bid. Hga1c 5.7 on 8/7. Well controlled.   3. CAD, s/p CABG: No chest  pain. On aspirin, rosuvastatin. F/u cardiology.    4. H/o Left MCA CVA: Right sided hemiplegia, dysarthria, dysphagia, expressive aphasia. Wheelchair and bed bound. EZ stand to slide board for transfers. Dependent for cares. On rosuvastatin, aspirin. F/u with neurology prn. Dysphagia on NDD3, honey thick liquids.  5. Anxiety and depression: On paroxetine. ACP following, feels mood stable today.   6. Dysphagia, G-J tube site infection: G-J tube removed. ST working with her and trialing nectar thick and thin liquids. FEES on 11/5. May upgrade soon.   7. PAF: Rate controlled 50s. On eliquis, metoprolol.   8.ANGEL, OHS: On CPAP. Refusing at times.   10. Weights, obesity: [phone]. Monitor. Counseling on heart healthy diet, diabetic diet, healthy lifestyle choices. Encouraged working with therapy and as much physical activity as possible but is limited due to profound weakness, hemiplegia.   11. Chronic CHF: [facility] reported h/o CHF. No echo or records available [facility]. No shortness of breath, no edema noted, on lasix daily. Weekly weights.  No recent concerns.   12. HTN: 110s. On irbesartan, metoprolol, lasix. Stable recently.     [facility], 1 on 10/9      Electronically signed by: Morenita Mosquera NP    Case Management:  I have reviewed the facility/SNF care plan/MDS which was done 8/6/19, including the falls risk, nutrition and pain screening. I also reviewed the current immunizations, and preventive care.. Future cancer screening is not clinically indicated secondary to age/goals of care.   Patient's desire to return to the community is not assessible due to cognitive impairment.    Information reviewed:  Medications, vital signs, orders, and nursing notes.  The health plan new enrollment has happened. I have reviewed the  MDS, the preventative needs,  and facility care plan. The level of care is appropriate. I have reviewed the code status/advanced directives.

## 2021-06-19 NOTE — LETTER
Letter by Morenita Mosquera NP at      Author: Morenita Mosquera NP Service: -- Author Type: --    Filed:  Encounter Date: 2019 Status: (Other)         Patient: Zach Olmos   MR Number: [id number]   YOB: 1950   Date of Visit: 2019                 [facility]    DATE: 2019    NAME:  Zach Olmos             :  1950  MRN: [id number]  CODE STATUS:  FULL CODE    VISIT TYPE: FVP Care Coordination - Home Visit-Initial Assessment     FACILITY:  [facility] [[id number]]       CHIEF COMPLAIN/REASON FOR VISIT:    Chief Complaint   Patient presents with   ? FVP Care Coordination - Home Visit-Initial Assessment               HISTORY OF PRESENT ILLNESS: Zach Olmos is a 69 y.o. female who is a long term care resident of [facility].     Today Ms. Olmos is seen for routine review of systems and [facility]. She is seen in her wheelchair after just finishing breakfast. She says she is not having any pain today. She has not been having any issues with headaches. She thinks her breathing is fine and denies any recent cough or shortness of breath. She sleeps well and thinks her appetite is good. She denies any trouble swallowing at all. She says she thinks she is doing fine. She denies any nausea or stomach upset at all. Review of chart showed she is refusing CPAP at times. She does wear this some of the time. Her son became upset on  believing she had not had a bath for some time because Zach said she had not. Staff had noted she had charted to have had one in the last week but he demanded she have one that day. Also noted by staff on occasion to be having some crying or tearful spells. Due to her stroke and aphasia she often has difficulty expressing how she is feeling and it was difficult from them to get from her what was wrong. Staff had requested a consult for Psych to see her. Her blood sugars are controlled  116-154. She has not used her G tube since being here but they are flushing qshift for patency. Her weights are down some since admission 184-178lbs.     REVIEW OF SYSTEMS:  PROBLEMS AND REVIEW OF SYSTEMS:   Today on ROS:   Currently, no fever, chills, or rigors. Does not have any visual or hearing problems. Denies any chest pain, headaches, palpitations, lightheadedness. Denies any GERD symptoms. Denies any nausea, or vomiting.  Positive for weakness, EZ stand for transfers, dysphagia, G tube, urinary incontinence, fecal incontinence, appetite is good, wheelchair and bed bound, right sided weakness, aphasia, no shortness of breath or cough, refusing cpap at times, tearful episodes at times      Allergies   Allergen Reactions   ? Aricept [Donepezil]    ? Atorvastatin    ? Glipizide    ? Lisinopril      Current Outpatient Medications   Medication Sig   ? acetaminophen (TYLENOL) 325 MG tablet Take 650 mg by mouth every 4 (four) hours as needed for pain.   ? apixaban (ELIQUIS) 5 mg Tab tablet Take 5 mg by mouth 2 (two) times a day.         ? aspirin 81 mg chewable tablet Chew 81 mg daily.         ? furosemide (LASIX) 20 MG tablet Take 20 mg by mouth 2 (two) times a day at 9am and 6pm.         ? insulin glargine (LANTUS) 100 unit/mL injection Inject 22 Units under the skin 2 (two) times a day. 07:30 AM, 04:30 PM         ? irbesartan (AVAPRO) 75 MG tablet Take 75 mg by mouth 2 (two) times a day.         ? lisinopril (PRINIVIL,ZESTRIL) 10 MG tablet Take 10 mg by mouth daily.   ? metoprolol tartrate (LOPRESSOR) 50 MG tablet Take 50 mg by mouth 2 (two) times a day.         ? nystatin (MYCOSTATIN) powder Apply to abdominal fold twice daily.   ? PARoxetine (PAXIL) 30 MG tablet Take 60 mg by mouth every morning.         ? rosuvastatin (CRESTOR) 20 MG tablet Take 20 mg by mouth every morning. PER HOSPITAL ORDERS GIVE TO PT. IN THE A.M.            Past Medical History:    Past Medical History:   Diagnosis Date   ? Anemia    ?  Anxiety    ? Cancer (H)     Hx of colon cancer   ? CHF (congestive heart failure) (H)     diastolic   ? Coronary artery disease     s/p cabg   ? CVA (cerebral vascular accident) (H)    ? Depression    ? Diabetes mellitus (H)     type 2   ? DVT (deep vein thrombosis) in pregnancy (H)    ? Granuloma annulare    ? Hemiplegia (H)     R sided and minimally communicative   ? Hyperlipidemia    ? Hypertension    ? Obesity    ? Oropharyngeal dysphagia    ? ANGEL (obstructive sleep apnea)    ? Parotitis    ? Paroxysmal atrial fibrillation (H)    ? PE (pulmonary thromboembolism) (H)    ? Stroke (H) 12/2018    Sub acute L MCA stroke   ? Varicose veins of both lower extremities        IMMUNIZATIONS:    There is no immunization history on file for this patient.  Above immunizations pulled from [facility]. Facility records also reconciled. Outstanding information sent to [facility] to update [facility].  Future immunizations are not needed at this point as all recommended immunizations are up to date.     PHYSICAL EXAMINATION  Vitals:    08/22/19 0700   BP: 128/72   Pulse: 66   Resp: 18   Temp: 98.5  F (36.9  C)   SpO2: 96%   Weight: 178 lb (80.7 kg)       Today on physical exam:     GENERAL: Awake, Alert, not in any form of acute distress, answers questions appropriately, follows simple commands, conversant  HEENT: Head is normocephalic with normal hair distribution. No evidence of trauma. Ears: No acute purulent discharge. Eyes: Conjunctivae pink with no scleral jaundice. Nose: Normal mucosa and septum. NECK: Supple with no cervical or supraclavicular lymphadenopathy. Trachea is midline.   CHEST: No tenderness or deformity, no crepitus  LUNG: dim to auscultation with good chest expansion. There are no crackles or wheezes, normal AP diameter.  No recent shortness of breath or cough   BACK: No kyphosis of the thoracic spine. Symmetric, no curvature, ROM normal, no CVA tenderness, no spinal tenderness   CVS:  irregularly irregular rhythm,  2+ pulses symmetric in all extremities.  ABDOMEN: Rounded and soft, nontender to palpation, non distended, no masses, no organomegaly, good bowel sounds, no rebound or guarding, no peritoneal signs.   EXTREMITIES: no edema  SKIN: Warm and dry, no erythema noted.  Skin color, texture, no rashes or lesions.  NEURO/PSYCH: The patient is oriented to person, place and time. Right sided hemiplegia, dysarthria, dysphagia, expressive aphasia, EZ stand to slide board for transfers, dependent for cares, tearful at times, anxious and depressed at times            LABS:   No results found for this or any previous visit (from the past 168 hour(s)).  No results found for this or any previous visit.      Lab Results   Component Value Date    WBC 9.5 08/07/2019    HGB 11.8 (L) 08/07/2019    HCT 34.3 (L) 08/07/2019    MCV 88 08/07/2019     08/07/2019       No results found for: [id number]  Lab Results   Component Value Date    HGBA1C 5.7 08/07/2019     No results found for: INR, PROTIME  No results found for: [id number]  Lab Results   Component Value Date    TSH 1.04 08/07/2019           ASSESSMENT/PLAN:    1. Left MCA CVA: Right sided hemiplegia, dysarthria, dysphagia, expressive aphasia. Working with therapy.  Wheelchair and bed bound. EZ stand to slide board for transfers. Dependent for cares. On rosuvastatin, aspirin. F/u with neurology prn. Stable.   2. Type 2 DM: Accuchecks 116-154. Hga1c 6.4 on 4/16. On lantus two times a day, accuchecks bid. Hga1c 5.7 on 8/7. accuchecks controlled, consider reducing lantus.   3. CAD, s/p CABG: No chest pain. On aspirin, rosuvastatin. F/u cardiology.   4. HTN: 120s. On irbesartan, metoprolol, lasix.   5. Anxiety and depression: On paroxetine. Tearful and depression episodes recently, ACP consulted. On max dose of paroxietine at this time so will not make any med changes. Let ACP evaluate.   6. Dysphagia: G tube in place. NDD2, honey thick liquids.  Taking meds orally. Patency flushes only, no use since admission. Discussed with her today if no use by next visit will discuss with her and son referral to GI for removal v removing in facility.  7. PAF: Rate controlled 60s. On eliquis, metoprolol.   8.ANGEL: On CPAP. Refusing at times.   10. Weights: 184-178lbs. Monitor. Appetite good per staff.     Tsh, hga1c, cmp, hm1 on 8/7 - labs stable    PT:  slide board trasnfers with min/mod assist with set up, plan to do more caregiver training this week     OT:  set up ROM program for R UE and will work on getting splint for R hand and then plan to d/c   ST:  wrking on a communication board. Recommending 1-2 weeks.     Electronically signed by: Morenita Mosquera NP    Case Management:  I have reviewed the facility/SNF care plan/MDS which was done 8/6/19, including the falls risk, nutrition and pain screening. I also reviewed the current immunizations, and preventive care.. Future cancer screening is not clinically indicated secondary to age/goals of care.   Patient's desire to return to the community is not assessible due to cognitive impairment.    Information reviewed:  Medications, vital signs, orders, and nursing notes.  The health plan new enrollment has happened. I have reviewed the  MDS, the preventative needs,  and facility care plan. The level of care is appropriate. I have reviewed the code status/advanced directives.

## 2021-06-19 NOTE — LETTER
Letter by Johnson, Michael Duane, CNP at      Author: Johnson, Michael Duane, CNP Service: -- Author Type: --    Filed:  Encounter Date: 7/25/2019 Status: (Other)         Patient: Zach Olmos   MR Number: [id number]   YOB: 1950   Date of Visit: 7/25/2019     [facility]    Facility:   [facility] [[id number]]   Code Status: FULL CODE  PCP: Johnson, Michael Duane, CNP   Phone: [phone]   Fax: [phone]      CHIEF COMPLAINT/REASON FOR VISIT:  Chief Complaint   Patient presents with   ? Discharge Summary       HISTORY COURSE:  Zach is a 68 y.o. Female who was hospitalized December 9 - December 16, 2018 secondary to principal diagnosis of parotiditis.  The CT of the brain and neck did not show any dilation of the left parotid duct or duct stone although was found to have moderate stenosis at the left greater than right proximal internal carotid arteries.  Also found was a subacute large left MCA stroke involving the left basal ganglia without hemorrhagic transformation.  She does have a history of diabetes last A1c in November 8 0.5% along with a history of CAD status post CABG along with hypertension hyperlipidemia anxiety depression paroxysmal atrial fib which was duly diagnosed and started on apixaban.  She recently had a large left MCA stroke out of the timeframe for lytics and unsuccessful thrombectomy and hospitalized at [facility] November 17 - December 7, 2018 with subsequent poor mental status and right-sided deficits.  She was found to have left neck and face swelling and a fever consistent with acute parotiditis.  She was started on broad-spectrum antibiotics including vancomycin and Flagyl ENT was consulted who recommended conservative treatment warm compresses frequent oral cares.  Leukocytosis did resolve and she did finish up her Augmentin.  Regarding the left MCA stroke with deficits including the right hemiplegia and minimal  communication her cognition did improve with treatment of infection and able to respond yes and no questions.  She is on apixaban for history of paroxysmal atrial fibrillation.  She also has iron deficiency anemia apparently the iron workup was incomplete.  Regarding her diabetes A1c was 6.4 she is on tube feedings as well as Lantus and sliding scale.  Regarding history of CAD status post CABG she is continuing with her metoprolol 50 mg twice daily along with furosemide 20 mg twice daily.  She has a history of colon cancer with a lower anterior resection.  For her nutrition she currently has a GJ tube and tube feedings.  Her laboratory studies on December 9 did show a hemoglobin of 10.3 platelets 429 creatinine 0.6 sodium 132 potassium 4.4 albumin 2.9 magnesium 1.8.  She has worked with the various therapy disciplines even to the point of working with speech therapy and now taking all her nutrition and orally and no longer feeding tube but is getting her medications via the feeding tube.  Have had a number of conversations with the dietitian.  Her blood sugars in the morning ranging 87-1 20 towards the p.m. ranging 134-190 even around 4:00 they have been all less than 130.  She has been normotensive and afebrile and also on room air.  She has been a delight to get to know and work with regarding her chronic medical conditions for transitional care stay as well as in the long-term care facility.  Her weight has been stable at 184 pounds.    Review of Systems  Currently there are no reports of fever chills or fatigue flulike symptoms headache stiff neck chest pain rashes or sores.  History of diabetes CAD hypertension hyperlipidemia anxiety depression sleep apnea left MCA stroke with right hemiplegia     There were no vitals filed for this visit.  Blood pressure 96/62 pulse 60 respirations 20 temperature 98.0 weight 184 pounds  Physical Exam  Constitutional: No distress.   HENT:   Cardiovascular: Normal rate, regular  rhythm and normal heart sounds.   History of CAD status post CABG   Pulmonary/Chest: Breath sounds normal.   History of pulmonary embolism   Abdominal:.   Tube feedings secondary to dysphagia.  History of colon cancer.  Abdominal scars.   Musculoskeletal:   History of left MCA stroke with right hemiplegia   Dysarthria.  History of stroke    Psychiatric:   History of anxiety and depression      MEDICATION LIST:  Current Outpatient Medications   Medication Sig   ? acetaminophen (TYLENOL) 160 mg/5 mL (5 mL) Soln solution Take 20.3 mL by mouth every 6 (six) hours. MAX OF 5 DOSES IN 24 HOURS BETWEEN SCHEDULED AND PRN DOSES. Every 6 Hours; 08:00 AM, 02:00 PM, 08:00 PM, 02:00 AM          ? apixaban (ELIQUIS) 5 mg Tab tablet Take 5 mg by mouth 2 (two) times a day.         ? aspirin 81 mg chewable tablet Chew 81 mg daily.         ? ferrous sulfate 300 mg (60 mg iron)/5 mL syrup Take 300 mg by mouth daily.         ? furosemide (LASIX) 20 MG tablet Take 20 mg by mouth 2 (two) times a day at 9am and 6pm.         ? insulin glargine (LANTUS) 100 unit/mL injection Inject 22 Units under the skin 2 (two) times a day. 07:30 AM, 04:30 PM         ? insulin regular (NOVOLIN R) 100 unit/mL injection Inject under the skin 3 (three) times a day. If Blood Sugar is 200 to 249, give 2 Units. If Blood Sugar is 250 to 299, give 4 Units. If Blood Sugar is 300 to 349, give 6 Units. If Blood Sugar is 350 to 399, give 8 Units. If Blood Sugar is 400 to 449, give 10 Units. If Blood Sugar is greater than 449, give 12 Units.         ? irbesartan (AVAPRO) 75 MG tablet Take 75 mg by mouth 2 (two) times a day.         ? lansoprazole (PREVACID SOLUTAB) 15 MG disintegrating tablet Take 15 mg by mouth daily.         ? metoprolol tartrate (LOPRESSOR) 50 MG tablet Take 50 mg by mouth 2 (two) times a day.         ? nystatin (MYCOSTATIN) powder Apply to abdominal fold twice daily.   ? PARoxetine (PAXIL) 30 MG tablet Take 60 mg by mouth every morning.         ?  rosuvastatin (CRESTOR) 20 MG tablet Take 20 mg by mouth every morning. PER HOSPITAL ORDERS GIVE TO PT. IN THE A.M.            Lab Results   Component Value Date    HGBA1C 6.4 (H) 04/16/2019     Lab Results   Component Value Date    CHOL 77 04/16/2019     Lab Results   Component Value Date    HDL 19 (L) 04/16/2019     Lab Results   Component Value Date    LDLCALC 38 04/16/2019     Lab Results   Component Value Date    TRIG 98 04/16/2019     No components found for: CHOLHDL  Lab Results   Component Value Date    HGB 10.6 (L) 04/16/2019     No results found for this or any previous visit.      Results for orders placed or performed in visit on 12/20/18   Comprehensive Metabolic Panel   Result Value Ref Range    Sodium 136 136 - 145 mmol/L    Potassium 3.9 3.5 - 5.0 mmol/L    Chloride 99 98 - 107 mmol/L    CO2 27 22 - 31 mmol/L    Anion Gap, Calculation 10 5 - 18 mmol/L    Glucose 207 (H) 70 - 125 mg/dL    BUN 26 8 - 28 mg/dL    Creatinine 0.66 0.60 - 1.10 mg/dL    GFR MDRD Af Amer >60 >60 mL/min/1.73m2    GFR MDRD Non Af Amer >60 >60 mL/min/1.73m2    Bilirubin, Total 0.4 0.0 - 1.0 mg/dL    Calcium 9.7 8.5 - 10.5 mg/dL    Protein, Total 7.6 6.0 - 8.0 g/dL    Albumin 3.0 (L) 3.5 - 5.0 g/dL    Alkaline Phosphatase 57 45 - 120 U/L    AST 43 (H) 0 - 40 U/L    ALT 37 0 - 45 U/L           DISCHARGE DIAGNOSIS:    ICD-10-CM    1. Oropharyngeal dysphagia R13.12    2. Right hemiplegia (H) G81.91    3. Hyperlipidemia, unspecified hyperlipidemia type E78.5    4. Essential hypertension I10        MEDICAL EQUIPMENT NEEDS:  None    DISCHARGE PLAN/FACE TO FACE:  I certify that services are/were furnished while this patient was under the care of a physician and that a physician or an allowed non-physician practitioner (NPP), had a face-to-face encounter that meets the physician face-to-face encounter requirements. The encounter was in whole, or in part, related to the primary reason for home health. The patient is confined to his/her  home and needs intermittent skilled nursing, physical therapy, speech-language pathology, or the continued need for occupational therapy. A plan of care has been established by a physician and is periodically reviewed by a physician.  Date of Face-to-Face Encounter: January 25, 2019    I certify that, based on my findings, the following services are medically necessary home health services: She will be discharging to [facility] with current medications and treatments.  She will also continue with physical and occupational therapy along with speech therapy.  Her anticipated discharge date is July 30, 2019    My clinical findings support the need for the above skilled services because: (Please write a brief narrative summary that describes what the RN, PT, SLP, or other services will be doing in the home. A list of diagnoses in this section does not meet the CMS requirements.)  She will require the skilled services secondary to her chronic medical conditions along with requiring assistance with basic ADLs managing and following her swallowing and medication management.    This patient is homebound because: (Please write a brief narrative summary describing the functional limitations as to why this patient is homebound and specifically what makes this patient homebound.)  Secondary to multiple chronic medical conditions safety self-care deficits and medication management.    The patient is, or has been, under my care and I have initiated the establishment of the plan of care. This patient will be followed by a physician who will periodically review the plan of care.    Schedule follow up visit with primary care provider within 7 days to reestablish care.  She will follow-up with the primary care doctor at the facility to go over her care medications any future laboratory studies.  She has been a delight to get to know on the long-term care unit.    Discharge coordination care greater than  30 minutes  Electronically signed by: Michael Duane Johnson, CNP

## 2021-06-19 NOTE — LETTER
Letter by Morenita Mosquera NP at      Author: Morenita Mosquera NP Service: -- Author Type: --    Filed:  Encounter Date: 9/10/2019 Status: (Other)         Patient: Zach Olmos   MR Number: [id number]   YOB: 1950   Date of Visit: 9/10/2019                 [facility]    DATE: 9/10/2019    NAME:  Zach Olmos             :  1950  MRN: [id number]  CODE STATUS:  FULL CODE    VISIT TYPE: Problem Visit (rash, drainage from g tube)     FACILITY:  [facility] [[id number]]       CHIEF COMPLAIN/REASON FOR VISIT:    Chief Complaint   Patient presents with   ? Problem Visit     rash, drainage from g tube               HISTORY OF PRESENT ILLNESS: Zach Olmos is a 69 y.o. female who is a long term care resident of [facility].     Today Ms. Olmos is seen for concerns of rash and drainage from G tube site. Staff called provider yesterday evening to update. Orders were given to cleanse site with saline and pat dry and apply bacitracin and cover with drain sponge. Staff had described drainage as purulent and appearing infectious over phone. On exam today Zach is seen in room sitting in wheelchair and reports that she is not having any pain or discomfort around her G tube site. She says they still have not been using it at all. She is able to swallow her meals, food, and pills without any issues. She says she would be more than happy to have tube removed. She does not think her son needs to be called about making the appointment for this because she wants it out and she thinks he will agree with this. She says she just noticed the change in drainage the last day or so. She says it has been bleeding more than draining. She denies any trouble with bowels, fevers, chills, breathing issues, or other concerns recently.     REVIEW OF SYSTEMS:  PROBLEMS AND REVIEW OF SYSTEMS:   Today on ROS:   Currently, no fever, chills, or rigors. Does not have any visual  or hearing problems. Denies any chest pain, headaches, palpitations, lightheadedness. Denies any GERD symptoms. Denies any nausea, or vomiting.  Positive for weakness, EZ stand for transfers, dysphagia, G tube, urinary incontinence, fecal incontinence, appetite is good, wheelchair and bed bound, right sided weakness, aphasia, no shortness of breath or cough, refusing cpap at times, taking all meds and food po, drainage and redness around g tube site      Allergies   Allergen Reactions   ? Aricept [Donepezil]    ? Atorvastatin    ? Glipizide    ? Lisinopril      Current Outpatient Medications   Medication Sig   ? triamcinolone (KENALOG) 0.1 % cream Apply 1 application topically 2 (two) times a day.   ? acetaminophen (TYLENOL) 325 MG tablet Take 650 mg by mouth every 4 (four) hours as needed for pain.   ? apixaban (ELIQUIS) 5 mg Tab tablet Take 5 mg by mouth 2 (two) times a day.         ? aspirin 81 mg chewable tablet Chew 81 mg daily.         ? furosemide (LASIX) 20 MG tablet Take 20 mg by mouth 2 (two) times a day at 9am and 6pm.         ? insulin glargine (LANTUS) 100 unit/mL injection Inject 22 Units under the skin 2 (two) times a day. 07:30 AM, 04:30 PM         ? irbesartan (AVAPRO) 75 MG tablet Take 75 mg by mouth 2 (two) times a day.         ? lisinopril (PRINIVIL,ZESTRIL) 10 MG tablet Take 10 mg by mouth daily.   ? metoprolol tartrate (LOPRESSOR) 50 MG tablet Take 50 mg by mouth 2 (two) times a day.         ? nystatin (MYCOSTATIN) powder Apply to abdominal fold twice daily.   ? PARoxetine (PAXIL) 30 MG tablet Take 60 mg by mouth every morning.         ? rosuvastatin (CRESTOR) 20 MG tablet Take 20 mg by mouth every morning. PER HOSPITAL ORDERS GIVE TO PT. IN THE A.M.            Past Medical History:    Past Medical History:   Diagnosis Date   ? Anemia    ? Anxiety    ? Cancer (H)     Hx of colon cancer   ? CHF (congestive heart failure) (H)     diastolic   ? Coronary artery disease     s/p cabg   ? CVA  (cerebral vascular accident) (H)    ? Depression    ? Diabetes mellitus (H)     type 2   ? DVT (deep vein thrombosis) in pregnancy (H)    ? Granuloma annulare    ? Hemiplegia (H)     R sided and minimally communicative   ? Hyperlipidemia    ? Hypertension    ? Obesity    ? Oropharyngeal dysphagia    ? ANGEL (obstructive sleep apnea)    ? Parotitis    ? Paroxysmal atrial fibrillation (H)    ? PE (pulmonary thromboembolism) (H)    ? Stroke (H) 12/2018    Sub acute L MCA stroke   ? Varicose veins of both lower extremities        IMMUNIZATIONS:    There is no immunization history on file for this patient.  Above immunizations pulled from [employer]. Facility records also reconciled. Outstanding information sent to [facility] to update [employer].  Future immunizations are not needed at this point as all recommended immunizations are up to date.     PHYSICAL EXAMINATION  Vitals:    09/09/19 2026   BP: 108/58   Pulse: (!) 54   Resp: 18   Temp: 97.8  F (36.6  C)   SpO2: 96%   Weight: 180 lb (81.6 kg)       Today on physical exam:     GENERAL: Awake, Alert, not in any form of acute distress, answers questions appropriately, follows simple commands, conversant  HEENT: Head is normocephalic with normal hair distribution. No evidence of trauma. Ears: No acute purulent discharge. Eyes: Conjunctivae pink with no scleral jaundice. Nose: Normal mucosa and septum. NECK: Supple with no cervical or supraclavicular lymphadenopathy. Trachea is midline.   CHEST: No tenderness or deformity, no crepitus  LUNG: dim to auscultation with good chest expansion. There are no crackles or wheezes, normal AP diameter.  No recent shortness of breath or cough   BACK: No kyphosis of the thoracic spine. Symmetric, no curvature, ROM normal, no CVA tenderness, no spinal tenderness   CVS: irregularly irregular rhythm,  2+ pulses symmetric in all extremities.  ABDOMEN: Rounded and soft, nontender to palpation, non distended, no  masses, no organomegaly, good bowel sounds, no rebound or guarding, no peritoneal signs. G tube site LUQ, hypergranulation tissue present, sanguinous drainage on drain sponge, nontender, denies recent traumatic event, denies purulent drainage, no issues with flushing  EXTREMITIES: no edema  SKIN: Warm and dry, no erythema noted.  Skin color, texture, no rashes or lesions.  NEURO/PSYCH: The patient is oriented to person, place and time. Right sided hemiplegia, dysarthria, dysphagia, expressive aphasia, EZ stand to slide board for transfers, dependent for cares            LABS:   No results found for this or any previous visit (from the past 168 hour(s)).  No results found for this or any previous visit.      Lab Results   Component Value Date    WBC 9.5 08/07/2019    HGB 11.8 (L) 08/07/2019    HCT 34.3 (L) 08/07/2019    MCV 88 08/07/2019     08/07/2019       No results found for: [id number]  Lab Results   Component Value Date    HGBA1C 5.7 08/07/2019     No results found for: INR, PROTIME  No results found for: [id number]  Lab Results   Component Value Date    TSH 1.04 08/07/2019           ASSESSMENT/PLAN:    1. Left MCA CVA: Right sided hemiplegia, dysarthria, dysphagia, expressive aphasia. Working with therapy.  Wheelchair and bed bound. EZ stand to slide board for transfers. Dependent for cares. On rosuvastatin, aspirin. F/u with neurology prn. Stable.   2. Type 2 DM: Accuchecks 118-210. Hga1c 6.4 on 4/16. On lantus two times a day, accuchecks bid. Hga1c 5.7 on 8/7. accuchecks controlled. No changes today. Well controlled.   3. CAD, s/p CABG: No chest pain. On aspirin, rosuvastatin. F/u cardiology.    4. HTN: 100s. On irbesartan, metoprolol, lasix. Stable recently.   5. Anxiety and depression: On paroxetine. ACP following, feels mood stable today.   6. Dysphagia, G tube status, Hypergranulation tissue: G tube in place. Upgraded to NDD3, honey thick liquids. Taking meds orally. Not used since admission end  of July. Doing well without using G tube. Discussed at length today risks v benefits of keeping tube versus removing and she is for removing tube. Will order gi referral. Asked if son needed to be called and she stated no because he would be in agreement with this. She wants it removed. Hypergranulation tissue present, bleeding present. No purulent drainage noted, erythema related to inflammation of hypergranulation tissue. Will order kenalog cream two times a day with wound cares, dc bacitracin. Continue cares until resolved. Notify if not improving.   7. PAF: Rate controlled 50-60s. On eliquis, metoprolol.   8.ANGEL: On CPAP. Refusing at times.   10. Weights: [phone]. Monitor. Appetite good per staff.     Tsh, hga1c, cmp, hm1 on 8/7 - labs stable    PT:  slide board trasnfers with min/mod assist with set up, plan to do more caregiver training this week     OT:  set up ROM program for R UE and will work on getting splint for R hand and then plan to d/c   ST:  wrking on a communication board. Recommending 1-2 weeks.     Electronically signed by: Morenita Mosquera NP    Case Management:  I have reviewed the facility/SNF care plan/MDS which was done 8/6/19, including the falls risk, nutrition and pain screening. I also reviewed the current immunizations, and preventive care.. Future cancer screening is not clinically indicated secondary to age/goals of care.   Patient's desire to return to the community is not assessible due to cognitive impairment.    Information reviewed:  Medications, vital signs, orders, and nursing notes.  The health plan new enrollment has happened. I have reviewed the  MDS, the preventative needs,  and facility care plan. The level of care is appropriate. I have reviewed the code status/advanced directives.       Total floor/unit time spent 35 min with 25 min spent on counseling and coordination of care. Counseling regarding dysphagia management, g tube status, hypergranulation tissue, issues  with g tube and drainage, cva management, diabetes management. Coordinated care with nursing for wound cares, g tube management, dysphagia management, st need and treatment, gi referral.

## 2021-06-19 NOTE — LETTER
Letter by Johnson, Michael Duane, CNP at      Author: Johnson, Michael Duane, CNP Service: -- Author Type: --    Filed:  Encounter Date: 6/12/2019 Status: (Other)         Patient: Zach Olmos   MR Number: [id number]   YOB: 1950   Date of Visit: 6/12/2019     [facility]    Facility:   [facility] [place] [[id number]]   Code Status: FULL CODE      CHIEF COMPLAINT/REASON FOR VISIT:  Chief Complaint   Patient presents with   ? Review Of Multiple Medical Conditions       HISTORY:      HPI: Zach is a 69 y.o. female who was seen secondary to review of chronic medical conditions.  She does have a history of CVA with hemiparesis and dysphasia currently getting tube feedings at 60 cc an hour.  The feeding tube stoma area looks pretty good.  Level 2 diet honey thick liquids does need supervision.  Systolic blood pressures from April 1 June first running 114-120.  Her weight is 185 pounds she is been ranging over the past 2 months 195-187 pounds.  Morning time blood sugars ranging 121-2:11 -180.  See results below her most recent laboratory studies.    Past Medical History:   Diagnosis Date   ? Anemia    ? Anxiety    ? Cancer (H)     Hx of colon cancer   ? CHF (congestive heart failure) (H)     diastolic   ? Coronary artery disease     s/p cabg   ? CVA (cerebral vascular accident) (H)    ? Depression    ? Diabetes mellitus (H)     type 2   ? DVT (deep vein thrombosis) in pregnancy (H)    ? Granuloma annulare    ? Hemiplegia (H)     R sided and minimally communicative   ? Hyperlipidemia    ? Hypertension    ? Obesity    ? Oropharyngeal dysphagia    ? ANGEL (obstructive sleep apnea)    ? Parotitis    ? Paroxysmal atrial fibrillation (H)    ? PE (pulmonary thromboembolism) (H)    ? Stroke (H) 12/2018    Sub acute L MCA stroke   ? Varicose veins of both lower extremities              Family History   Problem Relation Age of Onset   ? Heart disease Mother    ? Cancer  Father         esophageal     Social History     Socioeconomic History   ? Marital status: Single     Spouse name: Not on file   ? Number of children: Not on file   ? Years of education: Not on file   ? Highest education level: Not on file   Occupational History   ? Not on file   Social Needs   ? Financial resource strain: Not on file   ? Food insecurity:     Worry: Not on file     Inability: Not on file   ? Transportation needs:     Medical: Not on file     Non-medical: Not on file   Tobacco Use   ? Smoking status: Never Smoker   ? Smokeless tobacco: Never Used   Substance and Sexual Activity   ? Alcohol use: No     Frequency: Never   ? Drug use: No   ? Sexual activity: Not on file   Lifestyle   ? Physical activity:     Days per week: Not on file     Minutes per session: Not on file   ? Stress: Not on file   Relationships   ? Social connections:     Talks on phone: Not on file     Gets together: Not on file     Attends [employer] service: Not on file     Active member of club or organization: Not on file     Attends meetings of clubs or organizations: Not on file     Relationship status: Not on file   ? Intimate partner violence:     Fear of current or ex partner: Not on file     Emotionally abused: Not on file     Physically abused: Not on file     Forced sexual activity: Not on file   Other Topics Concern   ? Not on file   Social History Narrative   ? Not on file         Review of Systems  Currently there are no reports of fever chills or fatigue flulike symptoms headache stiff neck chest pain rashes or sores.  History of diabetes CAD hypertension hyperlipidemia anxiety depression sleep apnea left MCA stroke with right hemiplegia             Current Outpatient Medications   Medication Sig   ? acetaminophen (TYLENOL) 160 mg/5 mL (5 mL) Soln solution 20.3 mL by Enteral Tube route every 6 (six) hours MAX OF 5 DOSES IN 24 HOURS BETWEEN SCHEDULED AND PRN DOSES.  Every 6 Hours; 08:00 AM, 02:00 PM, 08:00 PM, 02:00 AM .    ? apixaban (ELIQUIS) 5 mg Tab tablet 5 mg by Enteral Tube route 2 (two) times a day .            ? aspirin 81 mg chewable tablet 81 mg by G-tube route daily.   ? ferrous sulfate 300 mg (60 mg iron)/5 mL syrup 300 mg by G-tube route daily.             ? furosemide (LASIX) 20 MG tablet 20 mg by G-tube route 2 (two) times a day at 9am and 6pm .            ? insulin glargine (LANTUS) 100 unit/mL injection Inject 20 Units under the skin 2 (two) times a day. 07:30 AM, 04:30 PM            ? insulin regular (NOVOLIN R) 100 unit/mL injection Inject under the skin 3 (three) times a day. If Blood Sugar is 200 to 249, give 2 Units. If Blood Sugar is 250 to 299, give 4 Units. If Blood Sugar is 300 to 349, give 6 Units. If Blood Sugar is 350 to 399, give 8 Units. If Blood Sugar is 400 to 449, give 10 Units. If Blood Sugar is greater than 449, give 12 Units.            ? irbesartan (AVAPRO) 75 MG tablet 1 tablet (75 mg total) by G-tube route 2 (two) times a day.   ? lansoprazole (PREVACID SOLUTAB) 15 MG disintegrating tablet 15 mg by G-tube route daily.   ? metoprolol tartrate (LOPRESSOR) 50 MG tablet 50 mg by G-tube route 2 (two) times a day .            ? nystatin (MYCOSTATIN) powder Apply to abdominal fold twice daily.   ? PARoxetine (PAXIL) 30 MG tablet 60 mg by G-tube route every morning.   ? rosuvastatin (CRESTOR) 20 MG tablet 20 mg by G-tube route every morning PER HOSPITAL ORDERS GIVE TO PT. IN THE A.M. .                        .There were no vitals filed for this visit.  Blood pressure 114/62 pulse 62 respirations 18 temperature 97.9 weight 185 pounds  Physical Exam  Constitutional: No distress.   HENT:   Cardiovascular: Normal rate, regular rhythm and normal heart sounds.   History of CAD status post CABG   Pulmonary/Chest: Breath sounds normal.   History of pulmonary embolism   Abdominal:.   Tube feedings secondary to dysphagia.  History of colon cancer.  Abdominal scars.   Musculoskeletal:   History of left MCA  stroke with right hemiplegia   Dysarthria.  History of stroke    Psychiatric:   History of anxiety and depression          LABS:   Lab Results   Component Value Date    HGB 10.6 (L) 04/16/2019     No results found for this or any previous visit.      Lab Results   Component Value Date    CHOL 77 04/16/2019     Lab Results   Component Value Date    HDL 19 (L) 04/16/2019     Lab Results   Component Value Date    LDLCALC 38 04/16/2019     Lab Results   Component Value Date    TRIG 98 04/16/2019     No components found for: CHOLHDL  Lab Results   Component Value Date    HGBA1C 6.4 (H) 04/16/2019         ASSESSMENT:      ICD-10-CM    1. Oropharyngeal dysphagia R13.12    2. Right hemiplegia (H) G81.91    3. Essential hypertension I10    4. Cerebrovascular accident (CVA), unspecified mechanism (H) I63.9        PLAN:    Blood sugars overall look pretty good her weights are stable.  Getting tube feedings also getting supervision for eating.  Continue to manage and follow her other chronic medical conditions.  No other changes.      Electronically signed by: Michael Duane Johnson, CNP

## 2021-06-19 NOTE — LETTER
Letter by Stefany Tyler MD at      Author: Stefany Tyler MD Service: -- Author Type: --    Filed:  Encounter Date: 11/5/2019 Status: Signed         Patient: Zach Olmos   MR Number: [id number]   YOB: 1950   Date of Visit: 11/5/2019     [facility]      Facility:    [facility] [[id number]]  Code Status: FULL CODE       Chief Complaint/Reason for Visit:  Chief Complaint   Patient presents with   ? H & P     Re-admit to LTC.       HPI:   Zach is a 69 y.o. female who is seen for re-admit to LTC at [facility]. She was initially admitted here on 7/30/19, came from [facility]. She has hx of stroke, ischemic left MCA with hospitalization at [facility] 11/17/18-12/7/18. She has late effects of right hemiparesis, dysphagia, dysarthria. She had a feeding tube but was getting nutrition orally and the G tube was being flushed to keep patent. She has additional hx of DM, HTN, CAD s/p CABG, ANGEL, colon cancer s/p resection, depression and anxiety.    She was sent in to the hospital on 10/2/19 due to drainage around the G tube site which was purulent with some blood. She had developed abdominal discomfort and bloating. There is no information in [facility] as she was hospitalized at [facility]. Due to infection of the G tube site, her G tube was removed, she was treated with abx and she was able to return to the facility on 10/4/19.    Today:  She has completed treatment with abx, no further concerns of infection. No fever. She has difficulty communicating due to dysarthria, expressive aphasia and slowed responses. She is weak on right side. She uses slide board and EZ stand. She denies chest pain or SOB. She denies cough. Denies abdominal pain. No fever. No urinary sx. No reported new vision or hearing problems. Unable to due further ROS with patient due to cognitive impairment.       Past Medical History:  Past Medical History:   Diagnosis Date    ? Anemia    ? Anxiety    ? Cancer (H)     Hx of colon cancer   ? CHF (congestive heart failure) (H)     diastolic   ? Coronary artery disease     s/p cabg   ? CVA (cerebral vascular accident) (H)    ? Depression    ? Diabetes mellitus (H)     type 2   ? DVT (deep vein thrombosis) in pregnancy    ? Granuloma annulare    ? Hemiplegia (H)     R sided and minimally communicative   ? Hyperlipidemia    ? Hypertension    ? Obesity    ? Oropharyngeal dysphagia    ? ANGEL (obstructive sleep apnea)    ? Parotitis    ? Paroxysmal atrial fibrillation (H)    ? PE (pulmonary thromboembolism) (H)    ? Stroke (H) 12/2018    Sub acute L MCA stroke   ? Varicose veins of both lower extremities            Surgical History:  Past Surgical History:   Procedure Laterality Date   ? CORONARY ARTERY BYPASS GRAFT      x5       Family History:   Family History   Problem Relation Age of Onset   ? Heart disease Mother    ? Cancer Father         esophageal       Social History:    Social History     Socioeconomic History   ? Marital status: Single     Spouse name: Not on file   ? Number of children: Not on file   ? Years of education: Not on file   ? Highest education level: Not on file   Occupational History   ? Not on file   Social Needs   ? Financial resource strain: Not on file   ? Food insecurity:     Worry: Not on file     Inability: Not on file   ? Transportation needs:     Medical: Not on file     Non-medical: Not on file   Tobacco Use   ? Smoking status: Never Smoker   ? Smokeless tobacco: Never Used   Substance and Sexual Activity   ? Alcohol use: No     Frequency: Never   ? Drug use: No   ? Sexual activity: Not on file   Lifestyle   ? Physical activity:     Days per week: Not on file     Minutes per session: Not on file   ? Stress: Not on file   Relationships   ? Social connections:     Talks on phone: Not on file     Gets together: Not on file     Attends Yazdanism service: Not on file     Active member of club or organization: Not on  file     Attends meetings of clubs or organizations: Not on file     Relationship status: Not on file   ? Intimate partner violence:     Fear of current or ex partner: Not on file     Emotionally abused: Not on file     Physically abused: Not on file     Forced sexual activity: Not on file   Other Topics Concern   ? Not on file   Social History Narrative   ? Not on file       Medications:  Current Outpatient Medications   Medication Sig   ? acetaminophen (TYLENOL) 325 MG tablet Take 650 mg by mouth every 6 (six) hours as needed for pain.          ? apixaban (ELIQUIS) 5 mg Tab tablet Take 5 mg by mouth 2 (two) times a day.         ? aspirin 81 mg chewable tablet Chew 81 mg daily.         ? furosemide (LASIX) 20 MG tablet Take 20 mg by mouth daily.          ? insulin glargine (LANTUS) 100 unit/mL injection Inject 15 Units under the skin 2 (two) times a day. 07:30 AM, 04:30 PM         ? irbesartan (AVAPRO) 75 MG tablet Take 75 mg by mouth 2 (two) times a day.         ? metoprolol tartrate (LOPRESSOR) 50 MG tablet Take 50 mg by mouth 2 (two) times a day.         ? ondansetron (ZOFRAN) 4 MG tablet Take 4 mg by mouth every 6 (six) hours as needed for nausea.   ? PARoxetine (PAXIL) 30 MG tablet Take 60 mg by mouth every morning.         ? rosuvastatin (CRESTOR) 20 MG tablet Take 20 mg by mouth every morning. PER HOSPITAL ORDERS GIVE TO PT. IN THE A.M.              Allergies:  Allergies   Allergen Reactions   ? Aricept [Donepezil]    ? Atorvastatin    ? Glipizide    ? Lisinopril         Review of Systems:  Pertinent items are noted in HPI.   Cognitive impairment.      Physical Exam:   General: Patient is alert female, laying in bed, no distress.   Vitals: /53, Temp 98.6, Pulse 53, RR 20, O2 sat 95%RA.  HEENT: Head is NCAT. Eyes show no injection or icterus. Nares negative. Oropharynx well hydrated.  Neck: Supple. No tenderness or adenopathy. No JVD.  Lungs: Clear bilaterally. No wheezes.  Cardiovascular: Regular rate  and rhythm, normal S1. S2.  Back: No spinal or CVA tenderness.  Abdomen: G tube site healed. Soft, no tenderness on exam. Bowel sounds present. No guarding rebound or rigidity.  : Deferred.  Extremities: No edema is noted.  Musculoskeletal: Weak right side.  Skin: G tube site healed over.   Psych: Difficult to assess.      Labs:  Lab Results   Component Value Date    WBC 10.5 10/09/2019    HGB 10.3 (L) 10/09/2019    HCT 31.6 (L) 10/09/2019    MCV 92 10/09/2019     10/09/2019     Results for orders placed or performed in visit on 10/09/19   Basic Metabolic Panel   Result Value Ref Range    Sodium 139 136 - 145 mmol/L    Potassium 4.3 3.5 - 5.0 mmol/L    Chloride 103 98 - 107 mmol/L    CO2 28 22 - 31 mmol/L    Anion Gap, Calculation 8 5 - 18 mmol/L    Glucose 87 70 - 125 mg/dL    Calcium 9.2 8.5 - 10.5 mg/dL    BUN 16 8 - 28 mg/dL    Creatinine 0.83 0.60 - 1.10 mg/dL    GFR MDRD Af Amer >60 >60 mL/min/1.73m2    GFR MDRD Non Af Amer >60 >60 mL/min/1.73m2     Lab Results   Component Value Date    HGBA1C 5.5 11/08/2019         Assessment/Plan:  1. Dysphagia. Secondary to stroke Nov 2018. Previous GT was not being used in LTC facility, became infected, hospitalized for treatment with abx and removal of tube. She is eating orally, honey thick liquids.   2. Stroke. Left MCA ischemic origin, Nov 2018. Residual exp aphasia, dysphagia. On aspirin, statin.  3. HTN. On metoprolol, irbesartan, lasix. Monitor BPs per protocol.  4. Afib. She is anticoagulated with eliquis.  5. DM. On Lantus insulin. Accuchecks followed. Last A1c at 5.5%.  6. CAD. Hx cabg. No CP.  7. Depression. Hx anxiety. Continue Paxil.  8. ANGEL. Has CPAP.  9. Hx colon cancer. S/p resection.  10. Code status is full code.          Electronically signed by: Stefany Tyler MD

## 2021-06-19 NOTE — LETTER
Letter by Johnson, Michael Duane, CNP at      Author: Johnson, Michael Duane, CNP Service: -- Author Type: --    Filed:  Encounter Date: 4/10/2019 Status: (Other)         Patient: Zach Olmos   MR Number: [id number]   YOB: 1950   Date of Visit: 4/10/2019     [facility]    Facility:   [facility] [[id number]]   Code Status: FULL CODE      CHIEF COMPLAINT/REASON FOR VISIT:  Chief Complaint   Patient presents with   ? Review Of Multiple Medical Conditions       HISTORY:      HPI: Zach is a 68 y.o. female who was seen secondary to review of chronic medical conditions.  His history of stroke with hemiparesis.  Did have a swallow study on March 7, 2019 now level 1 diet with honey thick liquids by teaspoon 100% supervision upright in a chair area talk to the dietitian will now change the tube feedings on at night off during the day and watch her weights as well as her response.  Blood sugars in the morning ranging 110-160 7 -184.  She has been normotensive afebrile and also on room air are stable.  She is due for lipid panel A1c as well as a hemoglobin the last hemoglobin was in December 2018.  Not having any pain.  Does need assistance with all ADLs.    Past Medical History:   Diagnosis Date   ? Anemia    ? Anxiety    ? Cancer (H)     Hx of colon cancer   ? CHF (congestive heart failure) (H)     diastolic   ? Coronary artery disease     s/p cabg   ? CVA (cerebral vascular accident) (H)    ? Depression    ? Diabetes mellitus (H)     type 2   ? DVT (deep vein thrombosis) in pregnancy (H)    ? Granuloma annulare    ? Hemiplegia (H)     R sided and minimally communicative   ? Hyperlipidemia    ? Hypertension    ? Obesity    ? Oropharyngeal dysphagia    ? ANGEL (obstructive sleep apnea)    ? Parotitis    ? Paroxysmal atrial fibrillation (H)    ? PE (pulmonary thromboembolism) (H)    ? Stroke (H) 12/2018    Sub acute L MCA stroke   ? Varicose veins of both  lower extremities              Family History   Problem Relation Age of Onset   ? Heart disease Mother    ? Cancer Father         esophageal     Social History     Socioeconomic History   ? Marital status: Single     Spouse name: Not on file   ? Number of children: Not on file   ? Years of education: Not on file   ? Highest education level: Not on file   Occupational History   ? Not on file   Social Needs   ? Financial resource strain: Not on file   ? Food insecurity:     Worry: Not on file     Inability: Not on file   ? Transportation needs:     Medical: Not on file     Non-medical: Not on file   Tobacco Use   ? Smoking status: Never Smoker   ? Smokeless tobacco: Never Used   Substance and Sexual Activity   ? Alcohol use: No     Frequency: Never   ? Drug use: No   ? Sexual activity: Not on file   Lifestyle   ? Physical activity:     Days per week: Not on file     Minutes per session: Not on file   ? Stress: Not on file   Relationships   ? Social connections:     Talks on phone: Not on file     Gets together: Not on file     Attends Jehovah's witness service: Not on file     Active member of club or organization: Not on file     Attends meetings of clubs or organizations: Not on file     Relationship status: Not on file   ? Intimate partner violence:     Fear of current or ex partner: Not on file     Emotionally abused: Not on file     Physically abused: Not on file     Forced sexual activity: Not on file   Other Topics Concern   ? Not on file   Social History Narrative   ? Not on file         Review of Systems  Currently there are no reports of fever chills or fatigue flulike symptoms headache stiff neck chest pain rashes or sores.  History of diabetes CAD hypertension hyperlipidemia anxiety depression sleep apnea left MCA stroke with right hemiplegia      Current Outpatient Medications   Medication Sig   ? acetaminophen (TYLENOL) 160 mg/5 mL (5 mL) Soln solution 20.3 mL by Enteral Tube route every 6 (six) hours MAX OF 5  DOSES IN 24 HOURS BETWEEN SCHEDULED AND PRN DOSES.  Every 6 Hours; 08:00 AM, 02:00 PM, 08:00 PM, 02:00 AM .   ? apixaban (ELIQUIS) 5 mg Tab tablet 5 mg by Enteral Tube route 2 (two) times a day .         ? aspirin 81 mg chewable tablet 81 mg by G-tube route daily.   ? ferrous sulfate 300 mg (60 mg iron)/5 mL syrup 300 mg by G-tube route 2 (two) times a day.   ? furosemide (LASIX) 20 MG tablet 20 mg by G-tube route 2 (two) times a day at 9am and 6pm .         ? insulin glargine (LANTUS) 100 unit/mL injection Inject 20 Units under the skin 2 (two) times a day. 07:30 AM, 04:30 PM         ? insulin regular (NOVOLIN R) 100 unit/mL injection Inject under the skin 3 (three) times a day. If Blood Sugar is 200 to 249, give 2 Units. If Blood Sugar is 250 to 299, give 4 Units. If Blood Sugar is 300 to 349, give 6 Units. If Blood Sugar is 350 to 399, give 8 Units. If Blood Sugar is 400 to 449, give 10 Units. If Blood Sugar is greater than 449, give 12 Units.         ? irbesartan (AVAPRO) 75 MG tablet 1 tablet (75 mg total) by G-tube route 2 (two) times a day.   ? lansoprazole (PREVACID SOLUTAB) 15 MG disintegrating tablet 15 mg by G-tube route daily.   ? lidocaine (LIDODERM) 5 % Place 2 patches on the skin daily Remove & Discard patch within 12 hours or as directed by MD  APPLY TO RIGHT AND LEFT SHOULDERS. .   ? metoprolol tartrate (LOPRESSOR) 50 MG tablet 50 mg by G-tube route 2 (two) times a day .         ? nystatin (MYCOSTATIN) powder Apply to abdominal fold twice daily.   ? PARoxetine (PAXIL) 30 MG tablet 60 mg by G-tube route every morning.   ? rosuvastatin (CRESTOR) 20 MG tablet 20 mg by G-tube route every morning PER HOSPITAL ORDERS GIVE TO PT. IN THE A.M. .       .There were no vitals filed for this visit.  Blood pressure 116/62 pulse 66 respirations 18 temperature 97.9 weight 25 pounds  Physical Exam      Constitutional: No distress.   HENT:   Cardiovascular: Normal rate, regular rhythm and normal heart  sounds.   History of CAD status post CABG   Pulmonary/Chest: Breath sounds normal.   History of pulmonary embolism   Abdominal:.   Tube feedings secondary to dysphagia.  History of colon cancer.  Abdominal scars.   Musculoskeletal:   History of left MCA stroke with right hemiplegia   Dysarthria.  History of stroke   Skin: Skin is warm and dry. No rash noted.   Psychiatric:   History of anxiety and depression         LABS:   Lab Results   Component Value Date    HGB 10.7 (L) 12/20/2018     No results found for this or any previous visit.      No results found for: HGBA1C  No results found for: CHOL  No results found for: HDL  No results found for: LDLCALC  No results found for: TRIG  No components found for: CHOLHDL      ASSESSMENT:      ICD-10-CM    1. Cerebrovascular accident (CVA), unspecified mechanism (H) I63.9    2. Oropharyngeal dysphagia R13.12    3. Right hemiplegia (H) G81.91    4. Type 1 diabetes mellitus with complication (H) E10.8        PLAN:    At this time no further changes are necessary.  At the changing of the tube feedings 200 and night off in the daytime continue with the one-to-one supervision with level 1 diet with honey thick liquids.  Continue to monitor blood pressures and blood sugars and also on April 16 for lipid panel A1c and hemoglobin.      Electronically signed by: Michael Duane Johnson, CNP

## 2021-06-19 NOTE — LETTER
Letter by Morenita Mosquera NP at      Author: Morenita Mosquera NP Service: -- Author Type: --    Filed:  Encounter Date: 2019 Status: Signed         Patient: Zach Olmos   MR Number: [id number]   YOB: 1950   Date of Visit: 2019                 [facility]    DATE: 2019    NAME:  Zach Olmos             :  1950  MRN: [id number]  CODE STATUS:  FULL CODE    VISIT TYPE: Problem Visit (drainage g tube)     FACILITY:  [facility] [[id number]]       CHIEF COMPLAIN/REASON FOR VISIT:    Chief Complaint   Patient presents with   ? Problem Visit     drainage g tube               HISTORY OF PRESENT ILLNESS: Zach Olmos is a 69 y.o. female who is a long term care resident of [facility].     Today Ms. Olmos is seen for concerns of drainage around g tube site again. Staff called yesterday to report this. On exam today Ms. Olmos is seen in bed and says she is not having any pain or discomfort around her G-J tube site. She says her bowels are moving fine and she continues to eat well. She is anxious to get her tube out. She denies fevers, chills, nausea, or other concerns. On exam her tube has hypergranulation tissue again and is bleeding but no purulent drainage or signs of infection. Per staff had called son who reported [facility] but requesting provider to provider call. On further review of records she had her G-J tube placed at [facility] on 18 by IR. Requested staff call [facility] IR to see if can make an appointment with them to pull tube. She has 18 [place] 45 cm size tube.     REVIEW OF SYSTEMS:  PROBLEMS AND REVIEW OF SYSTEMS:   Today on ROS:   Currently, no fever, chills, or rigors. Does not have any visual or hearing problems. Denies any chest pain, headaches, palpitations, lightheadedness. Denies any GERD symptoms. Denies any nausea, or  vomiting.  Positive for weakness, EZ stand for transfers or slide board, dysphagia, GJ tube, urinary incontinence, fecal incontinence, appetite is good, wheelchair bound, right sided weakness, aphasia, no shortness of breath or cough, refusing cpap at times, taking all meds and food po, drainage and redness around g tube site      Allergies   Allergen Reactions   ? Aricept [Donepezil]    ? Atorvastatin    ? Glipizide    ? Lisinopril      Current Outpatient Medications   Medication Sig   ? acetaminophen (TYLENOL) 325 MG tablet Take 650 mg by mouth every 4 (four) hours as needed for pain.   ? apixaban (ELIQUIS) 5 mg Tab tablet Take 5 mg by mouth 2 (two) times a day.         ? aspirin 81 mg chewable tablet Chew 81 mg daily.         ? furosemide (LASIX) 20 MG tablet Take 20 mg by mouth 2 (two) times a day at 9am and 6pm.         ? insulin glargine (LANTUS) 100 unit/mL injection Inject 22 Units under the skin 2 (two) times a day. 07:30 AM, 04:30 PM         ? irbesartan (AVAPRO) 75 MG tablet Take 75 mg by mouth 2 (two) times a day.         ? lisinopril (PRINIVIL,ZESTRIL) 10 MG tablet Take 10 mg by mouth daily.   ? metoprolol tartrate (LOPRESSOR) 50 MG tablet Take 50 mg by mouth 2 (two) times a day.         ? nystatin (MYCOSTATIN) powder Apply to abdominal fold twice daily.   ? PARoxetine (PAXIL) 30 MG tablet Take 60 mg by mouth every morning.         ? rosuvastatin (CRESTOR) 20 MG tablet Take 20 mg by mouth every morning. PER HOSPITAL ORDERS GIVE TO PT. IN THE A.M.          ? triamcinolone (KENALOG) 0.1 % cream Apply 1 application topically 3 (three) times a day.            Past Medical History:    Past Medical History:   Diagnosis Date   ? Anemia    ? Anxiety    ? Cancer (H)     Hx of colon cancer   ? CHF (congestive heart failure) (H)     diastolic   ? Coronary artery disease     s/p cabg   ? CVA (cerebral vascular accident) (H)    ? Depression    ? Diabetes mellitus (H)     type 2   ? DVT (deep vein thrombosis) in  pregnancy (H)    ? Granuloma annulare    ? Hemiplegia (H)     R sided and minimally communicative   ? Hyperlipidemia    ? Hypertension    ? Obesity    ? Oropharyngeal dysphagia    ? ANGEL (obstructive sleep apnea)    ? Parotitis    ? Paroxysmal atrial fibrillation (H)    ? PE (pulmonary thromboembolism) (H)    ? Stroke (H) 12/2018    Sub acute L MCA stroke   ? Varicose veins of both lower extremities        IMMUNIZATIONS:    There is no immunization history on file for this patient.  Above immunizations pulled from [facility]. Facility records also reconciled. Outstanding information sent to [facility] to update [facility].  Future immunizations are not needed at this point as all recommended immunizations are up to date.     PHYSICAL EXAMINATION  Vitals:    09/24/19 2253   BP: 122/54   Pulse: (!) 55   Resp: 20   Temp: 97.7  F (36.5  C)   SpO2: 97%   Weight: 179 lb (81.2 kg)       Today on physical exam:     GENERAL: Awake, Alert, not in any form of acute distress, answers questions appropriately, follows simple commands, conversant  HEENT: Head is normocephalic with normal hair distribution. No evidence of trauma. Ears: No acute purulent discharge. Eyes: Conjunctivae pink with no scleral jaundice. Nose: Normal mucosa and septum. NECK: Supple with no cervical or supraclavicular lymphadenopathy. Trachea is midline.   CHEST: No tenderness or deformity, no crepitus  LUNG: dim to auscultation with good chest expansion. There are no crackles or wheezes, normal AP diameter.  No recent shortness of breath or cough   BACK: No kyphosis of the thoracic spine. Symmetric, no curvature, ROM normal, no CVA tenderness, no spinal tenderness   CVS: irregularly irregular rhythm,  2+ pulses symmetric in all extremities.  ABDOMEN: Rounded and soft, nontender to palpation, non distended, no masses, no organomegaly, good bowel sounds, no rebound or guarding, no peritoneal signs. G-J tube site LUQ, hypergranulation  tissue present, sanguinous drainage on drain sponge, nontender, no purulent drainage or erythema present  EXTREMITIES: no edema  SKIN: Warm and dry, no erythema noted.  Skin color, texture, no rashes or lesions.  NEURO/PSYCH: The patient is oriented to person, place and time. Right sided hemiplegia, dysarthria, dysphagia, expressive aphasia, EZ stand to slide board for transfers, dependent for cares            LABS:   No results found for this or any previous visit (from the past 168 hour(s)).  No results found for this or any previous visit.      Lab Results   Component Value Date    WBC 9.5 08/07/2019    HGB 11.8 (L) 08/07/2019    HCT 34.3 (L) 08/07/2019    MCV 88 08/07/2019     08/07/2019       No results found for: [id number]  Lab Results   Component Value Date    HGBA1C 5.7 08/07/2019     No results found for: INR, PROTIME  No results found for: [id number]  Lab Results   Component Value Date    TSH 1.04 08/07/2019           ASSESSMENT/PLAN:    1. Left MCA CVA: Right sided hemiplegia, dysarthria, dysphagia, expressive aphasia. Completed therapy. Wheelchair and bed bound. EZ stand to slide board for transfers. Dependent for cares. On rosuvastatin, aspirin. F/u with neurology prn. Dysphagia on NDD3, honey thick liquids.   2. Type 2 DM: Accuchecks 103-199. Hga1c 6.4 on 4/16. On lantus two times a day, accuchecks bid. Hga1c 5.7 on 8/7. accuchecks controlled. No changes today. Well controlled.   3. CAD, s/p CABG: No chest pain. On aspirin, rosuvastatin. F/u cardiology.    4. HTN: 120s. On irbesartan, metoprolol, lasix. Stable recently.   5. Anxiety and depression: On paroxetine. ACP following, feels mood stable today.   6. Dysphagia, G-J tube status, Hypergranulation tissue: G-J tube in place. On NDD3, honey thick liquids. Taking meds orally. Not used since admission end of July. Doing well with oral foods, meds. Continues to have issues with drainage, hypergranulation tissue. Continues with triamcinolone  cream but will increase to tid. Continue cares until resolved. Needs appt to have removed. Reviewed records and appears was placed at [facility] in November of 2018. 18 [place] 45 cm tube. Will have staff schedule appt today with [facility] IR.   7. PAF: Rate controlled 50s. On eliquis, metoprolol.   8.ANGEL: On CPAP. Refusing at times.   10. Weights: [phone]. Monitor. Appetite good per staff.     Tsh, hga1c, cmp, hm1 on 8/7 - labs stable    PT:  slide board trasnfers with min/mod assist with set up, plan to do more caregiver training this week     OT:  set up ROM program for R UE and will work on getting splint for R hand and then plan to d/c   ST:  wrking on a communication board. Recommending 1-2 weeks.     Electronically signed by: Morenita Mosquera NP    Case Management:  I have reviewed the facility/SNF care plan/MDS which was done 8/6/19, including the falls risk, nutrition and pain screening. I also reviewed the current immunizations, and preventive care.. Future cancer screening is not clinically indicated secondary to age/goals of care.   Patient's desire to return to the community is not assessible due to cognitive impairment.    Information reviewed:  Medications, vital signs, orders, and nursing notes.  The health plan new enrollment has happened. I have reviewed the  MDS, the preventative needs,  and facility care plan. The level of care is appropriate. I have reviewed the code status/advanced directives.

## 2021-06-19 NOTE — LETTER
Letter by Morenita Mosquera NP at      Author: Morenita Mosquera NP Service: -- Author Type: --    Filed:  Encounter Date: 2019 Status: (Other)         Patient: Zach Olmos   MR Number: [id number]   YOB: 1950   Date of Visit: 2019                 [facility]    DATE: 2019    NAME:  Zach Olmos             :  1950  MRN: [id number]  CODE STATUS:  FULL CODE    VISIT TYPE: Problem Visit     FACILITY:  [facility] [[id number]]       CHIEF COMPLAIN/REASON FOR VISIT:    Chief Complaint   Patient presents with   ? Problem Visit               HISTORY OF PRESENT ILLNESS: Zach Olmos is a 69 y.o. female who was residing at [facility] and recently transferred to [facility] for long term care. She was hospitalized last December for acute parotiditis and Left MCA CVA. She has PMH of type 2 dm, CAD, CABG, HTN, HLD, anxiety, depression, PAF, anemia, colon cancer s/p resection, right sided hemiplegia, GJ tube, dysphagia.     Today Ms. Olmos is seen in her room laying in bed. She says she is not having any pain today and no dizziness. Her bowels are moving fine and no nausea, vomiting, stomach upset. Her appetite is good. She does wake up a lot at night and doesn't sleep very well but she declines a sleep aid and just wants to see how things go. She has some shortness of breath with activity and a cough at times. She doesn't really bring much up though. She denies any medication concerns today. She says she just got off the G tube feeds in the recent few weeks. She was getting her meds through her tube and eating by mouth but she thinks she could take meds if they were crushed in food. She says before her stroke she did not check her blood sugars at home, just at the doctor's office. She did have some therapy today and says she spends all her time in her wheelchair. She says with all the therapy she did her right sided did  not regain any strength. She denies trouble with her speech. She was dizzy in and out for a while but this seems to be getting better. She says she does not need tylenol scheduled and just wants to take it as needed. She denies any leg swelling. She does not recall taking lansoprazole before and thinks this is new. She is not having any stomach upset. She says the iron is new too. She denies any rash or itching. She does want to be a full code and says she is not having any issues with her CPAP machine. She does wear this every night. She wears pads for incontinence and says she has a hard time controlling this since her stroke. She is anxious at times and does feel a little down about her situation. Her family visits often and she moved here from [place] to be closer to them. She denies other concerns today. Per staff she is EZ stand for toilet transfers and to wheelchair. Therapy is going to follow her for a while. She is able to pull up with her left arm though for positioning. Staff report she has orders that need clarified and are wondering why meds need to be given by G tube but is able to take food by mouth. She is on nectar thick liquids. She did not eat anything for breakfast today, just coffee. Staff says she is crying and tearful at times and her family wanted her to stay on the paxil. Her weights at [place] recently were 186-183lbs. Her blood sugars were 141 last night and 162 this morning. She has not had a weight here yet.     REVIEW OF SYSTEMS:  PROBLEMS AND REVIEW OF SYSTEMS:   Today on ROS:   Currently, no fever, chills, or rigors. Does not have any visual or hearing problems. Denies any chest pain, headaches, palpitations, lightheadedness. Denies any GERD symptoms. Denies any nausea, or vomiting.  Positive for weakness, EZ stand for transfers, shortness of breath with exertion, cough intermittent, dysphagia, G tube, urinary incontinence, fecal incontinence, appetite is fair, dizzy at times,  wheelchair and bed bound, right sided weakness, insomnia      Allergies   Allergen Reactions   ? Aricept [Donepezil]    ? Atorvastatin    ? Glipizide    ? Lisinopril      Current Outpatient Medications   Medication Sig   ? acetaminophen (TYLENOL) 325 MG tablet Take 650 mg by mouth every 4 (four) hours as needed for pain.   ? lisinopril (PRINIVIL,ZESTRIL) 10 MG tablet Take 10 mg by mouth daily.   ? apixaban (ELIQUIS) 5 mg Tab tablet Take 5 mg by mouth 2 (two) times a day.         ? aspirin 81 mg chewable tablet Chew 81 mg daily.         ? furosemide (LASIX) 20 MG tablet Take 20 mg by mouth 2 (two) times a day at 9am and 6pm.         ? insulin glargine (LANTUS) 100 unit/mL injection Inject 22 Units under the skin 2 (two) times a day. 07:30 AM, 04:30 PM         ? irbesartan (AVAPRO) 75 MG tablet Take 75 mg by mouth 2 (two) times a day.         ? metoprolol tartrate (LOPRESSOR) 50 MG tablet Take 50 mg by mouth 2 (two) times a day.         ? nystatin (MYCOSTATIN) powder Apply to abdominal fold twice daily.   ? PARoxetine (PAXIL) 30 MG tablet Take 60 mg by mouth every morning.         ? rosuvastatin (CRESTOR) 20 MG tablet Take 20 mg by mouth every morning. PER HOSPITAL ORDERS GIVE TO PT. IN THE A.M.            Past Medical History:    Past Medical History:   Diagnosis Date   ? Anemia    ? Anxiety    ? Cancer (H)     Hx of colon cancer   ? CHF (congestive heart failure) (H)     diastolic   ? Coronary artery disease     s/p cabg   ? CVA (cerebral vascular accident) (H)    ? Depression    ? Diabetes mellitus (H)     type 2   ? DVT (deep vein thrombosis) in pregnancy (H)    ? Granuloma annulare    ? Hemiplegia (H)     R sided and minimally communicative   ? Hyperlipidemia    ? Hypertension    ? Obesity    ? Oropharyngeal dysphagia    ? ANGEL (obstructive sleep apnea)    ? Parotitis    ? Paroxysmal atrial fibrillation (H)    ? PE (pulmonary thromboembolism) (H)    ? Stroke (H) 12/2018    Sub acute L MCA stroke   ? Varicose veins  of both lower extremities            PHYSICAL EXAMINATION  Vitals:    07/31/19 0825   BP: 102/51   Pulse: (!) 53   Resp: 18   Temp: 97.8  F (36.6  C)   SpO2: 95%       Today on physical exam:     GENERAL: Awake, Alert, not in any form of acute distress, answers questions appropriately, follows simple commands, conversant  HEENT: Head is normocephalic with normal hair distribution. No evidence of trauma. Ears: No acute purulent discharge. Eyes: Conjunctivae pink with no scleral jaundice. Nose: Normal mucosa and septum. NECK: Supple with no cervical or supraclavicular lymphadenopathy. Trachea is midline.   CHEST: No tenderness or deformity, no crepitus  LUNG: dim to auscultation with good chest expansion. There are no crackles or wheezes, normal AP diameter. shortness of breath with exertion, congested cough at times  BACK: No kyphosis of the thoracic spine. Symmetric, no curvature, ROM normal, no CVA tenderness, no spinal tenderness   CVS: irregularly irregular rhythm,  2+ pulses symmetric in all extremities.  ABDOMEN: Rounded and soft, nontender to palpation, non distended, no masses, no organomegaly, good bowel sounds, no rebound or guarding, no peritoneal signs.   EXTREMITIES: no edema  SKIN: Warm and dry, no erythema noted.  Skin color, texture, no rashes or lesions.  NEURO/PSYCH: The patient is oriented to person, place and time. Right sided hemiplegia, dysarthria, dysphagia, EZ stand for transfers, dependent for cares, tearful at times, anxious and depressed at times            LABS:   No results found for this or any previous visit (from the past 168 hour(s)).  No results found for this or any previous visit.      Lab Results   Component Value Date    HGB 10.6 (L) 04/16/2019       No results found for: [id number]  Lab Results   Component Value Date    HGBA1C 6.4 (H) 04/16/2019     No results found for: INR, PROTIME  No results found for: [id number]  No results found for: TSH        ASSESSMENT/PLAN:    1.  Left MCA CVA: Right sided hemiplegia, dysarthria, dysphagia. Completed tcu therapy. PT, OT, ST following now. Wheelchair and bed bound. EZ stand for transfers. Dependent for cares. On rosuvastatin, aspirin. F/u with neurology prn.   2. Type 2 DM: Accuchecks 141, 162. Hga1c 6.4 on 4/16. On lantus two times a day, novolog ss and accuchecks achs. Will dc novolog ss and change to accuchecks two times a day. Was well controlled at [place] and here so far.   3. CAD, s/p CABG: No chest pain. On aspirin, rosuvastatin. F/u cardiology.   4. HTN: 100s. On irbesartan, metoprolol, lasix.   5. Anxiety and depression: On paroxetine.   6. Dysphagia: G tube placed. Off tube feeding, NDD2, honey thick liquids. Changed meds to crushed and in applesauce and pudding. Changed liquid meds to crushable meds. Once no use of G tube x 6 weeks will discuss GI referral for removal. Changed free water flushes to 30cc qshift for patency only.  7. PAF: Rate controlled 50s. On eliquis, metoprolol.   8. GERD: On lansoprazole but denies reflux or nausea. Will dc and monitor.   9. ANGEL: On CPAP. No concerns.   10. Weights: 186-183lbs at [place], no weight yet here. Monitor.     Tsh, hga1c, cmp, hm1 on 8/7 (no labs since April)    PT, OT, ST following  Electronically signed by: Morenita Mosquera NP    Total floor/unit time spent 35 with 25 time spent on counseling and coordination of care. Counseling was done regarding admission to [facility], transfer orders, clarifications of meds, diabetes management, lab monitoring, g tube management, dysphagia management, pain management. Coordinated care with nursing for clarification of admission orders, pain management, dysphagia management, lab monitoring, specialty care follow up, cva management.

## 2021-06-19 NOTE — LETTER
Letter by Morenita Mosquera NP at      Author: Morenita Mosquera NP Service: -- Author Type: --    Filed:  Encounter Date: 10/8/2019 Status: Signed         Patient: Zach Olmos   MR Number: [id number]   YOB: 1950   Date of Visit: 10/8/2019                 [facility]    DATE: 10/8/2019    NAME:  Zach Olmos             :  1950  MRN: [id number]  CODE STATUS:  FULL CODE    VISIT TYPE: Problem Visit (hospital f/u)     FACILITY:  [facility] [[id number]]       CHIEF COMPLAIN/REASON FOR VISIT:    Chief Complaint   Patient presents with   ? Problem Visit     hospital f/u               HISTORY OF PRESENT ILLNESS: Zach Olmos is a 69 y.o. female who is a long term care resident of [facility]. She was recently admitted to [facility] for abdominal pain, G-J tube infection. She was admitted 10/2-10/4. She was sent in for abdominal pain and purulent drainage from G-J tube site. Staff reported she was in so much pain she was crying and unable to eat. During her stay CT showed severe phlegmonous and inflammatory changes along percutaneous gastrostomy tract. She had leukocytosis but vitals were stable and no fever. There was no drainable fluid collection on CT scan. Her apixaban was held and IR removed GJ tube on 10/3. ID was consulted for antibiotic recommendations and was started on vanco/zosyn and later changed to po augmentin. HM1 on discharge showed resolution of leukocytosis. She was discharged back to [facility].     Today Ms. Olmos is seen for follow up on hospital stay. She is seen laying in bed and reports she is not having any abdominal pain today. She says she is sleeping well and denies any fevers or chills. She thinks her appetite is fine but does say she is a little nauseated today. She thinks she has had some nausea off and on the last few days. She would like something if needed but denies any vomiting. She says otherwise she  is doing ok. She thinks her site is still draining some and they are changing this about once a day she thinks. She denies other concerns today. On exam her abdominal site is noted to have saturated dressing with purulent drainage. There is erythema surrounding the insertion site with no indications of healing. There is foul odor to site. Discussed with nursing and reports that this dressing is ordered to be changed daily but was changed during the night due to drainage.     REVIEW OF SYSTEMS:  PROBLEMS AND REVIEW OF SYSTEMS:   Today on ROS:   Currently, no fever, chills, or rigors. Decreased vision. Denies any chest pain, headaches, palpitations, lightheadedness. Denies any GERD symptoms. Positive for weakness, EZ stand for transfers or slide board, dysphagia,  urinary incontinence, fecal incontinence, appetite is good, wheelchair bound, right sided weakness, aphasia, no shortness of breath or cough, refusing cpap at times, drainage, redness at GJ tube, nausea      Allergies   Allergen Reactions   ? Aricept [Donepezil]    ? Atorvastatin    ? Glipizide    ? Lisinopril      Current Outpatient Medications   Medication Sig   ? apixaban (ELIQUIS) 5 mg Tab tablet Take 5 mg by mouth 2 (two) times a day.         ? aspirin 81 mg chewable tablet Chew 81 mg daily.         ? acetaminophen (TYLENOL) 325 MG tablet Take 650 mg by mouth every 6 (six) hours as needed for pain.          ? clindamycin (CLEOCIN) 300 MG capsule Take 300 mg by mouth 3 (three) times a day.   ? furosemide (LASIX) 20 MG tablet Take 20 mg by mouth daily.          ? insulin glargine (LANTUS) 100 unit/mL injection Inject 15 Units under the skin 2 (two) times a day. 07:30 AM, 04:30 PM         ? irbesartan (AVAPRO) 75 MG tablet Take 75 mg by mouth 2 (two) times a day.         ? Lactobacillus rhamnosus GG (CULTURELLE) 10-15 Billion cell capsule Take 2 capsules by mouth daily.   ? metoprolol tartrate (LOPRESSOR) 50 MG tablet Take 50 mg by mouth 2 (two) times a  day.         ? ondansetron (ZOFRAN) 4 MG tablet Take 4 mg by mouth every 6 (six) hours as needed for nausea.   ? PARoxetine (PAXIL) 30 MG tablet Take 60 mg by mouth every morning.         ? rosuvastatin (CRESTOR) 20 MG tablet Take 20 mg by mouth every morning. PER HOSPITAL ORDERS GIVE TO PT. IN THE A.M.            Past Medical History:    Past Medical History:   Diagnosis Date   ? Anemia    ? Anxiety    ? Cancer (H)     Hx of colon cancer   ? CHF (congestive heart failure) (H)     diastolic   ? Coronary artery disease     s/p cabg   ? CVA (cerebral vascular accident) (H)    ? Depression    ? Diabetes mellitus (H)     type 2   ? DVT (deep vein thrombosis) in pregnancy    ? Granuloma annulare    ? Hemiplegia (H)     R sided and minimally communicative   ? Hyperlipidemia    ? Hypertension    ? Obesity    ? Oropharyngeal dysphagia    ? ANGEL (obstructive sleep apnea)    ? Parotitis    ? Paroxysmal atrial fibrillation (H)    ? PE (pulmonary thromboembolism) (H)    ? Stroke (H) 12/2018    Sub acute L MCA stroke   ? Varicose veins of both lower extremities        IMMUNIZATIONS:    There is no immunization history on file for this patient.  Above immunizations pulled from [employer]. Facility records also reconciled. Outstanding information sent to [facility] to update [employer].  Future immunizations are not needed at this point as all recommended immunizations are up to date.     PHYSICAL EXAMINATION  Vitals:    10/08/19 0700   BP: 119/54   Pulse: (!) 56   Resp: 17   Temp: 97.5  F (36.4  C)   SpO2: 96%   Weight: 180 lb (81.6 kg)       Today on physical exam:     GENERAL: Awake, Alert, not in any form of acute distress, answers questions appropriately, follows simple commands, conversant  HEENT: Head is normocephalic with normal hair distribution. No evidence of trauma. Ears: No acute purulent discharge. Eyes: Conjunctivae pink with no scleral jaundice. Nose: Normal mucosa and septum. NECK: Supple  with no cervical or supraclavicular lymphadenopathy. Trachea is midline.   CHEST: No tenderness or deformity, no crepitus  LUNG: dim to auscultation with good chest expansion. There are no crackles or wheezes, normal AP diameter.  No recent shortness of breath or cough   BACK: No kyphosis of the thoracic spine. Symmetric, no curvature, ROM normal, no CVA tenderness, no spinal tenderness   CVS: irregularly irregular rhythm,  2+ pulses symmetric in all extremities.  ABDOMEN: Rounded and soft, nontender to palpation, non distended, no masses, no organomegaly, good bowel sounds, no rebound or guarding, no peritoneal signs. G-J tube removed but site LUQ now has surrounding erythema, warmth, large amount of purulent, malodorous, and dark colored drainage saturated dressing  EXTREMITIES: no edema  SKIN: Warm and dry, no erythema noted.  Skin color, texture, no rashes or lesions.  NEURO/PSYCH: The patient is oriented to person, place and time. Right sided hemiplegia, dysarthria, dysphagia, expressive aphasia, EZ stand to slide board for transfers, dependent for cares            LABS:   Recent Results (from the past 168 hour(s))   Basic Metabolic Panel   Result Value Ref Range    Sodium 139 136 - 145 mmol/L    Potassium 4.3 3.5 - 5.0 mmol/L    Chloride 103 98 - 107 mmol/L    CO2 28 22 - 31 mmol/L    Anion Gap, Calculation 8 5 - 18 mmol/L    Glucose 87 70 - 125 mg/dL    Calcium 9.2 8.5 - 10.5 mg/dL    BUN 16 8 - 28 mg/dL    Creatinine 0.83 0.60 - 1.10 mg/dL    GFR MDRD Af Amer >60 >60 mL/min/1.73m2    GFR MDRD Non Af Amer >60 >60 mL/min/1.73m2   HM1 (CBC with Diff)   Result Value Ref Range    WBC 10.5 4.0 - 11.0 thou/uL    RBC 3.43 (L) 3.80 - 5.40 mill/uL    Hemoglobin 10.3 (L) 12.0 - 16.0 g/dL    Hematocrit 31.6 (L) 35.0 - 47.0 %    MCV 92 80 - 100 fL    MCH 30.0 27.0 - 34.0 pg    MCHC 32.6 32.0 - 36.0 g/dL    RDW 13.6 11.0 - 14.5 %    Platelets 241 140 - 440 thou/uL    MPV 10.2 8.5 - 12.5 fL    Neutrophils % 59 50 - 70 %     Lymphocytes % 31 20 - 40 %    Monocytes % 7 2 - 10 %    Eosinophils % 3 0 - 6 %    Basophils % 1 0 - 2 %    Neutrophils Absolute 6.1 2.0 - 7.7 thou/uL    Lymphocytes Absolute 3.3 0.8 - 4.4 thou/uL    Monocytes Absolute 0.7 0.0 - 0.9 thou/uL    Eosinophils Absolute 0.3 0.0 - 0.4 thou/uL    Basophils Absolute 0.1 0.0 - 0.2 thou/uL     No results found for this or any previous visit.      Lab Results   Component Value Date    WBC 10.5 10/09/2019    HGB 10.3 (L) 10/09/2019    HCT 31.6 (L) 10/09/2019    MCV 92 10/09/2019     10/09/2019       No results found for: [id number]  Lab Results   Component Value Date    HGBA1C 5.7 08/07/2019     No results found for: INR, PROTIME  No results found for: [id number]  Lab Results   Component Value Date    TSH 1.04 08/07/2019           ASSESSMENT/PLAN:    1. H/o Left MCA CVA: Right sided hemiplegia, dysarthria, dysphagia, expressive aphasia. Completed therapy. Wheelchair and bed bound. EZ stand to slide board for transfers. Dependent for cares. On rosuvastatin, aspirin. F/u with neurology prn. Dysphagia on NDD3, honey thick liquids.   2. Type 2 DM with HTN: Accuchecks 129-229. Hga1c 6.4 on 4/16. On lantus two times a day, accuchecks bid. Hga1c 5.7 on 8/7. Well controlled.   3. CAD, s/p CABG: No chest pain. On aspirin, rosuvastatin. F/u cardiology.    4. HTN: 120s. On irbesartan, metoprolol, lasix. Stable recently.   5. Anxiety and depression: On paroxetine. ACP following, feels mood stable today.   6. Dysphagia, G-J tube site infection: G-J tube removed. No further abdominal pain. However completed augmentin today and large amount of purulent, malodorous drainage, erythema. Apparent infection not resolved with antibiotic treatment. Will start clindamycin three times a day x 7 days and monitor closely. Give lactobacillus x 7 days due to recurrent antibiotic use, prevention of c diff. Some nausea today but no fevers, increased weakness, systemic signs of infection. Will repeat  labs on 10/9 to ensure leukocytosis not increasing.  On NDD3, honey thick liquids. Taking meds orally. Not used since admission end of July. Doing well with oral foods, meds. Transfer to hospital if signs of systemic infection with worsening weakness, malaise, altered mental status, fever. Notify if drainage not improved near or after completion of antibiotics. Notify if any worsening of condition.   7. PAF: Rate controlled 60s. On eliquis, metoprolol.   8.ANGEL, OHS: On CPAP. Refusing at times.   10. Weights, morbid obesity: [phone]. Monitor. Counseling on heart healthy diet, diabetic diet, healthy lifestyle choices. Encouraged working with therapy and as much physical activity as possible but is limited due to profound weakness, hemiplegia.   11. Nausea: ordered zofran prn.   12. [facility] CH[facility]: [facility] h/o CHF. No echo or records available [facility]. No shortness of breath, no edema noted, on lasix two times a day. Will reduce to daily and monitor. Weekly weights.     Bmp, hm1 on 10/9    Pt and ot eval and treat    Electronically signed by: Morenita Mosquera NP    Case Management:  I have reviewed the facility/SNF care plan/MDS which was done 8/6/19, including the falls risk, nutrition and pain screening. I also reviewed the current immunizations, and preventive care.. Future cancer screening is not clinically indicated secondary to age/goals of care.   Patient's desire to return to the community is not assessible due to cognitive impairment.    Information reviewed:  Medications, vital signs, orders, and nursing notes.  The health plan new enrollment has happened. I have reviewed the  MDS, the preventative needs,  and facility care plan. The level of care is appropriate. I have reviewed the code status/advanced directives.     Total floor/unit time spent 35 min with 25 min spent on counseling and coordination of care. Counseling regarding hospital course,  med changes, persistent GJ tube site infection and management options, counseling regarding need for probiotic and prevention of c diff. Counseling regarding monitoring for signs of systemic infection. Counseling regarding lab monitoring and lasix reduction, chf management. coordinated care with nursing for management of wound infection, wound cares, lab monitoring, nausea management, obesity management, when to transfer to ed.

## 2021-06-19 NOTE — LETTER
Letter by Morenita Mosquera NP at      Author: Morenita Mosquera NP Service: -- Author Type: --    Filed:  Encounter Date: 2019 Status: Signed         Patient: Zach Olmos   MR Number: [id number]   YOB: 1950   Date of Visit: 2019                 [facility]    DATE: 2019    NAME:  Zach Olmos             :  1950  MRN: [id number]  CODE STATUS:  FULL CODE    VISIT TYPE: FVP Care Coordination - Home Visit-Annual Assessment     FACILITY:  [facility] [[id number]]       CHIEF COMPLAIN/REASON FOR VISIT:    Chief Complaint   Patient presents with   ? FVP Care Coordination - Home Visit-Annual Assessment               HISTORY OF PRESENT ILLNESS: Zach Olmos is a 69 y.o. female who is a long term care resident of [facility]. She was recently admitted to [facility] for abdominal pain, G-J tube infection. She was admitted 10/2-10/4. She was sent in for abdominal pain and purulent drainage from G-J tube site. Staff reported she was in so much pain she was crying and unable to eat. During her stay CT showed severe phlegmonous and inflammatory changes along percutaneous gastrostomy tract. She had leukocytosis but vitals were stable and no fever. There was no drainable fluid collection on CT scan. Her apixaban was held and IR removed GJ tube on 10/3. ID was consulted for antibiotic recommendations and was started on vanco/zosyn and later changed to po augmentin. HM1 on discharge showed resolution of leukocytosis. She was discharged back to [facility].     Today Ms. Olmos is seen for annual assessment. She reports doing well lately. Her appetite is good and no nausea or stomach upset. She is nothaving any issues with her bowels and her g tube site is completely healed now. She denies issues swallowing or recent cough. She has not had any illnesses lately or colds. She sleeps well and no pain today. She says she is really doing  quite well and has no complaints. On review of chart her blood sugars have been stable 137-184. Her vitals have been stable. She did have an eye clinic appt on 12/3. Otherwise no recent appointments. She did see acp the in house psychologist on 10/25. She had labs in november. She did recently have a cold in November and still has orders for cepachol and robitussin on chart.     REVIEW OF SYSTEMS:  PROBLEMS AND REVIEW OF SYSTEMS:   Today on ROS:   Currently, no fever, chills, or rigors. Decreased vision. Denies any chest pain, headaches, palpitations, lightheadedness. Denies any GERD symptoms. Positive for weakness, EZ stand for transfers or slide board, dysphagia, urinary incontinence, fecal incontinence, appetite is good, wheelchair bound, right sided weakness, aphasia, no shortness of breath, refusing cpap at times      Allergies   Allergen Reactions   ? Aricept [Donepezil]    ? Atorvastatin    ? Glipizide    ? Lisinopril      Current Outpatient Medications   Medication Sig   ? acetaminophen (TYLENOL) 325 MG tablet Take 650 mg by mouth every 6 (six) hours as needed for pain.          ? apixaban (ELIQUIS) 5 mg Tab tablet Take 5 mg by mouth 2 (two) times a day.         ? aspirin 81 mg chewable tablet Chew 81 mg daily.         ? furosemide (LASIX) 20 MG tablet Take 20 mg by mouth daily.          ? insulin glargine (LANTUS) 100 unit/mL injection Inject 15 Units under the skin 2 (two) times a day. 07:30 AM, 04:30 PM         ? irbesartan (AVAPRO) 75 MG tablet Take 75 mg by mouth 2 (two) times a day.         ? metoprolol tartrate (LOPRESSOR) 50 MG tablet Take 50 mg by mouth 2 (two) times a day.         ? ondansetron (ZOFRAN) 4 MG tablet Take 4 mg by mouth every 6 (six) hours as needed for nausea.   ? PARoxetine (PAXIL) 30 MG tablet Take 60 mg by mouth every morning.         ? rosuvastatin (CRESTOR) 20 MG tablet Take 20 mg by mouth every morning. PER HOSPITAL ORDERS GIVE TO PT. IN THE A.M.            Past Medical  History:    Past Medical History:   Diagnosis Date   ? Anemia    ? Anxiety    ? Cancer (H)     Hx of colon cancer   ? CHF (congestive heart failure) (H)     diastolic   ? Coronary artery disease     s/p cabg   ? CVA (cerebral vascular accident) (H)    ? Depression    ? Diabetes mellitus (H)     type 2   ? DVT (deep vein thrombosis) in pregnancy    ? Granuloma annulare    ? Hemiplegia (H)     R sided and minimally communicative   ? Hyperlipidemia    ? Hypertension    ? Obesity    ? Oropharyngeal dysphagia    ? ANGEL (obstructive sleep apnea)    ? Parotitis    ? Paroxysmal atrial fibrillation (H)    ? PE (pulmonary thromboembolism) (H)    ? Stroke (H) 12/2018    Sub acute L MCA stroke   ? Varicose veins of both lower extremities        IMMUNIZATIONS:    There is no immunization history on file for this patient.  Above immunizations pulled from [employer]. Facility records also reconciled. Outstanding information sent to [facility] to update [employer].  Future immunizations are not needed at this point as all recommended immunizations are up to date.     PHYSICAL EXAMINATION  Vitals:    12/03/19 2222   BP: 120/67   Pulse: 98   Resp: 18   Temp: 97  F (36.1  C)   SpO2: 96%       Today on physical exam:     GENERAL: Awake, Alert, not in any form of acute distress, answers questions appropriately, follows simple commands, conversant  HEENT: Head is normocephalic with normal hair distribution. No evidence of trauma. Ears: No acute purulent discharge. Eyes: Conjunctivae pink with no scleral jaundice. Nose: Normal mucosa and septum. NECK: Supple with no cervical or supraclavicular lymphadenopathy. Trachea is midline.   CHEST: No tenderness or deformity, no crepitus  LUNG: dim to auscultation with good chest expansion. There are no crackles or wheezes, normal AP diameter.  No recent shortness of breath or cough   BACK: No kyphosis of the thoracic spine. Symmetric, no curvature, ROM normal, no CVA  tenderness, no spinal tenderness   CVS: irregularly irregular rhythm,  2+ pulses symmetric in all extremities.  ABDOMEN: Rounded and soft, nontender to palpation, non distended, no masses, no organomegaly, good bowel sounds, no rebound or guarding, no peritoneal signs.   EXTREMITIES: no edema  SKIN: Warm and dry, no erythema noted.  Skin color, texture, no rashes or lesions.  NEURO/PSYCH: The patient is oriented to person, place and time. Right sided hemiplegia, dysarthria, dysphagia, expressive aphasia, EZ stand to slide board for transfers, dependent for cares            LABS:   No results found for this or any previous visit (from the past 168 hour(s)).  Results for orders placed or performed in visit on 10/09/19   Basic Metabolic Panel   Result Value Ref Range    Sodium 139 136 - 145 mmol/L    Potassium 4.3 3.5 - 5.0 mmol/L    Chloride 103 98 - 107 mmol/L    CO2 28 22 - 31 mmol/L    Anion Gap, Calculation 8 5 - 18 mmol/L    Glucose 87 70 - 125 mg/dL    Calcium 9.2 8.5 - 10.5 mg/dL    BUN 16 8 - 28 mg/dL    Creatinine 0.83 0.60 - 1.10 mg/dL    GFR MDRD Af Amer >60 >60 mL/min/1.73m2    GFR MDRD Non Af Amer >60 >60 mL/min/1.73m2         Lab Results   Component Value Date    WBC 10.5 10/09/2019    HGB 10.3 (L) 10/09/2019    HCT 31.6 (L) 10/09/2019    MCV 92 10/09/2019     10/09/2019       No results found for: [id number]  Lab Results   Component Value Date    HGBA1C 5.5 11/08/2019     No results found for: INR, PROTIME  No results found for: [id number]  Lab Results   Component Value Date    TSH 1.04 08/07/2019           ASSESSMENT/PLAN:    1. Type 2 DM with HTN: Accuchecks 137-184. Hga1c 6.4 on 4/16. On lantus two times a day, accuchecks bid. Hga1c 5.7 on 8/7. hga1c 5.5. Well controlled. No changes today. Will recheck hga1c in few months. No recent low readings. Goal hga1c for age group 6.5-7, with recent CVA may be a little more aggressive in 6-6.5 range. Will reduce to 10u of lantus two times a day.   2.  CAD, s/p CABG: No chest pain. On aspirin, rosuvastatin. F/u cardiology.    3. H/o Left MCA CVA: Right sided hemiplegia, dysarthria, dysphagia, expressive aphasia. Wheelchair and bed bound. EZ stand to slide board for transfers. Dependent for cares. On rosuvastatin, aspirin. F/u with neurology prn. Dysphagia on NDD3, nectar thick liquids.  4. Anxiety and depression: On paroxetine. ACP following, feels mood stable today.   5. Dysphagia: G-J tube removed and site healed, treated for infection. NDD3 nectar thick liquids. No recent changes or concerns.   6. PAF: Rate controlled 70s. On eliquis, metoprolol.   7.ANGEL, OHS: On CPAP. Refusing at times.   8. Weights, obesity: [phone]. Monitor. Counseling on heart healthy diet, diabetic diet, healthy lifestyle choices. Encouraged working with therapy and as much physical activity as possible but is limited due to profound weakness, hemiplegia.   9. Chronic CHF: [facility] reported h/o CHF. No echo or records available [facility]. No shortness of breath, no edema noted, on lasix daily. Weekly weights.  No recent concerns.   10. HTN: 120s. On irbesartan, metoprolol, lasix. Stable recently.     Labs last in November, will check q6 months      Electronically signed by: Morenita Mosquera NP    Case Management:  I have reviewed the facility/SNF care plan/MDS which was done 10/30/19, including the falls risk, nutrition and pain screening. I also reviewed the current immunizations, and preventive care.. Future cancer screening is not clinically indicated secondary to age/goals of care.   Patient's desire to return to the community is not assessible due to cognitive impairment.    Information reviewed:  Medications, vital signs, orders, and nursing notes.  The health plan new enrollment has happened. I have reviewed the  MDS, the preventative needs,  and facility care plan. The level of care is appropriate. I have reviewed the code  status/advanced directives.

## 2021-06-19 NOTE — LETTER
Letter by Mark Agrawal DO at      Author: Mark Agrawal DO Service: -- Author Type: --    Filed:  Encounter Date: 7/18/2019 Status: (Other)         Patient: Zach Olmos   MR Number: [id number]   YOB: 1950   Date of Visit: 7/18/2019     [facility]    Facility:   [facility] [[id number]]   Code Status: FULL CODE    Recent medical history/chief concerns: Patient is seen by me for review of multiple medical issues.  Patient has a solitary acute issue of blurry vision in right eye since her CVA.  She feels like it is worsening.  I spoke to the care staff about how she has been doing since admission to the facility.  Overall, she seems to be doing better.  Spoke to the dietitian, who told me that she no longer required tube feedings.  Nursing staff asked if I could change her medications over from administering through her G-tube, to oral administration.  I felt this was appropriate.  Other than the concern but blurry vision in right eye, patient feels well overall when I visited with her.  She denied having any problems with moving her food.  She is on a regular level 2 diet with honey thick liquids.  No recent breathing problems, problems with pain control, or recent fever.  No new problems with urination or bowel movements per patient.  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  She aspires to live more independently eventually.    Review of systems: See history of present illness, all others negative.     Current Outpatient Medications   Medication Sig Dispense Refill   ? acetaminophen (TYLENOL) 160 mg/5 mL (5 mL) Soln solution Take 20.3 mL by mouth every 6 (six) hours. MAX OF 5 DOSES IN 24 HOURS BETWEEN SCHEDULED AND PRN DOSES. Every 6 Hours; 08:00 AM, 02:00 PM, 08:00 PM, 02:00 AM            ? apixaban (ELIQUIS) 5 mg Tab tablet Take 5 mg by mouth 2 (two) times a day.           ? aspirin 81 mg chewable tablet Chew 81 mg daily.            ? ferrous sulfate 300 mg (60 mg iron)/5 mL syrup Take 300 mg by mouth daily.           ? furosemide (LASIX) 20 MG tablet Take 20 mg by mouth 2 (two) times a day at 9am and 6pm.           ? insulin glargine (LANTUS) 100 unit/mL injection Inject 22 Units under the skin 2 (two) times a day. 07:30 AM, 04:30 PM           ? insulin regular (NOVOLIN R) 100 unit/mL injection Inject under the skin 3 (three) times a day. If Blood Sugar is 200 to 249, give 2 Units. If Blood Sugar is 250 to 299, give 4 Units. If Blood Sugar is 300 to 349, give 6 Units. If Blood Sugar is 350 to 399, give 8 Units. If Blood Sugar is 400 to 449, give 10 Units. If Blood Sugar is greater than 449, give 12 Units.           ? irbesartan (AVAPRO) 75 MG tablet Take 75 mg by mouth 2 (two) times a day.        0   ? lansoprazole (PREVACID SOLUTAB) 15 MG disintegrating tablet Take 15 mg by mouth daily.           ? metoprolol tartrate (LOPRESSOR) 50 MG tablet Take 50 mg by mouth 2 (two) times a day.           ? nystatin (MYCOSTATIN) powder Apply to abdominal fold twice daily. 15 g 0   ? PARoxetine (PAXIL) 30 MG tablet Take 60 mg by mouth every morning.           ? rosuvastatin (CRESTOR) 20 MG tablet Take 20 mg by mouth every morning. PER HOSPITAL ORDERS GIVE TO PT. IN THE A.M.              No current facility-administered medications for this visit.        Past Medical History:   Diagnosis Date   ? Anemia    ? Anxiety    ? Cancer (H)     Hx of colon cancer   ? CHF (congestive heart failure) (H)     diastolic   ? Coronary artery disease     s/p cabg   ? CVA (cerebral vascular accident) (H)    ? Depression    ? Diabetes mellitus (H)     type 2   ? DVT (deep vein thrombosis) in pregnancy (H)    ? Granuloma annulare    ? Hemiplegia (H)     R sided and minimally communicative   ? Hyperlipidemia    ? Hypertension    ? Obesity    ? Oropharyngeal dysphagia    ? ANGEL (obstructive sleep apnea)    ? Parotitis    ? Paroxysmal atrial fibrillation (H)    ? PE (pulmonary  thromboembolism) (H)    ? Stroke (H) 12/2018    Sub acute L MCA stroke   ? Varicose veins of both lower extremities       No past surgical history on file.   Social History     Socioeconomic History   ? Marital status: Single     Spouse name: Not on file   ? Number of children: Not on file   ? Years of education: Not on file   ? Highest education level: Not on file   Occupational History   ? Not on file   Social Needs   ? Financial resource strain: Not on file   ? Food insecurity:     Worry: Not on file     Inability: Not on file   ? Transportation needs:     Medical: Not on file     Non-medical: Not on file   Tobacco Use   ? Smoking status: Never Smoker   ? Smokeless tobacco: Never Used   Substance and Sexual Activity   ? Alcohol use: No     Frequency: Never   ? Drug use: No   ? Sexual activity: Not on file   Lifestyle   ? Physical activity:     Days per week: Not on file     Minutes per session: Not on file   ? Stress: Not on file   Relationships   ? Social connections:     Talks on phone: Not on file     Gets together: Not on file     Attends Sikhism service: Not on file     Active member of club or organization: Not on file     Attends meetings of clubs or organizations: Not on file     Relationship status: Not on file   ? Intimate partner violence:     Fear of current or ex partner: Not on file     Emotionally abused: Not on file     Physically abused: Not on file     Forced sexual activity: Not on file   Other Topics Concern   ? Not on file   Social History Narrative   ? Not on file       Social History     Tobacco Use   Smoking Status Never Smoker   Smokeless Tobacco Never Used        Exam:   Vitals:    07/18/19 1335   BP: 96/62   Pulse: (!) 53   Resp: 20   Temp: 98  F (36.7  C)   SpO2: 96%   Weight: 182 lb 6.4 oz (82.7 kg)       EXAM:   General: Vital signs reviewed.  Patient is in no acute appearing distress.  Breathing appears nonlabored.  Patient is alert and oriented to her immediate surroundings.  She  "did not know the year, telling me the year was \"18\".  She did not know what day of the week it was.  She could not recall what she had for lunch, her last meal before my exam.  HEENT exam: Pupils are equal round and reactive to light with normal consensual gaze and eye movement.  Normal eye convergence with accommodation.  Corneas are clear bilaterally with normal white sclera bilaterally.  No abnormal nasal or ear drainage.  No intraoral abnormal plaques or abnormal lesions.  Neck: Supple without JVD.  Heart: Heart rate is regular without murmur.  Lungs: Lungs are clear to auscultation with good airflow bilaterally.  Abdomen:  Abdomen is soft, nontender.  No palpable abnormal masses or organomegaly.  Bowel sounds are normal.  The feeding tube site looks good, with no associated skin breakdown or discoloration, or abnormal drainage.  Skin/extremities: Overall warm and dry, with slight bilateral ankle edema, about 1 mm pitting edema.  Neuro exam: Cranial nerves II through XII are intact.  Patient has right-sided paralysis, which has not improved per patient.  She has decreased sensation in her distal right lower extremity, and normal sensation in her left lower extremity.    Recent blood sugars have been excellent, mostly being in the 100s, and rarely in the 90s.    No other recent lab studies.    Approximately 25 minutes was spent on patient management, with greater than 50% of this time being spent on discussion of concerns and management with resident, and care staff.  The remainder of the time was spent on medication and vital sign data reconciliation between electronic medical records, and review of past medical history and current medical management.  Assessment/Plan   1. Essential hypertension     2. Coronary artery disease involving coronary bypass graft with angina pectoris, unspecified whether native or transplanted heart (H)     3. Hyperlipidemia, unspecified hyperlipidemia type     4. Right hemiplegia (H) "     5. Cerebrovascular accident (CVA), unspecified mechanism (H)     6. Blurry vision, right eye         Patient Instructions   An order was written to consult in-house eye care provider.  Her gastric tube medications were changed to to oral route.  I do not think it is currently safe for her to live in assisted living, due to both physical and cognitive limitations.  I think consideration of moving to assisted living should be deferred for at least a couple month as we monitor her progress with her diet, and functional status.  Her blood pressure and pulse were mildly low in exam.  I will have nursing staff recheck her vital signs in about 1 week.     Mark Agrawal, DO

## 2021-06-19 NOTE — LETTER
Letter by Johnson, Michael Duane, CNP at      Author: Johnson, Michael Duane, CNP Service: -- Author Type: --    Filed:  Encounter Date: 5/29/2019 Status: (Other)         Patient: Zach Olmos   MR Number: [id number]   YOB: 1950   Date of Visit: 5/29/2019     [facility]    Facility:   [facility] [[id number]]   Code Status: FULL CODE      CHIEF COMPLAINT/REASON FOR VISIT:  Chief Complaint   Patient presents with   ? Problem Visit     asked to see       HISTORY:      HPI: Zach is a 69 y.o. female who I was asked to see secondary to G-tube site irritation.  There is an order to cleanse the G-tube site and put some bacitracin and the split gauze on that area twice daily.  Apparently the other day there was some discharge from that site but has a look at it today it really clean and looks wonderful and I am not really sure what happened next but apparently on May 27 which I was never told about she went to [facility] because of the G-tube site and they did an x-ray which showed it was in good place but did not feel there is any drainage as well.  Her blood sugars in the morning ranging 171-250 6 in the -181 now increase the Lantus 22 units twice daily.  Otherwise she has been normotensive with a few blood pressures that they do check with her.  In talking with the patient she does not have any other particular concerns does have a history of CVA but I remember her from the transitional care unit.    Past Medical History:   Diagnosis Date   ? Anemia    ? Anxiety    ? Cancer (H)     Hx of colon cancer   ? CHF (congestive heart failure) (H)     diastolic   ? Coronary artery disease     s/p cabg   ? CVA (cerebral vascular accident) (H)    ? Depression    ? Diabetes mellitus (H)     type 2   ? DVT (deep vein thrombosis) in pregnancy (H)    ? Granuloma annulare    ? Hemiplegia (H)     R sided and minimally communicative   ? Hyperlipidemia    ?  Hypertension    ? Obesity    ? Oropharyngeal dysphagia    ? ANGEL (obstructive sleep apnea)    ? Parotitis    ? Paroxysmal atrial fibrillation (H)    ? PE (pulmonary thromboembolism) (H)    ? Stroke (H) 12/2018    Sub acute L MCA stroke   ? Varicose veins of both lower extremities              Family History   Problem Relation Age of Onset   ? Heart disease Mother    ? Cancer Father         esophageal     Social History     Socioeconomic History   ? Marital status: Single     Spouse name: Not on file   ? Number of children: Not on file   ? Years of education: Not on file   ? Highest education level: Not on file   Occupational History   ? Not on file   Social Needs   ? Financial resource strain: Not on file   ? Food insecurity:     Worry: Not on file     Inability: Not on file   ? Transportation needs:     Medical: Not on file     Non-medical: Not on file   Tobacco Use   ? Smoking status: Never Smoker   ? Smokeless tobacco: Never Used   Substance and Sexual Activity   ? Alcohol use: No     Frequency: Never   ? Drug use: No   ? Sexual activity: Not on file   Lifestyle   ? Physical activity:     Days per week: Not on file     Minutes per session: Not on file   ? Stress: Not on file   Relationships   ? Social connections:     Talks on phone: Not on file     Gets together: Not on file     Attends Jainism service: Not on file     Active member of club or organization: Not on file     Attends meetings of clubs or organizations: Not on file     Relationship status: Not on file   ? Intimate partner violence:     Fear of current or ex partner: Not on file     Emotionally abused: Not on file     Physically abused: Not on file     Forced sexual activity: Not on file   Other Topics Concern   ? Not on file   Social History Narrative   ? Not on file         Review of Systems  Currently there are no reports of fever chills or fatigue flulike symptoms headache stiff neck chest pain rashes or sores.  History of diabetes CAD  hypertension hyperlipidemia anxiety depression sleep apnea left MCA stroke with right hemiplegia       Current Outpatient Medications   Medication Sig   ? acetaminophen (TYLENOL) 160 mg/5 mL (5 mL) Soln solution 20.3 mL by Enteral Tube route every 6 (six) hours MAX OF 5 DOSES IN 24 HOURS BETWEEN SCHEDULED AND PRN DOSES.  Every 6 Hours; 08:00 AM, 02:00 PM, 08:00 PM, 02:00 AM .   ? apixaban (ELIQUIS) 5 mg Tab tablet 5 mg by Enteral Tube route 2 (two) times a day .         ? aspirin 81 mg chewable tablet 81 mg by G-tube route daily.   ? ferrous sulfate 300 mg (60 mg iron)/5 mL syrup 300 mg by G-tube route daily.          ? furosemide (LASIX) 20 MG tablet 20 mg by G-tube route 2 (two) times a day at 9am and 6pm .         ? insulin glargine (LANTUS) 100 unit/mL injection Inject 22 Units under the skin 2 (two) times a day. 07:30 AM, 04:30 PM         ? insulin regular (NOVOLIN R) 100 unit/mL injection Inject under the skin 3 (three) times a day. If Blood Sugar is 200 to 249, give 2 Units. If Blood Sugar is 250 to 299, give 4 Units. If Blood Sugar is 300 to 349, give 6 Units. If Blood Sugar is 350 to 399, give 8 Units. If Blood Sugar is 400 to 449, give 10 Units. If Blood Sugar is greater than 449, give 12 Units.         ? irbesartan (AVAPRO) 75 MG tablet 1 tablet (75 mg total) by G-tube route 2 (two) times a day.   ? lansoprazole (PREVACID SOLUTAB) 15 MG disintegrating tablet 15 mg by G-tube route daily.   ? metoprolol tartrate (LOPRESSOR) 50 MG tablet 50 mg by G-tube route 2 (two) times a day .         ? nystatin (MYCOSTATIN) powder Apply to abdominal fold twice daily.   ? PARoxetine (PAXIL) 30 MG tablet 60 mg by G-tube route every morning.   ? rosuvastatin (CRESTOR) 20 MG tablet 20 mg by G-tube route every morning PER HOSPITAL ORDERS GIVE TO PT. IN THE A.M. .       .There were no vitals filed for this visit.  Blood pressure on May 10 was 122/64 pulse 62 respirations 18 temperature 97.5  Physical Exam  Constitutional: No  distress.   HENT:   Cardiovascular: Normal rate, regular rhythm and normal heart sounds.   History of CAD status post CABG   Pulmonary/Chest: Breath sounds normal.   History of pulmonary embolism   Abdominal:.   Tube feedings secondary to dysphagia.  History of colon cancer.  Abdominal scars.   Musculoskeletal:   History of left MCA stroke with right hemiplegia   Dysarthria.  History of stroke    Psychiatric:   History of anxiety and depression          LABS:   Lab Results   Component Value Date    HGB 10.6 (L) 04/16/2019     Lab Results   Component Value Date    ALT 37 12/20/2018    AST 43 (H) 12/20/2018    ALKPHOS 57 12/20/2018    BILITOT 0.4 12/20/2018     No results found for this or any previous visit.          ASSESSMENT:      ICD-10-CM    1. G tube feedings (H) Z93.1    2. Cerebrovascular accident (CVA), unspecified mechanism (H) I63.9    3. Essential hypertension I10    4. Type 2 diabetes mellitus with complication, with long-term current use of insulin (H) E11.8     Z79.4        PLAN:    The G-tube site looks unremarkable and clean today and apparently on the 27th when they sent her to [facility] which I was not even aware of they did check the placement and that was in good position but also no drainage at that point either so we will continue the twice a day dressing changes to the G-tube site otherwise increase the Lantus 22 units twice daily rather than 20 units twice daily keep a close eye on the blood sugars.  She otherwise has been hemodynamically stable normotensive and no other new physical changes.        Electronically signed by: Michael Duane Johnson, CNP

## 2021-06-19 NOTE — LETTER
Letter by Morenita Mosquera NP at      Author: Morenita Mosquera NP Service: -- Author Type: --    Filed:  Encounter Date: 2019 Status: Signed         Patient: Zach Olmos   MR Number: [id number]   YOB: 1950   Date of Visit: 2019                 [facility]    DATE: 2019    NAME:  Zach Olmos             :  1950  MRN: [id number]  CODE STATUS:  FULL CODE    VISIT TYPE: Review Of Multiple Medical Conditions     FACILITY:  [facility] [[id number]]       CHIEF COMPLAIN/REASON FOR VISIT:    Chief Complaint   Patient presents with   ? Review Of Multiple Medical Conditions               HISTORY OF PRESENT ILLNESS: Zach Olmos is a 69 y.o. female who is a long term care resident of [facility]. She was recently admitted to [facility] for abdominal pain, G-J tube infection. She was admitted 10/2-10/4. She was sent in for abdominal pain and purulent drainage from G-J tube site. Staff reported she was in so much pain she was crying and unable to eat. During her stay CT showed severe phlegmonous and inflammatory changes along percutaneous gastrostomy tract. She had leukocytosis but vitals were stable and no fever. There was no drainable fluid collection on CT scan. Her apixaban was held and IR removed GJ tube on 10/3. ID was consulted for antibiotic recommendations and was started on vanco/zosyn and later changed to po augmentin. HM1 on discharge showed resolution of leukocytosis. She was discharged back to [facility].     Today Ms. Olmos is seen for follow up visit today. She is noted to be in bed still. She says she has been sleeping well. She denies any pain right now and no issues with her bowels recently. She says appetite is fine and no stomach upset. She denies dizziness or other recent concerns. No recent illness or cold symptoms. Per staff no recent concerns and vitals have been stable.     REVIEW OF  SYSTEMS:  PROBLEMS AND REVIEW OF SYSTEMS:   Today on ROS:   Currently, no fever, chills, or rigors. Decreased vision. Denies any chest pain, headaches, palpitations, lightheadedness. Denies any GERD symptoms. Positive for weakness, EZ stand for transfers or slide board, dysphagia, urinary incontinence, fecal incontinence, appetite is good, wheelchair bound, right sided weakness, aphasia, no shortness of breath, refusing cpap at times      Allergies   Allergen Reactions   ? Aricept [Donepezil]    ? Atorvastatin    ? Glipizide    ? Lisinopril      Current Outpatient Medications   Medication Sig   ? acetaminophen (TYLENOL) 325 MG tablet Take 650 mg by mouth every 6 (six) hours as needed for pain.          ? apixaban (ELIQUIS) 5 mg Tab tablet Take 5 mg by mouth 2 (two) times a day.         ? aspirin 81 mg chewable tablet Chew 81 mg daily.         ? furosemide (LASIX) 20 MG tablet Take 20 mg by mouth daily.          ? insulin glargine (LANTUS) 100 unit/mL injection Inject 15 Units under the skin 2 (two) times a day. 07:30 AM, 04:30 PM         ? irbesartan (AVAPRO) 75 MG tablet Take 75 mg by mouth 2 (two) times a day.         ? metoprolol tartrate (LOPRESSOR) 50 MG tablet Take 50 mg by mouth 2 (two) times a day.         ? ondansetron (ZOFRAN) 4 MG tablet Take 4 mg by mouth every 6 (six) hours as needed for nausea.   ? PARoxetine (PAXIL) 30 MG tablet Take 60 mg by mouth every morning.         ? rosuvastatin (CRESTOR) 20 MG tablet Take 20 mg by mouth every morning. PER HOSPITAL ORDERS GIVE TO PT. IN THE A.M.            Past Medical History:    Past Medical History:   Diagnosis Date   ? Anemia    ? Anxiety    ? Cancer (H)     Hx of colon cancer   ? CHF (congestive heart failure) (H)     diastolic   ? Coronary artery disease     s/p cabg   ? CVA (cerebral vascular accident) (H)    ? Depression    ? Diabetes mellitus (H)     type 2   ? DVT (deep vein thrombosis) in pregnancy    ? Granuloma annulare    ? Hemiplegia (H)     R  sided and minimally communicative   ? Hyperlipidemia    ? Hypertension    ? Obesity    ? Oropharyngeal dysphagia    ? ANGEL (obstructive sleep apnea)    ? Parotitis    ? Paroxysmal atrial fibrillation (H)    ? PE (pulmonary thromboembolism) (H)    ? Stroke (H) 12/2018    Sub acute L MCA stroke   ? Varicose veins of both lower extremities        IMMUNIZATIONS:    There is no immunization history on file for this patient.  Above immunizations pulled from [facility]. Facility records also reconciled. Outstanding information sent to [facility] to update [facility].  Future immunizations are not needed at this point as all recommended immunizations are up to date.     PHYSICAL EXAMINATION  Vitals:    11/27/19 0700   BP: 117/53   Pulse: (!) 55   Resp: 18   Temp: 97.7  F (36.5  C)   SpO2: 97%   Weight: 179 lb (81.2 kg)       Today on physical exam:     GENERAL: Awake, Alert, not in any form of acute distress, answers questions appropriately, follows simple commands, conversant  HEENT: Head is normocephalic with normal hair distribution. No evidence of trauma. Ears: No acute purulent discharge. Eyes: Conjunctivae pink with no scleral jaundice. Nose: Normal mucosa and septum. NECK: Supple with no cervical or supraclavicular lymphadenopathy. Trachea is midline.   CHEST: No tenderness or deformity, no crepitus  LUNG: dim to auscultation with good chest expansion. There are no crackles or wheezes, normal AP diameter.  No recent shortness of breath or cough   BACK: No kyphosis of the thoracic spine. Symmetric, no curvature, ROM normal, no CVA tenderness, no spinal tenderness   CVS: irregularly irregular rhythm,  2+ pulses symmetric in all extremities.  ABDOMEN: Rounded and soft, nontender to palpation, non distended, no masses, no organomegaly, good bowel sounds, no rebound or guarding, no peritoneal signs.   EXTREMITIES: no edema  SKIN: Warm and dry, no erythema noted.  Skin color, texture, no rashes or  lesions.  NEURO/PSYCH: The patient is oriented to person, place and time. Right sided hemiplegia, dysarthria, dysphagia, expressive aphasia, EZ stand to slide board for transfers, dependent for cares            LABS:   No results found for this or any previous visit (from the past 168 hour(s)).  Results for orders placed or performed in visit on 10/09/19   Basic Metabolic Panel   Result Value Ref Range    Sodium 139 136 - 145 mmol/L    Potassium 4.3 3.5 - 5.0 mmol/L    Chloride 103 98 - 107 mmol/L    CO2 28 22 - 31 mmol/L    Anion Gap, Calculation 8 5 - 18 mmol/L    Glucose 87 70 - 125 mg/dL    Calcium 9.2 8.5 - 10.5 mg/dL    BUN 16 8 - 28 mg/dL    Creatinine 0.83 0.60 - 1.10 mg/dL    GFR MDRD Af Amer >60 >60 mL/min/1.73m2    GFR MDRD Non Af Amer >60 >60 mL/min/1.73m2         Lab Results   Component Value Date    WBC 10.5 10/09/2019    HGB 10.3 (L) 10/09/2019    HCT 31.6 (L) 10/09/2019    MCV 92 10/09/2019     10/09/2019       No results found for: BMAPTRGS04  Lab Results   Component Value Date    HGBA1C 5.5 11/08/2019     No results found for: INR, PROTIME  No results found for: SMKFJEDK32CD  Lab Results   Component Value Date    TSH 1.04 08/07/2019           ASSESSMENT/PLAN:    1. Type 2 DM with HTN: Accuchecks 129-160. Hga1c 6.4 on 4/16. On lantus two times a day, accuchecks bid. Hga1c 5.7 on 8/7. Well controlled.   2. CAD, s/p CABG: No chest pain. On aspirin, rosuvastatin. F/u cardiology.    3. H/o Left MCA CVA: Right sided hemiplegia, dysarthria, dysphagia, expressive aphasia. Wheelchair and bed bound. EZ stand to slide board for transfers. Dependent for cares. On rosuvastatin, aspirin. F/u with neurology prn. Dysphagia on NDD3, nectar thick liquids.  4. Anxiety and depression: On paroxetine. ACP following, feels mood stable today.   5. Dysphagia, G-J tube site infection: G-J tube removed. NDD3 nectar thick liquids.   6. PAF: Rate controlled 50s. On eliquis, metoprolol.   7.ANGEL, OHS: On CPAP. Refusing at  times.   8. Weights, obesity: [phone]. Monitor. Counseling on heart healthy diet, diabetic diet, healthy lifestyle choices. Encouraged working with therapy and as much physical activity as possible but is limited due to profound weakness, hemiplegia.   9. Chronic CHF: [facility] reported h/o CHF. No echo or records available [facility]. No shortness of breath, no edema noted, on lasix daily. Weekly weights.  No recent concerns.   10. HTN: 110s. On irbesartan, metoprolol, lasix. Stable recently.     [facility], hm1 on 10/9      Electronically signed by: Morenita Mosquera NP    Case Management:  I have reviewed the facility/SNF care plan/MDS which was done 10/30/19, including the falls risk, nutrition and pain screening. I also reviewed the current immunizations, and preventive care.. Future cancer screening is not clinically indicated secondary to age/goals of care.   Patient's desire to return to the community is not assessible due to cognitive impairment.    Information reviewed:  Medications, vital signs, orders, and nursing notes.  The health plan new enrollment has happened. I have reviewed the  MDS, the preventative needs,  and facility care plan. The level of care is appropriate. I have reviewed the code status/advanced directives.

## 2021-06-19 NOTE — LETTER
Letter by Stefany Tyler MD at      Author: Stefany Tyler MD Service: -- Author Type: --    Filed:  Encounter Date: 8/30/2019 Status: (Other)         Patient: Zach Olmos   MR Number: [id number]   YOB: 1950   Date of Visit: 8/30/2019     [facility]      Facility:    [facility] [[id number]]  Code Status: FULL CODE       Chief Complaint/Reason for Visit:  Chief Complaint   Patient presents with   ? H & P     Admit note to LTC-Stroke Nov 2018, DM, HTN, afib. Previously at different LT facility.       HPI:   Zach is a 69 y.o. female who is seen for new admission note to LTC at [facility]. She was admitted here on 7/30/19, came from [facility]. Her chart was reviewed. She has hx of stroke, ischemic left MCA with hospitalization at [facility] 11/17/18-12/7/18. She has late effects of right hemiparesis, dysphagia, dysarthria. She has a feeding tube but currently getting all nutrition orally and the G tube is being flushed to keep patent. She is on honey thick liquids. She has additional hx of DM, HTN, CAD s/p CABG, ANGEL, colon cancer s/p resection, depression and anxiety.    Today:  She does have some difficulty communicating due to dysarthria, expressive aphasia and slowed responses. She is weak on right side. Per nurses notes, uses slide board and EZ stand. Patient denies chest pain or SOB. No need for oxygen. She denies cough. Denies abdominal pain. No fever. No urinary sx. No reported new vision or hearing problems.       Past Medical History:  Past Medical History:   Diagnosis Date   ? Anemia    ? Anxiety    ? Cancer (H)     Hx of colon cancer   ? CHF (congestive heart failure) (H)     diastolic   ? Coronary artery disease     s/p cabg   ? CVA (cerebral vascular accident) (H)    ? Depression    ? Diabetes mellitus (H)     type 2   ? DVT (deep vein thrombosis) in pregnancy (H)    ? Granuloma annulare    ? Hemiplegia (H)      R sided and minimally communicative   ? Hyperlipidemia    ? Hypertension    ? Obesity    ? Oropharyngeal dysphagia    ? ANGEL (obstructive sleep apnea)    ? Parotitis    ? Paroxysmal atrial fibrillation (H)    ? PE (pulmonary thromboembolism) (H)    ? Stroke (H) 12/2018    Sub acute L MCA stroke   ? Varicose veins of both lower extremities            Surgical History:  No past surgical history on file.    Family History:   Family History   Problem Relation Age of Onset   ? Heart disease Mother    ? Cancer Father         esophageal       Social History:    Social History     Socioeconomic History   ? Marital status: Single     Spouse name: Not on file   ? Number of children: Not on file   ? Years of education: Not on file   ? Highest education level: Not on file   Occupational History   ? Not on file   Social Needs   ? Financial resource strain: Not on file   ? Food insecurity:     Worry: Not on file     Inability: Not on file   ? Transportation needs:     Medical: Not on file     Non-medical: Not on file   Tobacco Use   ? Smoking status: Never Smoker   ? Smokeless tobacco: Never Used   Substance and Sexual Activity   ? Alcohol use: No     Frequency: Never   ? Drug use: No   ? Sexual activity: Not on file   Lifestyle   ? Physical activity:     Days per week: Not on file     Minutes per session: Not on file   ? Stress: Not on file   Relationships   ? Social connections:     Talks on phone: Not on file     Gets together: Not on file     Attends Mandaeism service: Not on file     Active member of club or organization: Not on file     Attends meetings of clubs or organizations: Not on file     Relationship status: Not on file   ? Intimate partner violence:     Fear of current or ex partner: Not on file     Emotionally abused: Not on file     Physically abused: Not on file     Forced sexual activity: Not on file   Other Topics Concern   ? Not on file   Social History Narrative   ? Not on file        Medications:  Current  Outpatient Medications   Medication Sig   ? acetaminophen (TYLENOL) 325 MG tablet Take 650 mg by mouth every 4 (four) hours as needed for pain.   ? apixaban (ELIQUIS) 5 mg Tab tablet Take 5 mg by mouth 2 (two) times a day.         ? aspirin 81 mg chewable tablet Chew 81 mg daily.         ? furosemide (LASIX) 20 MG tablet Take 20 mg by mouth 2 (two) times a day at 9am and 6pm.         ? insulin glargine (LANTUS) 100 unit/mL injection Inject 22 Units under the skin 2 (two) times a day. 07:30 AM, 04:30 PM         ? irbesartan (AVAPRO) 75 MG tablet Take 75 mg by mouth 2 (two) times a day.         ? lisinopril (PRINIVIL,ZESTRIL) 10 MG tablet Take 10 mg by mouth daily.   ? metoprolol tartrate (LOPRESSOR) 50 MG tablet Take 50 mg by mouth 2 (two) times a day.         ? nystatin (MYCOSTATIN) powder Apply to abdominal fold twice daily.   ? PARoxetine (PAXIL) 30 MG tablet Take 60 mg by mouth every morning.         ? rosuvastatin (CRESTOR) 20 MG tablet Take 20 mg by mouth every morning. PER HOSPITAL ORDERS GIVE TO PT. IN THE A.M.              Allergies:  Allergies   Allergen Reactions   ? Aricept [Donepezil]    ? Atorvastatin    ? Glipizide    ? Lisinopril        Review of Systems:  Pertinent items are noted in HPI.      Physical Exam:   General: Patient is alert female, no distress. lying in bed.   Vitals: /72, Temp 98.5, Pulse 66, RR 18, O2 sat 96% RA.  HEENT: Head is NCAT. Eyes show no injection or icterus. Nares negative. Oropharynx well hydrated.  Neck: Supple. No tenderness or adenopathy. No JVD.  Lungs: Clear bilaterally. No wheezes.  Cardiovascular: Regular rate and rhythm, normal S1, S2.  Back: No spinal or CVA tenderness.  Abdomen: GT site. Soft, no tenderness on exam. Bowel sounds present. No guarding rebound or rigidity.  : Deferred.  Extremities: No LE edema is noted.  Musculoskeletal: Flaccid right side.   Skin: Warm and dry.   Psych: Mood appears good.      Labs:  Lab Results   Component Value Date    WBC  9.5 08/07/2019    HGB 11.8 (L) 08/07/2019    HCT 34.3 (L) 08/07/2019    MCV 88 08/07/2019     08/07/2019     Lab Results   Component Value Date    HGBA1C 5.7 08/07/2019       Assessment/Plan:  1. Stroke. Left MCA ischemic origin, Nov 2018. Residual exp aphasia, dysphagia. Debilitated. On aspirin, statin.  2. HTN. On metoprolol, irbesartan, lasix. Monitor BPs per protocol.  3. Afib. She is anticoagulated with eliquis.  4. DM. On Lantus insulin. Accuchecks followed. Last A1c at 5.7%.  5. CAD. Hx cabg. No CP.  6. Depression. Hx anxiety. Continue Paxil.  7. Dysphagia. She as GT with just water flushes. Dietary following for nutritional needs. She is on honey thickened liquids, takes nutrition orally.  8. ANGEL. Has CPAP though often refuses per notes.  9. Hx colon cancer. S/p resection.  10. Code status is full code.            Electronically signed by: Stefany Tyler MD

## 2021-06-19 NOTE — LETTER
Letter by Johnson, Michael Duane, CNP at      Author: Johnson, Michael Duane, CNP Service: -- Author Type: --    Filed:  Encounter Date: 5/15/2019 Status: (Other)         Patient: Zach Olmos   MR Number: [id number]   YOB: 1950   Date of Visit: 5/15/2019     [facility]    Facility:   [facility] [[id number]]   Code Status: FULL CODE      CHIEF COMPLAINT/REASON FOR VISIT:  Chief Complaint   Patient presents with   ? Review Of Multiple Medical Conditions       HISTORY:      HPI: Zach is a 69 y.o. female   I had a chance to revisit secondary to her review of chronic medical conditions.  History of CVA hemiparesis dysphasia currently getting tube feeds at 60 cc an hour speech therapy has been working with her regarding advancing her diet.  Her weight has fluctuated between 181 and 185 pounds I do not know how accurate that is.  Supposed to use CPAP at night.  Blood sugars in the morning ranging 1 75-2 10 in the -191.  Looks like she has been normotensive and afebrile.  Her hemoglobin is at 10.6 is on ferrous sulfate twice daily 1 hour changes at the once daily.  For chronic pain she is on Tylenol every 6 hours.  Her moods apparently have been stable.  Had a lipid panel in April total cholesterol was good see results below.  Is on Eliquis secondary to CVA.    Past Medical History:   Diagnosis Date   ? Anemia    ? Anxiety    ? Cancer (H)     Hx of colon cancer   ? CHF (congestive heart failure) (H)     diastolic   ? Coronary artery disease     s/p cabg   ? CVA (cerebral vascular accident) (H)    ? Depression    ? Diabetes mellitus (H)     type 2   ? DVT (deep vein thrombosis) in pregnancy (H)    ? Granuloma annulare    ? Hemiplegia (H)     R sided and minimally communicative   ? Hyperlipidemia    ? Hypertension    ? Obesity    ? Oropharyngeal dysphagia    ? ANGEL (obstructive sleep apnea)    ? Parotitis    ? Paroxysmal atrial fibrillation (H)    ? PE  (pulmonary thromboembolism) (H)    ? Stroke (H) 12/2018    Sub acute L MCA stroke   ? Varicose veins of both lower extremities              Family History   Problem Relation Age of Onset   ? Heart disease Mother    ? Cancer Father         esophageal     Social History     Socioeconomic History   ? Marital status: Single     Spouse name: Not on file   ? Number of children: Not on file   ? Years of education: Not on file   ? Highest education level: Not on file   Occupational History   ? Not on file   Social Needs   ? Financial resource strain: Not on file   ? Food insecurity:     Worry: Not on file     Inability: Not on file   ? Transportation needs:     Medical: Not on file     Non-medical: Not on file   Tobacco Use   ? Smoking status: Never Smoker   ? Smokeless tobacco: Never Used   Substance and Sexual Activity   ? Alcohol use: No     Frequency: Never   ? Drug use: No   ? Sexual activity: Not on file   Lifestyle   ? Physical activity:     Days per week: Not on file     Minutes per session: Not on file   ? Stress: Not on file   Relationships   ? Social connections:     Talks on phone: Not on file     Gets together: Not on file     Attends Christianity service: Not on file     Active member of club or organization: Not on file     Attends meetings of clubs or organizations: Not on file     Relationship status: Not on file   ? Intimate partner violence:     Fear of current or ex partner: Not on file     Emotionally abused: Not on file     Physically abused: Not on file     Forced sexual activity: Not on file   Other Topics Concern   ? Not on file   Social History Narrative   ? Not on file         Review of Systems  Currently there are no reports of fever chills or fatigue flulike symptoms headache stiff neck chest pain rashes or sores.  History of diabetes CAD hypertension hyperlipidemia anxiety depression sleep apnea left MCA stroke with right hemiplegia      Current Outpatient Medications   Medication Sig   ?  acetaminophen (TYLENOL) 160 mg/5 mL (5 mL) Soln solution 20.3 mL by Enteral Tube route every 6 (six) hours MAX OF 5 DOSES IN 24 HOURS BETWEEN SCHEDULED AND PRN DOSES.  Every 6 Hours; 08:00 AM, 02:00 PM, 08:00 PM, 02:00 AM .   ? apixaban (ELIQUIS) 5 mg Tab tablet 5 mg by Enteral Tube route 2 (two) times a day .         ? aspirin 81 mg chewable tablet 81 mg by G-tube route daily.   ? ferrous sulfate 300 mg (60 mg iron)/5 mL syrup 300 mg by G-tube route daily.          ? furosemide (LASIX) 20 MG tablet 20 mg by G-tube route 2 (two) times a day at 9am and 6pm .         ? insulin glargine (LANTUS) 100 unit/mL injection Inject 20 Units under the skin 2 (two) times a day. 07:30 AM, 04:30 PM         ? insulin regular (NOVOLIN R) 100 unit/mL injection Inject under the skin 3 (three) times a day. If Blood Sugar is 200 to 249, give 2 Units. If Blood Sugar is 250 to 299, give 4 Units. If Blood Sugar is 300 to 349, give 6 Units. If Blood Sugar is 350 to 399, give 8 Units. If Blood Sugar is 400 to 449, give 10 Units. If Blood Sugar is greater than 449, give 12 Units.         ? irbesartan (AVAPRO) 75 MG tablet 1 tablet (75 mg total) by G-tube route 2 (two) times a day.   ? lansoprazole (PREVACID SOLUTAB) 15 MG disintegrating tablet 15 mg by G-tube route daily.   ? metoprolol tartrate (LOPRESSOR) 50 MG tablet 50 mg by G-tube route 2 (two) times a day .         ? nystatin (MYCOSTATIN) powder Apply to abdominal fold twice daily.   ? PARoxetine (PAXIL) 30 MG tablet 60 mg by G-tube route every morning.   ? rosuvastatin (CRESTOR) 20 MG tablet 20 mg by G-tube route every morning PER HOSPITAL ORDERS GIVE TO PT. IN THE A.M. .       .There were no vitals filed for this visit.  Blood pressure 122/69 pulse 62 respirations 18 temperature 97.5 current weight 183 pounds over the past month her weight has been ranging 181-185 pounds  Physical Exam  Constitutional: No distress.   HENT:   Cardiovascular: Normal rate, regular rhythm and normal heart  sounds.   History of CAD status post CABG   Pulmonary/Chest: Breath sounds normal.   History of pulmonary embolism   Abdominal:.   Tube feedings secondary to dysphagia.  History of colon cancer.  Abdominal scars.   Musculoskeletal:   History of left MCA stroke with right hemiplegia   Dysarthria.  History of stroke    Psychiatric:   History of anxiety and depression          LABS:   Lab Results   Component Value Date    HGBA1C 6.4 (H) 04/16/2019     Lab Results   Component Value Date    HGB 10.6 (L) 04/16/2019     No results found for this or any previous visit.      Lab Results   Component Value Date    CHOL 77 04/16/2019     Lab Results   Component Value Date    HDL 19 (L) 04/16/2019     Lab Results   Component Value Date    LDLCALC 38 04/16/2019     Lab Results   Component Value Date    TRIG 98 04/16/2019     No components found for: CHOLHDL      ASSESSMENT:      ICD-10-CM    1. Right hemiplegia (H) G81.91    2. Essential hypertension I10    3. Cerebrovascular accident (CVA), unspecified mechanism (H) I63.9    4. Oropharyngeal dysphagia R13.12        PLAN:    Recently had laboratory studies.  Weight seem to be fine her blood sugars are okay he is on tube feedings also a level 2 diet with honey thick with supervision.  We will also decrease the ferrous sulfate to once daily rather than twice daily with a hemoglobin of 10.6.  Continue to manage and follow.      Electronically signed by: Michael Duane Johnson, CNP

## 2021-06-20 NOTE — LETTER
Letter by Morenita Mosquera NP at      Author: Morenita Mosquera NP Service: -- Author Type: --    Filed:  Encounter Date: 2020 Status: (Other)         Patient: Zach Olmos   MR Number: [id number]   YOB: 1950   Date of Visit: 2020                 [facility]    DATE: 2020    NAME:  Zach Olmos             :  1950  MRN: [id number]  CODE STATUS:  FULL CODE    VISIT TYPE: Problem Visit (drainage)     FACILITY:  [facility] [[id number]]       CHIEF COMPLAIN/REASON FOR VISIT:    Chief Complaint   Patient presents with   ? Problem Visit     drainage               HISTORY OF PRESENT ILLNESS: Zach Olmos is a 69 y.o. female who is a long term care resident of [facility], [place], operated by [facility].     Today Ms. Olmos is seen per nursing request for change in left groin abscess. Nursing reports it was bleeding some this morning and there was some drainage on her depends. On exam Zach is seen in bed with nursing present. She says they noticed it started draining some stuff last night. She does not recall what it looked like. They are doing the warm cloths to it a few times per day. She says it still really hurts and now is tender even when they put the washcloth on it. Nursing reports she has been declining tylenol. She says she still does not feel feverish. Examined left groin abscess and has already started to shrink in size. Discussed with nursing and Zach how the subdermal lesion is smaller than the outlined area and is already regressing. There is some sanguinous and purulent drainage noted on her depends. Instructed nursing to allow to drain naturally, may gently express contents but no force to be used to expel contents. Keep area clean and dry as much as possible. Continue with warm packs to assist with drainage.     REVIEW OF SYSTEMS:  PROBLEMS AND REVIEW OF SYSTEMS:   Today on ROS:   Currently, no fever, chills, or rigors. Decreased vision.  Denies any chest pain, headaches, palpitations, lightheadedness. Denies any GERD symptoms. Positive for weakness, EZ stand for transfers or slide board, dysphagia, urinary incontinence, fecal incontinence, appetite is good, wheelchair bound, right sided weakness, aphasia, no shortness of breath, refusing cpap at times, lump in left groin draining      Allergies   Allergen Reactions   ? Aricept [Donepezil]    ? Atorvastatin    ? Glipizide    ? Lisinopril      Current Outpatient Medications   Medication Sig   ? acetaminophen (TYLENOL) 325 MG tablet Take 650 mg by mouth every 6 (six) hours as needed for pain.          ? apixaban (ELIQUIS) 5 mg Tab tablet Take 5 mg by mouth 2 (two) times a day.         ? aspirin 81 mg chewable tablet Chew 81 mg daily.         ? furosemide (LASIX) 20 MG tablet Take 20 mg by mouth daily.          ? insulin glargine (LANTUS) 100 unit/mL injection Inject 10 Units under the skin 2 (two) times a day. 07:30 AM, 04:30 PM   ? irbesartan (AVAPRO) 75 MG tablet Take 75 mg by mouth 2 (two) times a day.         ? metoprolol tartrate (LOPRESSOR) 50 MG tablet Take 50 mg by mouth 2 (two) times a day.         ? ondansetron (ZOFRAN) 4 MG tablet Take 4 mg by mouth every 6 (six) hours as needed for nausea.   ? PARoxetine (PAXIL) 30 MG tablet Take 60 mg by mouth every morning.         ? rosuvastatin (CRESTOR) 20 MG tablet Take 20 mg by mouth every morning. PER HOSPITAL ORDERS GIVE TO PT. IN THE A.M.            Past Medical History:    Past Medical History:   Diagnosis Date   ? Anemia    ? Anxiety    ? Cancer (H)     Hx of colon cancer   ? CHF (congestive heart failure) (H)     diastolic   ? Coronary artery disease     s/p cabg   ? CVA (cerebral vascular accident) (H)    ? Depression    ? Diabetes mellitus (H)     type 2   ? DVT (deep vein thrombosis) in pregnancy    ? Granuloma annulare    ? Hemiplegia (H)     R sided and minimally communicative   ? Hyperlipidemia    ? Hypertension    ? Obesity    ?  Oropharyngeal dysphagia    ? ANGEL (obstructive sleep apnea)    ? Parotitis    ? Paroxysmal atrial fibrillation (H)    ? PE (pulmonary thromboembolism) (H)    ? Stroke (H) 12/2018    Sub acute L MCA stroke   ? Varicose veins of both lower extremities        IMMUNIZATIONS:    There is no immunization history on file for this patient.  Above immunizations pulled from [facility]. Facility records also reconciled. Outstanding information sent to [facility] to update [facility].  Future immunizations are not needed at this point as all recommended immunizations are up to date.     PHYSICAL EXAMINATION  Vitals:    01/29/20 0700   BP: 113/53   Pulse: 61   Resp: 18   Temp: 97.7  F (36.5  C)   SpO2: 97%       Today on physical exam:     GENERAL: Awake, Alert, not in any form of acute distress, answers questions appropriately, follows simple commands, conversant  HEENT: Head is normocephalic with normal hair distribution. No evidence of trauma. Ears: No acute purulent discharge. Eyes: Conjunctivae pink with no scleral jaundice. Nose: Normal mucosa and septum. NECK: Supple with no cervical or supraclavicular lymphadenopathy. Trachea is midline.   CHEST: No tenderness or deformity, no crepitus  LUNG: dim to auscultation with good chest expansion. There are no crackles or wheezes, normal AP diameter.  No recent shortness of breath or cough   BACK: No kyphosis of the thoracic spine. Symmetric, no curvature, ROM normal, no CVA tenderness, no spinal tenderness   CVS: irregularly irregular rhythm,  2+ pulses symmetric in all extremities.  ABDOMEN: Rounded and soft, nontender to palpation, non distended, no masses, no organomegaly, good bowel sounds, no rebound or guarding, no peritoneal signs.   EXTREMITIES: no edema  SKIN: Warm and dry, Left groin noted firm, circular, subdermal lesion noted, some fluctuance, mobile, tender to palpation, raised surface plaque smaller than subdermal lesion, demarcated with  purplish discolored epidermal tissue, surrounding erythema and warmth, size smaller than yesterday, smaller than outline, small amount of purulent and moderate amount of sanguinous drainage noted today  NEURO/PSYCH: The patient is oriented to person, place and time. Right sided hemiplegia, dysarthria, dysphagia, expressive aphasia, EZ stand to slide board for transfers, dependent for cares            LABS:   No results found for this or any previous visit (from the past 168 hour(s)).  Results for orders placed or performed in visit on 10/09/19   Basic Metabolic Panel   Result Value Ref Range    Sodium 139 136 - 145 mmol/L    Potassium 4.3 3.5 - 5.0 mmol/L    Chloride 103 98 - 107 mmol/L    CO2 28 22 - 31 mmol/L    Anion Gap, Calculation 8 5 - 18 mmol/L    Glucose 87 70 - 125 mg/dL    Calcium 9.2 8.5 - 10.5 mg/dL    BUN 16 8 - 28 mg/dL    Creatinine 0.83 0.60 - 1.10 mg/dL    GFR MDRD Af Amer >60 >60 mL/min/1.73m2    GFR MDRD Non Af Amer >60 >60 mL/min/1.73m2         Lab Results   Component Value Date    WBC 10.5 10/09/2019    HGB 10.3 (L) 10/09/2019    HCT 31.6 (L) 10/09/2019    MCV 92 10/09/2019     10/09/2019       No results found for: [id number]  Lab Results   Component Value Date    HGBA1C 5.5 11/08/2019     No results found for: INR, PROTIME  No results found for: [id number]  Lab Results   Component Value Date    TSH 1.04 08/07/2019           ASSESSMENT/PLAN:    1. Left groin Subdermal Abscess, Cellulitis: unclear etiology, first noticed few days ago, growing rapidly in size with developing cellulitis. Moderately sized. Tender on palpation. No fevers or signs of systemic infection at this time. Monitor closely and notify if fever >100.5 F. Discussed treatment options with Zach and opted for conservative approach.  warm packs four times a day, on clindamycin x 7 days. culturelle with clindamycin for c diff prevention. Outlined lesion and monitor q shift Reports pain controlled with tylenol as needed.  Smaller in size today, some sanguinous and purulent drainage. Continue to keep area clean and dry, continue warm packs and allow to drain on own. Did instruct nursing may gently expel contents but no forceful draining. Per [facility] supplies should be in facility within next few days for bedside sterile I&D if needed. Discussed with Zach will re evaluate in a few days.   2. Type 2 DM with HTN: Accuchecks 133-172 recently. On lantus two times a day, accuchecks bid. Hga1c 5.7 on 8/7. hga1c 5.5 on 11/8. Well controlled. Goal hga1c for age group 6.5-7, with recent CVA may be a little more aggressive in 6-6.5 range. Consider checking hga1c at next visit and depending on results further reduce lantus.   3. CAD, s/p CABG: No chest pain. On aspirin, rosuvastatin. F/u cardiology.    4. H/o Left MCA CVA: Right sided hemiplegia, dysarthria, dysphagia, expressive aphasia. Wheelchair and bed bound. EZ stand to slide board for transfers. Dependent for cares. On rosuvastatin, aspirin. F/u with neurology prn. Dysphagia on NDD3, nectar thick liquids.stable.   5. Anxiety and depression: On paroxetine. ACP following, feels mood stable today.   6. Dysphagia: NDD3 nectar thick liquids. No recent changes or concerns.   6. PAF: Rate controlled 50-70s. On eliquis, metoprolol.   7.ANGEL, OHS: On CPAP. Refusing at times.   8. Weights, obesity: [phone]. Monitor. Counseling on heart healthy diet, diabetic diet, healthy lifestyle choices. Stable.   9. [facility] CH[facility]: [facility] h/o CHF. No echo or records available [facility]. No shortness of breath, no edema noted, on lasix daily. Weekly weights.  No recent concerns.   10. HTN: 110s. On irbesartan, metoprolol, lasix. Stable recently.      Labs last in November, will check q6 months      Electronically signed by: Morenita Mosquera NP    Case Management:  I have reviewed the facility/SNF care plan/MDS which was done 10/30/19, including the falls  risk, nutrition and pain screening. I also reviewed the current immunizations, and preventive care.. Future cancer screening is not clinically indicated secondary to age/goals of care.   Patient's desire to return to the community is not assessible due to cognitive impairment.    Information reviewed:  Medications, vital signs, orders, and nursing notes.  The health plan new enrollment has happened. I have reviewed the  MDS, the preventative needs,  and facility care plan. The level of care is appropriate. I have reviewed the code status/advanced directives.

## 2021-06-20 NOTE — LETTER
Letter by Carolina Mendez SW at      Author: Carolina Mendez SW Service: -- Author Type: --    Filed:  Encounter Date: 7/9/2020 Status: (Other)       July 9, 2020      ELKE WESLEY  [address] [place] [address]      Dear Elke:    At [facility], we are dedicated to improving your health and well-being. Enclosed is the Comprehensive Care Plan that we developed with you on 6/30/20. Please review the Care Plan carefully.    As a reminder, some of the things we discussed at your visit include:    Your physical and mental health    Ways to reduce falls    Health care needs you may have    Dont forget to contact your care coordinator if you:    Have been hospitalized or plan to be hospitalized     Have had a fall     Have experienced a change in physical health    Are experiencing emotional problems     If you do not agree with your Care Plan, have questions about it, or have experienced a change in your needs, please call me at [phone]. If you are hearing impaired, please call the [place] [employer] at 329 or [phone] (lefglw-ax-poamrv relay service).    Sincerely,      MICHEL Barker    E-mail: [email or web address]  [phone]      [facility] ([facility]) is a health plan that contracts with both Medicare and the [place] Medical Assistance (Medicaid) program to provide benefits of both programs to enrollees. Enrollment in [facility] depends on contract renewal.    [id number])     [id number] (11/18)

## 2021-06-20 NOTE — LETTER
Letter by Morenita Mosquera NP at      Author: Morenita Mosquera NP Service: -- Author Type: --    Filed:  Encounter Date: 2019 Status: Signed         Patient: Zach Olmos   MR Number: [id number]   YOB: 1950   Date of Visit: 2019                 [facility]    DATE: 2019    NAME:  Zach Olmos             :  1950  MRN: [id number]  CODE STATUS:  FULL CODE    VISIT TYPE: Problem Visit (ear pain, pressure)     FACILITY:  [facility] [[id number]]       CHIEF COMPLAIN/REASON FOR VISIT:    Chief Complaint   Patient presents with   ? Problem Visit     ear pain, pressure               HISTORY OF PRESENT ILLNESS: Zach Olmos is a 69 y.o. female who is a long term care resident of [facility].     Today Ms. Olmos is seen per request for ear pain and pressure. She was requesting exam of ears. She says her it is her left ear that is bothering her more than the right. She denies fevers, pain, nausea, stomach upset, or breathing concerns. No runny nose or nasal congestion. She is not having any cold symptoms. On exam both ears have some dry wax but left ear is noted to have erythema and inflammation in ear canal.     REVIEW OF SYSTEMS:  PROBLEMS AND REVIEW OF SYSTEMS:   Today on ROS:   Currently, no fever, chills, or rigors. Decreased vision. Denies any chest pain, headaches, palpitations, lightheadedness. Denies any GERD symptoms. Positive for weakness, EZ stand for transfers or slide board, dysphagia, urinary incontinence, fecal incontinence, appetite is good, wheelchair bound, right sided weakness, aphasia, no shortness of breath, refusing cpap at times, left ear pain and pressure      Allergies   Allergen Reactions   ? Aricept [Donepezil]    ? Atorvastatin    ? Glipizide    ? Lisinopril      Current Outpatient Medications   Medication Sig   ? acetaminophen (TYLENOL) 325 MG tablet Take 650 mg by mouth every 6 (six) hours as  needed for pain.          ? apixaban (ELIQUIS) 5 mg Tab tablet Take 5 mg by mouth 2 (two) times a day.         ? aspirin 81 mg chewable tablet Chew 81 mg daily.         ? furosemide (LASIX) 20 MG tablet Take 20 mg by mouth daily.          ? insulin glargine (LANTUS) 100 unit/mL injection Inject 15 Units under the skin 2 (two) times a day. 07:30 AM, 04:30 PM         ? irbesartan (AVAPRO) 75 MG tablet Take 75 mg by mouth 2 (two) times a day.         ? metoprolol tartrate (LOPRESSOR) 50 MG tablet Take 50 mg by mouth 2 (two) times a day.         ? ondansetron (ZOFRAN) 4 MG tablet Take 4 mg by mouth every 6 (six) hours as needed for nausea.   ? PARoxetine (PAXIL) 30 MG tablet Take 60 mg by mouth every morning.         ? rosuvastatin (CRESTOR) 20 MG tablet Take 20 mg by mouth every morning. PER HOSPITAL ORDERS GIVE TO PT. IN THE A.M.            Past Medical History:    Past Medical History:   Diagnosis Date   ? Anemia    ? Anxiety    ? Cancer (H)     Hx of colon cancer   ? CHF (congestive heart failure) (H)     diastolic   ? Coronary artery disease     s/p cabg   ? CVA (cerebral vascular accident) (H)    ? Depression    ? Diabetes mellitus (H)     type 2   ? DVT (deep vein thrombosis) in pregnancy    ? Granuloma annulare    ? Hemiplegia (H)     R sided and minimally communicative   ? Hyperlipidemia    ? Hypertension    ? Obesity    ? Oropharyngeal dysphagia    ? ANGEL (obstructive sleep apnea)    ? Parotitis    ? Paroxysmal atrial fibrillation (H)    ? PE (pulmonary thromboembolism) (H)    ? Stroke (H) 12/2018    Sub acute L MCA stroke   ? Varicose veins of both lower extremities        IMMUNIZATIONS:    There is no immunization history on file for this patient.  Above immunizations pulled from [facility]. Facility records also reconciled. Outstanding information sent to [facility] to update [facility].  Future immunizations are not needed at this point as all recommended immunizations are up to  date.     PHYSICAL EXAMINATION  Vitals:    12/11/19 0700   BP: 143/63   Pulse: (!) 55   Resp: 18   Temp: 97.9  F (36.6  C)   SpO2: 95%   Weight: 176 lb (79.8 kg)       Today on physical exam:     GENERAL: Awake, Alert, not in any form of acute distress, answers questions appropriately, follows simple commands, conversant  HEENT: Head is normocephalic with normal hair distribution. No evidence of trauma. Ears: No acute purulent discharge. Dry wax noted in right ear, dry wax noted in left ear with erythema and inflammation, minimal debris present, TM pearly white Eyes: Conjunctivae pink with no scleral jaundice. Nose: Normal mucosa and septum. NECK: Supple with no cervical or supraclavicular lymphadenopathy. Trachea is midline.   CHEST: No tenderness or deformity, no crepitus  LUNG: dim to auscultation with good chest expansion. There are no crackles or wheezes, normal AP diameter.  No recent shortness of breath or cough   BACK: No kyphosis of the thoracic spine. Symmetric, no curvature, ROM normal, no CVA tenderness, no spinal tenderness   CVS: irregularly irregular rhythm,  2+ pulses symmetric in all extremities.  ABDOMEN: Rounded and soft, nontender to palpation, non distended, no masses, no organomegaly, good bowel sounds, no rebound or guarding, no peritoneal signs.   EXTREMITIES: no edema  SKIN: Warm and dry, no erythema noted.  Skin color, texture, no rashes or lesions.  NEURO/PSYCH: The patient is oriented to person, place and time. Right sided hemiplegia, dysarthria, dysphagia, expressive aphasia, EZ stand to slide board for transfers, dependent for cares            LABS:   No results found for this or any previous visit (from the past 168 hour(s)).  Results for orders placed or performed in visit on 10/09/19   Basic Metabolic Panel   Result Value Ref Range    Sodium 139 136 - 145 mmol/L    Potassium 4.3 3.5 - 5.0 mmol/L    Chloride 103 98 - 107 mmol/L    CO2 28 22 - 31 mmol/L    Anion Gap, Calculation 8 5 -  18 mmol/L    Glucose 87 70 - 125 mg/dL    Calcium 9.2 8.5 - 10.5 mg/dL    BUN 16 8 - 28 mg/dL    Creatinine 0.83 0.60 - 1.10 mg/dL    GFR MDRD Af Amer >60 >60 mL/min/1.73m2    GFR MDRD Non Af Amer >60 >60 mL/min/1.73m2         Lab Results   Component Value Date    WBC 10.5 10/09/2019    HGB 10.3 (L) 10/09/2019    HCT 31.6 (L) 10/09/2019    MCV 92 10/09/2019     10/09/2019       No results found for: [id number]  Lab Results   Component Value Date    HGBA1C 5.5 11/08/2019     No results found for: INR, PROTIME  No results found for: [id number]  Lab Results   Component Value Date    TSH 1.04 08/07/2019           ASSESSMENT/PLAN:    1. Type 2 DM with HTN: Accuchecks 137-157. Hga1c 6.4 on 4/16. On lantus two times a day, accuchecks bid. Hga1c 5.7 on 8/7. hga1c 5.5 on 11/8. Well controlled. Goal hga1c for age group 6.5-7, with recent CVA may be a little more aggressive in 6-6.5 range. previously redcued lantus.   2. CAD, s/p CABG: No chest pain. On aspirin, rosuvastatin. F/u cardiology.    3. H/o Left MCA CVA: Right sided hemiplegia, dysarthria, dysphagia, expressive aphasia. Wheelchair and bed bound. EZ stand to slide board for transfers. Dependent for cares. On rosuvastatin, aspirin. F/u with neurology prn. Dysphagia on NDD3, nectar thick liquids.  4. Anxiety and depression: On paroxetine. ACP following, feels mood stable today.   5. Dysphagia: G-J tube removed and site healed, treated for infection. NDD3 nectar thick liquids. No recent changes or concerns.   6. PAF: Rate controlled 50-70s. On eliquis, metoprolol.   7.ANGEL, OHS: On CPAP. Refusing at times.   8. Weights, obesity: [phone]. Monitor. Counseling on heart healthy diet, diabetic diet, healthy lifestyle choices. Stable.   9. Chronic CHF: [facility] reported h/o CHF. No echo or records available [facility]. No shortness of breath, no edema noted, on lasix daily. Weekly weights.  No recent concerns.    10. HTN: 140s. On irbesartan, metoprolol, lasix. Stable recently.   11. Left otitis externa: ordered cipro dex two times a day x 7 days.     Labs last in November, will check q6 months      Electronically signed by: Morenita Mosquera NP    Case Management:  I have reviewed the facility/SNF care plan/MDS which was done 10/30/19, including the falls risk, nutrition and pain screening. I also reviewed the current immunizations, and preventive care.. Future cancer screening is not clinically indicated secondary to age/goals of care.   Patient's desire to return to the community is not assessible due to cognitive impairment.    Information reviewed:  Medications, vital signs, orders, and nursing notes.  The health plan new enrollment has happened. I have reviewed the  MDS, the preventative needs,  and facility care plan. The level of care is appropriate. I have reviewed the code status/advanced directives.

## 2021-06-20 NOTE — LETTER
Letter by Morenita Mosquera NP at      Author: Morenita Mosquera NP Service: -- Author Type: --    Filed:  Encounter Date: 6/3/2020 Status: (Other)         Patient: Zach Olmos   MR Number: [id number]   YOB: 1950   Date of Visit: 6/3/2020                 [facility]    DATE: 6/3/2020    NAME:  Zach Olmos             :  1950  MRN: [id number]  CODE STATUS:  FULL CODE    VISIT TYPE: Review Of Multiple Medical Conditions (h/o cva, htn)     FACILITY:  [facility] [[id number]]       CHIEF COMPLAIN/REASON FOR VISIT:    Chief Complaint   Patient presents with   ? Review Of Multiple Medical Conditions     h/o cva, htn               HISTORY OF PRESENT ILLNESS: Zach Olmos is a 70 y.o. female who is a long term care resident of [facility].     Today Ms. Olmos is seen for review of systems for cva and hypertension. She is seen alone in her room today. She says she has been doing well. She denies any  More issues with rash or the spot in her groin. She says her appetite is good. She is not having any issues with her bowels. She is urinating fine and no dizziness or other concerns. She feels she has been doing well and has no med concerns. We reviewed her blood sugars being elevated lately 202-311. She denies eating any different. Her last hga1c was low so will check hga1c prior to changing med regimen at this time. Her vitals have been stable and weights are up 195-201lbs but she reports not feeling like she has gained weight. She is not short of breath or having swelling in legs.     REVIEW OF SYSTEMS:  PROBLEMS AND REVIEW OF SYSTEMS:   Today on ROS:   Currently, no fever, chills, or rigors. Decreased vision. Denies any chest pain, headaches, palpitations, lightheadedness. Denies any GERD symptoms. Positive for weakness, EZ stand for transfers or slide board, dysphagia, urinary incontinence, fecal incontinence, appetite is good, wheelchair  bound, right sided weakness, aphasia, no shortness of breath, refusing cpap at times, elevated blood sugars      Allergies   Allergen Reactions   ? Aricept [Donepezil]    ? Atorvastatin    ? Glipizide    ? Lisinopril      Current Outpatient Medications   Medication Sig   ? acetaminophen (TYLENOL) 325 MG tablet Take 650 mg by mouth every 6 (six) hours as needed for pain.          ? apixaban (ELIQUIS) 5 mg Tab tablet Take 5 mg by mouth 2 (two) times a day.         ? aspirin 81 mg chewable tablet Chew 81 mg daily.         ? furosemide (LASIX) 20 MG tablet Take 20 mg by mouth daily.          ? insulin glargine (LANTUS) 100 unit/mL injection Inject 15 Units under the skin at bedtime. 07:30 AM, 04:30 PM   ? irbesartan (AVAPRO) 75 MG tablet Take 75 mg by mouth 2 (two) times a day.         ? metoprolol tartrate (LOPRESSOR) 50 MG tablet Take 25 mg by mouth 2 (two) times a day.    ? ondansetron (ZOFRAN) 4 MG tablet Take 4 mg by mouth every 6 (six) hours as needed for nausea.   ? PARoxetine (PAXIL) 30 MG tablet Take 60 mg by mouth every morning.         ? rosuvastatin (CRESTOR) 20 MG tablet Take 20 mg by mouth every morning. PER HOSPITAL ORDERS GIVE TO PT. IN THE A.M.            Past Medical History:    Past Medical History:   Diagnosis Date   ? Anemia    ? Anxiety    ? Cancer (H)     Hx of colon cancer   ? CHF (congestive heart failure) (H)     diastolic   ? Coronary artery disease     s/p cabg   ? CVA (cerebral vascular accident) (H)    ? Depression    ? Diabetes mellitus (H)     type 2   ? DVT (deep vein thrombosis) in pregnancy    ? Granuloma annulare    ? Hemiplegia (H)     R sided and minimally communicative   ? Hyperlipidemia    ? Hypertension    ? Obesity    ? Oropharyngeal dysphagia    ? ANGEL (obstructive sleep apnea)    ? Parotitis    ? Paroxysmal atrial fibrillation (H)    ? PE (pulmonary thromboembolism) (H)    ? Stroke (H) 12/2018    Sub acute L MCA stroke   ? Varicose veins of both lower extremities         IMMUNIZATIONS:    There is no immunization history on file for this patient.  Above immunizations pulled from [facility] [employer]. Facility records also reconciled. Outstanding information sent to [facility] to update [facility].  Future immunizations are not needed at this point as all recommended immunizations are up to date.     PHYSICAL EXAMINATION  Vitals:    06/03/20 0700   BP: 150/59   Pulse: (!) 54   Resp: 18   Temp: 98.2  F (36.8  C)   SpO2: 99%   Weight: 201 lb (91.2 kg)       Today on physical exam:     GENERAL: Awake, Alert, not in any form of acute distress, answers questions appropriately, follows simple commands, conversant  HEENT: Head is normocephalic with normal hair distribution. No evidence of trauma. Ears: No acute purulent discharge. Eyes: Conjunctivae pink with no scleral jaundice. Nose: Normal mucosa and septum. NECK: Supple with no cervical or supraclavicular lymphadenopathy. Trachea is midline.   CHEST: No tenderness or deformity, no crepitus  LUNG: dim to auscultation with good chest expansion. There are no crackles or wheezes, normal AP diameter.  No recent shortness of breath or cough   BACK: No kyphosis of the thoracic spine. Symmetric, no curvature, ROM normal, no CVA tenderness, no spinal tenderness   CVS: irregularly irregular rhythm,  2+ pulses symmetric in all extremities.  ABDOMEN: Rounded and soft, nontender to palpation, non distended, no masses, no organomegaly, good bowel sounds, no rebound or guarding, no peritoneal signs.   EXTREMITIES: no edema  SKIN: Warm and dry  NEURO/PSYCH: The patient is oriented to person, place and time. Right sided hemiplegia, dysarthria, dysphagia, expressive aphasia, EZ stand to slide board for transfers, dependent for cares            LABS:   No results found for this or any previous visit (from the past 168 hour(s)).  Results for orders placed or performed in visit on 10/09/19   Basic Metabolic Panel   Result Value Ref Range     Sodium 139 136 - 145 mmol/L    Potassium 4.3 3.5 - 5.0 mmol/L    Chloride 103 98 - 107 mmol/L    CO2 28 22 - 31 mmol/L    Anion Gap, Calculation 8 5 - 18 mmol/L    Glucose 87 70 - 125 mg/dL    Calcium 9.2 8.5 - 10.5 mg/dL    BUN 16 8 - 28 mg/dL    Creatinine 0.83 0.60 - 1.10 mg/dL    GFR MDRD Af Amer >60 >60 mL/min/1.73m2    GFR MDRD Non Af Amer >60 >60 mL/min/1.73m2         Lab Results   Component Value Date    WBC 10.5 10/09/2019    HGB 10.3 (L) 10/09/2019    HCT 31.6 (L) 10/09/2019    MCV 92 10/09/2019     10/09/2019       No results found for: [id number]  Lab Results   Component Value Date    HGBA1C 6.5 (H) 02/26/2020     No results found for: INR, PROTIME  No results found for: [id number]  Lab Results   Component Value Date    TSH 1.04 08/07/2019           ASSESSMENT/PLAN:      Type 2 DM with HTN: Accuchecks elevated recently 202-311. On lantus two times a day, accuchecks bid. Hga1c 5.7 on 8/7. hga1c 5.5 on 11/8. hga1c 2/26-6.5. continue lantus. Most recent hga1c was low, will recheck hga1c prior to adjusting meds. Will order on 6/10.   CAD, s/p CABG: No chest pain. On aspirin, rosuvastatin. F/u cardiology.    H/o Left MCA CVA: Right sided hemiplegia, dysarthria, dysphagia, expressive aphasia. Wheelchair and bed bound. EZ stand to slide board for transfers. Dependent for cares. On rosuvastatin, aspirin. F/u with neurology prn. Dysphagia on NDD3, nectar thick liquids. No recent concerns.   Anxiety and depression: On paroxetine. ACP following, feels mood stable today.   Dysphagia: NDD3 nectar thick liquids. No recent concerns.   PAF: Rate controlled 50s. On eliquis, metoprolol. Previously reduced metoprolol.   ANGEL, OHS: On CPAP. Refusing at times.   Weights, obesity: [phone]. Monitor. Counseling on heart healthy diet, diabetic diet,  Weights are climbing and discussed today. Denies any changes recently to diet. Encourage healthier choices.   Chronic CH[facility]: [facility] reported h/o CHF. No echo or records available [facility]. No shortness of breath, no edema noted, on lasix daily. Weights up recently but denies recent increase in shortness of breath or edema. Will monitor closely.   HTN: 150s. On irbesartan, metoprolol, lasix. Stable.     CMP, hm1, tsh, hga1c on 6/10    Electronically signed by: Morenita Mosquera NP    Case Management:  I have reviewed the facility/SNF care plan/MDS which was done 4/9/20, including the falls risk, nutrition and pain screening. I also reviewed the current immunizations, and preventive care.. Future cancer screening is not clinically indicated secondary to age/goals of care.   Patient's desire to return to the community is not assessible due to cognitive impairment.    Information reviewed:  Medications, vital signs, orders, and nursing notes.  The health plan new enrollment has happened. I have reviewed the  MDS, the preventative needs,  and facility care plan. The level of care is appropriate. I have reviewed the code status/advanced directives.

## 2021-06-20 NOTE — LETTER
"Letter by Vick Sutton MD at      Author: Vick Sutton MD Service: -- Author Type: --    Filed:  Encounter Date: 1/7/2020 Status: Signed         Patient: Zach Olmos   MR Number: [id number]   YOB: 1950   Date of Visit: 1/7/2020     [facility]    Facility:   [facility] [[id number]]   Code Status: FULL CODE      CHIEF COMPLAINT/REASON FOR VISIT:  Chief Complaint   Patient presents with   ? Review Of Multiple Medical Conditions       HISTORY:      HPI: Ms. 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    Subjective/review of systems: Limited by patient's expressive aphasia.  However she can answer yes and no questions appropriately and has very limited other language such as \"I do not know\".  Patient denies headaches change in vision speaking swallowing hearing.  She feels like her swallowing is good and has not had any recent gagging or choking.  She reports her \"some\" depression.  Staff feels she is doing pretty well from a mood standpoint on current SSRI.  She denies chest pain orthopnea PND cough wheezing nausea vomiting diarrhea melena bright red blood per rectum falls injuries skin rash.  She says she is using her CPAP, but it seems from document staff that is quite inconsistent and that even if she starts with it on she often does not leave it on.  Denies numbness weakness tingling " but has no ability to use her right side.  No skin rash or breakdown.  Remainder is negative    Past Medical History:   Diagnosis Date   ? Anemia    ? Anxiety    ? Cancer (H)     Hx of colon cancer   ? CHF (congestive heart failure) (H)     diastolic   ? Coronary artery disease     s/p cabg   ? CVA (cerebral vascular accident) (H)    ? Depression    ? Diabetes mellitus (H)     type 2   ? DVT (deep vein thrombosis) in pregnancy    ? Granuloma annulare    ? Hemiplegia (H)     R sided and minimally communicative   ? Hyperlipidemia    ? Hypertension    ? Obesity    ? Oropharyngeal dysphagia    ? ANGEL (obstructive sleep apnea)    ? Parotitis    ? Paroxysmal atrial fibrillation (H)    ? PE (pulmonary thromboembolism) (H)    ? Stroke (H) 12/2018    Sub acute L MCA stroke   ? Varicose veins of both lower extremities              Family History   Problem Relation Age of Onset   ? Heart disease Mother    ? Cancer Father         esophageal     Social History     Socioeconomic History   ? Marital status: Single     Spouse name: None   ? Number of children: None   ? Years of education: None   ? Highest education level: None   Occupational History   ? None   Social Needs   ? Financial resource strain: None   ? Food insecurity:     Worry: None     Inability: None   ? Transportation needs:     Medical: None     Non-medical: None   Tobacco Use   ? Smoking status: Never Smoker   ? Smokeless tobacco: Never Used   Substance and Sexual Activity   ? Alcohol use: No     Frequency: Never   ? Drug use: No   ? Sexual activity: None   Lifestyle   ? Physical activity:     Days per week: None     Minutes per session: None   ? Stress: None   Relationships   ? Social connections:     Talks on phone: None     Gets together: None     Attends [employer] service: None     Active member of club or organization: None     Attends meetings of clubs or organizations: None     Relationship status: None   ? Intimate partner violence:     Fear of current or ex  "partner: None     Emotionally abused: None     Physically abused: None     Forced sexual activity: None   Other Topics Concern   ? None   Social History Narrative   ? None         Review of Systems as above    Vitals:    01/07/20 1224   BP: 140/64   Pulse: (!) 56   Resp: 24   Temp: (!) 96  F (35.6  C)   SpO2: 96%   Weight: 184 lb (83.5 kg)   Height: 5' 7\" (1.702 m)       Physical Exam  Patient is alert sitting up in her wheelchair.  She is attentive throughout and appears to be answering questions appropriately with her limited language abilities.  Normocephalic atraumatic sclera clear nonicteric oropharynx is clear neck supple without adenopathy mass heart is regular, slow in the upper 50s but regular with a soft early systolic murmur at the left sternal border.  Chest wall is nontender.  Lungs are clear she moves her easily no rales rhonchi or wheezing.  Abdomen is obese with organomegaly mass or tenderness.  Bowel sounds are present.  PEG tube site noted.  She has dense right hemiplegia.  However she is nonedematous with warm pink hands and feet with good cap refill.  LABS:   Results for ELKE WESLEY (MRN [id number]) as of 1/7/2020 12:35   Ref. Range 10/9/2019 05:55 11/8/2019 06:03   Sodium Latest Ref Range: 136 - 145 mmol/L 139    Potassium Latest Ref Range: 3.5 - 5.0 mmol/L 4.3    Chloride Latest Ref Range: 98 - 107 mmol/L 103    CO2 Latest Ref Range: 22 - 31 mmol/L 28    Anion Gap, Calculation Latest Ref Range: 5 - 18 mmol/L 8    BUN Latest Ref Range: 8 - 28 mg/dL 16    Creatinine Latest Ref Range: 0.60 - 1.10 mg/dL 0.83 0.88   GFR MDRD Af Amer Latest Ref Range: >60 mL/min/1.73m2 >60 >60   GFR MDRD Non Af Amer Latest Ref Range: >60 mL/min/1.73m2 >60 >60   Calcium Latest Ref Range: 8.5 - 10.5 mg/dL 9.2        ASSESSMENT:      ICD-10-CM    1. ANGEL on CPAP G47.33     Z99.89    2. PAF (paroxysmal atrial fibrillation) (H) I48.0    3. Oropharyngeal dysphagia R13.12    4. Coronary artery disease involving coronary " bypass graft with angina pectoris, unspecified whether native or transplanted heart (H) I25.709    5. Essential hypertension I10    6. Chronic combined systolic and diastolic congestive heart failure (H) I50.42    7. Obesity hypoventilation syndrome (H) E66.2    8. Right hemiplegia (H) G81.91    9. Dyslipidemia (high LDL; low HDL) E78.5    10. Morbid obesity (H) E66.01    11. Anxiety and depression F41.9     F32.9    12. H/O deep venous thrombosis Z86.718      Assessment/plan    CVA  Right hemiplegia  Expressive aphasia  Dysphasia     Patient continues to have expressive aphasia.  Her dysphasia has improved, NDD 3 with nectar thick liquids his current diet.  She is on appropriate secondary prevention with apixaban for her paroxysmal atrial fibrillation, blood pressure and lipid control.    Paroxysmal atrial fibrillation     Patient sounds to be in sinus rhythm today mildly bradycardic on her metoprolol 50 twice daily.  Heart rate review shows no worrisome bradycardia.  We will continue the apixaban and metoprolol as current    Coronary artery disease  CABG 2018     Continue aspirin, metoprolol.  I do find an echocardiogram from 2018 showing normal EF 55 to 60%, this was post cabg.    DM 2     Patient is on Lantus 10 units twice daily.  Her sugar control has been excellent.  Her A1c's have been excellent including November 8, 2019 at 5.5.  Fortunately she has had no low blood sugars on record and staff has not noticed any on review with them verbally.   Patient was on metformin up to her stroke time.  It is unclear why it was discontinued but I cannot find evidence of a true allergy.  She was on tube feedings then with an A1c of 8.5 and they may have just decided to go with insulin.  At this point I think we should give some consideration to returning to metformin therapy to see if she can get by without insulin at all.     I will communicate with her primary care provider/Ms. Mosquera to seek her opinion on the matter  as well.    Hypertension     Overall control appears adequate we will continue irbesartan 75 twice daily and metoprolol 50 twice daily (along she is not having bradycardia)    ANGEL/CPAP  Obesity     Weight now 184.  I find a weight of 200 pounds 20 months ago.  This weight loss may decrease her obstructive load somewhat.  At any rate she does not seem to want to use the CPAP consistently.  One could consider retesting at this new weight to see what her pressures are to make sure that it is still appropriate.  She seems like she would rather keep going as current and I am okay with that.    Hyperlipidemia       Lab Results   Component Value Date    CHOL 77 04/16/2019     Lab Results   Component Value Date    HDL 19 (L) 04/16/2019     Lab Results   Component Value Date    LDLCALC 38 04/16/2019     Lab Results   Component Value Date    TRIG 98 04/16/2019     No components found for: CHOLHDL  No changes made in her Crestor--- due to CVA history and coronary artery disease history, though with her remarkably low LDL, consideration of decreased dose will be given, annual FLP may shed some light on that, due in April.    History of DVT/PE     Sounds provoked most likely following her CABG and prolonged TCU stay, however it is a moot point as she is on apixaban for atrial fibrillation at this point anyway    Pain no changes made PRN Tylenol    Constipation patient reports good bowel function              Electronically signed by: Vick Sutton MD

## 2021-06-20 NOTE — LETTER
Letter by Morenita Mosquera NP at      Author: Morenita Mosquera NP Service: -- Author Type: --    Filed:  Encounter Date: 2020 Status: (Other)         Patient: Zach Olmos   MR Number: [id number]   YOB: 1950   Date of Visit: 2020                 [facility]    DATE: 2020    NAME:  Zach Olmos             :  1950  MRN: [id number]  CODE STATUS:  FULL CODE    VISIT TYPE: Problem Visit (nosebleeds)     FACILITY:  [facility] [[id number]]       CHIEF COMPLAIN/REASON FOR VISIT:    Chief Complaint   Patient presents with   ? Problem Visit     nosebleeds               HISTORY OF PRESENT ILLNESS: Zach Olmos is a 70 y.o. female who is a long term care resident of [facility].     Today Ms. Olmos is seen today for concerns of frequent nosebleeds recently. She is seen in her room sitting in wheelchair. She reports that she has had a few nosebleeds lately but not every day. She doesn't think they take a long time to stop. She says that she thinks the air is dry in here. She denies any nose picking. We discussed she is on blood thinners and this could make her higher risk for bleeding issues. We discussed if she has issues with the bleeding stopping she may need to go to the ER for a rhinorocket. We will try doing some daily ocean spray and prn. If she continues to have frequent bleeding will refer to ENt for cauterization. She is agreeable to this. She denies any pain or other concerns. No nasal drainage or sinus congestion today.     REVIEW OF SYSTEMS:  PROBLEMS AND REVIEW OF SYSTEMS:   Today on ROS:   Currently, no fever, chills, or rigors. Decreased vision. Denies any chest pain, headaches, palpitations, lightheadedness. Denies any GERD symptoms. Positive for weakness, EZ stand for transfers or slide board, dysphagia, urinary incontinence, fecal incontinence, appetite is good, wheelchair bound, right sided weakness, aphasia, no  shortness of breath, refusing cpap at times, elevated blood sugars, nosebleeds      Allergies   Allergen Reactions   ? Aricept [Donepezil]    ? Atorvastatin    ? Glipizide    ? Lisinopril      Current Outpatient Medications   Medication Sig   ? acetaminophen (TYLENOL) 325 MG tablet Take 650 mg by mouth every 6 (six) hours as needed for pain.          ? apixaban (ELIQUIS) 5 mg Tab tablet Take 5 mg by mouth 2 (two) times a day.         ? artificial tears,hypromellose, (GENTEAL; SYSTANE) 0.3 % Gel Administer to both eyes.   ? aspirin 81 mg chewable tablet Chew 81 mg daily.         ? furosemide (LASIX) 20 MG tablet Take 20 mg by mouth daily.          ? insulin glargine (LANTUS) 100 unit/mL injection Inject 20 Units under the skin at bedtime. 07:30 AM, 04:30 PM   ? irbesartan (AVAPRO) 75 MG tablet Take 75 mg by mouth 2 (two) times a day.         ? metoprolol tartrate (LOPRESSOR) 50 MG tablet Take 25 mg by mouth 2 (two) times a day.    ? ondansetron (ZOFRAN) 4 MG tablet Take 4 mg by mouth every 6 (six) hours as needed for nausea.   ? PARoxetine (PAXIL) 30 MG tablet Take 60 mg by mouth every morning.         ? rosuvastatin (CRESTOR) 20 MG tablet Take 20 mg by mouth every morning. PER HOSPITAL ORDERS GIVE TO PT. IN THE A.M.            Past Medical History:    Past Medical History:   Diagnosis Date   ? Anemia    ? Anxiety    ? Cancer (H)     Hx of colon cancer   ? CHF (congestive heart failure) (H)     diastolic   ? Coronary artery disease     s/p cabg   ? CVA (cerebral vascular accident) (H)    ? Depression    ? Diabetes mellitus (H)     type 2   ? DVT (deep vein thrombosis) in pregnancy    ? Granuloma annulare    ? Hemiplegia (H)     R sided and minimally communicative   ? Hyperlipidemia    ? Hypertension    ? Obesity    ? Oropharyngeal dysphagia    ? ANGEL (obstructive sleep apnea)    ? Parotitis    ? Paroxysmal atrial fibrillation (H)    ? PE (pulmonary thromboembolism) (H)    ? Stroke (H) 12/2018    Sub acute L MCA stroke    ? Varicose veins of both lower extremities        IMMUNIZATIONS:    There is no immunization history on file for this patient.  Above immunizations pulled from [facility]. Facility records also reconciled. Outstanding information sent to [facility] to update [facility].  Future immunizations are not needed at this point as all recommended immunizations are up to date.     PHYSICAL EXAMINATION  Vitals:    08/17/20 0700   BP: 115/53   Pulse: 63   Resp: 18   Temp: 98.7  F (37.1  C)   SpO2: 94%       Today on physical exam:     GENERAL: Awake, Alert, not in any form of acute distress, answers questions appropriately, follows simple commands, conversant  HEENT: Head is normocephalic with normal hair distribution. No evidence of trauma. Ears: No acute purulent discharge. Eyes: Conjunctivae pink with no scleral jaundice. Nose: Normal mucosa and septum. NECK: Supple with no cervical or supraclavicular lymphadenopathy. Trachea is midline.   CHEST: No tenderness or deformity, no crepitus  LUNG: dim to auscultation with good chest expansion. There are no crackles or wheezes, normal AP diameter.  No recent shortness of breath or cough   BACK: No kyphosis of the thoracic spine. Symmetric, no curvature, ROM normal, no CVA tenderness, no spinal tenderness   CVS: irregularly irregular rhythm,  2+ pulses symmetric in all extremities.  ABDOMEN: Rounded and soft, nontender to palpation, non distended, no masses, no organomegaly, good bowel sounds, no rebound or guarding, no peritoneal signs.   EXTREMITIES: no edema  SKIN: Warm and dry  NEURO/PSYCH: The patient is oriented to person, place and time. Right sided hemiplegia, dysarthria, dysphagia, expressive aphasia, EZ stand to slide board for transfers, dependent for cares            LABS:   No results found for this or any previous visit (from the past 168 hour(s)).  Results for orders placed or performed in visit on 06/10/20   Basic Metabolic Panel   Result  Value Ref Range    Sodium 140 136 - 145 mmol/L    Potassium 4.3 3.5 - 5.0 mmol/L    Chloride 103 98 - 107 mmol/L    CO2 29 22 - 31 mmol/L    Anion Gap, Calculation 8 5 - 18 mmol/L    Glucose 121 70 - 125 mg/dL    Calcium 9.2 8.5 - 10.5 mg/dL    BUN 18 8 - 28 mg/dL    Creatinine 0.85 0.60 - 1.10 mg/dL    GFR MDRD Af Amer >60 >60 mL/min/1.73m2    GFR MDRD Non Af Amer >60 >60 mL/min/1.73m2         Lab Results   Component Value Date    WBC 8.1 06/10/2020    HGB 12.3 06/10/2020    HCT 38.0 06/10/2020    MCV 91 06/10/2020     06/10/2020       No results found for: [id number]  Lab Results   Component Value Date    HGBA1C 7.3 (H) 06/10/2020     No results found for: INR, PROTIME  No results found for: [id number]  Lab Results   Component Value Date    TSH 1.24 06/10/2020           ASSESSMENT/PLAN:    Epistaxis: ordered ocean spray daily and prn. If continues to be frequent will consider referral to ENT for cauterization. Send to ED if 30 min or more of uncontrolled bleeding for rhinorocket.   Hyperglycemia, Type 2 DM with HTN: Accuchecks 207-403 recently. On lantus two times a day, accuchecks bid. Hga1c 5.7 on 8/7. hga1c 5.5 on 11/8. hga1c 2/26-6.5. continue lantus. Hga1c up to 7.3 on 6/10. Will increase lantus to 20u.   CAD, s/p CABG: No chest pain. On aspirin, rosuvastatin. F/u cardiology.    H/o Left MCA CVA: Right sided hemiplegia, dysarthria, dysphagia, expressive aphasia. Wheelchair and bed bound. EZ stand to slide board for transfers. Dependent for cares. On rosuvastatin, aspirin. F/u with neurology prn. Dysphagia on NDD3, nectar thick liquids. No recent concerns.   Anxiety and depression: On paroxetine. ACP following, feels mood stable today.   Dysphagia: NDD3 nectar thick liquids. No recent concerns.   PAF: Rate controlled 60s. On eliquis, metoprolol. Previously reduced metoprolol.   ANGEL, OHS: On CPAP. Refusing at times.   Weights, obesity: [phone]. Monitor. Counseling  on heart healthy diet, diabetic diet,  Weights are climbing and discussed today. Denies any changes recently to diet. Encourage healthier choices.   Chronic CH[facility]: [facility] reported h/o CHF. No echo or records available [facility]. No shortness of breath, no edema noted, on lasix daily. No recent concerns.   HTN: 110s. On irbesartan, metoprolol, lasix. Stable.     CMP, hm1, tsh, hga1c on 6/10    Electronically signed by: Morenita Mosquera NP    Case Management:  I have reviewed the facility/SNF care plan/MDS which was done 4/9/20, including the falls risk, nutrition and pain screening. I also reviewed the current immunizations, and preventive care.. Future cancer screening is not clinically indicated secondary to age/goals of care.   Patient's desire to return to the community is not assessible due to cognitive impairment.    Information reviewed:  Medications, vital signs, orders, and nursing notes.  The health plan new enrollment has happened. I have reviewed the  MDS, the preventative needs,  and facility care plan. The level of care is appropriate. I have reviewed the code status/advanced directives.

## 2021-06-20 NOTE — LETTER
Letter by Morenita Mosquera NP at      Author: Morenita Mosquera NP Service: -- Author Type: --    Filed:  Encounter Date: 2020 Status: (Other)         Patient: Zach Olmos   MR Number: [id number]   YOB: 1950   Date of Visit: 2020                 [facility]    DATE: 2020    NAME:  Zach Olmos             :  1950  MRN: [id number]  CODE STATUS:  FULL CODE    VISIT TYPE: Review Of Multiple Medical Conditions (cva)     FACILITY:  [facility] [[id number]]       CHIEF COMPLAIN/REASON FOR VISIT:    Chief Complaint   Patient presents with   ? Review Of Multiple Medical Conditions     cva               HISTORY OF PRESENT ILLNESS: Zach Olmos is a 70 y.o. female who is a long term care resident of [facility].     Today Ms. Olmos is seen today for review of systems for CVA. She is seen alone in her room. She says she has been doing well and not having any concerns. That lesion in her groin has healed and has not come back at all recently. She has not had any itching or rashes at all either. She says her appetite is fine and no bowel concerns. She denies any breathing problems and no cough or fever. She sleeps fine. She has no new concerns recently. Per nursing her weights are stable 190-194lbs. Her vitals are stable. She did recently  Have some sunburn on  and she says today that it is all healed but does still have some peeling skin on her right arm.     REVIEW OF SYSTEMS:  PROBLEMS AND REVIEW OF SYSTEMS:   Today on ROS:   Currently, no fever, chills, or rigors. Decreased vision. Denies any chest pain, headaches, palpitations, lightheadedness. Denies any GERD symptoms. Positive for weakness, EZ stand for transfers or slide board, dysphagia, urinary incontinence, fecal incontinence, appetite is good, wheelchair bound, right sided weakness, aphasia, no shortness of breath, refusing cpap at times, sunburn on right arm peeling  and healing      Allergies   Allergen Reactions   ? Aricept [Donepezil]    ? Atorvastatin    ? Glipizide    ? Lisinopril      Current Outpatient Medications   Medication Sig   ? acetaminophen (TYLENOL) 325 MG tablet Take 650 mg by mouth every 6 (six) hours as needed for pain.          ? apixaban (ELIQUIS) 5 mg Tab tablet Take 5 mg by mouth 2 (two) times a day.         ? aspirin 81 mg chewable tablet Chew 81 mg daily.         ? furosemide (LASIX) 20 MG tablet Take 20 mg by mouth daily.          ? insulin glargine (LANTUS) 100 unit/mL injection Inject 15 Units under the skin at bedtime. 07:30 AM, 04:30 PM   ? irbesartan (AVAPRO) 75 MG tablet Take 75 mg by mouth 2 (two) times a day.         ? metoprolol tartrate (LOPRESSOR) 50 MG tablet Take 25 mg by mouth 2 (two) times a day.    ? ondansetron (ZOFRAN) 4 MG tablet Take 4 mg by mouth every 6 (six) hours as needed for nausea.   ? PARoxetine (PAXIL) 30 MG tablet Take 60 mg by mouth every morning.         ? rosuvastatin (CRESTOR) 20 MG tablet Take 20 mg by mouth every morning. PER HOSPITAL ORDERS GIVE TO PT. IN THE A.M.            Past Medical History:    Past Medical History:   Diagnosis Date   ? Anemia    ? Anxiety    ? Cancer (H)     Hx of colon cancer   ? CHF (congestive heart failure) (H)     diastolic   ? Coronary artery disease     s/p cabg   ? CVA (cerebral vascular accident) (H)    ? Depression    ? Diabetes mellitus (H)     type 2   ? DVT (deep vein thrombosis) in pregnancy    ? Granuloma annulare    ? Hemiplegia (H)     R sided and minimally communicative   ? Hyperlipidemia    ? Hypertension    ? Obesity    ? Oropharyngeal dysphagia    ? ANGEL (obstructive sleep apnea)    ? Parotitis    ? Paroxysmal atrial fibrillation (H)    ? PE (pulmonary thromboembolism) (H)    ? Stroke (H) 12/2018    Sub acute L MCA stroke   ? Varicose veins of both lower extremities        IMMUNIZATIONS:    There is no immunization history on file for this patient.  Above immunizations  pulled from [facility]. Facility records also reconciled. Outstanding information sent to [facility] to update [facility].  Future immunizations are not needed at this point as all recommended immunizations are up to date.     PHYSICAL EXAMINATION  Vitals:    05/13/20 0700   BP: 137/62   Pulse: 64   Resp: 22   Temp: 97.2  F (36.2  C)   SpO2: 98%   Weight: 194 lb (88 kg)       Today on physical exam:     GENERAL: Awake, Alert, not in any form of acute distress, answers questions appropriately, follows simple commands, conversant  HEENT: Head is normocephalic with normal hair distribution. No evidence of trauma. Ears: No acute purulent discharge. Eyes: Conjunctivae pink with no scleral jaundice. Nose: Normal mucosa and septum. NECK: Supple with no cervical or supraclavicular lymphadenopathy. Trachea is midline.   CHEST: No tenderness or deformity, no crepitus  LUNG: dim to auscultation with good chest expansion. There are no crackles or wheezes, normal AP diameter.  No recent shortness of breath or cough   BACK: No kyphosis of the thoracic spine. Symmetric, no curvature, ROM normal, no CVA tenderness, no spinal tenderness   CVS: irregularly irregular rhythm,  2+ pulses symmetric in all extremities.  ABDOMEN: Rounded and soft, nontender to palpation, non distended, no masses, no organomegaly, good bowel sounds, no rebound or guarding, no peritoneal signs.   EXTREMITIES: no edema  SKIN: Warm and dry, sunburn, peeling right arm healing  NEURO/PSYCH: The patient is oriented to person, place and time. Right sided hemiplegia, dysarthria, dysphagia, expressive aphasia, EZ stand to slide board for transfers, dependent for cares            LABS:   No results found for this or any previous visit (from the past 168 hour(s)).  Results for orders placed or performed in visit on 10/09/19   Basic Metabolic Panel   Result Value Ref Range    Sodium 139 136 - 145 mmol/L    Potassium 4.3 3.5 - 5.0 mmol/L     Chloride 103 98 - 107 mmol/L    CO2 28 22 - 31 mmol/L    Anion Gap, Calculation 8 5 - 18 mmol/L    Glucose 87 70 - 125 mg/dL    Calcium 9.2 8.5 - 10.5 mg/dL    BUN 16 8 - 28 mg/dL    Creatinine 0.83 0.60 - 1.10 mg/dL    GFR MDRD Af Amer >60 >60 mL/min/1.73m2    GFR MDRD Non Af Amer >60 >60 mL/min/1.73m2         Lab Results   Component Value Date    WBC 10.5 10/09/2019    HGB 10.3 (L) 10/09/2019    HCT 31.6 (L) 10/09/2019    MCV 92 10/09/2019     10/09/2019       No results found for: [id number]  Lab Results   Component Value Date    HGBA1C 6.5 (H) 02/26/2020     No results found for: INR, PROTIME  No results found for: [id number]  Lab Results   Component Value Date    TSH 1.04 08/07/2019           ASSESSMENT/PLAN:      Type 2 DM with HTN: Accuchecks controlled recently. On lantus two times a day, accuchecks bid. Hga1c 5.7 on 8/7. hga1c 5.5 on 11/8. hga1c 2/26-6.5. continue lantus.   CAD, s/p CABG: No chest pain. On aspirin, rosuvastatin. F/u cardiology.    H/o Left MCA CVA: Right sided hemiplegia, dysarthria, dysphagia, expressive aphasia. Wheelchair and bed bound. EZ stand to slide board for transfers. Dependent for cares. On rosuvastatin, aspirin. F/u with neurology prn. Dysphagia on NDD3, nectar thick liquids. No recent concerns.   Anxiety and depression: On paroxetine. ACP following, feels mood stable today.   Dysphagia: NDD3 nectar thick liquids. Stable recently, no coughing.   PAF: Rate controlled 60s. On eliquis, metoprolol. Previously reduced metoprolol.   ANGEL, OHS: On CPAP. Refusing at times.   Weights, obesity: [phone]. Monitor. Counseling on heart healthy diet, diabetic diet, healthy lifestyle choices. Stable.   Chronic CHF: [facility] reported h/o CHF. No echo or records available [facility]. No shortness of breath, no edema noted, on lasix daily. Stable weights. No recent concerns.   HTN: 130s. On irbesartan, metoprolol,  lasix. Stable.     Labs last in November, no repeat labs at this time due to pandemic and avoiding unecessary exposures from lab personnel    Electronically signed by: Morenita Mosquera NP    Case Management:  I have reviewed the facility/SNF care plan/MDS which was done 1/22/20, including the falls risk, nutrition and pain screening. I also reviewed the current immunizations, and preventive care.. Future cancer screening is not clinically indicated secondary to age/goals of care.   Patient's desire to return to the community is not assessible due to cognitive impairment.    Information reviewed:  Medications, vital signs, orders, and nursing notes.  The health plan new enrollment has happened. I have reviewed the  MDS, the preventative needs,  and facility care plan. The level of care is appropriate. I have reviewed the code status/advanced directives.

## 2021-06-20 NOTE — LETTER
Letter by Morenita Mosquera NP at      Author: Morenita Mosquera NP Service: -- Author Type: --    Filed:  Encounter Date: 2020 Status: (Other)         Patient: Zach Olmos   MR Number: [id number]   YOB: 1950   Date of Visit: 2020                 [facility]    DATE: 2020    NAME:  Zach Olmos             :  1950  MRN: [id number]  CODE STATUS:  FULL CODE    VISIT TYPE: Problem Visit (left groin wound)     FACILITY:  [facility] [[id number]]       CHIEF COMPLAIN/REASON FOR VISIT:    Chief Complaint   Patient presents with   ? Problem Visit     left groin wound               HISTORY OF PRESENT ILLNESS: Zach Olmos is a 69 y.o. female who is a long term care resident of [facility].     Today Ms. Olmos is seen for concerns of open left groin wound now and development of slough per nursing reports. On exam she was found to be in wheelchair so staff asissted to EZ  order to be able to remove clothing and examine the area. There is a small open wound now that was covered with slough. Zach reports it is not nearly painful anymore, just uncomfortable when the nurses are performing cares. They have been just placing a gauze or abd pad over the wound and tucking into her folds/groin area. There has been some sanguinous and occasional purulent drainage from the wound. The size and area of firmness has significantly reduced. Zach says she is not having any fevers or chills at all. She denies any other medical symptoms or concerns today. Discussed with nursing present in room and with Zach that the slough will need to be removed in order for wound to heal.     Wound was cleansed with topical antiseptic and with clean technique the slough was excised using scissors and tweezers. There was about 50% slough covering the wound that was loose and not adherent to wound bed. The underlying wound bed was clean and pink. There was a  small amount of sanguinous drainage after procedure. Zach experienced no pain and tolerated procedure well. The wound bed measures approximately 3 x 4 cm. <0.5cm in depth. Surrounding skin has open edges with pink skin and no signs of erythema or cellulitis. No further induration, fluctuance, or firmness noted subdermally. Discussed with nursing staff and Zach importance of keeping the wound clean, dry, and protected from urine and stool. New wound care orders discussed with nursing staff. Also discussed may need to shave pubic hair in order to cover with dressing and prevent further infection.     REVIEW OF SYSTEMS:  PROBLEMS AND REVIEW OF SYSTEMS:   Today on ROS:   Currently, no fever, chills, or rigors. Decreased vision. Denies any chest pain, headaches, palpitations, lightheadedness. Denies any GERD symptoms. Positive for weakness, EZ stand for transfers or slide board, dysphagia, urinary incontinence, fecal incontinence, appetite is good, wheelchair bound, right sided weakness, aphasia, no shortness of breath, refusing cpap at times, lesion left groin now converted to open wound      Allergies   Allergen Reactions   ? Aricept [Donepezil]    ? Atorvastatin    ? Glipizide    ? Lisinopril      Current Outpatient Medications   Medication Sig   ? acetaminophen (TYLENOL) 325 MG tablet Take 650 mg by mouth every 6 (six) hours as needed for pain.          ? apixaban (ELIQUIS) 5 mg Tab tablet Take 5 mg by mouth 2 (two) times a day.         ? aspirin 81 mg chewable tablet Chew 81 mg daily.         ? furosemide (LASIX) 20 MG tablet Take 20 mg by mouth daily.          ? insulin glargine (LANTUS) 100 unit/mL injection Inject 10 Units under the skin 2 (two) times a day. 07:30 AM, 04:30 PM   ? irbesartan (AVAPRO) 75 MG tablet Take 75 mg by mouth 2 (two) times a day.         ? metoprolol tartrate (LOPRESSOR) 50 MG tablet Take 50 mg by mouth 2 (two) times a day.         ? ondansetron (ZOFRAN) 4 MG tablet Take 4 mg by mouth  every 6 (six) hours as needed for nausea.   ? PARoxetine (PAXIL) 30 MG tablet Take 60 mg by mouth every morning.         ? rosuvastatin (CRESTOR) 20 MG tablet Take 20 mg by mouth every morning. PER HOSPITAL ORDERS GIVE TO PT. IN THE A.M.            Past Medical History:    Past Medical History:   Diagnosis Date   ? Anemia    ? Anxiety    ? Cancer (H)     Hx of colon cancer   ? CHF (congestive heart failure) (H)     diastolic   ? Coronary artery disease     s/p cabg   ? CVA (cerebral vascular accident) (H)    ? Depression    ? Diabetes mellitus (H)     type 2   ? DVT (deep vein thrombosis) in pregnancy    ? Granuloma annulare    ? Hemiplegia (H)     R sided and minimally communicative   ? Hyperlipidemia    ? Hypertension    ? Obesity    ? Oropharyngeal dysphagia    ? ANGEL (obstructive sleep apnea)    ? Parotitis    ? Paroxysmal atrial fibrillation (H)    ? PE (pulmonary thromboembolism) (H)    ? Stroke (H) 12/2018    Sub acute L MCA stroke   ? Varicose veins of both lower extremities        IMMUNIZATIONS:    There is no immunization history on file for this patient.  Above immunizations pulled from [employer]. Facility records also reconciled. Outstanding information sent to [facility] to update [employer].  Future immunizations are not needed at this point as all recommended immunizations are up to date.     PHYSICAL EXAMINATION  Vitals:    02/04/20 0700   BP: 112/53   Pulse: (!) 52   Resp: 18   Temp: 98.5  F (36.9  C)   SpO2: 94%   Weight: 189 lb (85.7 kg)       Today on physical exam:     GENERAL: Awake, Alert, not in any form of acute distress, answers questions appropriately, follows simple commands, conversant  HEENT: Head is normocephalic with normal hair distribution. No evidence of trauma. Ears: No acute purulent discharge. Eyes: Conjunctivae pink with no scleral jaundice. Nose: Normal mucosa and septum. NECK: Supple with no cervical or supraclavicular lymphadenopathy. Trachea is  midline.   CHEST: No tenderness or deformity, no crepitus  LUNG: dim to auscultation with good chest expansion. There are no crackles or wheezes, normal AP diameter.  No recent shortness of breath or cough   BACK: No kyphosis of the thoracic spine. Symmetric, no curvature, ROM normal, no CVA tenderness, no spinal tenderness   CVS: irregularly irregular rhythm,  2+ pulses symmetric in all extremities.  ABDOMEN: Rounded and soft, nontender to palpation, non distended, no masses, no organomegaly, good bowel sounds, no rebound or guarding, no peritoneal signs.   EXTREMITIES: no edema  SKIN: Warm and dry, Left groin now open wound present approximately 3 x 4 cm, slough covering 50% of wound removed today, now wound bed clean, pink, small amount of sanguinous drainage present, open wound edges noted. Surrounding skin intact, pink with no erythema or warmth. No further induration, fluctuance, no firm subdermal lesion noted today.   NEURO/PSYCH: The patient is oriented to person, place and time. Right sided hemiplegia, dysarthria, dysphagia, expressive aphasia, EZ stand to slide board for transfers, dependent for cares            LABS:   No results found for this or any previous visit (from the past 168 hour(s)).  Results for orders placed or performed in visit on 10/09/19   Basic Metabolic Panel   Result Value Ref Range    Sodium 139 136 - 145 mmol/L    Potassium 4.3 3.5 - 5.0 mmol/L    Chloride 103 98 - 107 mmol/L    CO2 28 22 - 31 mmol/L    Anion Gap, Calculation 8 5 - 18 mmol/L    Glucose 87 70 - 125 mg/dL    Calcium 9.2 8.5 - 10.5 mg/dL    BUN 16 8 - 28 mg/dL    Creatinine 0.83 0.60 - 1.10 mg/dL    GFR MDRD Af Amer >60 >60 mL/min/1.73m2    GFR MDRD Non Af Amer >60 >60 mL/min/1.73m2         Lab Results   Component Value Date    WBC 10.5 10/09/2019    HGB 10.3 (L) 10/09/2019    HCT 31.6 (L) 10/09/2019    MCV 92 10/09/2019     10/09/2019       No results found for: [id number]  Lab Results   Component Value Date     HGBA1C 5.5 11/08/2019     No results found for: INR, PROTIME  No results found for: [id number]  Lab Results   Component Value Date    TSH 1.04 08/07/2019           ASSESSMENT/PLAN:    1. Left groin Subdermal Abscess, Cellulitis, now open wound: On clindamycin and improved size, appearance. Began draining few days ago and now has slough present with open wound underneath. Wound is superficial, partial thickness <0.5cm in depth. Approximately 3x4 cm. No further pain, resolution of subdermal lesion, induration, fluctuance. No further pain. Stop warm packs. Slough excised today, wound bed clean. explained importance of keeping wound bed clean and protected for healing and preventing further infection. Will cleanse with saline, wound cleanser and pat dry. Cover with antimicrobial island dressing if available, may need to shave pubic hair in order for this to adhere. Change daily and prn soiling/drainage for monitoring. Notify if develops further purulent drainage, slough, or increase in size.   2. Type 2 DM with HTN: Accuchecks 117-188 recently. On lantus two times a day, accuchecks bid. Hga1c 5.7 on 8/7. hga1c 5.5 on 11/8. Well controlled. Goal hga1c for age group 6.5-7, with recent CVA may be a little more aggressive in 6-6.5 range. Consider checking hga1c at next visit and depending on results further reduce lantus. Goal for now <180 for wound healing.   3. CAD, s/p CABG: No chest pain. On aspirin, rosuvastatin. F/u cardiology.    4. H/o Left MCA CVA: Right sided hemiplegia, dysarthria, dysphagia, expressive aphasia. Wheelchair and bed bound. EZ stand to slide board for transfers. Dependent for cares. On rosuvastatin, aspirin. F/u with neurology prn. Dysphagia on NDD3, nectar thick liquids.stable.   5. Anxiety and depression: On paroxetine. ACP following, feels mood stable today.   6. Dysphagia: NDD3 nectar thick liquids. No recent changes or concerns.   6. PAF: Rate controlled 50-70s. On eliquis, metoprolol.    7.ANGEL, OHS: On CPAP. Refusing at times.   8. Weights, obesity: [phone]. Monitor. Counseling on heart healthy diet, diabetic diet, healthy lifestyle choices. Stable.   9. Chronic CH[facility]: [facility] h/o CHF. No echo or records available [facility]. No shortness of breath, no edema noted, on lasix daily. Weekly weights.  No recent concerns.   10. HTN: 110s. On irbesartan, metoprolol, lasix. Stable recently.      Labs last in November, will check q6 months      Electronically signed by: Morenita Mosquera NP    Case Management:  I have reviewed the facility/SNF care plan/MDS which was done 10/30/19, including the falls risk, nutrition and pain screening. I also reviewed the current immunizations, and preventive care.. Future cancer screening is not clinically indicated secondary to age/goals of care.   Patient's desire to return to the community is not assessible due to cognitive impairment.    Information reviewed:  Medications, vital signs, orders, and nursing notes.  The health plan new enrollment has happened. I have reviewed the  MDS, the preventative needs,  and facility care plan. The level of care is appropriate. I have reviewed the code status/advanced directives.

## 2021-06-20 NOTE — LETTER
Letter by Morenita Mosquera NP at      Author: Morenita Mosquera NP Service: -- Author Type: --    Filed:  Encounter Date: 2020 Status: (Other)         Patient: Zach Olmos   MR Number: [id number]   YOB: 1950   Date of Visit: 2020                 [facility]    DATE: 2020    NAME:  Zach Olmos             :  1950  MRN: [id number]  CODE STATUS:  FULL CODE    VISIT TYPE: Problem Visit (lump in groin)     FACILITY:  [facility] [[id number]]       CHIEF COMPLAIN/REASON FOR VISIT:    Chief Complaint   Patient presents with   ? Problem Visit     lump in groin               HISTORY OF PRESENT ILLNESS: Zach Omlos is a 69 y.o. female who is a long term care resident of [facility].     Today Ms. Olmos is seen today per staff request for a recently noted lump in her groin. Staff report today it is tender to touch and now has a discoloration over it. She has not been having any fevers. On exam Zach is seen in bed with nurse manager present. She says she first noticed the lump last Thursday and it is grown in size since then. She thinks she told or the staff noted it in the last few days. Nursing notes yesterday it appeared flesh colored but now today is discolored. Zach denies feeling feverish or ill. She does not have any muscle aches or shortness of breath. She just has pain when someone is touching the lump. She denies any drainage.     REVIEW OF SYSTEMS:  PROBLEMS AND REVIEW OF SYSTEMS:   Today on ROS:   Currently, no fever, chills, or rigors. Decreased vision. Denies any chest pain, headaches, palpitations, lightheadedness. Denies any GERD symptoms. Positive for weakness, EZ stand for transfers or slide board, dysphagia, urinary incontinence, fecal incontinence, appetite is good, wheelchair bound, right sided weakness, aphasia, no shortness of breath, refusing cpap at times, lump in left groin today      Allergies    Allergen Reactions   ? Aricept [Donepezil]    ? Atorvastatin    ? Glipizide    ? Lisinopril      Current Outpatient Medications   Medication Sig   ? acetaminophen (TYLENOL) 325 MG tablet Take 650 mg by mouth every 6 (six) hours as needed for pain.          ? apixaban (ELIQUIS) 5 mg Tab tablet Take 5 mg by mouth 2 (two) times a day.         ? aspirin 81 mg chewable tablet Chew 81 mg daily.         ? furosemide (LASIX) 20 MG tablet Take 20 mg by mouth daily.          ? insulin glargine (LANTUS) 100 unit/mL injection Inject 10 Units under the skin 2 (two) times a day. 07:30 AM, 04:30 PM   ? irbesartan (AVAPRO) 75 MG tablet Take 75 mg by mouth 2 (two) times a day.         ? metoprolol tartrate (LOPRESSOR) 50 MG tablet Take 50 mg by mouth 2 (two) times a day.         ? ondansetron (ZOFRAN) 4 MG tablet Take 4 mg by mouth every 6 (six) hours as needed for nausea.   ? PARoxetine (PAXIL) 30 MG tablet Take 60 mg by mouth every morning.         ? rosuvastatin (CRESTOR) 20 MG tablet Take 20 mg by mouth every morning. PER HOSPITAL ORDERS GIVE TO PT. IN THE A.M.            Past Medical History:    Past Medical History:   Diagnosis Date   ? Anemia    ? Anxiety    ? Cancer (H)     Hx of colon cancer   ? CHF (congestive heart failure) (H)     diastolic   ? Coronary artery disease     s/p cabg   ? CVA (cerebral vascular accident) (H)    ? Depression    ? Diabetes mellitus (H)     type 2   ? DVT (deep vein thrombosis) in pregnancy    ? Granuloma annulare    ? Hemiplegia (H)     R sided and minimally communicative   ? Hyperlipidemia    ? Hypertension    ? Obesity    ? Oropharyngeal dysphagia    ? ANGEL (obstructive sleep apnea)    ? Parotitis    ? Paroxysmal atrial fibrillation (H)    ? PE (pulmonary thromboembolism) (H)    ? Stroke (H) 12/2018    Sub acute L MCA stroke   ? Varicose veins of both lower extremities        IMMUNIZATIONS:    There is no immunization history on file for this patient.  Above immunizations pulled from  [facility]. Facility records also reconciled. Outstanding information sent to [facility] to update [facility].  Future immunizations are not needed at this point as all recommended immunizations are up to date.     PHYSICAL EXAMINATION  Vitals:    01/28/20 0700   BP: 113/53   Pulse: 61   Resp: 18   Temp: 97.7  F (36.5  C)   SpO2: 93%   Weight: 187 lb (84.8 kg)       Today on physical exam:     GENERAL: Awake, Alert, not in any form of acute distress, answers questions appropriately, follows simple commands, conversant  HEENT: Head is normocephalic with normal hair distribution. No evidence of trauma. Ears: No acute purulent discharge. Eyes: Conjunctivae pink with no scleral jaundice. Nose: Normal mucosa and septum. NECK: Supple with no cervical or supraclavicular lymphadenopathy. Trachea is midline.   CHEST: No tenderness or deformity, no crepitus  LUNG: dim to auscultation with good chest expansion. There are no crackles or wheezes, normal AP diameter.  No recent shortness of breath or cough   BACK: No kyphosis of the thoracic spine. Symmetric, no curvature, ROM normal, no CVA tenderness, no spinal tenderness   CVS: irregularly irregular rhythm,  2+ pulses symmetric in all extremities.  ABDOMEN: Rounded and soft, nontender to palpation, non distended, no masses, no organomegaly, good bowel sounds, no rebound or guarding, no peritoneal signs.   EXTREMITIES: no edema  SKIN: Warm and dry, Left groin noted firm, circular, subdermal lesion noted, some fluctuance, mobile, tender to palpation, raised surface plaque smaller than subdermal lesion, demarcated with purplish discolored epidermal tissue, surrounding erythema and warmth, surface plaque measures approximately 3-4 x 3-4 cm, subdermal lesion approximately 5-6cm x 5-6cm, marked outline today  NEURO/PSYCH: The patient is oriented to person, place and time. Right sided hemiplegia, dysarthria, dysphagia, expressive aphasia, EZ stand to slide  board for transfers, dependent for cares            LABS:   No results found for this or any previous visit (from the past 168 hour(s)).  Results for orders placed or performed in visit on 10/09/19   Basic Metabolic Panel   Result Value Ref Range    Sodium 139 136 - 145 mmol/L    Potassium 4.3 3.5 - 5.0 mmol/L    Chloride 103 98 - 107 mmol/L    CO2 28 22 - 31 mmol/L    Anion Gap, Calculation 8 5 - 18 mmol/L    Glucose 87 70 - 125 mg/dL    Calcium 9.2 8.5 - 10.5 mg/dL    BUN 16 8 - 28 mg/dL    Creatinine 0.83 0.60 - 1.10 mg/dL    GFR MDRD Af Amer >60 >60 mL/min/1.73m2    GFR MDRD Non Af Amer >60 >60 mL/min/1.73m2         Lab Results   Component Value Date    WBC 10.5 10/09/2019    HGB 10.3 (L) 10/09/2019    HCT 31.6 (L) 10/09/2019    MCV 92 10/09/2019     10/09/2019       No results found for: [id number]  Lab Results   Component Value Date    HGBA1C 5.5 11/08/2019     No results found for: INR, PROTIME  No results found for: [id number]  Lab Results   Component Value Date    TSH 1.04 08/07/2019           ASSESSMENT/PLAN:    1. Left groin Subdermal Abscess: unclear etiology, first noticed few days ago, growing rapidly in size with developing cellulitis. Moderately sized. Tender on palpation. No fevers or signs of systemic infection at this time. Monitor closely and notify if fever >100.5 F. Discussed treatment options with Zach and opted for conservative approach. Will treat with warm packs four times a day, started clindamycin x 7 days. culturelle with clindamycin for c diff prevention. Outlined lesion and monitor q shift if enlarging or increase in pain and cellulitis. Reports pain controlled with tylenol as needed. If no improvement or does not begin to drain on own will order supplies for sterile bedside Incision and drainage.   2. Type 2 DM with HTN: Accuchecks 133-172 recently. On lantus two times a day, accuchecks bid. Hga1c 5.7 on 8/7. hga1c 5.5 on 11/8. Well controlled. Goal hga1c for age group  6.5-7, with recent CVA may be a little more aggressive in 6-6.5 range. Consider checking hga1c at next visit and depending on results further reduce lantus.   3. CAD, s/p CABG: No chest pain. On aspirin, rosuvastatin. F/u cardiology.    4. H/o Left MCA CVA: Right sided hemiplegia, dysarthria, dysphagia, expressive aphasia. Wheelchair and bed bound. EZ stand to slide board for transfers. Dependent for cares. On rosuvastatin, aspirin. F/u with neurology prn. Dysphagia on NDD3, nectar thick liquids.stable.   5. Anxiety and depression: On paroxetine. ACP following, feels mood stable today.   6. Dysphagia: NDD3 nectar thick liquids. No recent changes or concerns.   6. PAF: Rate controlled 50-70s. On eliquis, metoprolol.   7.ANGEL, OHS: On CPAP. Refusing at times.   8. Weights, obesity: [phone]. Monitor. Counseling on heart healthy diet, diabetic diet, healthy lifestyle choices. Stable.   9. Chronic CHF: [facility] h/o CHF. No echo or records available [facility]. No shortness of breath, no edema noted, on lasix daily. Weekly weights.  No recent concerns.   10. HTN: 110s. On irbesartan, metoprolol, lasix. Stable recently.      Labs last in November, will check q6 months      Electronically signed by: Morenita Mosquera NP    Case Management:  I have reviewed the facility/SNF care plan/MDS which was done 10/30/19, including the falls risk, nutrition and pain screening. I also reviewed the current immunizations, and preventive care.. Future cancer screening is not clinically indicated secondary to age/goals of care.   Patient's desire to return to the community is not assessible due to cognitive impairment.    Information reviewed:  Medications, vital signs, orders, and nursing notes.  The health plan new enrollment has happened. I have reviewed the  MDS, the preventative needs,  and facility care plan. The level of care is appropriate. I have reviewed the code  status/advanced directives.

## 2021-06-20 NOTE — LETTER
Letter by Morenita Mosquera NP at      Author: Morenita Mosquera NP Service: -- Author Type: --    Filed:  Encounter Date: 3/2/2020 Status: (Other)         Patient: Zach Olmos   MR Number: [id number]   YOB: 1950   Date of Visit: 3/2/2020                 [facility]    DATE: 3/2/2020    NAME:  Zach Olmos             :  1950  MRN: [id number]  CODE STATUS:  FULL CODE    VISIT TYPE: Problem Visit (wound)     FACILITY:  [facility] [[id number]]       CHIEF COMPLAIN/REASON FOR VISIT:    Chief Complaint   Patient presents with   ? Problem Visit     wound               HISTORY OF PRESENT ILLNESS: Zach Olmos is a 69 y.o. female who is a long term care resident of [facility].     Today Ms. Olmos is seen today for concerns of persistent wound in left groin. Nursing reports concern for scratching the previous pustule she had that had scabbed over. Now it keeps bleeding and draining from time to time. Nursing cleaned it this morning and educated Zach again on not touching this or scratching it. On exam Zach is seen in her room she is about to get up with the aid with EZ stand to the bathroom. She says she noticed the spot in her groin was getting worse a few days ago. She says it is a little tender but does not itch or bother her otherwise. She denies fevers or chills. No swelling to area like before. Discussed with her importance of keeping the area clean and if needed will order another round of antibiotics if starts to appear infected again.     REVIEW OF SYSTEMS:  PROBLEMS AND REVIEW OF SYSTEMS:   Today on ROS:   Currently, no fever, chills, or rigors. Decreased vision. Denies any chest pain, headaches, palpitations, lightheadedness. Denies any GERD symptoms. Positive for weakness, EZ stand for transfers or slide board, dysphagia, urinary incontinence, fecal incontinence, appetite is good, wheelchair bound, right sided weakness, aphasia,  no shortness of breath, refusing cpap at times, lesion left groin open again and draining intermittently      Allergies   Allergen Reactions   ? Aricept [Donepezil]    ? Atorvastatin    ? Glipizide    ? Lisinopril      Current Outpatient Medications   Medication Sig   ? acetaminophen (TYLENOL) 325 MG tablet Take 650 mg by mouth every 6 (six) hours as needed for pain.          ? apixaban (ELIQUIS) 5 mg Tab tablet Take 5 mg by mouth 2 (two) times a day.         ? aspirin 81 mg chewable tablet Chew 81 mg daily.         ? furosemide (LASIX) 20 MG tablet Take 20 mg by mouth daily.          ? insulin glargine (LANTUS) 100 unit/mL injection Inject 15 Units under the skin at bedtime. 07:30 AM, 04:30 PM   ? irbesartan (AVAPRO) 75 MG tablet Take 75 mg by mouth 2 (two) times a day.         ? metoprolol tartrate (LOPRESSOR) 50 MG tablet Take 25 mg by mouth 2 (two) times a day.    ? ondansetron (ZOFRAN) 4 MG tablet Take 4 mg by mouth every 6 (six) hours as needed for nausea.   ? PARoxetine (PAXIL) 30 MG tablet Take 60 mg by mouth every morning.         ? rosuvastatin (CRESTOR) 20 MG tablet Take 20 mg by mouth every morning. PER HOSPITAL ORDERS GIVE TO PT. IN THE A.M.            Past Medical History:    Past Medical History:   Diagnosis Date   ? Anemia    ? Anxiety    ? Cancer (H)     Hx of colon cancer   ? CHF (congestive heart failure) (H)     diastolic   ? Coronary artery disease     s/p cabg   ? CVA (cerebral vascular accident) (H)    ? Depression    ? Diabetes mellitus (H)     type 2   ? DVT (deep vein thrombosis) in pregnancy    ? Granuloma annulare    ? Hemiplegia (H)     R sided and minimally communicative   ? Hyperlipidemia    ? Hypertension    ? Obesity    ? Oropharyngeal dysphagia    ? ANGEL (obstructive sleep apnea)    ? Parotitis    ? Paroxysmal atrial fibrillation (H)    ? PE (pulmonary thromboembolism) (H)    ? Stroke (H) 12/2018    Sub acute L MCA stroke   ? Varicose veins of both lower extremities         IMMUNIZATIONS:    There is no immunization history on file for this patient.  Above immunizations pulled from [facility]. Facility records also reconciled. Outstanding information sent to [facility] to update [facility].  Future immunizations are not needed at this point as all recommended immunizations are up to date.     PHYSICAL EXAMINATION  Vitals:    03/02/20 0700   BP: 106/51   Pulse: (!) 54   Resp: 18   Temp: 98.9  F (37.2  C)   SpO2: 97%       Today on physical exam:     GENERAL: Awake, Alert, not in any form of acute distress, answers questions appropriately, follows simple commands, conversant  HEENT: Head is normocephalic with normal hair distribution. No evidence of trauma. Ears: No acute purulent discharge. Eyes: Conjunctivae pink with no scleral jaundice. Nose: Normal mucosa and septum. NECK: Supple with no cervical or supraclavicular lymphadenopathy. Trachea is midline.   CHEST: No tenderness or deformity, no crepitus  LUNG: dim to auscultation with good chest expansion. There are no crackles or wheezes, normal AP diameter.  No recent shortness of breath or cough   BACK: No kyphosis of the thoracic spine. Symmetric, no curvature, ROM normal, no CVA tenderness, no spinal tenderness   CVS: irregularly irregular rhythm,  2+ pulses symmetric in all extremities.  ABDOMEN: Rounded and soft, nontender to palpation, non distended, no masses, no organomegaly, good bowel sounds, no rebound or guarding, no peritoneal signs.   EXTREMITIES: no edema  SKIN: Warm and dry, Left groin small open area with sanguinous drainage, some erythema but no warmth  NEURO/PSYCH: The patient is oriented to person, place and time. Right sided hemiplegia, dysarthria, dysphagia, expressive aphasia, EZ stand to slide board for transfers, dependent for cares            LABS:   Recent Results (from the past 168 hour(s))   Glycosylated Hemoglobin A1C   Result Value Ref Range    Hemoglobin A1c 6.5 (H) <=5.6 %      Results for orders placed or performed in visit on 10/09/19   Basic Metabolic Panel   Result Value Ref Range    Sodium 139 136 - 145 mmol/L    Potassium 4.3 3.5 - 5.0 mmol/L    Chloride 103 98 - 107 mmol/L    CO2 28 22 - 31 mmol/L    Anion Gap, Calculation 8 5 - 18 mmol/L    Glucose 87 70 - 125 mg/dL    Calcium 9.2 8.5 - 10.5 mg/dL    BUN 16 8 - 28 mg/dL    Creatinine 0.83 0.60 - 1.10 mg/dL    GFR MDRD Af Amer >60 >60 mL/min/1.73m2    GFR MDRD Non Af Amer >60 >60 mL/min/1.73m2         Lab Results   Component Value Date    WBC 10.5 10/09/2019    HGB 10.3 (L) 10/09/2019    HCT 31.6 (L) 10/09/2019    MCV 92 10/09/2019     10/09/2019       No results found for: [id number]  Lab Results   Component Value Date    HGBA1C 6.5 (H) 02/26/2020     No results found for: INR, PROTIME  No results found for: [id number]  Lab Results   Component Value Date    TSH 1.04 08/07/2019           ASSESSMENT/PLAN:    Left groin wound: cellulitis and abscess resolved. Continues to scratch intermittently per nursing report. Sanguinous drainage, per nursing no purulent drainage. Some erythema but no warmth. Will order to cleanse daily pat dry and apply bacitracin. Keep covered with abd or gauze tucked into groin. Keep clean as much as possible and monitor for signs of infection and notify me if develops further edema, warmth, purulent drainage and will treat with PO antibiotics.   Type 2 DM with HTN: Accuchecks  recently. On lantus two times a day, accuchecks bid. Hga1c 5.7 on 8/7. hga1c 5.5 on 11/8. Well controlled. Goal hga1c for age group 6.5-7, with recent CVA may be a little more aggressive in 6-6.5 range. Reduced lantus to 15u daily instead of two times a day (20u total previously). Will need to check hga1c at next visit.   CAD, s/p CABG: No chest pain. On aspirin, rosuvastatin. F/u cardiology.    H/o Left MCA CVA: Right sided hemiplegia, dysarthria, dysphagia, expressive aphasia. Wheelchair and bed bound. EZ stand to  slide board for transfers. Dependent for cares. On rosuvastatin, aspirin. F/u with neurology prn. Dysphagia on NDD3, nectar thick liquids. No recent concerns.   Anxiety and depression: On paroxetine. ACP following, feels mood stable today.   Dysphagia: NDD3 nectar thick liquids. No recent changes or concerns.   PAF: Rate controlled 50s. On eliquis, metoprolol. Will reduce metoprolol dose to 25mg two times a day.   ANGEL, OHS: On CPAP. Refusing at times.   Weights, obesity: [phone]. Monitor. Counseling on heart healthy diet, diabetic diet, healthy lifestyle choices. Stable.   Chronic CHF: [facility] reported h/o CHF. No echo or records available [facility]. No shortness of breath, no edema noted, on lasix daily. Weekly weights.  No recent concerns.   HTN: 100s. On irbesartan, metoprolol, lasix. Reduced metoprolol dose.     Labs last in November, will check q6 months      Electronically signed by: Morenita Mosquera NP    Case Management:  I have reviewed the facility/SNF care plan/MDS which was done 1/22/20, including the falls risk, nutrition and pain screening. I also reviewed the current immunizations, and preventive care.. Future cancer screening is not clinically indicated secondary to age/goals of care.   Patient's desire to return to the community is not assessible due to cognitive impairment.    Information reviewed:  Medications, vital signs, orders, and nursing notes.  The health plan new enrollment has happened. I have reviewed the  MDS, the preventative needs,  and facility care plan. The level of care is appropriate. I have reviewed the code status/advanced directives.

## 2021-06-20 NOTE — LETTER
Letter by Vick Sutton MD at      Author: Vick Sutton MD Service: -- Author Type: --    Filed:  Encounter Date: 8/11/2020 Status: (Other)         Patient: Zach Olmos   MR Number: [id number]   YOB: 1950   Date of Visit: 8/11/2020      [facility]    Facility:  [facility] [[id number]]    Code Status:  FULL CODE    Chief Complaint   Patient presents with   ? Review Of Multiple Medical Conditions   :                    Patient is seen today for a federally mandated regulatory visit                 Patient Active Problem List   Diagnosis   ? Essential hypertension   ? Coronary artery disease involving coronary bypass graft with angina pectoris, unspecified whether native or transplanted heart (H)   ? Dyslipidemia (high LDL; low HDL)   ? Right hemiplegia (H)   ? Oropharyngeal dysphagia   ? PAF (paroxysmal atrial fibrillation) (H)   ? GERD (gastroesophageal reflux disease)   ? ANGEL on CPAP   ? Anxiety and depression   ? Type 2 DM with CKD stage 1 and hypertension (H)   ? Chronic combined systolic and diastolic congestive heart failure (H)   ? Morbid obesity (H)   ? Obesity hypoventilation syndrome (H)   ? Granuloma annulare   ? Varicose veins of both legs with edema   ? H/O deep venous thrombosis       History:  Ms. 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, expressive aphasia, dysphasia (November 17, 2018), coronary artery disease/CABG (March 2018), DVT/PE (occurring after CABG 2018), DM2 (currently Lantus 10 units twice daily), paroxysmal atrial fibrillation (apixaban 5 twice daily/metoprolol 50 twice daily) hypertension (irbesartan 75 twice daily, metoprolol 50 mg twice daily), CKD 1, GERD, remote colon cancer, hyperlipidemia (Crestor 20), anxiety/depression (paroxetine 60 mg daily) seen today for review of her multiple medical problems/federally mandated regulatory  visit      Subjective/ROS:    -augmented by discussion with facility staff involved in direct care  -limited by: Expressive aphasia    Patient says she is feeling okay.  Review of systems is compromised by very poor memory.  She denies headaches change in vision speaking swallowing hearing nausea vomiting diarrhea melena bright red blood per rectum.  She says she is had some constipation but not recently.  She remains on nectar thick liquids with NDD 3.  Her weight has increased considerably 206 pounds was 184 pounds when I saw her on January 7 although I do see weights a size 200 pounds in the past as well.  There have been no bleeding complications with her apixaban.  She occasionally has heart rates into the 50s but her bradycardia is asymptomatic.  Staff says that she is poorly compliant with CPAP and she does not disagree.  She reports no falls or pain, mood change, remainder is negative.  She had lab work on Maria Del Rosario 10 of this year which is reviewed and looks mostly good.    The most striking issue with vital review is her consistent hypoglycemia basically all blood sugars greater than 200 since mid July.  Patient was once on metformin 1000 mg twice daily.  She has been managed with Lantus insulin currently 18 units.  Her A1c was 5.5 on November 18, 2019, 6.5 on February 26, 2020, and 7.3 on Maria Del Rosario 10, 2020    Past Medical History:   Diagnosis Date   ? Anemia    ? Anxiety    ? Cancer (H)     Hx of colon cancer   ? CHF (congestive heart failure) (H)     diastolic   ? Coronary artery disease     s/p cabg   ? CVA (cerebral vascular accident) (H)    ? Depression    ? Diabetes mellitus (H)     type 2   ? DVT (deep vein thrombosis) in pregnancy    ? Granuloma annulare    ? Hemiplegia (H)     R sided and minimally communicative   ? Hyperlipidemia    ? Hypertension    ? Obesity    ? Oropharyngeal dysphagia    ? ANGEL (obstructive sleep apnea)    ? Parotitis    ? Paroxysmal atrial fibrillation (H)    ? PE (pulmonary  thromboembolism) (H)    ? Stroke (H) 12/2018    Sub acute L MCA stroke   ? Varicose veins of both lower extremities      Past Surgical History:   Procedure Laterality Date   ? CORONARY ARTERY BYPASS GRAFT      x5          Family History   Problem Relation Age of Onset   ? Heart disease Mother    ? Cancer Father         esophageal   :       Social History     Socioeconomic History   ? Marital status: Single     Spouse name: Not on file   ? Number of children: Not on file   ? Years of education: Not on file   ? Highest education level: Not on file   Occupational History   ? Not on file   Social Needs   ? Financial resource strain: Not on file   ? Food insecurity     Worry: Not on file     Inability: Not on file   ? Transportation needs     Medical: Not on file     Non-medical: Not on file   Tobacco Use   ? Smoking status: Never Smoker   ? Smokeless tobacco: Never Used   Substance and Sexual Activity   ? Alcohol use: No     Frequency: Never   ? Drug use: No   ? Sexual activity: Not on file   Lifestyle   ? Physical activity     Days per week: Not on file     Minutes per session: Not on file   ? Stress: Not on file   Relationships   ? Social connections     Talks on phone: Not on file     Gets together: Not on file     Attends Sabianist service: Not on file     Active member of club or organization: Not on file     Attends meetings of clubs or organizations: Not on file     Relationship status: Not on file   ? Intimate partner violence     Fear of current or ex partner: Not on file     Emotionally abused: Not on file     Physically abused: Not on file     Forced sexual activity: Not on file   Other Topics Concern   ? Not on file   Social History Narrative   ? Not on file   :        Current Outpatient Medications on File Prior to Visit   Medication Sig Dispense Refill   ? acetaminophen (TYLENOL) 325 MG tablet Take 650 mg by mouth every 6 (six) hours as needed for pain.            ? apixaban (ELIQUIS) 5 mg Tab tablet Take  5 mg by mouth 2 (two) times a day.           ? artificial tears,hypromellose, (GENTEAL; SYSTANE) 0.3 % Gel Administer to both eyes.     ? aspirin 81 mg chewable tablet Chew 81 mg daily.           ? furosemide (LASIX) 20 MG tablet Take 20 mg by mouth daily.            ? insulin glargine (LANTUS) 100 unit/mL injection Inject 18 Units under the skin at bedtime. 07:30 AM, 04:30 PM     ? irbesartan (AVAPRO) 75 MG tablet Take 75 mg by mouth 2 (two) times a day.        0   ? metoprolol tartrate (LOPRESSOR) 50 MG tablet Take 25 mg by mouth 2 (two) times a day.      ? ondansetron (ZOFRAN) 4 MG tablet Take 4 mg by mouth every 6 (six) hours as needed for nausea.     ? PARoxetine (PAXIL) 30 MG tablet Take 60 mg by mouth every morning.           ? rosuvastatin (CRESTOR) 20 MG tablet Take 20 mg by mouth every morning. PER HOSPITAL ORDERS GIVE TO PT. IN THE A.M.              No current facility-administered medications on file prior to visit.    :      Allergies:  Aricept [donepezil]; Atorvastatin; Glipizide; and Lisinopril    Vitals:  There were no vitals taken for this visit.    Vital signs from the facility record:                  Most Recent Vitals Date/Time Taken  Temperature: 98.5  F 08/11/2020 05:20 PM  Pulse: 67 per minute 08/11/2020 05:20 PM  Respirations: 16 per minute 08/11/2020 05:20 PM  Blood Pressure: 133 / 57 mmHg 08/11/2020 05:20 PM  O2 Saturation: 97 % 08/11/2020 05:20 PM  Blood Sugar: 247 mg/dL 08/11/2020 07:36 PM  Weight: 207 lbs / Routine       BMI: 32.42 08/10/2020 02:51 PM  Height: 5ft   There is no height or weight on file to calculate BMI.    Physical exam:    General:  Alert pleasant with word finding difficulty but she can make herself understood with time.  However she has a very poor memory    She has right hemiplegia with right lower extremity edema 2+ to the midshin with some pitting but no venous stasis dermatitis, blistering or weeping normocephalic/atraumatic sclera clear EOMI gaze is conjugate  she is attentive.  Her breathing is without tachypnea or accessory muscle use skin is clear and visualized portions    Due to the 2020 Covid 19 pandemic, the patient was visually observed at a 6 foot plus distance.  An observational exam was performed in an effort to keep patient safe from Covid 19 and other communicable diseases.      Labs:  Lab Results   Component Value Date    WBC 8.1 06/10/2020    HGB 12.3 06/10/2020    HCT 38.0 06/10/2020    MCV 91 06/10/2020     06/10/2020     Results for orders placed or performed in visit on 06/10/20   Basic Metabolic Panel   Result Value Ref Range    Sodium 140 136 - 145 mmol/L    Potassium 4.3 3.5 - 5.0 mmol/L    Chloride 103 98 - 107 mmol/L    CO2 29 22 - 31 mmol/L    Anion Gap, Calculation 8 5 - 18 mmol/L    Glucose 121 70 - 125 mg/dL    Calcium 9.2 8.5 - 10.5 mg/dL    BUN 18 8 - 28 mg/dL    Creatinine 0.85 0.60 - 1.10 mg/dL    GFR MDRD Af Amer >60 >60 mL/min/1.73m2    GFR MDRD Non Af Amer >60 >60 mL/min/1.73m2         Lab Results   Component Value Date    TSH 1.24 06/10/2020     @LASTA!C@  [unfilled]  No results found for: [id number]  No results found for: BNP  [unfilled]        Invalid input(s): PRINTERVAL      Assessment/Plan:      ICD-10-CM    1. Dyslipidemia (high LDL; low HDL)  E78.5    2. Right hemiplegia (H)  G81.91    3. Type 2 DM with CKD stage 1 and hypertension (H)  E11.22     I12.9     N18.1    4. Chronic combined systolic and diastolic congestive heart failure (H)  I50.42    5. Coronary artery disease involving coronary bypass graft with angina pectoris, unspecified whether native or transplanted heart (H)  I25.709    6. Essential hypertension  I10      CVA  Right hemiplegia  Expressive aphasia  Dysphasia                 Patient continues to have expressive aphasia.  Her dysphasia has improved, NDD 3 with nectar thick liquids remains her current diet.  She is on appropriate secondary prevention with apixaban for her paroxysmal atrial fibrillation,  blood pressure--- metoprolol irbesartan and lipid control--- Crestor.  She also takes aspirin 81 mg daily for cardiac indication     Paroxysmal atrial fibrillation  Bradycardia   Bradycardia has been mild into the 50s and asymptomatic.  Thus I make no changes in her metoprolol.                      Coronary artery disease  CABG 2018                 Continue aspirin, metoprolol.  I do find an echocardiogram from 2018 showing normal EF 55 to 60%, this was post cabg.     DM 2                 Patient's recent control has been poor.  She is not showing any sign of infection.  Staff says she is not eating any differently than she has in the past.  I do not identify any certain trigger for worsening of the sugars.  Her A1c has been on a steady climb over the last 9 months currently at 7.3 (still quite close to target) and with her current blood sugars there is no reason to believe that it is going to stabilize there.  Her Lantus was twice daily now just once daily 18 units.    She seems a good candidate to me for Glucophage/metformin.  She was once on it and tolerated it thousand milligrams twice daily.  It may help her metabolic syndrome.  It may also help her weight which has risen significantly over the past few months.  It appears that the metformin was dropped at the time of her stroke and tube feedings although I do not find a clear reason why, perhaps because she required insulin for hyperglycemia of tube feeding??  There is an outside chance she could be controlled with oral hypoglycemic agents and no Lantus at all?  The initial goal is simply to reestablish metformin treatment.  No changes will be made in her Lantus.  We will recheck her A1c in 3 months 11/15/2020.     Weight gain      Question dietary indiscretion?  Question exogenous insulin effect?  She is quite inactive, thus caloric modification is really the only thing would be expected make much difference.  I have some hope that metformin could lead to  some improvement.  TSH good in June         hypertension                 Overall control appears adequate we will continue irbesartan 75 twice daily and metoprolol 50 twice daily (along she is not having bradycardia)     ANGEL/CPAP  Obesity                 Weight is back up.  She might require higher pressure?  It is a moot point as she does not wish to use the treatment.  I discussed the high risk of heart attacks, strokes, potential for earlier death.     Hyperlipidemia                   Lab Results   Component Value Date    CHOL 77 04/16/2019     Lab Results   Component Value Date    HDL 19 (L) 04/16/2019     Lab Results   Component Value Date    LDLCALC 38 04/16/2019     Lab Results   Component Value Date    TRIG 98 04/16/2019     No components found for: CHOLHDL    Just had blood work in June, but no FLP at that time.  No changes made in her Crestor--- due to CVA history and coronary artery disease history, though with her remarkably low LDL, would recheck FLP.  That can probably wait until her A1c rechecked November 15, 2020     History of DVT/PE                 Sounds provoked most likely following her CABG and prolonged TCU stay, however it is a moot point as she is on apixaban for atrial fibrillation at this point anyway     Pain   no changes made PRN Tylenol     Constipation   patient reports good bowel function without any stool softeners.           Case discussed with:    Facility staff           Vick Sutton MD

## 2021-06-20 NOTE — LETTER
Letter by Morenita Mosquera NP at      Author: Morenita Mosquera NP Service: -- Author Type: --    Filed:  Encounter Date: 3/11/2020 Status: (Other)         Patient: Zach Olmos   MR Number: [id number]   YOB: 1950   Date of Visit: 3/11/2020                 [facility]    DATE: 3/11/2020    NAME:  Zach Olmos             :  1950  MRN: [id number]  CODE STATUS:  FULL CODE    VISIT TYPE: Review Of Multiple Medical Conditions (cva)     FACILITY:  [facility] [[id number]]       CHIEF COMPLAIN/REASON FOR VISIT:    Chief Complaint   Patient presents with   ? Review Of Multiple Medical Conditions     cva               HISTORY OF PRESENT ILLNESS: Zahc Olmos is a 69 y.o. female who is a long term care resident of [facility].     Today Ms. Olmos is seen for follow up on CVA. She is seen alone in her room today. She says she has been doing well and denies any pain today. She is not having any issues with her bowels that she knows of. She denies any recent cough or cold symptoms. She is not having any dizziness or headaches. She is not having any acute concerns. She has been sleeping very well and thinks her appetite is good. She says the lesion on her left groin is not bothering her anymore. She is not sure how big it is or if it is still there. Per nursing she is scheduled for vision appt on 3/17. Her weights recently have been 192-185lbs. Her blood sugars recently have been 127-223.     REVIEW OF SYSTEMS:  PROBLEMS AND REVIEW OF SYSTEMS:   Today on ROS:   Currently, no fever, chills, or rigors. Decreased vision. Denies any chest pain, headaches, palpitations, lightheadedness. Denies any GERD symptoms. Positive for weakness, EZ stand for transfers or slide board, dysphagia, urinary incontinence, fecal incontinence, appetite is good, wheelchair bound, right sided weakness, aphasia, no shortness of breath, refusing cpap at times, lesion left  groin      Allergies   Allergen Reactions   ? Aricept [Donepezil]    ? Atorvastatin    ? Glipizide    ? Lisinopril      Current Outpatient Medications   Medication Sig   ? acetaminophen (TYLENOL) 325 MG tablet Take 650 mg by mouth every 6 (six) hours as needed for pain.          ? apixaban (ELIQUIS) 5 mg Tab tablet Take 5 mg by mouth 2 (two) times a day.         ? aspirin 81 mg chewable tablet Chew 81 mg daily.         ? furosemide (LASIX) 20 MG tablet Take 20 mg by mouth daily.          ? insulin glargine (LANTUS) 100 unit/mL injection Inject 15 Units under the skin at bedtime. 07:30 AM, 04:30 PM   ? irbesartan (AVAPRO) 75 MG tablet Take 75 mg by mouth 2 (two) times a day.         ? metoprolol tartrate (LOPRESSOR) 50 MG tablet Take 25 mg by mouth 2 (two) times a day.    ? ondansetron (ZOFRAN) 4 MG tablet Take 4 mg by mouth every 6 (six) hours as needed for nausea.   ? PARoxetine (PAXIL) 30 MG tablet Take 60 mg by mouth every morning.         ? rosuvastatin (CRESTOR) 20 MG tablet Take 20 mg by mouth every morning. PER HOSPITAL ORDERS GIVE TO PT. IN THE A.M.            Past Medical History:    Past Medical History:   Diagnosis Date   ? Anemia    ? Anxiety    ? Cancer (H)     Hx of colon cancer   ? CHF (congestive heart failure) (H)     diastolic   ? Coronary artery disease     s/p cabg   ? CVA (cerebral vascular accident) (H)    ? Depression    ? Diabetes mellitus (H)     type 2   ? DVT (deep vein thrombosis) in pregnancy    ? Granuloma annulare    ? Hemiplegia (H)     R sided and minimally communicative   ? Hyperlipidemia    ? Hypertension    ? Obesity    ? Oropharyngeal dysphagia    ? ANGEL (obstructive sleep apnea)    ? Parotitis    ? Paroxysmal atrial fibrillation (H)    ? PE (pulmonary thromboembolism) (H)    ? Stroke (H) 12/2018    Sub acute L MCA stroke   ? Varicose veins of both lower extremities        IMMUNIZATIONS:    There is no immunization history on file for this patient.  Above immunizations pulled from  [facility]. Facility records also reconciled. Outstanding information sent to [facility] to update [facility].  Future immunizations are not needed at this point as all recommended immunizations are up to date.     PHYSICAL EXAMINATION  Vitals:    03/10/20 1930   BP: 118/78   Pulse: 68   Resp: 20   Temp: 98  F (36.7  C)   SpO2: 98%   Weight: 185 lb (83.9 kg)       Today on physical exam:     GENERAL: Awake, Alert, not in any form of acute distress, answers questions appropriately, follows simple commands, conversant  HEENT: Head is normocephalic with normal hair distribution. No evidence of trauma. Ears: No acute purulent discharge. Eyes: Conjunctivae pink with no scleral jaundice. Nose: Normal mucosa and septum. NECK: Supple with no cervical or supraclavicular lymphadenopathy. Trachea is midline.   CHEST: No tenderness or deformity, no crepitus  LUNG: dim to auscultation with good chest expansion. There are no crackles or wheezes, normal AP diameter.  No recent shortness of breath or cough   BACK: No kyphosis of the thoracic spine. Symmetric, no curvature, ROM normal, no CVA tenderness, no spinal tenderness   CVS: irregularly irregular rhythm,  2+ pulses symmetric in all extremities.  ABDOMEN: Rounded and soft, nontender to palpation, non distended, no masses, no organomegaly, good bowel sounds, no rebound or guarding, no peritoneal signs.   EXTREMITIES: no edema  SKIN: Warm and dry, Left groin pustule noted, small in size, no open areas noted, no drainage noted, no erythema or tenderness today  NEURO/PSYCH: The patient is oriented to person, place and time. Right sided hemiplegia, dysarthria, dysphagia, expressive aphasia, EZ stand to slide board for transfers, dependent for cares            LABS:   No results found for this or any previous visit (from the past 168 hour(s)).  Results for orders placed or performed in visit on 10/09/19   Basic Metabolic Panel   Result Value Ref Range     Sodium 139 136 - 145 mmol/L    Potassium 4.3 3.5 - 5.0 mmol/L    Chloride 103 98 - 107 mmol/L    CO2 28 22 - 31 mmol/L    Anion Gap, Calculation 8 5 - 18 mmol/L    Glucose 87 70 - 125 mg/dL    Calcium 9.2 8.5 - 10.5 mg/dL    BUN 16 8 - 28 mg/dL    Creatinine 0.83 0.60 - 1.10 mg/dL    GFR MDRD Af Amer >60 >60 mL/min/1.73m2    GFR MDRD Non Af Amer >60 >60 mL/min/1.73m2         Lab Results   Component Value Date    WBC 10.5 10/09/2019    HGB 10.3 (L) 10/09/2019    HCT 31.6 (L) 10/09/2019    MCV 92 10/09/2019     10/09/2019       No results found for: [id number]  Lab Results   Component Value Date    HGBA1C 6.5 (H) 02/26/2020     No results found for: INR, PROTIME  No results found for: [id number]  Lab Results   Component Value Date    TSH 1.04 08/07/2019           ASSESSMENT/PLAN:    Left groin pustule: Now very small in size, no open area, drainage, no erythema, warmth, or tenderness anymore. Appears to be improving. Continue applying bacitracin daily and monitoring.   Type 2 DM with HTN: Accuchecks 127-223 recently. On lantus two times a day, accuchecks bid. Hga1c 5.7 on 8/7. hga1c 5.5 on 11/8. Reduced lantus to 15u daily instead of two times a day (20u total previously). recheck hga1c 2/26-6.5.   CAD, s/p CABG: No chest pain. On aspirin, rosuvastatin. F/u cardiology.    H/o Left MCA CVA: Right sided hemiplegia, dysarthria, dysphagia, expressive aphasia. Wheelchair and bed bound. EZ stand to slide board for transfers. Dependent for cares. On rosuvastatin, aspirin. F/u with neurology prn. Dysphagia on NDD3, nectar thick liquids. No recent concerns.   Anxiety and depression: On paroxetine. ACP following, feels mood stable today.   Dysphagia: NDD3 nectar thick liquids. Stable recently, no coughing.   PAF: Rate controlled 60s. On eliquis, metoprolol. Previously reduced metoprolol.   ANGEL, OHS: On CPAP. Refusing at times.   Weights, obesity: [phone]. Monitor. Counseling on heart healthy  diet, diabetic diet, healthy lifestyle choices. Stable.   Chronic CH[facility]: [facility] h/[facility] CHF. No echo or records available [facility]. No shortness of breath, no edema noted, on lasix daily. Stable weights. No recent concerns.   HTN: 110s. On irbesartan, metoprolol, lasix. Stable.     Labs last in November, will consider checking at next visit      Electronically signed by: Morenita Mosquera NP    Case Management:  I have reviewed the facility/SNF care plan/MDS which was done 1/22/20, including the falls risk, nutrition and pain screening. I also reviewed the current immunizations, and preventive care.. Future cancer screening is not clinically indicated secondary to age/goals of care.   Patient's desire to return to the community is not assessible due to cognitive impairment.    Information reviewed:  Medications, vital signs, orders, and nursing notes.  The health plan new enrollment has happened. I have reviewed the  MDS, the preventative needs,  and facility care plan. The level of care is appropriate. I have reviewed the code status/advanced directives.

## 2021-06-20 NOTE — LETTER
Letter by Morenita Mosquera NP at      Author: Morenita Mosquera NP Service: -- Author Type: --    Filed:  Encounter Date: 10/7/2020 Status: (Other)         Patient: Zach Olmos   MR Number: [id number]   YOB: 1950   Date of Visit: 10/7/2020                                                                 [facility]     DATE: 10/7/2020     NAME:  Zach Olmos             :  1950  MRN: [id number]  CODE STATUS:  FULL CODE     VISIT TYPE: Review Of Multiple Medical Conditions (htn, dm), annual wellness exam     FACILITY:  [facility] [[id number]]        Assessment and Plan:   Type 2 DM with HTN: Accuchecks rather well controlled 187-243. On lantus daily, accuchecks bid. Hga1c 5.7 on . hga1c 5.5 on . hga1c -6.5. Hga1c up to 7.3 on 6/10 and increased lantus at that time. Per pharmacy review and recommendations will reduce lantus and increase metformin. Will recheck hga1c on 10/21. Will determine if need further lantus adjustment at that time. Discussed with her importance of diabetic foot and eye exams today. Per facility staff will be rescheduling eye exams soon, podiatry now rounding again in facility, last visit in October.   CAD, s/p CABG: No chest pain or recent concerns. On aspirin, rosuvastatin. F/u cardiology prn, no recent follow ups.  per pharmacy recommendations will discontinue aspirin as also on eliquis. Cmp, tsh on 10/21.   H/o Left MCA CVA, Right sided hemiplegia, dysphagia: Right sided hemiplegia, dysarthria, dysphagia, expressive aphasia. Wheelchair and bed bound. EZ stand to slide board for transfers. Dependent for cares. On rosuvastatin, eliquis, aspirin. F/u with neurology prn. Dysphagia on NDD3, nectar thick liquids. Per pharmacy will discontinue aspirin. Continue eliquis. ST did recently re evaluate and ordered NDD3 with thin liquid trials in isolation. Continue to monitor for signs of aspiration. Vitals stable and no recent  coughing or respiratory concerns.   Anxiety and depression: On paroxetine. ACP following, feels mood stable today. Son recently visited in person on 10/3. No recent depression concerns.   PAF: Rate controlled 60s. On eliquis, metoprolol. per pharmacy recommendations will change metoprolol to XR formulation due to improved outcomes.   ANGEL, OHS: On CPAP. Intermittently wears. No recent concerns.   Weights, obesity: [phone].Counseling on heart healthy diet, diabetic diet, lifestyle changes. Dietary consult for dietary counseling, reduced portion sizes. Unable to participate in physical activity but encouraged to be as active as possible.   Chronic CHF: [facility] reported h/o CHF. No echo or records available [facility]. No shortness of breath, no edema noted, on lasix daily. No recent exacerbations or concerns. Appears euvolemic today.   HTN: 140s. On irbesartan, metoprolol, lasix. Changed metoprolol to XR.    Encounter for preventative adult health care exam with abnormal findings    Patient has been advised of split billing requirements and indicates understanding: Yes          The patient's current medical problems were reviewed.    I have had an Advance Directives discussion with the patient.  The following are part of a depression follow up plan for the patient:  under care of mental health counselor, coping support assessment and coping support management  The following high BMI interventions were performed this visit: weight monitoring and dietary needs education  The following health maintenance schedule was reviewed with the patient and provided in printed form in the after visit summary:   Health Maintenance   Topic Date Due   ? DEPRESSION ACTION PLAN  1950   ? HEPATITIS C SCREENING  1950   ? HEART FAILURE ACTION PLAN  1950   ? DIABETIC FOOT EXAM  1950   ? MAMMOGRAM  1950   ? DIABETIC  EYE EXAM  1950   ? MICROALBUMIN  1950   ? TD 18+ HE  04/13/1966   ? COLORECTAL CANCER SCREENING  04/13/1966   ? HEPATITIS B VACCINES (1 of 3 - Risk 3-dose series) 04/13/1967   ? ZOSTER VACCINES (1 of 2) 04/13/1998   ? Pneumococcal Vaccine: 65+ Years (1 of 1 - PPSV23) 04/13/2013   ? DXA SCAN  04/13/2013   ? LIPID  04/16/2020   ? INFLUENZA VACCINE RULE BASED (1) 08/01/2020   ? ALT  08/07/2020   ? BMP  12/10/2020   ? A1C  12/10/2020   ? CBC  06/10/2021   ? MEDICARE ANNUAL WELLNESS VISIT  10/07/2021   ? FALL RISK ASSESSMENT  10/08/2021   ? ADVANCE CARE PLANNING  10/07/2025   ? TSH  Completed   ? Pneumococcal Vaccine: Pediatrics (0 to 5 Years) and At-Risk Patients (6 to 64 Years)  Aged Out        Subjective:   Chief Complaint: Zach Olmos is an 70 y.o. female here for an Annual Wellness visit.     HPI:  Zach Olmos is a 70 y.o. female who is a long term care resident of [facility] and is seen today  For annual wellness visit and review of systems. She is seen in her wheelchair today about ready to eat breakfast. She says her appetite is good and she sleeps very well. She has not had any more issues with  Bloody noses and has not been wanting to use the ocean spray. Staff are wondering if this can be discontinued. She denies any headaches or visual changes. We did discuss with her diabetes the importance of regular eye and foot exams. Per staff eye doctor should be resuming in house rounds soon so she will have an eye exam soon. She says her bowels are moving fine and the spot in her groin is all healed up and not bothering her any more. She denies any itching or rash issues today. She thinks her memory is good and no further issues with words than usual. Her swallowing is doing well and thinks she is doing great with the thin liquids at times. She hopes to have these all the time soon. She denies any recent illnesses. She is glad she gets to see her son sometimes now. She denies dizziness  or mood concerns. Staff report she recently had a stye on her left eyelid but appears to be resolving now. It is no longer bothering her. She denies any visual changes. Per pharmacist review and recommendations to consider stopping aspirin due to being on eliquis and cva several years ago. Pharmacist also recommended changing metoprolol to ER formulation due to improved outcomes. Her lantus was recommended to be reduced and maximize metformin dosing for improved insulin sensitivity and diabetic control.     Review of Systems: Currently, no fever, chills, or rigors. Decreased vision. Denies any chest pain, headaches, palpitations, lightheadedness. Denies any GERD symptoms. Positive for weakness, EZ stand for transfers or slide board, dysphagia, urinary incontinence, fecal incontinence, appetite good, wheelchair bound, right sided weakness stable, aphasia, no shortness of breath,  cpap, forgetfulness    Patient Care Team:  Morenita Mosquera NP as PCP - General (Nurse Practitioner)  Sunita Blanchard RN as Utilization Review RN (Primary Care - CC)  Miesha Pineda as Case Management Specialist (Primary Care - CC)  Rosanna Sinha as Case Management Specialist  [facility] as Nursing Home Facility (Generic Provider)  Morenita Mosquera NP as Assigned PCP  Carolina Mendez SW as Lead Care Coordinator (Primary Care - CC)     Patient Active Problem List   Diagnosis   ? Essential hypertension   ? Coronary artery disease involving coronary bypass graft with angina pectoris, unspecified whether native or transplanted heart (H)   ? Dyslipidemia (high LDL; low HDL)   ? Right hemiplegia (H)   ? Oropharyngeal dysphagia   ? PAF (paroxysmal atrial fibrillation) (H)   ? GERD (gastroesophageal reflux disease)   ? ANGEL on CPAP   ? Anxiety and depression   ? Type 2 DM with CKD stage 1 and hypertension (H)   ? Chronic combined systolic and diastolic congestive heart failure (H)   ? Morbid obesity (H)   ? Obesity  hypoventilation syndrome (H)   ? Granuloma annulare   ? Varicose veins of both legs with edema   ? H/O deep venous thrombosis   ? Epistaxis   ? Encounter for preventative adult health care exam with abnormal findings     Past Medical History:   Diagnosis Date   ? Anemia    ? Anxiety    ? Cancer (H)     Hx of colon cancer   ? CHF (congestive heart failure) (H)     diastolic   ? Coronary artery disease     s/p cabg   ? CVA (cerebral vascular accident) (H)    ? Depression    ? Diabetes mellitus (H)     type 2   ? DVT (deep vein thrombosis) in pregnancy    ? Granuloma annulare    ? Hemiplegia (H)     R sided and minimally communicative   ? Hyperlipidemia    ? Hypertension    ? Obesity    ? Oropharyngeal dysphagia    ? ANGEL (obstructive sleep apnea)    ? Parotitis    ? Paroxysmal atrial fibrillation (H)    ? PE (pulmonary thromboembolism) (H)    ? Stroke (H) 12/2018    Sub acute L MCA stroke   ? Varicose veins of both lower extremities       Past Surgical History:   Procedure Laterality Date   ? CORONARY ARTERY BYPASS GRAFT      x5      Family History   Problem Relation Age of Onset   ? Heart disease Mother    ? Cancer Father         esophageal      Social History     Socioeconomic History   ? Marital status: Single     Spouse name: Not on file   ? Number of children: Not on file   ? Years of education: Not on file   ? Highest education level: Not on file   Occupational History   ? Not on file   Social Needs   ? Financial resource strain: Not on file   ? Food insecurity     Worry: Not on file     Inability: Not on file   ? Transportation needs     Medical: Not on file     Non-medical: Not on file   Tobacco Use   ? Smoking status: Never Smoker   ? Smokeless tobacco: Never Used   Substance and Sexual Activity   ? Alcohol use: No     Frequency: Never   ? Drug use: No   ? Sexual activity: Not on file   Lifestyle   ? Physical activity     Days per week: Not on file     Minutes per session: Not on file   ? Stress: Not on file    Relationships   ? Social connections     Talks on phone: Not on file     Gets together: Not on file     Attends Holiness service: Not on file     Active member of club or organization: Not on file     Attends meetings of clubs or organizations: Not on file     Relationship status: Not on file   ? Intimate partner violence     Fear of current or ex partner: Not on file     Emotionally abused: Not on file     Physically abused: Not on file     Forced sexual activity: Not on file   Other Topics Concern   ? Incontinent Yes   ? Toileting independently No   ? Cognitive impairment No   ? Vision impairment Yes   ? Hearing impairment Yes   ? Dentures Not Asked   Social History Narrative   ? Not on file      Current Outpatient Medications   Medication Sig Dispense Refill   ? metoprolol succinate (TOPROL-XL) 50 MG 24 hr tablet Take 50 mg by mouth daily.     ? acetaminophen (TYLENOL) 325 MG tablet Take 650 mg by mouth every 6 (six) hours as needed for pain.            ? apixaban (ELIQUIS) 5 mg Tab tablet Take 5 mg by mouth 2 (two) times a day.           ? artificial tears,hypromellose, (GENTEAL; SYSTANE) 0.3 % Gel Administer 1 drop to both eyes 4 (four) times a day.      ? furosemide (LASIX) 20 MG tablet Take 20 mg by mouth daily.            ? insulin glargine (LANTUS) 100 unit/mL injection Inject 15 Units under the skin at bedtime.      ? irbesartan (AVAPRO) 75 MG tablet Take 75 mg by mouth 2 (two) times a day.        0   ? metFORMIN (GLUCOPHAGE) 500 MG tablet Take 500 mg by mouth 2 (two) times a day with meals. 1000mg in am and 500mg in evening     ? ondansetron (ZOFRAN) 4 MG tablet Take 4 mg by mouth every 6 (six) hours as needed for nausea.     ? PARoxetine (PAXIL) 30 MG tablet Take 60 mg by mouth every morning.           ? rosuvastatin (CRESTOR) 20 MG tablet Take 20 mg by mouth every morning. PER HOSPITAL ORDERS GIVE TO PT. IN THE A.M.              No current facility-administered medications for this visit.        Objective:   Vital Signs:   Visit Vitals  /67   Pulse 64   Temp 98.5  F (36.9  C)   Resp 16   Wt 212 lb (96.2 kg)   SpO2 97%   BMI 33.20 kg/m           VisionScreening:  Declines vision screening due to upcoming eye exam    PHYSICAL EXAM    GENERAL: Awake, Alert, not in any form of acute distress, answers questions appropriately, follows simple commands, conversant, weakness  HEENT: Head is normocephalic with normal hair distribution. No evidence of trauma. Ears: No acute purulent discharge. Eyes: Conjunctivae pink with no scleral jaundice. Nose: Normal mucosa and septum. NECK: Supple with no cervical or supraclavicular lymphadenopathy. Trachea is midline. Decreased vision and hearing, left eyelid small flesh colored lesion appears resolving, no erythema, discoloration, irritation noted  CHEST: No tenderness or deformity, no crepitus  LUNG: dim to auscultation with good chest expansion. There are no crackles or wheezes, normal AP diameter.  No recent shortness of breath or cough   BACK: No kyphosis of the thoracic spine. Symmetric, no curvature, ROM normal, no CVA tenderness, no spinal tenderness   CVS: irregularly irregular rhythm,  2+ pulses symmetric in all extremities.  ABDOMEN: Rounded and soft, nontender to palpation, non distended, no masses, no organomegaly, good bowel sounds, no rebound or guarding, no peritoneal signs.   EXTREMITIES: no edema, atraumatic  SKIN: Warm and dry, no rash today  NEURO/PSYCH: The patient is oriented to person, place and time. Forgetful, Right sided hemiplegia, dysarthria, dysphagia, expressive aphasia, EZ stand to slide board for transfers, dependent for cares, 0/3 word recall however difficult to determine if was aphasia related and not memory related      Assessment Results 10/7/2020   Activities of Daily Living 5-6 - Severe functional impairment   Instrumental Activities of Daily Living 5-6 - Severe functional impairment   Get Up and Go Score 12 seconds or more   Mini  Cog Total Score 0            A Mini-Cog score of 0-2 suggests the possibility of dementia, score of 3-5 suggests no dementia  Fall Risk not completed for medical reasons.  Cognitive Screen not completed due to mental handicap.     Identified Health Risks:   Obesity, heart disease, fall risk, functional decline, potential cognitive impairment

## 2021-06-20 NOTE — LETTER
Letter by Morenita Mosquera NP at      Author: Morenita Mosquera NP Service: -- Author Type: --    Filed:  Encounter Date: 2020 Status: (Other)         Patient: Zach Olmos   MR Number: [id number]   YOB: 1950   Date of Visit: 2020                 [facility]    DATE: 2020    NAME:  Zach Olmos             :  1950  MRN: [id number]  CODE STATUS:  FULL CODE    VISIT TYPE: Problem Visit (drainage from abscess)     FACILITY:  [facility] [[id number]]       CHIEF COMPLAIN/REASON FOR VISIT:    Chief Complaint   Patient presents with   ? Problem Visit     drainage from abscess               HISTORY OF PRESENT ILLNESS: Zach Olmos is a 69 y.o. female who is a long term care resident of [facility].     Today Ms. Olmos is seen per nursing request today as her groin abscess is draining a lot more and is improving in size. They are requesting clarification for wound cares. On exam Ms. Olmos is seen in bed with nursing at bedside. She says she has noticed it is draining a lot as well and seems to be much smaller. She says the warm packs can be irritating at times. They seem to make it drain more. She says the skin is sensitive but the pain is improving. She denies any fevers or chills at all. She is not having any other concerns.     REVIEW OF SYSTEMS:  PROBLEMS AND REVIEW OF SYSTEMS:   Today on ROS:   Currently, no fever, chills, or rigors. Decreased vision. Denies any chest pain, headaches, palpitations, lightheadedness. Denies any GERD symptoms. Positive for weakness, EZ stand for transfers or slide board, dysphagia, urinary incontinence, fecal incontinence, appetite is good, wheelchair bound, right sided weakness, aphasia, no shortness of breath, refusing cpap at times, lump in left groin draining more      Allergies   Allergen Reactions   ? Aricept [Donepezil]    ? Atorvastatin    ? Glipizide    ? Lisinopril      Current  Outpatient Medications   Medication Sig   ? acetaminophen (TYLENOL) 325 MG tablet Take 650 mg by mouth every 6 (six) hours as needed for pain.          ? apixaban (ELIQUIS) 5 mg Tab tablet Take 5 mg by mouth 2 (two) times a day.         ? aspirin 81 mg chewable tablet Chew 81 mg daily.         ? furosemide (LASIX) 20 MG tablet Take 20 mg by mouth daily.          ? insulin glargine (LANTUS) 100 unit/mL injection Inject 10 Units under the skin 2 (two) times a day. 07:30 AM, 04:30 PM   ? irbesartan (AVAPRO) 75 MG tablet Take 75 mg by mouth 2 (two) times a day.         ? metoprolol tartrate (LOPRESSOR) 50 MG tablet Take 50 mg by mouth 2 (two) times a day.         ? ondansetron (ZOFRAN) 4 MG tablet Take 4 mg by mouth every 6 (six) hours as needed for nausea.   ? PARoxetine (PAXIL) 30 MG tablet Take 60 mg by mouth every morning.         ? rosuvastatin (CRESTOR) 20 MG tablet Take 20 mg by mouth every morning. PER HOSPITAL ORDERS GIVE TO PT. IN THE A.M.            Past Medical History:    Past Medical History:   Diagnosis Date   ? Anemia    ? Anxiety    ? Cancer (H)     Hx of colon cancer   ? CHF (congestive heart failure) (H)     diastolic   ? Coronary artery disease     s/p cabg   ? CVA (cerebral vascular accident) (H)    ? Depression    ? Diabetes mellitus (H)     type 2   ? DVT (deep vein thrombosis) in pregnancy    ? Granuloma annulare    ? Hemiplegia (H)     R sided and minimally communicative   ? Hyperlipidemia    ? Hypertension    ? Obesity    ? Oropharyngeal dysphagia    ? ANGEL (obstructive sleep apnea)    ? Parotitis    ? Paroxysmal atrial fibrillation (H)    ? PE (pulmonary thromboembolism) (H)    ? Stroke (H) 12/2018    Sub acute L MCA stroke   ? Varicose veins of both lower extremities        IMMUNIZATIONS:    There is no immunization history on file for this patient.  Above immunizations pulled from [employer]. Facility records also reconciled. Outstanding information sent to [facility] to  update [facility].  Future immunizations are not needed at this point as all recommended immunizations are up to date.     PHYSICAL EXAMINATION  Vitals:    01/31/20 0700   BP: 112/53   Pulse: 76   Resp: 18   Temp: (!) 96.5  F (35.8  C)   SpO2: 94%       Today on physical exam:     GENERAL: Awake, Alert, not in any form of acute distress, answers questions appropriately, follows simple commands, conversant  HEENT: Head is normocephalic with normal hair distribution. No evidence of trauma. Ears: No acute purulent discharge. Eyes: Conjunctivae pink with no scleral jaundice. Nose: Normal mucosa and septum. NECK: Supple with no cervical or supraclavicular lymphadenopathy. Trachea is midline.   CHEST: No tenderness or deformity, no crepitus  LUNG: dim to auscultation with good chest expansion. There are no crackles or wheezes, normal AP diameter.  No recent shortness of breath or cough   BACK: No kyphosis of the thoracic spine. Symmetric, no curvature, ROM normal, no CVA tenderness, no spinal tenderness   CVS: irregularly irregular rhythm,  2+ pulses symmetric in all extremities.  ABDOMEN: Rounded and soft, nontender to palpation, non distended, no masses, no organomegaly, good bowel sounds, no rebound or guarding, no peritoneal signs.   EXTREMITIES: no edema  SKIN: Warm and dry, Left groin subdermal lesion noted to be much smaller in size, less firm and more fluctuance, purulent and sanguinous drainage on palpation, mildly tender to palpation and area of erythema and discoloration improving  NEURO/PSYCH: The patient is oriented to person, place and time. Right sided hemiplegia, dysarthria, dysphagia, expressive aphasia, EZ stand to slide board for transfers, dependent for cares            LABS:   No results found for this or any previous visit (from the past 168 hour(s)).  Results for orders placed or performed in visit on 10/09/19   Basic Metabolic Panel   Result Value Ref Range    Sodium 139 136 - 145 mmol/L     Potassium 4.3 3.5 - 5.0 mmol/L    Chloride 103 98 - 107 mmol/L    CO2 28 22 - 31 mmol/L    Anion Gap, Calculation 8 5 - 18 mmol/L    Glucose 87 70 - 125 mg/dL    Calcium 9.2 8.5 - 10.5 mg/dL    BUN 16 8 - 28 mg/dL    Creatinine 0.83 0.60 - 1.10 mg/dL    GFR MDRD Af Amer >60 >60 mL/min/1.73m2    GFR MDRD Non Af Amer >60 >60 mL/min/1.73m2         Lab Results   Component Value Date    WBC 10.5 10/09/2019    HGB 10.3 (L) 10/09/2019    HCT 31.6 (L) 10/09/2019    MCV 92 10/09/2019     10/09/2019       No results found for: [id number]  Lab Results   Component Value Date    HGBA1C 5.5 11/08/2019     No results found for: INR, PROTIME  No results found for: [id number]  Lab Results   Component Value Date    TSH 1.04 08/07/2019           ASSESSMENT/PLAN:    1. Left groin Subdermal Abscess, Cellulitis: unclear etiology, first noticed few days ago, much improved in size and appearance. Pain improving but still mildly tender especially with warm packs. Several small open areas but no large open wound, draining purulent and sanguinous drainage.  No fevers or signs of systemic infection at this time. Improving on the warm packs four times a day, clindamycin x 7 days. culturelle with clindamycin for c diff prevention. Much smaller than outlined area. Will have staff cleanse with wound cleanser or saline and pat dry, cover with clean foam dressing or tuck abd pads in creases if dressing not stick due to hair. Keep area clean and dry as much as possible. Continue warm packs as contributing to increase in drainage and resolution of size. Supplies arrive for I&D, will keep in facility in case stops draining or fails to resolve with conservative treatments.   2. Type 2 DM with HTN: Accuchecks 133-172 recently. On lantus two times a day, accuchecks bid. Hga1c 5.7 on 8/7. hga1c 5.5 on 11/8. Well controlled. Goal hga1c for age group 6.5-7, with recent CVA may be a little more aggressive in 6-6.5 range. Consider checking hga1c at  next visit and depending on results further reduce lantus.   3. CAD, s/p CABG: No chest pain. On aspirin, rosuvastatin. F/u cardiology.    4. H/o Left MCA CVA: Right sided hemiplegia, dysarthria, dysphagia, expressive aphasia. Wheelchair and bed bound. EZ stand to slide board for transfers. Dependent for cares. On rosuvastatin, aspirin. F/u with neurology prn. Dysphagia on NDD3, nectar thick liquids.stable.   5. Anxiety and depression: On paroxetine. ACP following, feels mood stable today.   6. Dysphagia: NDD3 nectar thick liquids. No recent changes or concerns.   6. PAF: Rate controlled 50-70s. On eliquis, metoprolol.   7.ANGEL, OHS: On CPAP. Refusing at times.   8. Weights, obesity: [phone]. Monitor. Counseling on heart healthy diet, diabetic diet, healthy lifestyle choices. Stable.   9. [facility] CH[facility]: [facility] reported h/o CHF. No echo or records available [facility]. No shortness of breath, no edema noted, on lasix daily. Weekly weights.  No recent concerns.   10. HTN: 110s. On irbesartan, metoprolol, lasix. Stable recently.      Labs last in November, will check q6 months      Electronically signed by: Morenita Mosquera NP    Case Management:  I have reviewed the facility/SNF care plan/MDS which was done 10/30/19, including the falls risk, nutrition and pain screening. I also reviewed the current immunizations, and preventive care.. Future cancer screening is not clinically indicated secondary to age/goals of care.   Patient's desire to return to the community is not assessible due to cognitive impairment.    Information reviewed:  Medications, vital signs, orders, and nursing notes.  The health plan new enrollment has happened. I have reviewed the  MDS, the preventative needs,  and facility care plan. The level of care is appropriate. I have reviewed the code status/advanced directives.

## 2021-06-20 NOTE — LETTER
Letter by Morenita Mosquera NP at      Author: Morenita Mosquera NP Service: -- Author Type: --    Filed:  Encounter Date: 2020 Status: (Other)         Patient: Zach Olmos   MR Number: [id number]   YOB: 1950   Date of Visit: 2020                 [facility]    DATE: 2020    NAME:  Zach Olmos             :  1950  MRN: [id number]  CODE STATUS:  FULL CODE    VISIT TYPE: Review Of Multiple Medical Conditions (cva, hemiplegia, htn)     FACILITY:  [facility] [[id number]]       CHIEF COMPLAIN/REASON FOR VISIT:    Chief Complaint   Patient presents with   ? Review Of Multiple Medical Conditions     cva, hemiplegia, htn               HISTORY OF PRESENT ILLNESS: Zach Olmos is a 70 y.o. female who is a long term care resident of [facility].     Today Ms. Olmos is seen for federally mandated visit  And review of systems for Cva, hemiplegia, and hypertension. She is seen sitting in her wheelchair in her room today. She says she feels well today. She is not having any appetite concerns and says she has been eating well. She thinks her bowels are moving regularly. She says she is not having any urinary issues. She is sleeping very well. She denies any further rashes or spots in her groin. She thinks her weakness is about the same. No new issues with headaches or visual changes. She thinks overall she is doing fine. Her blood sugars recently have been 161-226. Her weights are 207-203lbs and other vitals stable. No further nosebleeds.    REVIEW OF SYSTEMS:  PROBLEMS AND REVIEW OF SYSTEMS:   Today on ROS:   Currently, no fever, chills, or rigors. Decreased vision. Denies any chest pain, headaches, palpitations, lightheadedness. Denies any GERD symptoms. Positive for weakness, EZ stand for transfers or slide board, dysphagia, urinary incontinence, fecal incontinence, appetite good, wheelchair bound, right sided weakness stable, aphasia,  no shortness of breath,  cpap      Allergies   Allergen Reactions   ? Aricept [Donepezil]    ? Atorvastatin    ? Glipizide    ? Lisinopril      Current Outpatient Medications   Medication Sig   ? acetaminophen (TYLENOL) 325 MG tablet Take 650 mg by mouth every 6 (six) hours as needed for pain.          ? apixaban (ELIQUIS) 5 mg Tab tablet Take 5 mg by mouth 2 (two) times a day.         ? artificial tears,hypromellose, (GENTEAL; SYSTANE) 0.3 % Gel Administer to both eyes.   ? aspirin 81 mg chewable tablet Chew 81 mg daily.         ? furosemide (LASIX) 20 MG tablet Take 20 mg by mouth daily.          ? insulin glargine (LANTUS) 100 unit/mL injection Inject 20 Units under the skin at bedtime. 07:30 AM, 04:30 PM   ? irbesartan (AVAPRO) 75 MG tablet Take 75 mg by mouth 2 (two) times a day.         ? metoprolol tartrate (LOPRESSOR) 50 MG tablet Take 25 mg by mouth 2 (two) times a day.    ? ondansetron (ZOFRAN) 4 MG tablet Take 4 mg by mouth every 6 (six) hours as needed for nausea.   ? PARoxetine (PAXIL) 30 MG tablet Take 60 mg by mouth every morning.         ? rosuvastatin (CRESTOR) 20 MG tablet Take 20 mg by mouth every morning. PER HOSPITAL ORDERS GIVE TO PT. IN THE A.M.            Past Medical History:    Past Medical History:   Diagnosis Date   ? Anemia    ? Anxiety    ? Cancer (H)     Hx of colon cancer   ? CHF (congestive heart failure) (H)     diastolic   ? Coronary artery disease     s/p cabg   ? CVA (cerebral vascular accident) (H)    ? Depression    ? Diabetes mellitus (H)     type 2   ? DVT (deep vein thrombosis) in pregnancy    ? Granuloma annulare    ? Hemiplegia (H)     R sided and minimally communicative   ? Hyperlipidemia    ? Hypertension    ? Obesity    ? Oropharyngeal dysphagia    ? ANGEL (obstructive sleep apnea)    ? Parotitis    ? Paroxysmal atrial fibrillation (H)    ? PE (pulmonary thromboembolism) (H)    ? Stroke (H) 12/2018    Sub acute L MCA stroke   ? Varicose veins of both lower extremities         IMMUNIZATIONS:    There is no immunization history on file for this patient.  Above immunizations pulled from [facility]. Facility records also reconciled. Outstanding information sent to [facility] to update [facility].  Future immunizations are not needed at this point as all recommended immunizations are up to date.     PHYSICAL EXAMINATION  Vitals:    09/09/20 0700   BP: 136/63   Pulse: 62   Resp: 18   Temp: 98  F (36.7  C)   SpO2: 96%   Weight: 203 lb (92.1 kg)       Today on physical exam:     GENERAL: Awake, Alert, not in any form of acute distress, answers questions appropriately, follows simple commands, conversant  HEENT: Head is normocephalic with normal hair distribution. No evidence of trauma. Ears: No acute purulent discharge. Eyes: Conjunctivae pink with no scleral jaundice. Nose: Normal mucosa and septum. NECK: Supple with no cervical or supraclavicular lymphadenopathy. Trachea is midline. Decreased vision and hearing  CHEST: No tenderness or deformity, no crepitus  LUNG: dim to auscultation with good chest expansion. There are no crackles or wheezes, normal AP diameter.  No recent shortness of breath or cough   BACK: No kyphosis of the thoracic spine. Symmetric, no curvature, ROM normal, no CVA tenderness, no spinal tenderness   CVS: irregularly irregular rhythm,  2+ pulses symmetric in all extremities.  ABDOMEN: Rounded and soft, nontender to palpation, non distended, no masses, no organomegaly, good bowel sounds, no rebound or guarding, no peritoneal signs.   EXTREMITIES: no edema, atraumatic  SKIN: Warm and dry, no rash today  NEURO/PSYCH: The patient is oriented to person, place and time. Forgetful at times, Right sided hemiplegia, dysarthria, dysphagia, expressive aphasia, EZ stand to slide board for transfers, dependent for cares            LABS:   No results found for this or any previous visit (from the past 168 hour(s)).  Results for orders placed or performed  in visit on 06/10/20   Basic Metabolic Panel   Result Value Ref Range    Sodium 140 136 - 145 mmol/L    Potassium 4.3 3.5 - 5.0 mmol/L    Chloride 103 98 - 107 mmol/L    CO2 29 22 - 31 mmol/L    Anion Gap, Calculation 8 5 - 18 mmol/L    Glucose 121 70 - 125 mg/dL    Calcium 9.2 8.5 - 10.5 mg/dL    BUN 18 8 - 28 mg/dL    Creatinine 0.85 0.60 - 1.10 mg/dL    GFR MDRD Af Amer >60 >60 mL/min/1.73m2    GFR MDRD Non Af Amer >60 >60 mL/min/1.73m2         Lab Results   Component Value Date    WBC 8.1 06/10/2020    HGB 12.3 06/10/2020    HCT 38.0 06/10/2020    MCV 91 06/10/2020     06/10/2020       No results found for: [id number]  Lab Results   Component Value Date    HGBA1C 7.3 (H) 06/10/2020     No results found for: INR, PROTIME  No results found for: [id number]  Lab Results   Component Value Date    TSH 1.24 06/10/2020           ASSESSMENT/PLAN:      Type 2 DM with HTN: Accuchecks better controlled 161-226. On lantus two times a day, accuchecks bid. Hga1c 5.7 on 8/7. hga1c 5.5 on 11/8. hga1c 2/26-6.5. continue lantus. Hga1c up to 7.3 on 6/10. Stable on increased lantus.   CAD, s/p CABG: No chest pain or recent concerns. On aspirin, rosuvastatin. F/u cardiology.    H/o Left MCA CVA: Right sided hemiplegia, dysarthria, dysphagia, expressive aphasia. Wheelchair and bed bound. EZ stand to slide board for transfers. Dependent for cares. On rosuvastatin, aspirin. F/u with neurology prn. Dysphagia on NDD3, nectar thick liquids. No recent changes. Stable.    Anxiety and depression: On paroxetine. ACP following, feels mood stable today.   Dysphagia: NDD3 nectar thick liquids. No recent concerns.   PAF: Rate controlled 60s. On eliquis, metoprolol. Well controlled.    ANGEL, OHS: On CPAP. Refusing at times. No concerns today.   Weights, obesity: [phone]. Monitor. Counseling on heart healthy diet, diabetic diet,  Continues to have weight gain. Immobility and increased intake.  Counseling provided   Chronic CHF: [facility] reported h/o CHF. No echo or records available [facility]. No shortness of breath, no edema noted, on lasix daily. No recent concerns.   HTN: 130s. On irbesartan, metoprolol, lasix. Stable.     CMP, hm1, tsh, hga1c on 6/10    Electronically signed by: Morenita Mosquera NP    Case Management:  I have reviewed the facility/SNF care plan/MDS which was done 7/2/20, including the falls risk, nutrition and pain screening. I also reviewed the current immunizations, and preventive care.. Future cancer screening is not clinically indicated secondary to age/goals of care.   Patient's desire to return to the community is not assessible due to cognitive impairment.    Information reviewed:  Medications, vital signs, orders, and nursing notes.  The health plan new enrollment has happened. I have reviewed the  MDS, the preventative needs,  and facility care plan. The level of care is appropriate. I have reviewed the code status/advanced directives.

## 2021-06-21 NOTE — LETTER
Letter by Morenita Mosquera NP at      Author: Morenita Mosquera NP Service: -- Author Type: --    Filed:  Encounter Date: 10/21/2020 Status: (Other)         Patient: Zach Olmos   MR Number: [id number]   YOB: 1950   Date of Visit: 10/21/2020                 [facility]    DATE: 10/21/2020    NAME:  Zach Olmos             :  1950  MRN: [id number]  CODE STATUS:  FULL CODE    VISIT TYPE: Problem Visit (f/u on ear pain, jaw pain)     FACILITY:  [facility] [[id number]]       CHIEF COMPLAIN/REASON FOR VISIT:    Chief Complaint   Patient presents with   ? Problem Visit     f/u on ear pain, jaw pain               HISTORY OF PRESENT ILLNESS: Zach Olmos is a 70 y.o. female who is a long term care resident of [facility].     Today Ms. Olmos is seen for follow up on ear and jaw pain today. She is seen in bed today in her room. She says the ear drops and nasal spray do seem to be helping. She is not feeling as much pressure in her ear. She thinks the jaw pain is better as well. She is able to point to where it was hurting today. She is not having any issues hearing out of the left ear. She says she is not coughing or having any sore throat. She denies any runny nose or nasal congestion. She denies pain with pressure applied to sinus cavities today. She denies any pain or issues in her mouth  Or with chewing. She thinks she is getting better. Staff report she has been complaining less of pain and pressure in her left ear. She has not complained of the jaw pain recently. Her temps have been normal as have her other vitals. She has not been noted to have any issues chewing.     REVIEW OF SYSTEMS:  PROBLEMS AND REVIEW OF SYSTEMS:   Today on ROS:   Currently, no fever, chills, or rigors. Decreased vision. Denies any chest pain, headaches, palpitations, lightheadedness. Denies any GERD symptoms. Positive for weakness, EZ stand for transfers or slide  board, dysphagia, urinary incontinence, fecal incontinence, appetite good, wheelchair bound, right sided weakness stable, aphasia, no shortness of breath,  Cpap, improving left ear and jaw pain      Allergies   Allergen Reactions   ? Aricept [Donepezil]    ? Atorvastatin    ? Glipizide    ? Lisinopril      Current Outpatient Medications   Medication Sig   ? acetaminophen (TYLENOL) 325 MG tablet Take 650 mg by mouth every 6 (six) hours as needed for pain.          ? apixaban (ELIQUIS) 5 mg Tab tablet Take 5 mg by mouth 2 (two) times a day.         ? artificial tears,hypromellose, (GENTEAL; SYSTANE) 0.3 % Gel Administer 1 drop to both eyes 4 (four) times a day.    ? furosemide (LASIX) 20 MG tablet Take 20 mg by mouth daily.          ? insulin glargine (LANTUS) 100 unit/mL injection Inject 15 Units under the skin at bedtime.    ? irbesartan (AVAPRO) 75 MG tablet Take 75 mg by mouth 2 (two) times a day.         ? metFORMIN (GLUCOPHAGE) 500 MG tablet Take 500 mg by mouth 2 (two) times a day with meals. 1000mg in am and 500mg in evening   ? metoprolol succinate (TOPROL-XL) 50 MG 24 hr tablet Take 50 mg by mouth daily.   ? ondansetron (ZOFRAN) 4 MG tablet Take 4 mg by mouth every 6 (six) hours as needed for nausea.   ? PARoxetine (PAXIL) 30 MG tablet Take 60 mg by mouth every morning.         ? rosuvastatin (CRESTOR) 20 MG tablet Take 20 mg by mouth every morning. PER HOSPITAL ORDERS GIVE TO PT. IN THE A.M.            Past Medical History:    Past Medical History:   Diagnosis Date   ? Anemia    ? Anxiety    ? Cancer (H)     Hx of colon cancer   ? CHF (congestive heart failure) (H)     diastolic   ? Coronary artery disease     s/p cabg   ? CVA (cerebral vascular accident) (H)    ? Depression    ? Diabetes mellitus (H)     type 2   ? DVT (deep vein thrombosis) in pregnancy    ? Granuloma annulare    ? Hemiplegia (H)     R sided and minimally communicative   ? Hyperlipidemia    ? Hypertension    ? Obesity    ? Oropharyngeal  dysphagia    ? ANGEL (obstructive sleep apnea)    ? Parotitis    ? Paroxysmal atrial fibrillation (H)    ? PE (pulmonary thromboembolism) (H)    ? Stroke (H) 12/2018    Sub acute L MCA stroke   ? Varicose veins of both lower extremities        IMMUNIZATIONS:    There is no immunization history on file for this patient.  Above immunizations pulled from [facility]. Facility records also reconciled. Outstanding information sent to [facility] to update [facility].  Future immunizations are not needed at this point as all recommended immunizations are up to date.     PHYSICAL EXAMINATION  Vitals:    10/21/20 0700   BP: 146/65   Pulse: 61   Resp: 17   Temp: 98.4  F (36.9  C)   SpO2: 98%   Weight: 214 lb (97.1 kg)       Today on physical exam:     GENERAL: Awake, Alert, not in any form of acute distress, answers most questions appropriately, follows simple commands  HEENT: Head is normocephalic with normal hair distribution. No evidence of trauma. Ears: No acute purulent discharge. Eyes: Conjunctivae pink with no scleral jaundice. Nose: Normal mucosa and septum. NECK: Supple with no cervical or supraclavicular lymphadenopathy. Trachea is midline. Decreased vision and hearing, left ear pinna no signs of erythema, ecchymosis, edema, or inflammation, no pain with manipulation of pinna, ear canal clear, minimal cerumen present, TM pearly gray, no signs of inflammation or infection on left  CHEST: No tenderness or deformity, no crepitus  LUNG: dim to auscultation with good chest expansion. There are no crackles or wheezes, normal AP diameter.  No recent shortness of breath or cough   BACK: No kyphosis of the thoracic spine. Symmetric, no curvature, ROM normal, no CVA tenderness, no spinal tenderness   CVS: irregularly irregular rhythm,  2+ pulses symmetric in all extremities.  ABDOMEN: Rounded and soft, nontender to palpation, non distended, no masses, no organomegaly, good bowel sounds, no rebound or  guarding, no peritoneal signs.   EXTREMITIES: no edema, atraumatic  SKIN: Warm and dry, no rash today  NEURO/PSYCH: The patient is oriented to person, place and time. Forgetful at times, Right sided hemiplegia, dysarthria, dysphagia, expressive aphasia, EZ stand to slide board for transfers, dependent for cares            LABS:   No results found for this or any previous visit (from the past 168 hour(s)).  Results for orders placed or performed in visit on 06/10/20   Basic Metabolic Panel   Result Value Ref Range    Sodium 140 136 - 145 mmol/L    Potassium 4.3 3.5 - 5.0 mmol/L    Chloride 103 98 - 107 mmol/L    CO2 29 22 - 31 mmol/L    Anion Gap, Calculation 8 5 - 18 mmol/L    Glucose 121 70 - 125 mg/dL    Calcium 9.2 8.5 - 10.5 mg/dL    BUN 18 8 - 28 mg/dL    Creatinine 0.85 0.60 - 1.10 mg/dL    GFR MDRD Af Amer >60 >60 mL/min/1.73m2    GFR MDRD Non Af Amer >60 >60 mL/min/1.73m2         Lab Results   Component Value Date    WBC 8.1 06/10/2020    HGB 12.3 06/10/2020    HCT 38.0 06/10/2020    MCV 91 06/10/2020     06/10/2020       No results found for: [id number]  Lab Results   Component Value Date    HGBA1C 7.3 (H) 06/10/2020     No results found for: INR, PROTIME  No results found for: [id number]  Lab Results   Component Value Date    TSH 1.24 06/10/2020           ASSESSMENT/PLAN:    Left ear effusion, Sinusitis: left ear and jaw pain appear improved with flonase and ciprodex. On exam the effusion and inflammation in left ear appear resolving. No signs of mastoiditis, no pain with  Pressure to sinus cavities. No cervical lymphadenopathy noted today. Continue flonase and ciprodex for 7 days. Notify if begins developing symptoms again after completed.   Type 2 DM with HTN: Accuchecks better controlled 100-200 recently. On lantus two times a day, accuchecks bid. Hga1c 5.7 on 8/7. hga1c 5.5 on 11/8. hga1c 2/26-6.5. continue lantus. Hga1c up to 7.3 on 6/10.   CAD, s/p CABG: No chest pain or recent concerns. On  aspirin, rosuvastatin. F/u cardiology.    H/o Left MCA CVA: Right sided hemiplegia, dysarthria, dysphagia, expressive aphasia. Wheelchair and bed bound. EZ stand to slide board for transfers. Dependent for cares. On rosuvastatin, aspirin. F/u with neurology prn. Dysphagia on NDD3, nectar thick liquids, trialing thin liquids. No current concerns.   Anxiety and depression: On paroxetine. ACP following, feels mood stable today.   Dysphagia: NDD3 nectar thick liquids. No recent concerns.   PAF: Rate controlled 60s. On eliquis, metoprolol. Well controlled.    ANGEL, OHS: On CPAP. Refusing at times. No concerns today.   Weights, obesity: [phone]. Monitor. Counseling on heart healthy diet, diabetic diet,  Continues to have weight gain. Immobility and increased intake. Counseling provided   Chronic CH[facility]: [facility] h/[facility] CHF. No echo or records available [facility]. No shortness of breath, no edema noted today, on lasix daily. Appears euvolemic on exam   HTN: 140s. On irbesartan, metoprolol, lasix. Stable.     CMP, hm1, tsh, hga1c on 6/10    Electronically signed by: Morenita Mosquera NP    Case Management:  I have reviewed the facility/SNF care plan/MDS which was done 9/24/20, including the falls risk, nutrition and pain screening. I also reviewed the current immunizations, and preventive care.. Future cancer screening is not clinically indicated secondary to age/goals of care.   Patient's desire to return to the community is not assessible due to cognitive impairment.    Information reviewed:  Medications, vital signs, orders, and nursing notes.  The health plan new enrollment has happened. I have reviewed the  MDS, the preventative needs,  and facility care plan. The level of care is appropriate. I have reviewed the code status/advanced directives.

## 2021-06-21 NOTE — LETTER
Letter by Morenita Mosquera NP at      Author: Morenita Mosquera NP Service: -- Author Type: --    Filed:  Encounter Date: 2021 Status: (Other)         [address] [address]                                  2021    Patient: Zcah Olmos   MR Number: [id number]   YOB: 1950   Date of Visit: 2021     Dear Dr. Vasquez:    Thank you for referring Zach Olmos to me for evaluation. Below are the relevant portions of my assessment and plan of care.    If you have questions, please do not hesitate to call me. I look forward to following Zach along with you.    Sincerely,        Morenita Mosquera NP          CC  No Recipients  Morenita Mosquera NP  2021  4:05 PM  Signed              [facility]    DATE: 2021    NAME:  Zach Olmos             :  1950  MRN: [id number]  CODE STATUS:  FULL CODE    VISIT TYPE: Problem Visit (covid infection, fatigue)     FACILITY:  [facility] [[id number]]       CHIEF COMPLAIN/REASON FOR VISIT:    Chief Complaint   Patient presents with   ? Problem Visit     covid infection, fatigue               HISTORY OF PRESENT ILLNESS: Zach Olmos is a 70 y.o. female who is a long term care resident of [facility].     Today Ms. Olmos is seen for COVID infection, fatigue. Per staff she has been afebrile and not having shortness of breath. She has had fatigue, nausea, and loss of taste and smell. She is eating ok and no loose stools noted. Her oxygen levels have been fine and she is not complaining of anything. She was noted to have a mild dry cough this morning but nothing noted since. She seems to be doing very well and vitals are all stable. She is seen in her room on COVID unit. She says she feels great. She denies any pain or problems. She denies any nausea right now but thinks she has had some off and on. No vomiting though. She thinks her taste might be  different but unable to really describe. She does feel tired but no other concerns today.       REVIEW OF SYSTEMS:  PROBLEMS AND REVIEW OF SYSTEMS:   Today on ROS:   Currently, no fever, chills, or rigors. Decreased vision. Denies any chest pain, headaches, palpitations, lightheadedness. Denies any GERD symptoms. Positive for weakness, EZ stand for transfers or slide board, dysphagia, urinary incontinence, fecal incontinence, appetite good, wheelchair bound, right sided weakness stable, aphasia, no shortness of breath,  Cpap use, no further loose stools, fatigue, loss of taste and smell, intermittent nausea      Allergies   Allergen Reactions   ? Aricept [Donepezil]    ? Atorvastatin    ? Glipizide    ? Lisinopril      Current Outpatient Medications   Medication Sig   ? acetaminophen (TYLENOL) 325 MG tablet Take 650 mg by mouth every 6 (six) hours as needed for pain.          ? apixaban (ELIQUIS) 5 mg Tab tablet Take 5 mg by mouth 2 (two) times a day.         ? artificial tears,hypromellose, (GENTEAL; SYSTANE) 0.3 % Gel Administer 1 drop to both eyes 4 (four) times a day.    ? furosemide (LASIX) 20 MG tablet Take 20 mg by mouth daily.          ? insulin glargine (LANTUS) 100 unit/mL injection Inject 24 Units under the skin at bedtime.    ? irbesartan (AVAPRO) 75 MG tablet Take 75 mg by mouth 2 (two) times a day.         ? metFORMIN (GLUCOPHAGE) 500 MG tablet Take 500 mg by mouth 2 (two) times a day with meals. 1000mg in am and 500mg in evening   ? metoprolol succinate (TOPROL-XL) 50 MG 24 hr tablet Take 50 mg by mouth daily.   ? ondansetron (ZOFRAN) 4 MG tablet Take 4 mg by mouth every 6 (six) hours as needed for nausea.   ? PARoxetine (PAXIL) 30 MG tablet Take 60 mg by mouth every morning.         ? rosuvastatin (CRESTOR) 20 MG tablet Take 20 mg by mouth every morning. PER HOSPITAL ORDERS GIVE TO PT. IN THE A.M.            Past Medical History:    Past Medical History:   Diagnosis Date   ? Anemia    ? Anxiety    ?  "Cancer (H)     Hx of colon cancer   ? CHF (congestive heart failure) (H)     diastolic   ? Coronary artery disease     s/p cabg   ? CVA (cerebral vascular accident) (H)    ? Depression    ? Diabetes mellitus (H)     type 2   ? DVT (deep vein thrombosis) in pregnancy    ? Granuloma annulare    ? Hemiplegia (H)     R sided and minimally communicative   ? Hyperlipidemia    ? Hypertension    ? Obesity    ? Oropharyngeal dysphagia    ? ANGEL (obstructive sleep apnea)    ? Parotitis    ? Paroxysmal atrial fibrillation (H)    ? PE (pulmonary thromboembolism) (H)    ? Stroke (H) 12/2018    Sub acute L MCA stroke   ? Varicose veins of both lower extremities        IMMUNIZATIONS:    There is no immunization history on file for this patient.  Above immunizations pulled from [employer]. Facility records also reconciled. Outstanding information sent to [facility] to update [employer].  Future immunizations are not needed at this point as all recommended immunizations are up to date.     PHYSICAL EXAMINATION  Vitals:    01/04/21 0335   BP: 120/62   Pulse: 65   Resp: 16   Temp: 97.2  F (36.2  C)   SpO2: 96%   Weight: 213 lb (96.6 kg)   Height: 5' 7\" (1.702 m)       Today on physical exam:     GENERAL: Awake, Alert, not in any form of acute distress, answers most questions appropriately, follows simple commands  HEENT: Head is normocephalic with normal hair distribution. No evidence of trauma. Ears: No acute purulent discharge. Eyes: Conjunctivae pink with no scleral jaundice. Nose: Normal mucosa and septum. NECK: Supple with no cervical or supraclavicular lymphadenopathy. Trachea is midline. Decreased vision and hearing, left eyelid cyst   CHEST: No tenderness or deformity, no crepitus  LUNG: dim to auscultation with good chest expansion. There are no crackles or wheezes, normal AP diameter.  No recent shortness of breath or cough   BACK: No kyphosis of the thoracic spine. Symmetric, no curvature, ROM normal, " no CVA tenderness, no spinal tenderness   CVS: irregularly irregular rhythm,  2+ pulses symmetric in all extremities.  ABDOMEN: Rounded and soft, nontender to palpation, non distended, no masses, no organomegaly, good bowel sounds, no rebound or guarding, no peritoneal signs.   EXTREMITIES: no edema, atraumatic  SKIN: Warm and dry, no rash today  NEURO/PSYCH: The patient is oriented to person, place and time. Forgetful at times, Right sided hemiplegia, dysarthria, dysphagia, expressive aphasia, EZ stand to slide board for transfers, dependent for cares            LABS:   Recent Results (from the past 168 hour(s))   Creatinine   Result Value Ref Range    Creatinine 0.82 0.60 - 1.10 mg/dL    GFR MDRD Af Amer >60 >60 mL/min/1.73m2    GFR MDRD Non Af Amer >60 >60 mL/min/1.73m2     Results for orders placed or performed in visit on 06/10/20   Basic Metabolic Panel   Result Value Ref Range    Sodium 140 136 - 145 mmol/L    Potassium 4.3 3.5 - 5.0 mmol/L    Chloride 103 98 - 107 mmol/L    CO2 29 22 - 31 mmol/L    Anion Gap, Calculation 8 5 - 18 mmol/L    Glucose 121 70 - 125 mg/dL    Calcium 9.2 8.5 - 10.5 mg/dL    BUN 18 8 - 28 mg/dL    Creatinine 0.85 0.60 - 1.10 mg/dL    GFR MDRD Af Amer >60 >60 mL/min/1.73m2    GFR MDRD Non Af Amer >60 >60 mL/min/1.73m2         Lab Results   Component Value Date    WBC 7.2 10/21/2020    HGB 11.6 (L) 10/21/2020    HCT 35.9 10/21/2020    MCV 90 10/21/2020     10/21/2020       No results found for: [id number]  Lab Results   Component Value Date    HGBA1C 7.4 (H) 10/21/2020     No results found for: INR, PROTIME  No results found for: [id number]  Lab Results   Component Value Date    TSH 1.21 10/21/2020           ASSESSMENT/PLAN:    COVID 19 infection: no further loose stools. Some intermittent nausea, no vomiting. Appetite fine. Fatigue, loss of taste and smell, dry cough intermittent. Vitals stable, no hypoxia and afebrile. Appears to be doing very well, minimally symptomatic.  On covid unit. On zinc and vitamin c will extend one more week. Will add vitamin d daily per covid treatment recommendations. Does not meet criteria for dexamethasone, remdesivir, or bamlanivamab treatment at this time. Will order proair prn x 14 days. May discontinue isolation 10 days post positive test. Notify with any changes. No need for further anticoagulation as on eliquis.   Type 2 DM with HTN: Accuchecks stable. On lantus, accuchecks two times a day. Hga1c 5.7 on 8/7. hga1c 5.5 on 11/8. hga1c 2/26-6.5. continue lantus. Hga1c up to 7.3 on 6/10.  hga1c 7.4 on 10/21.previously adjusted lantus and has been controlled. Continue to encourage diet compliance.   CAD, s/p CABG: No chest pain or recent concerns. On aspirin, rosuvastatin. F/u cardiology.    H/o Left MCA CVA: Right sided hemiplegia, dysarthria, dysphagia, expressive aphasia. Wheelchair and bed bound. EZ stand to slide board for transfers. Dependent for cares. On rosuvastatin, aspirin. Dysphagia on NDD3, nectar thick liquids. F/u neuro prn. Has been stable for some time.   Anxiety and depression: On paroxetine. ACP following, no recent mood concerns.   Dysphagia: NDD3 nectar thick liquids.   PAF: Rate controlled 70s. On eliquis, metoprolol. stable  ANGEL, OHS: On CPAP. Refusing at times. No concerns today.   Weights, obesity: [phone]. Monitor. Counseling on heart healthy diet, diabetic diet,  Continues to have weight gain. Immobility and increased intake. Counseling provided again, dietary monitoring. Encourage improved dietary habits.   Chronic CHF: [facility] h/o CHF. No echo or records available [facility]. on lasix daily. Appears euvolemic on exam. Has had weight gain recently but does not appear fluid overloaded. No shortness of breath or edema. Monitor closely with covid infection.   HTN: 120s. On irbesartan, metoprolol, lasix. Stable.    Vitreal hemorrhage: injections q4-6 weeks. Last eye appt 11/11.       Labs in october    Electronically signed by: Morenita Mosquera NP    Case Management:  I have reviewed the facility/SNF care plan/MDS which was done 9/24/20, including the falls risk, nutrition and pain screening. I also reviewed the current immunizations, and preventive care.. Future cancer screening is not clinically indicated secondary to age/goals of care.   Patient's desire to return to the community is not assessible due to cognitive impairment.    Information reviewed:  Medications, vital signs, orders, and nursing notes.  The health plan new enrollment has happened. I have reviewed the  MDS, the preventative needs,  and facility care plan. The level of care is appropriate. I have reviewed the code status/advanced directives.

## 2021-06-21 NOTE — LETTER
"Letter by Morenita Mosquera NP at      Author: Morenita Mosquera NP Service: -- Author Type: --    Filed:  Encounter Date: 10/19/2020 Status: (Other)         Patient: Zach Olmos   MR Number: [id number]   YOB: 1950   Date of Visit: 10/19/2020       [facility]   Telephone Visit      CHIEF COMPLAINT/REASON FOR VISIT:  Chief Complaint   Patient presents with   ? Problem Visit     left ear pain     Zach Olmos is a 70 y.o. female who is being evaluated via a billable telephone visit due to the restrictions of the Covid-19 pandemic.     The patient has been notified of the following:     \"This is a telephone visit call between you and your physician/provider. We have found that certain health care needs can be provided without the need for a physical exam.  This service lets us provide the care you need with a short phone conversation.  If a prescription is necessary we can send it to the facility team.  If lab work is needed we can place an order through the facility team to have that test done at a later time.    If during the course of the call the physician/provider feels a telephone visit is not appropriate, you will not be charged for this service.\"     Zach Olmos complains of    Chief Complaint   Patient presents with   ? Problem Visit     left ear pain       HPI:   Zach is a 70 y.o. female who is a long term care resident of [facility].     Nursing notes she has been complaining of left ear pain over the weekend. She is even complaining when her outer ear is manipulated. Nursing notes they examined her ear with otoscope and did not see any redness or inflammation. She is complaining of pain again today. She is not having any fever or cough. Nursing brought phone into her room and Zach was asked if she could describe the pain in her ear. She says it just hurts. She is also pointing to her jaw line per nursing report. She denies having any nasal " congestion, runny nose, sore throat, cough, fever, headache, sinus pressure but she is noted by nursing to be a poor historian. She has difficulty with verbal communication. Zach does say the pain is in her ear and around it. She is unable to give further description. She thinks it has been there a few days. She can still hear ok. She is not sure if it is pressure she feels. She denies difficulty sleeping or laying on the ear. She denies any other problems today.     I have reviewed and updated the patient's Past Medical History, Social History, Family History and Medication List.    ALLERGIES  Aricept [donepezil], Atorvastatin, Glipizide, and Lisinopril    Review of Systems   Currently, no fever, chills, or rigors. Decreased vision. Denies any chest pain, headaches, palpitations, lightheadedness. Denies any GERD symptoms. Positive for weakness, EZ stand for transfers or slide board, dysphagia, urinary incontinence, fecal incontinence, appetite good, wheelchair bound, right sided weakness stable, aphasia, no shortness of breath,  cpap, forgetfulness, left ear pain, left jaw pain    Vitals:    10/19/20 0700   BP: 112/59   Pulse: 61   Resp: 19   Temp: 97.3  F (36.3  C)   SpO2: 96%         Labs:  N/A    Assessment/Plan:    ICD-10-CM    1. Acute non-recurrent sinusitis, unspecified location  J01.90    2. OME (otitis media with effusion), left  H65.92    3. Left ear pain  H92.02       DC ocean spray  Flonase 50mcg/spray 1 spray each nostril daily x 7 days dx sinusitis  Ciprodex 0.3%/0.1% 4 gtts in left ear bid x 7 days dx Otitis media effusion  Please notify if symptoms of left ear pain not improved within 2-3 days      Phone call duration:  20 minutes with additional 5 min spent on counseling and coordination of care for sinusitis, ear pain.    Morenita Mosquera NP

## 2021-06-21 NOTE — LETTER
Letter by Morenita Mosquera NP at      Author: Morenita Mosquera NP Service: -- Author Type: --    Filed:  Encounter Date: 2021 Status: (Other)         [facility]- [address] [address]         CC  No Recipients  Morenita Mosquera NP  2021  7:45 PM  Signed              [facility]    DATE: 2021    NAME:  Zach Olmos             :  1950  MRN: [id number]  CODE STATUS:  FULL CODE    VISIT TYPE: Discharge Summary     FACILITY:  [facility] [[id number]]       CHIEF COMPLAIN/REASON FOR VISIT:    Chief Complaint   Patient presents with   ? Discharge Summary               HISTORY OF PRESENT ILLNESS: Zach Olmos is a 70 y.o. female who is a long term care resident of [facility].     LTC summary:   Ms. Olmos had CVA in . She was at TCU and then LTC at [facility]. She later transferred to [facility] in 2019. She has right sided hemiparesis, expressive aphasia, dysphagia. She is totally dependent for all cares and is lift for transfers. She is wheelchair and bed bound. She is on dysphagia diet NDD3 with nectar thick liquids. She is diabetic with blood sugar checks twice daily and has been on lantus. Her hga1c most recently was 7.4 in October. Her lantus has since been adjusted. She does follow with cardiology for CAD and CHF, but these have been stable. She is on lasix daily. Her weights over the past 2 years have been trending up 184-217lbs, most likely due to immobility and increased intake. She has been counseled on heart healthy, diabetic diet but she is not always compliant with this. Her depression and anxiety have been stable on paroxetine. She does have history of vitreal hemorrhage and has been following with eye doctor for injections every 4-6 weeks. She recently recovered from COVID 19 infection and has been undergoing therapy at [facility] for  deconditioning. Her son has been discussing taking her home for quite some time but has previously had inappropriate and unsafe discharge plans. However, he has a care coordinator involved and has made adaptations to the home including a ramp and wheelchair accessibility.  and therapy from [place] did a home evaluation last week and do feel the home is appropriately adapted for her needs. Her daughter will also be moving into the home so that while the son is working night shift she will be with the daughter and vice versa. She will have 24 hour care provided by family. Son is coming in today to the facility to be trained on glucometer and blood glucose monitoring as well as dietary adaptations. He will also need to be trained on incontinence cares and equipment needed for home. Hospital bed, wheelchair, and lift have been ordered per [place] therapy department. See previous encounter for f2f and equipment orders. All other equipment requests are not covered by insurance nor do they require a prescription and will need to be purchased per the family. There have been family dynamic concerns previously, so home health  will be ordered to be following up after discharge and ensure Zach has appropriate and safe care at home. She will also have HH PT, Ot, HHA, RN. She will need to establish with a community primary care provider. She will need to follow up with endocrinology in 4-6 weeks for diabetes management. She will need to re-establish with neurology for CVA management. She will need cardiology follow up in 4-6 weeks for CAD and CHF management at home. She will need daily weights or at minimum weekly weights for CHF monitoring. She will need to continue to follow up with eye doctor as recommended for her injections. She will also need podiatry follow ups for diabetic foot care. All of these were being managed in house and will need to be established for outpatient management. She should  be seen by community primary care provider within 1-2 weeks in order to continue Home health plan of care as she will be discharging from my care when she leaves the facility.           REVIEW OF SYSTEMS:  PROBLEMS AND REVIEW OF SYSTEMS:   Today on ROS:   Currently, no fever, chills, or rigors. Decreased vision. Denies any chest pain, headaches, palpitations, lightheadedness. Denies any GERD symptoms. Positive for EZ stand for transfers, dysphagia, urinary incontinence, fecal incontinence, appetite good, wheelchair bound, right sided weakness, aphasia, no shortness of breath,  Cpap use, no recent cough, no recent nausea or vomiting, totally dependent for cares. Forgetful at times. Unable to obtain further ROS due to cognition and aphasia      Allergies   Allergen Reactions   ? Aricept [Donepezil]    ? Atorvastatin    ? Glipizide    ? Lisinopril      Current Outpatient Medications   Medication Sig   ? acetaminophen (TYLENOL) 325 MG tablet Take 650 mg by mouth every 6 (six) hours as needed for pain.          ? apixaban (ELIQUIS) 5 mg Tab tablet Take 5 mg by mouth 2 (two) times a day.         ? artificial tears,hypromellose, (GENTEAL; SYSTANE) 0.3 % Gel Administer 1 drop to both eyes 4 (four) times a day.    ? furosemide (LASIX) 20 MG tablet Take 20 mg by mouth daily.          ? insulin glargine (LANTUS) 100 unit/mL injection Inject 24 Units under the skin at bedtime.    ? irbesartan (AVAPRO) 75 MG tablet Take 75 mg by mouth 2 (two) times a day.         ? metFORMIN (GLUCOPHAGE) 500 MG tablet Take 500 mg by mouth 2 (two) times a day with meals. 1000mg in am and 500mg in evening   ? metoprolol succinate (TOPROL-XL) 50 MG 24 hr tablet Take 50 mg by mouth daily.   ? PARoxetine (PAXIL) 30 MG tablet Take 60 mg by mouth every morning.         ? rosuvastatin (CRESTOR) 20 MG tablet Take 20 mg by mouth every morning. PER HOSPITAL ORDERS GIVE TO PT. IN THE A.M.            Past Medical History:    Past Medical History:   Diagnosis  "Date   ? Anemia    ? Anxiety    ? Cancer (H)     Hx of colon cancer   ? CHF (congestive heart failure) (H)     diastolic   ? Coronary artery disease     s/p cabg   ? CVA (cerebral vascular accident) (H)    ? Depression    ? Diabetes mellitus (H)     type 2   ? DVT (deep vein thrombosis) in pregnancy    ? Granuloma annulare    ? Hemiplegia (H)     R sided and minimally communicative   ? Hyperlipidemia    ? Hypertension    ? Obesity    ? Oropharyngeal dysphagia    ? ANGEL (obstructive sleep apnea)    ? Parotitis    ? Paroxysmal atrial fibrillation (H)    ? PE (pulmonary thromboembolism) (H)    ? Stroke (H) 12/2018    Sub acute L MCA stroke   ? Varicose veins of both lower extremities        IMMUNIZATIONS:    There is no immunization history on file for this patient.  Above immunizations pulled from [facility]. Facility records also reconciled. Outstanding information sent to [facility] to update [facility].  Future immunizations are not needed at this point as all recommended immunizations are up to date.     PHYSICAL EXAMINATION  Vitals:    01/26/21 1628   BP: 121/55   Pulse: 60   Resp: 20   Temp: 97.1  F (36.2  C)   SpO2: 94%   Weight: 215 lb 11.2 oz (97.8 kg)   Height: 5' 7\" (1.702 m)       Today on physical exam:     GENERAL: Awake, Alert, not in any form of acute distress, answers most questions appropriately, follows simple commands  HEENT: Head is normocephalic with normal hair distribution. No evidence of trauma. Ears: No acute purulent discharge. Eyes: Conjunctivae pink with no scleral jaundice. Nose: Normal mucosa and septum. NECK: Supple with no cervical or supraclavicular lymphadenopathy. Trachea is midline. Decreased vision and hearing, left eyelid aprocrine cyst   CHEST: No tenderness or deformity, no crepitus  LUNG: dim to auscultation with good chest expansion. There are no crackles or wheezes, normal AP diameter.  No recent shortness of breath or cough   BACK: No kyphosis of the " thoracic spine. Symmetric, no curvature, ROM normal, no CVA tenderness, no spinal tenderness   CVS: irregularly irregular rhythm,  2+ pulses symmetric in all extremities.  ABDOMEN: Rounded and soft, nontender to palpation, non distended, no masses, no organomegaly, good bowel sounds, no rebound or guarding, no peritoneal signs.   EXTREMITIES: no edema, atraumatic  SKIN: Warm and dry, no rash today  NEURO/PSYCH: The patient is oriented to person, place, usually  time. Forgetful at times, Right sided hemiparesis, dysarthria, dysphagia, expressive aphasia, EZ stand, dependent for all cares            LABS:   No results found for this or any previous visit (from the past 168 hour(s)).  Results for orders placed or performed in visit on 06/10/20   Basic Metabolic Panel   Result Value Ref Range    Sodium 140 136 - 145 mmol/L    Potassium 4.3 3.5 - 5.0 mmol/L    Chloride 103 98 - 107 mmol/L    CO2 29 22 - 31 mmol/L    Anion Gap, Calculation 8 5 - 18 mmol/L    Glucose 121 70 - 125 mg/dL    Calcium 9.2 8.5 - 10.5 mg/dL    BUN 18 8 - 28 mg/dL    Creatinine 0.85 0.60 - 1.10 mg/dL    GFR MDRD Af Amer >60 >60 mL/min/1.73m2    GFR MDRD Non Af Amer >60 >60 mL/min/1.73m2         Lab Results   Component Value Date    WBC 7.2 10/21/2020    HGB 11.6 (L) 10/21/2020    HCT 35.9 10/21/2020    MCV 90 10/21/2020     10/21/2020       No results found for: [id number]  Lab Results   Component Value Date    HGBA1C 7.4 (H) 10/21/2020     No results found for: INR, PROTIME  No results found for: [id number]  Lab Results   Component Value Date    TSH 1.21 10/21/2020           ASSESSMENT/PLAN:    COVID 19 infection: resolved. Recommend continuing therapy as continues to be deconditioned from baseline.   Type 2 DM: Accuchecks stable, twice daily checks. On lantus. Hga1c 5.7 on 8/7. hga1c 5.5 on 11/8. hga1c 2/26-6.5. Hga1c up to 7.3 on 6/10.  hga1c 7.4 on 10/21. Increase in a1c related to weight gain, lantus adjusted. Will need endocrinology  follow up after discharge and is due for a1c at next visit. Diabetic diet.   CAD, s/p CABG: No chest pain or recent concerns. On aspirin, rosuvastatin. F/u cardiology in 4-6 weeks.   H/o Left MCA CVA, Right sided hemiparesis: Right sided hemiparesis, dysarthria, dysphagia, expressive aphasia. Wheelchair and bed bound. EZ stand. Dependent for cares, incontinent of urine and stool. On rosuvastatin, aspirin. Dysphagia on NDD3, nectar thick liquids. F/u neuro in 4-6 weeks to re-establish care for CVA.   Anxiety and depression: On paroxetine.   Dysphagia: NDD3 nectar thick liquids. Staff teaching family today about diet modifications, blood glucose monitoring.   PAF: Rate controlled 80s. On eliquis, metoprolol. Stable. Will need f/u with cardiology in 4-6  Weeks.   ANGEL, OHS: On CPAP. Refusing at times. Needs to continue and encourage compliance with this at home.   Morbid Obesity: [phone]. Monitor. Counseling on heart healthy diet, diabetic diet, continue to Encourage improved dietary habits. Needs dietary compliance, continues to gain weight.   Chronic CHF:  No echo or records available [facility]. on lasix daily. Has continued to be euvolemic despite weight gain. Felt to be related to increased intake and immobility. Needs cardiology follow up since now leaving facility with 24 hour care and monitoring. F/u in 4-6 weeks. Needs daily weights or at minimum weekly  At home, notify primary or cardiology for any weight gain >2lbs in 24 hours or 5lbs in 7  Days.   HTN: 120s. On irbesartan, metoprolol, lasix. F/u with cardiology in 4-6  Weeks.   Vitreal hemorrhage: injections q4-6 weeks. No recent changes to vision or noted concerns. Needs f/u with eye doctor.       Electronically signed by: Morenita Mosquera NP    Total floor/unit time spent 47 min with >50% time spent on counseling and coordination of care. Counseling regarding discharge  "instructions, primary care and specialty  Follow ups to establish care. Coordinated care with nursing, therapy,  for discharge planning, lift for home, wheelchair, hospital bed, home health services, primary care follow up.     Please evaluate Zach Olmos for admission to Home Health.    Face to Face Attestation and Initial Plan of Care    The face-to-face encounter occurred on date: 1/27/21  Face to Face encounter was with: Morenita Mosquera    Please provide brief clinical summary of reason for visit and need for home care. Deconditioning after CVA, covid 19 infection, moving from long term care (where lived for 2 years) to home environment, family needs ongoing teaching in providing care, equipment use, diabetes and chf management    Please identify which of the following home health disciplines the patient will need AND describe the skilled services that you would like the home health agency to perform: SKILLED NURSING (RN): complex med management and Other teach family diabetes monitoring and management, PHYSICAL THERAPY: strength training and gait training, OCCUPATIONAL THERAPY: ADLs and home safety, MEDICAL SOCIAL WORK and HOME HEALTH AIDE    Homebound Status (describe the functional limitations that support this patient is confined to his/her home. Medicaid recipients are not required to be homebound.):wheelchair, bed bound, cva, right sided hemiparesis    Name of physician who will be responsible for the ongoing home health plan of care (CMS requires the referring physician to provide the specific name of the community physician instead of a title, such as \"PCP\"): Needs to find and establish with primary in community    Requested Start of Care Date: Within 48 hours    Other information to assist the home health agency in developing the initial Plan of Care:    I certify that services are/were furnished while this patient was under the care of a physician and that a physician or an allowed " non-physician practitioner (NPP), had a face-to-face encounter that meets the physician face-to-face encounter requirements. The encounter was in whole, or in part, related to the primary reason for home health. The patient is confined to his/her home and needs intermittent skilled nursing, physical therapy, speech-language pathology, or the continued need for occupational therapy. A plan of care has been established by a physician and is periodically reviewed by a physician.    Post Discharge Medication Reconciliation Status: discharge medications reconciled, continue medications without change

## 2021-06-21 NOTE — LETTER
Letter by Morenita Mosquera NP at      Author: Morenita Mosquera NP Service: -- Author Type: --    Filed:  Encounter Date: 2020 Status: (Other)         Patient: Zach Olmos   MR Number: [id number]   YOB: 1950   Date of Visit: 2020                 [facility]    DATE: 2020    NAME:  Zach Olmos             :  1950  MRN: [id number]  CODE STATUS:  FULL CODE    VISIT TYPE: Problem Visit (blisters on eye lids)     FACILITY:  [facility] [[id number]]       CHIEF COMPLAIN/REASON FOR VISIT:    Chief Complaint   Patient presents with   ? Problem Visit     blisters on eye lids               HISTORY OF PRESENT ILLNESS: Zach Olmos is a 70 y.o. female who is a long term care resident of [facility], [place].     Today Ms. Olmos is seen for new blisters on her eyelids. She started with the one small one on her left eyelid but now this is enlarging. She developed a new small one to the right of her right eyelid. Staff are concerned that the warm packs are not working. On exam she is seen in her wheelchair. She says she was not sure if the blisters were getting worse or developing new ones. She now has 3 new small ones on her left eyelid in addition to the original blister on exam. She says they do  Not hurt unless touched. They are not open or draining. She says sometimes they are uncomfortable or itching but there is no redness or warmth. She denies every having anything like this before. We discussed different options and that we have tried conservative treatment but at this time with new and worsening blisters and no identifiable cause for this we will refer her to an eye doctor. She agrees to this and thinks her son can take her. We reviewed her blood sugars and she has been running high 206-317 recently and will also adjust her lantus today. Discussed with nursing staff given the new and worsening blisters, no signs of infection and not  responding to conservative treatments will refer to ophthalmology for further evaluation.       REVIEW OF SYSTEMS:  PROBLEMS AND REVIEW OF SYSTEMS:   Today on ROS:   Currently, no fever, chills, or rigors. Decreased vision. Denies any chest pain, headaches, palpitations, lightheadedness. Denies any GERD symptoms. Positive for weakness, EZ stand for transfers or slide board, dysphagia, urinary incontinence, fecal incontinence, appetite good, wheelchair bound, right sided weakness stable, aphasia, no shortness of breath,  Cpap, no further ear or jaw pain, blisters on both eyelids      Allergies   Allergen Reactions   ? Aricept [Donepezil]    ? Atorvastatin    ? Glipizide    ? Lisinopril      Current Outpatient Medications   Medication Sig   ? acetaminophen (TYLENOL) 325 MG tablet Take 650 mg by mouth every 6 (six) hours as needed for pain.          ? apixaban (ELIQUIS) 5 mg Tab tablet Take 5 mg by mouth 2 (two) times a day.         ? artificial tears,hypromellose, (GENTEAL; SYSTANE) 0.3 % Gel Administer 1 drop to both eyes 4 (four) times a day.    ? furosemide (LASIX) 20 MG tablet Take 20 mg by mouth daily.          ? insulin glargine (LANTUS) 100 unit/mL injection Inject 15 Units under the skin at bedtime.    ? irbesartan (AVAPRO) 75 MG tablet Take 75 mg by mouth 2 (two) times a day.         ? metFORMIN (GLUCOPHAGE) 500 MG tablet Take 500 mg by mouth 2 (two) times a day with meals. 1000mg in am and 500mg in evening   ? metoprolol succinate (TOPROL-XL) 50 MG 24 hr tablet Take 50 mg by mouth daily.   ? ondansetron (ZOFRAN) 4 MG tablet Take 4 mg by mouth every 6 (six) hours as needed for nausea.   ? PARoxetine (PAXIL) 30 MG tablet Take 60 mg by mouth every morning.         ? rosuvastatin (CRESTOR) 20 MG tablet Take 20 mg by mouth every morning. PER HOSPITAL ORDERS GIVE TO PT. IN THE A.M.            Past Medical History:    Past Medical History:   Diagnosis Date   ? Anemia    ? Anxiety    ? Cancer (H)     Hx of colon  cancer   ? CHF (congestive heart failure) (H)     diastolic   ? Coronary artery disease     s/p cabg   ? CVA (cerebral vascular accident) (H)    ? Depression    ? Diabetes mellitus (H)     type 2   ? DVT (deep vein thrombosis) in pregnancy    ? Granuloma annulare    ? Hemiplegia (H)     R sided and minimally communicative   ? Hyperlipidemia    ? Hypertension    ? Obesity    ? Oropharyngeal dysphagia    ? ANGEL (obstructive sleep apnea)    ? Parotitis    ? Paroxysmal atrial fibrillation (H)    ? PE (pulmonary thromboembolism) (H)    ? Stroke (H) 12/2018    Sub acute L MCA stroke   ? Varicose veins of both lower extremities        IMMUNIZATIONS:    There is no immunization history on file for this patient.  Above immunizations pulled from [employer]. Facility records also reconciled. Outstanding information sent to [facility] to update [employer].  Future immunizations are not needed at this point as all recommended immunizations are up to date.     PHYSICAL EXAMINATION  Vitals:    11/02/20 0700   BP: 135/58   Pulse: 65   Resp: 20   Temp: 97.8  F (36.6  C)   SpO2: 94%       Today on physical exam:     GENERAL: Awake, Alert, not in any form of acute distress, answers most questions appropriately, follows simple commands  HEENT: Head is normocephalic with normal hair distribution. No evidence of trauma. Ears: No acute purulent discharge. Eyes: Conjunctivae pink with no scleral jaundice. Nose: Normal mucosa and septum. NECK: Supple with no cervical or supraclavicular lymphadenopathy. Trachea is midline. Decreased vision and hearing, left eyelid enlarging fluid filled vesicle noted with 3 new small clear filled vesicles now present on left eyelid, right eyelid has one moderate sized clear filled vesicle to the right edge of eyelid, no erythema, no warmth, no drainage, no changes to vision, tender to palpation but nontender without palpation  CHEST: No tenderness or deformity, no crepitus  LUNG: dim  to auscultation with good chest expansion. There are no crackles or wheezes, normal AP diameter.  No recent shortness of breath or cough   BACK: No kyphosis of the thoracic spine. Symmetric, no curvature, ROM normal, no CVA tenderness, no spinal tenderness   CVS: irregularly irregular rhythm,  2+ pulses symmetric in all extremities.  ABDOMEN: Rounded and soft, nontender to palpation, non distended, no masses, no organomegaly, good bowel sounds, no rebound or guarding, no peritoneal signs.   EXTREMITIES: no edema, atraumatic  SKIN: Warm and dry, no rash today  NEURO/PSYCH: The patient is oriented to person, place and time. Forgetful at times, Right sided hemiplegia, dysarthria, dysphagia, expressive aphasia, EZ stand to slide board for transfers, dependent for cares            LABS:   No results found for this or any previous visit (from the past 168 hour(s)).  Results for orders placed or performed in visit on 06/10/20   Basic Metabolic Panel   Result Value Ref Range    Sodium 140 136 - 145 mmol/L    Potassium 4.3 3.5 - 5.0 mmol/L    Chloride 103 98 - 107 mmol/L    CO2 29 22 - 31 mmol/L    Anion Gap, Calculation 8 5 - 18 mmol/L    Glucose 121 70 - 125 mg/dL    Calcium 9.2 8.5 - 10.5 mg/dL    BUN 18 8 - 28 mg/dL    Creatinine 0.85 0.60 - 1.10 mg/dL    GFR MDRD Af Amer >60 >60 mL/min/1.73m2    GFR MDRD Non Af Amer >60 >60 mL/min/1.73m2         Lab Results   Component Value Date    WBC 7.2 10/21/2020    HGB 11.6 (L) 10/21/2020    HCT 35.9 10/21/2020    MCV 90 10/21/2020     10/21/2020       No results found for: [id number]  Lab Results   Component Value Date    HGBA1C 7.4 (H) 10/21/2020     No results found for: INR, PROTIME  No results found for: [id number]  Lab Results   Component Value Date    TSH 1.21 10/21/2020           ASSESSMENT/PLAN:    Bilateral eyelid blisters: multiple new vesicles today with enlarging blister on left eyelid, no warmth, no drainage, no erythema. No signs of infection. nontender  unless being palpated. Previously attempted warm packs three times a day x 7 days for reabsorption with no improvement. Reports discomfort and or itching at times. Not appear to be a stye, did not respond to treatment for styes. Unclear if dermatitis but no recent med changes, has artificial tears but no other eye drops. No lotions or new soaps around eyes. No redness or signs of inflammation with dermatitis. Unclear if could be viral but does not have drainage from eyes, does not follow nerve line. Unclear etiology at this time. Refer to ophthalmology for further evaluation.   Type 2 DM with HTN: Accuchecks controlled 206-317 recently. On lantus, accuchecks two times a day. Hga1c 5.7 on 8/7. hga1c 5.5 on 11/8. hga1c 2/26-6.5. continue lantus. Hga1c up to 7.3 on 6/10.  Will increase lantus to 18u. Monitor closely.   CAD, s/p CABG: No chest pain or recent concerns. On aspirin, rosuvastatin. F/u cardiology.    H/o Left MCA CVA: Right sided hemiplegia, dysarthria, dysphagia, expressive aphasia. Wheelchair and bed bound. EZ stand to slide board for transfers. Dependent for cares. On rosuvastatin, aspirin. F/u with neurology prn. Dysphagia on NDD3, nectar thick liquids, trialing thin liquids. No current concerns.   Anxiety and depression: On paroxetine. ACP following, feels mood stable today.   Dysphagia: NDD3 nectar thick liquids. No recent concerns.   PAF: Rate controlled 60s. On eliquis, metoprolol. Well controlled.    ANGEL, OHS: On CPAP. Refusing at times. No concerns today.   Weights, obesity: [phone]. Monitor. Counseling on heart healthy diet, diabetic diet,  Continues to have weight gain. Immobility and increased intake. Counseling provided   Chronic CHF: [facility] reported h/o CHF. No echo or records available [facility]. No shortness of breath, no edema noted today, on lasix daily. Appears euvolemic on exam   HTN: 120s. On  irbesartan, metoprolol, lasix. Stable.     CMP, hm1, tsh, hga1c on 6/10    Electronically signed by: Morenita Mosquera NP    Case Management:  I have reviewed the facility/SNF care plan/MDS which was done 9/24/20, including the falls risk, nutrition and pain screening. I also reviewed the current immunizations, and preventive care.. Future cancer screening is not clinically indicated secondary to age/goals of care.   Patient's desire to return to the community is not assessible due to cognitive impairment.    Information reviewed:  Medications, vital signs, orders, and nursing notes.  The health plan new enrollment has happened. I have reviewed the  MDS, the preventative needs,  and facility care plan. The level of care is appropriate. I have reviewed the code status/advanced directives.

## 2021-06-21 NOTE — LETTER
Letter by Vick Sutton MD at      Author: Vick Sutton MD Service: -- Author Type: --    Filed:  Encounter Date: 12/1/2020 Status: (Other)         Patient: Zach Olmos   MR Number: [id number]   YOB: 1950   Date of Visit: 12/1/2020      [facility]    Facility:  [facility] [[id number]]    Code Status:  Full    Chief Complaint   Patient presents with   ? Review Of Multiple Medical Conditions   :                    Patient is seen today for a federally mandated regulatory visit                 Patient Active Problem List   Diagnosis   ? Essential hypertension   ? Coronary artery disease involving coronary bypass graft with angina pectoris, unspecified whether native or transplanted heart (H)   ? Dyslipidemia (high LDL; low HDL)   ? Right hemiplegia (H)   ? Oropharyngeal dysphagia   ? PAF (paroxysmal atrial fibrillation) (H)   ? GERD (gastroesophageal reflux disease)   ? ANGEL on CPAP   ? Anxiety and depression   ? Type 2 DM with CKD stage 1 and hypertension (H)   ? Chronic combined systolic and diastolic congestive heart failure (H)   ? Morbid obesity (H)   ? Obesity hypoventilation syndrome (H)   ? Granuloma annulare   ? Varicose veins of both legs with edema   ? H/O deep venous thrombosis   ? Epistaxis   ? Hordeolum externum (stye)   ? Sinusitis   ? Blister of eyelid   ? H/O: CVA (cerebrovascular accident)   ? Apocrine cyst       History:  Ms. 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, expressive aphasia, dysphasia (November 17, 2018), coronary artery disease/CABG (March 2018), DVT/PE (occurring after CABG 2018), DM2 (currently Lantus 10 units twice daily), paroxysmal atrial fibrillation (apixaban 5 twice daily/metoprolol 50 twice daily) hypertension (irbesartan 75 twice daily, metoprolol 50 mg twice daily), CKD 1, GERD, remote colon cancer, hyperlipidemia (Crestor  "20), anxiety/depression (paroxetine 60 mg daily) seen today for review of her multiple medical problems/federally mandated regulatory visit      Subjective/ROS:    -augmented by discussion with facility staff involved in direct care  -limited by: Expressive aphasia    Patient is in good spirits today.  She says that she is feeling well.  She is disappointed with her vision and has been in for an injection for vitreal hemorrhage with her ophthalmologist.    She was restarted on Metformin October 7 and has tolerated it.  Nonetheless her sugars have been a bit high Ms. Mosquera just increased her Lantus to 22 units which she is also tolerated well.    She reports that she otherwise is \"the same\".  She has expressive aphasia but can answer most questions.  She denies headaches difficulty swallowing chest pain cough shortness of breath orthopnea PND chest pain abdominal pain.  No falls or injuries.  No skin rashes or skin breakdown.  She is on apixaban and has not had any bleeding complications.  She continues her Lasix and says that her swelling is \"the same\".  She remains on Paxil and feels like her mood is stable.  She still occasionally has asymptomatic bradycardia to the 50s.  Her A1c was 5.5 on November 18, 2019, 6.5 on February 26, 2020, and 7.3 on Maria [place] 10, 2020, 7.4 on October 21.  TSH at that time was 1.21.  Hemoglobin stable 11.6.  Creatinine looking good at 0.85    Past Medical History:   Diagnosis Date   ? Anemia    ? Anxiety    ? Cancer (H)     Hx of colon cancer   ? CHF (congestive heart failure) (H)     diastolic   ? Coronary artery disease     s/p cabg   ? CVA (cerebral vascular accident) (H)    ? Depression    ? Diabetes mellitus (H)     type 2   ? DVT (deep vein thrombosis) in pregnancy    ? Granuloma annulare    ? Hemiplegia (H)     R sided and minimally communicative   ? Hyperlipidemia    ? Hypertension    ? Obesity    ? Oropharyngeal dysphagia    ? ANGEL (obstructive sleep apnea)    ? Parotitis    ? " Paroxysmal atrial fibrillation (H)    ? PE (pulmonary thromboembolism) (H)    ? Stroke (H) 12/2018    Sub acute L MCA stroke   ? Varicose veins of both lower extremities      Past Surgical History:   Procedure Laterality Date   ? CORONARY ARTERY BYPASS GRAFT      x5          Family History   Problem Relation Age of Onset   ? Heart disease Mother    ? Cancer Father         esophageal   :       Social History     Socioeconomic History   ? Marital status: Single     Spouse name: Not on file   ? Number of children: Not on file   ? Years of education: Not on file   ? Highest education level: Not on file   Occupational History   ? Not on file   Social Needs   ? Financial resource strain: Not on file   ? Food insecurity     Worry: Not on file     Inability: Not on file   ? Transportation needs     Medical: Not on file     Non-medical: Not on file   Tobacco Use   ? Smoking status: Never Smoker   ? Smokeless tobacco: Never Used   Substance and Sexual Activity   ? Alcohol use: No     Frequency: Never   ? Drug use: No   ? Sexual activity: Not on file   Lifestyle   ? Physical activity     Days per week: Not on file     Minutes per session: Not on file   ? Stress: Not on file   Relationships   ? Social connections     Talks on phone: Not on file     Gets together: Not on file     Attends [employer] service: Not on file     Active member of club or organization: Not on file     Attends meetings of clubs or organizations: Not on file     Relationship status: Not on file   ? Intimate partner violence     Fear of current or ex partner: Not on file     Emotionally abused: Not on file     Physically abused: Not on file     Forced sexual activity: Not on file   Other Topics Concern   ? Incontinent Yes   ? Toileting independently No   ? Cognitive impairment No   ? Vision impairment Yes   ? Hearing impairment Yes   ? Dentures Not Asked   Social History Narrative   ? Not on file   :        Current Outpatient Medications on File Prior to  Visit   Medication Sig Dispense Refill   ? acetaminophen (TYLENOL) 325 MG tablet Take 650 mg by mouth every 6 (six) hours as needed for pain.            ? apixaban (ELIQUIS) 5 mg Tab tablet Take 5 mg by mouth 2 (two) times a day.           ? artificial tears,hypromellose, (GENTEAL; SYSTANE) 0.3 % Gel Administer 1 drop to both eyes 4 (four) times a day.      ? furosemide (LASIX) 20 MG tablet Take 20 mg by mouth daily.            ? insulin glargine (LANTUS) 100 unit/mL injection Inject 22 Units under the skin at bedtime.      ? irbesartan (AVAPRO) 75 MG tablet Take 75 mg by mouth 2 (two) times a day.        0   ? metFORMIN (GLUCOPHAGE) 500 MG tablet Take 500 mg by mouth 2 (two) times a day with meals. 1000mg in am and 500mg in evening     ? metoprolol succinate (TOPROL-XL) 50 MG 24 hr tablet Take 50 mg by mouth daily.     ? ondansetron (ZOFRAN) 4 MG tablet Take 4 mg by mouth every 6 (six) hours as needed for nausea.     ? PARoxetine (PAXIL) 30 MG tablet Take 60 mg by mouth every morning.           ? rosuvastatin (CRESTOR) 20 MG tablet Take 20 mg by mouth every morning. PER HOSPITAL ORDERS GIVE TO PT. IN THE A.M.              No current facility-administered medications on file prior to visit.    :      Allergies:  Aricept [donepezil], Atorvastatin, Glipizide, and Lisinopril    Vitals: Weight is up 10 pounds since I last saw her at 217.  She has a temperature of 98.3 heart rate 65 respirations 15 blood pressure 140/64 O2 sats 90% on room air most blood sugars are in the 200s  Physical exam:    Patient is alert and attentive she is up in her wheelchair in her room.  She has very significant expressive aphasia but often finds a way to answer simple questions slowly but effectively.  She has dense right hemiplegia and asks for help getting her foot back up on her wheelchair tray.  She is calm appears comfortable without accessory muscle use or tachypnea gaze is conjugate sclera clear purposeful movements of right arm or  right leg noted.  Minimal ankle edema asymmetric to the left.  Warm hands and feet with good cap refill.  Skin is clear other visualized areas.    Due to the 2020 Covid 19 pandemic, the patient was visually observed at a 6 foot plus distance.  An observational exam was performed in an effort to keep patient safe from Covid 19 and other communicable diseases.      Labs:  Lab Results   Component Value Date    WBC 7.2 10/21/2020    HGB 11.6 (L) 10/21/2020    HCT 35.9 10/21/2020    MCV 90 10/21/2020     10/21/2020     Results for orders placed or performed in visit on 06/10/20   Basic Metabolic Panel   Result Value Ref Range    Sodium 140 136 - 145 mmol/L    Potassium 4.3 3.5 - 5.0 mmol/L    Chloride 103 98 - 107 mmol/L    CO2 29 22 - 31 mmol/L    Anion Gap, Calculation 8 5 - 18 mmol/L    Glucose 121 70 - 125 mg/dL    Calcium 9.2 8.5 - 10.5 mg/dL    BUN 18 8 - 28 mg/dL    Creatinine 0.85 0.60 - 1.10 mg/dL    GFR MDRD Af Amer >60 >60 mL/min/1.73m2    GFR MDRD Non Af Amer >60 >60 mL/min/1.73m2         Lab Results   Component Value Date    TSH 1.21 10/21/2020     @LASTA!C@  [unfilled]  No results found for: TFHDYDXE58  No results found for: BNP  [unfilled]        Invalid input(s): PRINTERVAL      Assessment/Plan:      ICD-10-CM    1. Essential hypertension  I10    2. Coronary artery disease involving coronary bypass graft with angina pectoris, unspecified whether native or transplanted heart (H)  I25.709    3. PAF (paroxysmal atrial fibrillation) (H)  I48.0    4. Type 2 DM with CKD stage 1 and hypertension (H)  E11.22     I12.9     N18.1      CVA  Right hemiplegia  Expressive aphasia  Dysphasia                 NDD 3 with nectar thick liquids continuous.  Expressive aphasia appears to be stable. She is on appropriate secondary prevention with apixaban for her paroxysmal atrial fibrillation, blood pressure--- metoprolol irbesartan and lipid control--- Crestor.  She also takes aspirin 81 mg daily for cardiac  indication     Paroxysmal atrial fibrillation  Bradycardia   Infrequent bradycardia into the 50s without symptoms.  Metoprolol Exar 50 daily continues.                      Coronary artery disease  CABG 2018                 Continue aspirin, metoprolol.  I do find an echocardiogram from 2018 showing normal EF 55 to 60%, this was post cabg.     DM 2                 Patient's blood sugars have stabilized with an A1c 7.4 this time similar to last check at 7.3.  However significantly increased as noted above.  Thus Ms. Mosquera has increased her Lantus.  She was once on Metformin at thousand twice daily and she recalls tolerating it.  I do not find any note suggesting that she could not tolerate it.  It seems reasonable to increase Metformin as part of her overall treatment plan    -Recommend increasing Metformin back to her prior dose over timeUnless there is a reason not to.  -Ms. Mosquera is managing sugars and has recently increased her Lantus which appears appropriate although still not ideal control.  She is not having any hypoglycemic spells suggesting that she could tolerate some increase in medication in all likelihood    It appears that the metformin was dropped at the time of her stroke and tube feedings although I do not find a clear reason why, perhaps because she required insulin for hyperglycemia of tube feeding??       Weight gain      Question dietary indiscretion?  Question exogenous insulin effect?  She is quite inactive, thus caloric modification is really the only thing would be expected make much difference.  I had some hope that restarting Metformin could help but it has not yet.  TSH good at last check, 1.21         hypertension                 Overall control appears adequate we will continue irbesartan 75 twice daily and metoprolol 50 twice daily.  Monitor for any worsening of bradycardia.     ANGEL/CPAP  Obesity                 Weight is back up.  She might require higher pressure?  It is a moot point  as she does not wish to use the treatment.  I discussed the high risk of heart attacks, strokes, potential for earlier death.     Hyperlipidemia                     Lab Results   Component Value Date    CHOL 77 04/16/2019     Lab Results   Component Value Date    HDL 19 (L) 04/16/2019     Lab Results   Component Value Date    LDLCALC 38 04/16/2019     Lab Results   Component Value Date    TRIG 98 04/16/2019     No components found for: CHOLHDL    Remains on Crestor.  Thus I recommend at least annual FLP and consideration of ALT     History of DVT/PE                 Sounds provoked most likely following her CABG and prolonged TCU stay, however it is a moot point as she is on apixaban for atrial fibrillation at this point anyway     Pain   no changes made PRN Tylenol     Constipation   patient reports good bowel function without any stool softeners.           Case discussed with:    Facility staff           Vikc Sutton MD

## 2021-06-21 NOTE — LETTER
Letter by Morenita Mosquera NP at      Author: Morenita Mosquera NP Service: -- Author Type: --    Filed:  Encounter Date: 2020 Status: (Other)         Patient: Zach Olmos   MR Number: [id number]   YOB: 1950   Date of Visit: 2020                 [facility]    DATE: 2020    NAME:  Zach Olmos             :  1950  MRN: [id number]  CODE STATUS:  FULL CODE    VISIT TYPE: Review Of Multiple Medical Conditions (h/o cva, diabetes, hypertension)     FACILITY:  [facility] [[id number]]       CHIEF COMPLAIN/REASON FOR VISIT:    Chief Complaint   Patient presents with   ? Review Of Multiple Medical Conditions     h/o cva, diabetes, hypertension               HISTORY OF PRESENT ILLNESS: Zach Olmos is a 70 y.o. female who is a long term care resident of [facility].     Today Ms. Olmos is seen today for federally mandated visit and review of systems for h/o CVA, diabetes, hypertension. She is seen in her room today in wheelchair. She says she has been doing well. She denies any recent illness or concerns. She has no pain today  And is sleeping well. Her appetite has been great and no reflux or heartburn issues. She has no bowel or bladder concerns today. She denies any recent rashes. She thinks her eye blisters are the same. She is not having any other concerns. She does think she recently went out for an appointment but not sure what. Nursing says she saw the eye doctor recently and had injection for vitreal hemorrhage. Eye doctor felt blisters were apocrine cyst and to continue with compresses as needed. No other treatment orders. Her blood sugars have been running 150-316.      REVIEW OF SYSTEMS:  PROBLEMS AND REVIEW OF SYSTEMS:   Today on ROS:   Currently, no fever, chills, or rigors. Decreased vision. Denies any chest pain, headaches, palpitations, lightheadedness. Denies any GERD symptoms. Positive for weakness, EZ stand  for transfers or slide board, dysphagia, urinary incontinence, fecal incontinence, appetite good, wheelchair bound, right sided weakness stable, aphasia, no shortness of breath,  Cpap, blisters on both eyelids      Allergies   Allergen Reactions   ? Aricept [Donepezil]    ? Atorvastatin    ? Glipizide    ? Lisinopril      Current Outpatient Medications   Medication Sig   ? acetaminophen (TYLENOL) 325 MG tablet Take 650 mg by mouth every 6 (six) hours as needed for pain.          ? apixaban (ELIQUIS) 5 mg Tab tablet Take 5 mg by mouth 2 (two) times a day.         ? artificial tears,hypromellose, (GENTEAL; SYSTANE) 0.3 % Gel Administer 1 drop to both eyes 4 (four) times a day.    ? furosemide (LASIX) 20 MG tablet Take 20 mg by mouth daily.          ? insulin glargine (LANTUS) 100 unit/mL injection Inject 22 Units under the skin at bedtime.    ? irbesartan (AVAPRO) 75 MG tablet Take 75 mg by mouth 2 (two) times a day.         ? metFORMIN (GLUCOPHAGE) 500 MG tablet Take 500 mg by mouth 2 (two) times a day with meals. 1000mg in am and 500mg in evening   ? metoprolol succinate (TOPROL-XL) 50 MG 24 hr tablet Take 50 mg by mouth daily.   ? ondansetron (ZOFRAN) 4 MG tablet Take 4 mg by mouth every 6 (six) hours as needed for nausea.   ? PARoxetine (PAXIL) 30 MG tablet Take 60 mg by mouth every morning.         ? rosuvastatin (CRESTOR) 20 MG tablet Take 20 mg by mouth every morning. PER HOSPITAL ORDERS GIVE TO PT. IN THE A.M.            Past Medical History:    Past Medical History:   Diagnosis Date   ? Anemia    ? Anxiety    ? Cancer (H)     Hx of colon cancer   ? CHF (congestive heart failure) (H)     diastolic   ? Coronary artery disease     s/p cabg   ? CVA (cerebral vascular accident) (H)    ? Depression    ? Diabetes mellitus (H)     type 2   ? DVT (deep vein thrombosis) in pregnancy    ? Granuloma annulare    ? Hemiplegia (H)     R sided and minimally communicative   ? Hyperlipidemia    ? Hypertension    ? Obesity    ?  Oropharyngeal dysphagia    ? ANGEL (obstructive sleep apnea)    ? Parotitis    ? Paroxysmal atrial fibrillation (H)    ? PE (pulmonary thromboembolism) (H)    ? Stroke (H) 12/2018    Sub acute L MCA stroke   ? Varicose veins of both lower extremities        IMMUNIZATIONS:    There is no immunization history on file for this patient.  Above immunizations pulled from [facility]. Facility records also reconciled. Outstanding information sent to [facility] to update [facility].  Future immunizations are not needed at this point as all recommended immunizations are up to date.     PHYSICAL EXAMINATION  Vitals:    11/18/20 0700   BP: 128/60   Pulse: 69   Resp: 15   Temp: 98.2  F (36.8  C)   SpO2: 95%   Weight: 214 lb (97.1 kg)       Today on physical exam:     GENERAL: Awake, Alert, not in any form of acute distress, answers most questions appropriately, follows simple commands  HEENT: Head is normocephalic with normal hair distribution. No evidence of trauma. Ears: No acute purulent discharge. Eyes: Conjunctivae pink with no scleral jaundice. Nose: Normal mucosa and septum. NECK: Supple with no cervical or supraclavicular lymphadenopathy. Trachea is midline. Decreased vision and hearing, left eyelid cyst   CHEST: No tenderness or deformity, no crepitus  LUNG: dim to auscultation with good chest expansion. There are no crackles or wheezes, normal AP diameter.  No recent shortness of breath or cough   BACK: No kyphosis of the thoracic spine. Symmetric, no curvature, ROM normal, no CVA tenderness, no spinal tenderness   CVS: irregularly irregular rhythm,  2+ pulses symmetric in all extremities.  ABDOMEN: Rounded and soft, nontender to palpation, non distended, no masses, no organomegaly, good bowel sounds, no rebound or guarding, no peritoneal signs.   EXTREMITIES: no edema, atraumatic  SKIN: Warm and dry, no rash today  NEURO/PSYCH: The patient is oriented to person, place and time. Forgetful at  times, Right sided hemiplegia, dysarthria, dysphagia, expressive aphasia, EZ stand to slide board for transfers, dependent for cares            LABS:   No results found for this or any previous visit (from the past 168 hour(s)).  Results for orders placed or performed in visit on 06/10/20   Basic Metabolic Panel   Result Value Ref Range    Sodium 140 136 - 145 mmol/L    Potassium 4.3 3.5 - 5.0 mmol/L    Chloride 103 98 - 107 mmol/L    CO2 29 22 - 31 mmol/L    Anion Gap, Calculation 8 5 - 18 mmol/L    Glucose 121 70 - 125 mg/dL    Calcium 9.2 8.5 - 10.5 mg/dL    BUN 18 8 - 28 mg/dL    Creatinine 0.85 0.60 - 1.10 mg/dL    GFR MDRD Af Amer >60 >60 mL/min/1.73m2    GFR MDRD Non Af Amer >60 >60 mL/min/1.73m2         Lab Results   Component Value Date    WBC 7.2 10/21/2020    HGB 11.6 (L) 10/21/2020    HCT 35.9 10/21/2020    MCV 90 10/21/2020     10/21/2020       No results found for: [id number]  Lab Results   Component Value Date    HGBA1C 7.4 (H) 10/21/2020     No results found for: INR, PROTIME  No results found for: [id number]  Lab Results   Component Value Date    TSH 1.21 10/21/2020           ASSESSMENT/PLAN:      Type 2 DM with HTN: Accuchecks controlled 150-300, only occasional in 200-300 range. On lantus, accuchecks two times a day. Hga1c 5.7 on 8/7. hga1c 5.5 on 11/8. hga1c 2/26-6.5. continue lantus. Hga1c up to 7.3 on 6/10.  hga1c 7.4 on 10/21. Increase lantus to 22u daily.   CAD, s/p CABG: No chest pain or recent concerns. On aspirin, rosuvastatin. F/u cardiology.    H/o Left MCA CVA: Right sided hemiplegia, dysarthria, dysphagia, expressive aphasia. Wheelchair and bed bound. EZ stand to slide board for transfers. Dependent for cares. On rosuvastatin, aspirin. Dysphagia on NDD3, nectar thick liquids, trialing thin liquids. Continue to monitor. F/u with  Neuro prn.   Anxiety and depression: On paroxetine. ACP following, feels mood stable today.   Dysphagia: NDD3 nectar thick liquids. No recent  concerns.   PAF: Rate controlled 60s. On eliquis, metoprolol. Well controlled.    ANGEL, OHS: On CPAP. Refusing at times. No concerns today.   Weights, obesity: [phone]. Monitor. Counseling on heart healthy diet, diabetic diet,  Continues to have weight gain. Immobility and increased intake. Counseling provided again today. Dietary to follow.   Chronic CH[facility]: [facility] h/o CHF. No echo or records available [facility]. No shortness of breath, no edema noted today, on lasix daily. Appears euvolemic on exam   HTN: 120s. On irbesartan, metoprolol, lasix. Stable.   Vitreal hemorrhage: injections q4-6 weeks. Last eye appt 11/11.   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 No further treatment at this time.     CMP, hm1, tsh, hga1c on 6/10    Electronically signed by: Morenita Mosquera NP    Case Management:  I have reviewed the facility/SNF care plan/MDS which was done 9/24/20, including the falls risk, nutrition and pain screening. I also reviewed the current immunizations, and preventive care.. Future cancer screening is not clinically indicated secondary to age/goals of care.   Patient's desire to return to the community is not assessible due to cognitive impairment.    Information reviewed:  Medications, vital signs, orders, and nursing notes.  The health plan new enrollment has happened. I have reviewed the  MDS, the preventative needs,  and facility care plan. The level of care is appropriate. I have reviewed the code status/advanced directives.

## 2021-06-21 NOTE — LETTER
Letter by Morenita Mosquera NP at      Author: Morenita Mosquera NP Service: -- Author Type: --    Filed:  Encounter Date: 2021 Status: (Other)         [address] [address]                                  2021    Patient: Zach Olmos   MR Number: [id number]   YOB: 1950   Date of Visit: 2021     Dear Dr. Vasquez:    Thank you for referring Zach Olmos to me for evaluation. Below are the relevant portions of my assessment and plan of care.    If you have questions, please do not hesitate to call me. I look forward to following Zach along with you.    Sincerely,        Morenita Mosquera NP          CC  No Recipients  Morenita Mosquera NP  2021  4:35 PM  Signed              [facility]    DATE: 2021    NAME:  Zach Olmos             :  1950  MRN: [id number]  CODE STATUS:  FULL CODE    VISIT TYPE: Problem Visit (covid, dry cough, nausea)     FACILITY:  [facility] [[id number]]       CHIEF COMPLAIN/REASON FOR VISIT:    Chief Complaint   Patient presents with   ? Problem Visit     covid, dry cough, nausea               HISTORY OF PRESENT ILLNESS: Zach Olmos is a 70 y.o. female who is a long term care resident of [facility].     Today Ms. Olmos is seen for follow up on COVID infection, dry cough, nausea. Per nursing staff she will be moving back to long term care today as she has completed her isolation for COVID infection. She is seen in her room alone today. She just finished getting ready with the aid. She says she feels good. She may have a little throat irritation and dry cough but denies any shortness of breath. She thinks otherwise she has been doing well. She does feel a little tired and maybe a little weaker than before. She is not sure. She denies any fever or chills. She denies any other recent concerns. Discussed with staff will order PT, OT eval and treat as  she appears deconditioned from COVID infection.     Also of note there have been multiple messages from a community Care coordinator requesting equipment orders for discharge to son's home. This has been discussed many times with , therapy, and they have discussed with son. He has been unrealistic about her care needs and has not completed any Caregiver, transfer, or equipment training. She requires 24/7 care, dysphagia diet, incontinence cares, lift for transfers, etc. A home assessment also needs to be completed from therapy staff before deeming home safe for discharge. It is not appropriate to be ordering this equipment at this time as many measures need to be taken first to ensure safe and appropriate discharge plans are in place. Facility staff need to be coordinating this as they are the most well versed in patient care needs. Most of orders requested by [facility] coordinator are not prescription anyways and family would need to purchase. Discussed this with  and therapy again today. PT, Ot eval and treat orders were given due to deconditioning from COVID as patient will need to return to baseline anyways prior to considering a discharge. Defer to therapy for evaluation of son's home and if this will be a safe and appropriate discharge plan. Will await therapy  Recommendations for what equipment would be needed if she is to discharge.       REVIEW OF SYSTEMS:  PROBLEMS AND REVIEW OF SYSTEMS:   Today on ROS:   Currently, no fever, chills, or rigors. Decreased vision. Denies any chest pain, headaches, palpitations, lightheadedness. Denies any GERD symptoms. Positive for weakness, EZ stand for transfers, dysphagia, urinary incontinence, fecal incontinence, appetite good, wheelchair bound, right sided weakness, aphasia, no shortness of breath,  Cpap use, fatigue, loss of taste and smell, intermittent nausea, dry cough      Allergies   Allergen Reactions   ? Aricept [Donepezil]    ?  Atorvastatin    ? Glipizide    ? Lisinopril      Current Outpatient Medications   Medication Sig   ? acetaminophen (TYLENOL) 325 MG tablet Take 650 mg by mouth every 6 (six) hours as needed for pain.          ? apixaban (ELIQUIS) 5 mg Tab tablet Take 5 mg by mouth 2 (two) times a day.         ? artificial tears,hypromellose, (GENTEAL; SYSTANE) 0.3 % Gel Administer 1 drop to both eyes 4 (four) times a day.    ? furosemide (LASIX) 20 MG tablet Take 20 mg by mouth daily.          ? insulin glargine (LANTUS) 100 unit/mL injection Inject 24 Units under the skin at bedtime.    ? irbesartan (AVAPRO) 75 MG tablet Take 75 mg by mouth 2 (two) times a day.         ? metFORMIN (GLUCOPHAGE) 500 MG tablet Take 500 mg by mouth 2 (two) times a day with meals. 1000mg in am and 500mg in evening   ? metoprolol succinate (TOPROL-XL) 50 MG 24 hr tablet Take 50 mg by mouth daily.   ? ondansetron (ZOFRAN) 4 MG tablet Take 4 mg by mouth every 6 (six) hours as needed for nausea.   ? PARoxetine (PAXIL) 30 MG tablet Take 60 mg by mouth every morning.         ? rosuvastatin (CRESTOR) 20 MG tablet Take 20 mg by mouth every morning. PER HOSPITAL ORDERS GIVE TO PT. IN THE A.M.            Past Medical History:    Past Medical History:   Diagnosis Date   ? Anemia    ? Anxiety    ? Cancer (H)     Hx of colon cancer   ? CHF (congestive heart failure) (H)     diastolic   ? Coronary artery disease     s/p cabg   ? CVA (cerebral vascular accident) (H)    ? Depression    ? Diabetes mellitus (H)     type 2   ? DVT (deep vein thrombosis) in pregnancy    ? Granuloma annulare    ? Hemiplegia (H)     R sided and minimally communicative   ? Hyperlipidemia    ? Hypertension    ? Obesity    ? Oropharyngeal dysphagia    ? ANGEL (obstructive sleep apnea)    ? Parotitis    ? Paroxysmal atrial fibrillation (H)    ? PE (pulmonary thromboembolism) (H)    ? Stroke (H) 12/2018    Sub acute L MCA stroke   ? Varicose veins of both lower extremities   "      IMMUNIZATIONS:    There is no immunization history on file for this patient.  Above immunizations pulled from [facility]. Facility records also reconciled. Outstanding information sent to [facility] to update [facility].  Future immunizations are not needed at this point as all recommended immunizations are up to date.     PHYSICAL EXAMINATION  Vitals:    01/07/21 2345   BP: 114/58   Pulse: 60   Resp: 16   Temp: 97.9  F (36.6  C)   SpO2: 93%   Weight: 213 lb (96.6 kg)   Height: 5' 7\" (1.702 m)       Today on physical exam:     GENERAL: Awake, Alert, not in any form of acute distress, answers most questions appropriately, follows simple commands  HEENT: Head is normocephalic with normal hair distribution. No evidence of trauma. Ears: No acute purulent discharge. Eyes: Conjunctivae pink with no scleral jaundice. Nose: Normal mucosa and septum. NECK: Supple with no cervical or supraclavicular lymphadenopathy. Trachea is midline. Decreased vision and hearing, left eyelid cyst   CHEST: No tenderness or deformity, no crepitus  LUNG: dim to auscultation with good chest expansion. There are no crackles or wheezes, normal AP diameter.  No recent shortness of breath or cough   BACK: No kyphosis of the thoracic spine. Symmetric, no curvature, ROM normal, no CVA tenderness, no spinal tenderness   CVS: irregularly irregular rhythm,  2+ pulses symmetric in all extremities.  ABDOMEN: Rounded and soft, nontender to palpation, non distended, no masses, no organomegaly, good bowel sounds, no rebound or guarding, no peritoneal signs.   EXTREMITIES: no edema, atraumatic  SKIN: Warm and dry, no rash today  NEURO/PSYCH: The patient is oriented to person, place, usually  time. Forgetful at times, Right sided hemiplegia, dysarthria, dysphagia, expressive aphasia, EZ stand, increasing weakness, dependent for all cares            LABS:   Recent Results (from the past 168 hour(s))   Creatinine   Result Value Ref " Range    Creatinine 0.82 0.60 - 1.10 mg/dL    GFR MDRD Af Amer >60 >60 mL/min/1.73m2    GFR MDRD Non Af Amer >60 >60 mL/min/1.73m2     Results for orders placed or performed in visit on 06/10/20   Basic Metabolic Panel   Result Value Ref Range    Sodium 140 136 - 145 mmol/L    Potassium 4.3 3.5 - 5.0 mmol/L    Chloride 103 98 - 107 mmol/L    CO2 29 22 - 31 mmol/L    Anion Gap, Calculation 8 5 - 18 mmol/L    Glucose 121 70 - 125 mg/dL    Calcium 9.2 8.5 - 10.5 mg/dL    BUN 18 8 - 28 mg/dL    Creatinine 0.85 0.60 - 1.10 mg/dL    GFR MDRD Af Amer >60 >60 mL/min/1.73m2    GFR MDRD Non Af Amer >60 >60 mL/min/1.73m2         Lab Results   Component Value Date    WBC 7.2 10/21/2020    HGB 11.6 (L) 10/21/2020    HCT 35.9 10/21/2020    MCV 90 10/21/2020     10/21/2020       No results found for: [id number]  Lab Results   Component Value Date    HGBA1C 7.4 (H) 10/21/2020     No results found for: INR, PROTIME  No results found for: [id number]  Lab Results   Component Value Date    TSH 1.21 10/21/2020           ASSESSMENT/PLAN:    COVID 19 infection: intermittent nausea, no vomiting. Dry cough, fatigue, increased weakness, loss of taste and smell, dry cough. Vitals stable, afebrile. Completed covid isolation today and will be returning to long term care floor. Continue vitamin c, zinc, vit d for 14 days total. No other treatments were required. proair prn for 14 days. On eliquis for VTE prophylaxis. Due to deconditioning with infection ordered PT, OT eval and treat.   Type 2 DM with HTN: Accuchecks stable. On lantus, accuchecks two times a day. Hga1c 5.7 on 8/7. hga1c 5.5 on 11/8. hga1c 2/26-6.5. continue lantus. Hga1c up to 7.3 on 6/10.  hga1c 7.4 on 10/21. No recent concerns.   CAD, s/p CABG: No chest pain or recent concerns. On aspirin, rosuvastatin. F/u cardiology.    H/o Left MCA CVA: Right sided hemiplegia, dysarthria, dysphagia, expressive aphasia. Wheelchair and bed bound. EZ stand. Dependent for cares,  incontinent of urine and stool. On rosuvastatin, aspirin. Dysphagia on NDD3, nectar thick liquids. F/u neuro prn. PT, OT eval and treat for deconditioning and potential evaluation for discharge to son's home. Therapy to determine if safe and appropriate discharge plan in place and what equipment would be needed if discharging. Son needs to complete caregiver training. Therapy reports considering home eval.   Anxiety and depression: On paroxetine. ACP following, no recent mood concerns.   Dysphagia: NDD3 nectar thick liquids. No recent concerns.   PAF: Rate controlled 80s. On eliquis, metoprolol. stable  ANGEL, OHS: On CPAP. Refusing at times. No concerns today.   Weights, obesity: [phone]. Monitor. Counseling on heart healthy diet, diabetic diet, continue to Encourage improved dietary habits. Monitor. Labs have been stable.   Chronic CHF: [facility] h/o CHF. No echo or records available [facility]. on lasix daily. Has continued to be euvolemic despite weight gain. Felt to be related to increased intake and immobility.   HTN: 110s. On irbesartan, metoprolol, lasix. No recent concerns.    Vitreal hemorrhage: injections q4-6 weeks. No recent changes to vision or noted concerns.       Labs in october    Electronically signed by: Morenita Mosquera NP    Case Management:  I have reviewed the facility/SNF care plan/MDS which was done 12/17/20, including the falls risk, nutrition and pain screening. I also reviewed the current immunizations, and preventive care.. Future cancer screening is not clinically indicated secondary to age/goals of care.   Patient's desire to return to the community is not assessible due to cognitive impairment.    Information reviewed:  Medications, vital signs, orders, and nursing notes.  The health plan new enrollment has happened. I have reviewed the  MDS, the preventative needs,  and  facility care plan. The level of care is appropriate. I have reviewed the code status/advanced directives.

## 2021-06-21 NOTE — LETTER
Letter by Morenita Mosquera NP at      Author: Morenita Mosquera NP Service: -- Author Type: --    Filed:  Encounter Date: 10/27/2020 Status: (Other)         Patient: Zach Olmos   MR Number: [id number]   YOB: 1950   Date of Visit: 10/27/2020       [facility]   Chronic Care Management Note      Facility: [facility]    Facility Type: C    Allergies:   Allergies   Allergen Reactions   ? Aricept [Donepezil]    ? Atorvastatin    ? Glipizide    ? Lisinopril        Reason for call: Chronic Care Management and Care Coordination  **Due to COVID-19 Pandemic, This Assessment was carried out over telephone with the facility team and patient**    Case Management:  I have reviewed the facility/SNF care plan/MDS which was done 9/24/20, including the falls risk, nutrition and pain screening. I also reviewed the current immunizations, and preventive care.. Future cancer screening is not clinically indicated secondary to age/goals of care.   Patient's desire to return to the community is present, but is not able due to care needs .    Advance Directive Discussion:    I reviewed the current advanced directives as reflected in EPIC and the facility chart. I did review the advance directives with the resident.     Team Discussion:  I communicated with the appropriate disciplines involved with the Plan of Care:   Nursing   and       Patient Goal:  Patient's goal is pain control and comfort.    Information reviewed:  Medications, vital signs, orders, and nursing notes.    Consent for care management from Zach. Patient and family aware of potential for cost sharing.   Care coordinator: Morenita Mosquera NP  Availability 24/7 via facility staff, [facility] office number [phone], virtual visits also available via [employer] and [employer].       Medications reviewed and reconciled 10/27/20      1. Chronic combined systolic and diastolic congestive heart failure  (H)     2. Coronary artery disease involving coronary bypass graft with angina pectoris, unspecified whether native or transplanted heart (H)     3. Essential hypertension     4. Oropharyngeal dysphagia     5. PAF (paroxysmal atrial fibrillation) (H)     6. ANGEL on CPAP     7. Type 2 DM with CKD stage 1 and hypertension (H)     8. Anxiety and depression     9. Morbid obesity (H)     10. Right hemiplegia (H)     11. H/O: CVA (cerebrovascular accident)         Expected outcomes and prognosis: Prognosis good, expected outcome maintenance of current health status.      Treatment Goals: Maintain euvolemic status with adequate CHF management to prevent hospitalizations. Rate management of Atrial fibrillation in  range. Prevent DVT, PE, or CVA from Atrial fibrillation with appropriate anticoagulation management. Prevent bleeding risk with appropriate anticoagulation management and monitoring. CAD management in order to prevent MI or worsening heart failure. Adequate blood pressure management with goal of SBP <160 or >90. Compliance with CPAP for ANGEL. Appropriate management of depression with optimization of mood stability, noo suicidal ideation, PHQ9 goal 9 or less. Diabetic management with goal HGa1c of 7. Adherence to diabetic diet and prevention of hypo and hyperglycemia. Safe environment maintained with prevention of falls and injury  As much as possible. Adherence to COVID 19 infection control guidelines per MD and CDC recommendations. Weight loss management, lifestyle modification with optimization of weight.     Treatment Interventions, Symptom Management: Weekly weights, notify for weight change > 5lbs, notify for signs of fluid overload including increased shortness of breath, hypoxia, increased leg edema. Continue current regimen for A fib management. Regular follow ups with cardiology at least annually or more if recommended. Encourage adherence to cardiac diet, educate on importance of lifestyle  modifications in management of comorbidities. Monitor for signs and symptoms of bleeding or clotting issues.  Encourage weight loss program and obesity management with dietitian. ACP counseling, volunteer services as needed for depression and anxiety management. Continue current med regimen not felt appropriate for GDR at this time due to concerns of mood instability at times. Continue current diabetes regimen with routine labs q6 months and hga1c q3 months.     Follow up: PCP q30-60 days, chronic care management monthly. F/u with  Neurology prn. Eye exam annually, dentist bi annually. Podiatry q3-6  Months.       Electronically signed by:  Morenita Mosquera NP

## 2021-06-21 NOTE — LETTER
Letter by Morenita Mosquera NP at      Author: Morenita Mosquera NP Service: -- Author Type: --    Filed:  Encounter Date: 2021 Status: (Other)         [address] [address]                                  2021    Patient: Zach Olmos   MR Number: [id number]   YOB: 1950   Date of Visit: 2021     Dear Dr. Home:    Thank you for referring Zach Olmos to me for evaluation. Below are the relevant portions of my assessment and plan of care.    If you have questions, please do not hesitate to call me. I look forward to following Zach along with you.    Sincerely,        Morenita Mosquera NP          CC  No Recipients  Morenita Mosquera NP  2021  2:47 PM  Signed              [facility]    DATE: 2021    NAME:  Zach Olmos             :  1950  MRN: [id number]  CODE STATUS:  FULL CODE    VISIT TYPE: Problem Visit (discharge planning, f2f for equipment)     FACILITY:  [facility] [[id number]]       CHIEF COMPLAIN/REASON FOR VISIT:    Chief Complaint   Patient presents with   ? Problem Visit     discharge planning, f2f for equipment               HISTORY OF PRESENT ILLNESS: Zach Olmos is a 70 y.o. female who is a long term care resident of [facility].     Today Ms. Olmos is seen for face to face for equipment orders. Therapy and [place]  had a home evaluation with Zach and her family. Her son has made many modifications to the home to make this wheelchair safe. They now have safe and appropriate care arranged for her including family caregivers . Her family is working with therapy on caregiver training and proper equipment use. She is in need of lift, wheelchair, and hospital bed. Therapy and [place]  both feel this is now a safe and appropriate discharge plan. They are planning on her moving to her son's home sometime next week  once all arrangements have been made and equipment needed will be available. She is seen in her wheelchair today [facility] She says she feels great. She thinks her home evaluation went well and is very much looking forward to moving home with her family. She denies any pain, cough, or breathing concerns recently.     DME Order  Hospital Bed   Hospital bed is required for body positioning, to allow for safe transfers to wheelchair and standing and frequent changes in body position, not feasible in an ordinary bed.     1. Does the patient require positioning of the body in ways not feasible with an ordinary bed due to a medical condition that is expected to last at least 1 month? Yes    2. Does the patient require, for the alleviation of pain, positioning of the body in ways not feasible with an ordinary bed? No    3. Does the patient require the head of the bed to be elevated more than 30 degrees most of the time due to congestive heart failure, chronic pulmonary disease, or aspiration? Yes    4. Does the patient require traction that can only be attached to a hospital bed? No    5. Does the patient require a bed height different than a fixed height hospital bed to permit transfers to chair, wheelchair, or standing position? Yes    6. Does the patient require frequent changes in body position and/or have an immediate need for change in body position? Yes    For trapeze- Patient must meet standard hospital bed criteria also:   1. Does patient need this device to sit up because of a respiratory condition, for change in body position for other medical reasons, or to get in or out of bed? No    My patient requires body positioning not feasible in an ordinary bed due to CHF, CVA, right hemiparesis and inability to position self-related to patient requiring the head of the bed more than 30 degrees most of the time and SOB.   Additionally, my patient needs to be  frequently repositioned due to right hemiparesis and symptoms listed above    Date of face to face encounter: 1/22/21 1100am    Morenita Mosquera NP NPI [id number]    [facility] Order  Wheelchair    Wheelchair Face to Face documentation  Length of need: 99 (lifetime)  Diagnosis:   1. Chronic combined systolic and diastolic congestive heart failure (H)     2. Coronary artery disease involving coronary bypass graft with angina pectoris, unspecified whether native or transplanted heart (H)     3. Essential hypertension     4. ANGEL on CPAP     5. PAF (paroxysmal atrial fibrillation) (H)     6. 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 Right hemiplegia (H)     8. H/O: CVA (cerebrovascular accident)          This patient has significant mobility limitations that impair her activities of daily living including toileting, dressing, and bathing in a customary home.  She has had weakness, falls, poor endurance while attempting to perform MRADLs.  The patient's cannot walk with an assistive device such as a cane or walker.  Her current home has adequate space in entry doorway, bedrooms, bathroom, and kitchen for maneuvering a manual wheelchair.  The patient will use this wheelchair daily and it will improve her participation in MRADLs as she will be able to get out of bed and move about her home efficiently.  She is willing to use a manual wheelchair.  The patient does have sufficient upper extremity strength to propel the wheelchair but has 24 hour supervision who will propel her in the wheelchair.  She is anticipated to spend at least 10 hours/day in the wheelchair.        Face to face with Zach Olmos in regards to a judith height light weight wheelchair.  Patient requires a wheelchair to use within the home due to the following limitations in mobility:  inability to stand without a mechanical lift, Right Hemiplegia, weakness and fatigue resulting from hemiplegia from cerebral infarction and abnormalities of gait.  Patient  has impairment in ability to complete mobility related ADLs from standing.  Pt would not be able to safely complete mobility and mobility related ADLs with use of a cane, walker or crutches.  Patient has expressed willingness to propel wheelchair.  Pt has physical ability to propel wheelchair.  Patients home can accommodate use of the wheelchair for mobility.  Patient requires the use of a judith height wheelchair due to his short stature.  Pt requires a light weight wheelchair because patient is unable to propel a standard wheelchair.      Please contact with questions [phone], Fax: [phone].    Morenita Mosquera NP   NPI [id number]    DME Order  Mechanical Lift  Patient requires a mechanical lift to use within the home due to the following limitations in mobility:  inability to stand without a mechanical lift, Right Hemiplegia, weakness and fatigue resulting from hemiplegia from cerebral infarction and abnormalities of gait. The lift is required to transfer patient from a bed to chair, wheelchair to a commode and any other ways of transfer. Without the use of the lift, the patient would be confined to bed.         REVIEW OF SYSTEMS:  PROBLEMS AND REVIEW OF SYSTEMS:   Today on ROS:   Currently, no fever, chills, or rigors. Decreased vision. Denies any chest pain, headaches, palpitations, lightheadedness. Denies any GERD symptoms. Positive for  EZ stand for transfers, dysphagia, urinary incontinence, fecal incontinence, appetite good, wheelchair bound, right sided weakness, aphasia, no shortness of breath,  Cpap use, fatigue, cough improved, no recent nausea or vomiting, weakness improving      Allergies   Allergen Reactions   ? Aricept [Donepezil]    ? Atorvastatin    ? Glipizide    ? Lisinopril      Current Outpatient Medications   Medication Sig   ? acetaminophen (TYLENOL) 325 MG tablet Take 650 mg by mouth every 6 (six) hours as needed for pain.          ? apixaban (ELIQUIS) 5 mg Tab tablet Take 5 mg by mouth  2 (two) times a day.         ? artificial tears,hypromellose, (GENTEAL; SYSTANE) 0.3 % Gel Administer 1 drop to both eyes 4 (four) times a day.    ? furosemide (LASIX) 20 MG tablet Take 20 mg by mouth daily.          ? insulin glargine (LANTUS) 100 unit/mL injection Inject 24 Units under the skin at bedtime.    ? irbesartan (AVAPRO) 75 MG tablet Take 75 mg by mouth 2 (two) times a day.         ? metFORMIN (GLUCOPHAGE) 500 MG tablet Take 500 mg by mouth 2 (two) times a day with meals. 1000mg in am and 500mg in evening   ? metoprolol succinate (TOPROL-XL) 50 MG 24 hr tablet Take 50 mg by mouth daily.   ? ondansetron (ZOFRAN) 4 MG tablet Take 4 mg by mouth every 6 (six) hours as needed for nausea.   ? PARoxetine (PAXIL) 30 MG tablet Take 60 mg by mouth every morning.         ? rosuvastatin (CRESTOR) 20 MG tablet Take 20 mg by mouth every morning. PER HOSPITAL ORDERS GIVE TO PT. IN THE A.M.            Past Medical History:    Past Medical History:   Diagnosis Date   ? Anemia    ? Anxiety    ? Cancer (H)     Hx of colon cancer   ? CHF (congestive heart failure) (H)     diastolic   ? Coronary artery disease     s/p cabg   ? CVA (cerebral vascular accident) (H)    ? Depression    ? Diabetes mellitus (H)     type 2   ? DVT (deep vein thrombosis) in pregnancy    ? Granuloma annulare    ? Hemiplegia (H)     R sided and minimally communicative   ? Hyperlipidemia    ? Hypertension    ? Obesity    ? Oropharyngeal dysphagia    ? ANGEL (obstructive sleep apnea)    ? Parotitis    ? Paroxysmal atrial fibrillation (H)    ? PE (pulmonary thromboembolism) (H)    ? Stroke (H) 12/2018    Sub acute L MCA stroke   ? Varicose veins of both lower extremities        IMMUNIZATIONS:    There is no immunization history on file for this patient.  Above immunizations pulled from [employer]. Facility records also reconciled. Outstanding information sent to [facility] to update [employer].  Future immunizations are not needed  "at this point as all recommended immunizations are up to date.     PHYSICAL EXAMINATION  Vitals:    01/21/21 2026   BP: 124/56   Pulse: 77   Resp: 20   Temp: 98.1  F (36.7  C)   SpO2: 94%   Weight: 213 lb (96.6 kg)   Height: 5' 7\" (1.702 m)       Today on physical exam:     GENERAL: Awake, Alert, not in any form of acute distress, answers most questions appropriately, follows simple commands  HEENT: Head is normocephalic with normal hair distribution. No evidence of trauma. Ears: No acute purulent discharge. Eyes: Conjunctivae pink with no scleral jaundice. Nose: Normal mucosa and septum. NECK: Supple with no cervical or supraclavicular lymphadenopathy. Trachea is midline. Decreased vision and hearing, left eyelid cyst   CHEST: No tenderness or deformity, no crepitus  LUNG: dim to auscultation with good chest expansion. There are no crackles or wheezes, normal AP diameter.  No recent shortness of breath or cough   BACK: No kyphosis of the thoracic spine. Symmetric, no curvature, ROM normal, no CVA tenderness, no spinal tenderness   CVS: irregularly irregular rhythm,  2+ pulses symmetric in all extremities.  ABDOMEN: Rounded and soft, nontender to palpation, non distended, no masses, no organomegaly, good bowel sounds, no rebound or guarding, no peritoneal signs.   EXTREMITIES: no edema, atraumatic  SKIN: Warm and dry, no rash today  NEURO/PSYCH: The patient is oriented to person, place, usually  time. Forgetful at times, Right sided hemiplegia, dysarthria, dysphagia, expressive aphasia, EZ stand,dependent for all cares            LABS:   No results found for this or any previous visit (from the past 168 hour(s)).  Results for orders placed or performed in visit on 06/10/20   Basic Metabolic Panel   Result Value Ref Range    Sodium 140 136 - 145 mmol/L    Potassium 4.3 3.5 - 5.0 mmol/L    Chloride 103 98 - 107 mmol/L    CO2 29 22 - 31 mmol/L    Anion Gap, Calculation 8 5 - 18 mmol/L    Glucose 121 70 - 125 mg/dL    " Calcium 9.2 8.5 - 10.5 mg/dL    BUN 18 8 - 28 mg/dL    Creatinine 0.85 0.60 - 1.10 mg/dL    GFR MDRD Af Amer >60 >60 mL/min/1.73m2    GFR MDRD Non Af Amer >60 >60 mL/min/1.73m2         Lab Results   Component Value Date    WBC 7.2 10/21/2020    HGB 11.6 (L) 10/21/2020    HCT 35.9 10/21/2020    MCV 90 10/21/2020     10/21/2020       No results found for: [id number]  Lab Results   Component Value Date    HGBA1C 7.4 (H) 10/21/2020     No results found for: INR, PROTIME  No results found for: [id number]  Lab Results   Component Value Date    TSH 1.21 10/21/2020           ASSESSMENT/PLAN:    COVID 19 infection: improving. Completed isolation. Working with PT, Ot on deconditioning after infection.    Type 2 DM with HTN: Accuchecks stable. On lantus, accuchecks two times a day. Hga1c 5.7 on 8/7. hga1c 5.5 on 11/8. hga1c 2/26-6.5. continue lantus. Hga1c up to 7.3 on 6/10.  hga1c 7.4 on 10/21. Overall stable.   CAD, s/p CABG: No chest pain or recent concerns. On aspirin, rosuvastatin. F/u cardiology.    H/o Left MCA CVA: Right sided hemiplegia, dysarthria, dysphagia, expressive aphasia. Wheelchair and bed bound. EZ stand. Dependent for cares, incontinent of urine and stool. On rosuvastatin, aspirin. Dysphagia on NDD3, nectar thick liquids. F/u neuro prn. Per  and therapy home [place] went well yesterday  And feel the son now has an appropriate and safe environment and plan for caring for her at home. Equipment orders completed today. Plan on discharge next week.   Anxiety and depression: On paroxetine. ACP following, no recent mood concerns.   Dysphagia: NDD3 nectar thick liquids. No recent concerns.   PAF: Rate controlled 80s. On eliquis, metoprolol. stable  ANGEL, OHS: On CPAP. Refusing at times. No concerns today.   Weights, obesity: [phone]. Monitor. Counseling on heart healthy diet, diabetic diet, continue to Encourage improved dietary  habits. Monitor. Labs have been stable.   Chronic CHF: [facility] reported h/o CHF. No echo or records available as [facility]. on lasix daily. Has continued to be euvolemic despite weight gain. Felt to be related to increased intake and immobility.   HTN: 120s. On irbesartan, metoprolol, lasix. No recent concerns.    Vitreal hemorrhage: injections q4-6 weeks. No recent changes to vision or noted concerns.       Labs in october    Electronically signed by: Morenita Mosquera NP    Case Management:  I have reviewed the facility/SNF care plan/MDS which was done 12/17/20, including the falls risk, nutrition and pain screening. I also reviewed the current immunizations, and preventive care.. Future cancer screening is not clinically indicated secondary to age/goals of care.   Patient's desire to return to the community is not assessible due to cognitive impairment.    Information reviewed:  Medications, vital signs, orders, and nursing notes.  The health plan new enrollment has happened. I have reviewed the  MDS, the preventative needs,  and facility care plan. The level of care is appropriate. I have reviewed the code status/advanced directives.

## 2021-06-21 NOTE — LETTER
Letter by Morenita Mosquera NP at      Author: Morenita Mosquera NP Service: -- Author Type: --    Filed:  Encounter Date: 10/26/2020 Status: (Other)         Patient: Zach Olmos   MR Number: [id number]   YOB: 1950   Date of Visit: 10/26/2020                 [facility]    DATE: 10/26/2020    NAME:  Zach Olmos             :  1950  MRN: [id number]  CODE STATUS:  FULL CODE    VISIT TYPE: Problem Visit (eye blister)     FACILITY:  [facility] [[id number]]       CHIEF COMPLAIN/REASON FOR VISIT:    Chief Complaint   Patient presents with   ? Problem Visit     eye blister               HISTORY OF PRESENT ILLNESS: Zach Olmos is a 70 y.o. female who is a long term care resident of [facility].     Today Ms. Olmos is seen today per nursing request for assessment of blister on left eyelid. This appeared a few weeks ago and initially was felt to be improving but now is larger in size and more fluid filled again. She denies any pain or discomfort from it. She has not been noted to be scratching this area. She is seen in bed today with nursing at bedside. She says her eyelid does not bother her at all and she cannot tell there is anything there. However on palpation of the blister she does report tenderness. We discussed whether they have been doing warm packs and she is not sure. Nursing says they are intermittently. We discussed options for treatment and decided to treat with warm packs three times daily for 7 days and if no improvement to update me and we will discuss alternative options for treatment. She verbalized understanding. She does not want to have to go out for an appointment if she does not have to.       REVIEW OF SYSTEMS:  PROBLEMS AND REVIEW OF SYSTEMS:   Today on ROS:   Currently, no fever, chills, or rigors. Decreased vision. Denies any chest pain, headaches, palpitations, lightheadedness. Denies any GERD symptoms. Positive for  weakness, EZ stand for transfers or slide board, dysphagia, urinary incontinence, fecal incontinence, appetite good, wheelchair bound, right sided weakness stable, aphasia, no shortness of breath,  Cpap, no further ear or jaw pain, blister on left eyelid      Allergies   Allergen Reactions   ? Aricept [Donepezil]    ? Atorvastatin    ? Glipizide    ? Lisinopril      Current Outpatient Medications   Medication Sig   ? acetaminophen (TYLENOL) 325 MG tablet Take 650 mg by mouth every 6 (six) hours as needed for pain.          ? apixaban (ELIQUIS) 5 mg Tab tablet Take 5 mg by mouth 2 (two) times a day.         ? artificial tears,hypromellose, (GENTEAL; SYSTANE) 0.3 % Gel Administer 1 drop to both eyes 4 (four) times a day.    ? furosemide (LASIX) 20 MG tablet Take 20 mg by mouth daily.          ? insulin glargine (LANTUS) 100 unit/mL injection Inject 15 Units under the skin at bedtime.    ? irbesartan (AVAPRO) 75 MG tablet Take 75 mg by mouth 2 (two) times a day.         ? metFORMIN (GLUCOPHAGE) 500 MG tablet Take 500 mg by mouth 2 (two) times a day with meals. 1000mg in am and 500mg in evening   ? metoprolol succinate (TOPROL-XL) 50 MG 24 hr tablet Take 50 mg by mouth daily.   ? ondansetron (ZOFRAN) 4 MG tablet Take 4 mg by mouth every 6 (six) hours as needed for nausea.   ? PARoxetine (PAXIL) 30 MG tablet Take 60 mg by mouth every morning.         ? rosuvastatin (CRESTOR) 20 MG tablet Take 20 mg by mouth every morning. PER HOSPITAL ORDERS GIVE TO PT. IN THE A.M.            Past Medical History:    Past Medical History:   Diagnosis Date   ? Anemia    ? Anxiety    ? Cancer (H)     Hx of colon cancer   ? CHF (congestive heart failure) (H)     diastolic   ? Coronary artery disease     s/p cabg   ? CVA (cerebral vascular accident) (H)    ? Depression    ? Diabetes mellitus (H)     type 2   ? DVT (deep vein thrombosis) in pregnancy    ? Granuloma annulare    ? Hemiplegia (H)     R sided and minimally communicative   ?  Hyperlipidemia    ? Hypertension    ? Obesity    ? Oropharyngeal dysphagia    ? ANGEL (obstructive sleep apnea)    ? Parotitis    ? Paroxysmal atrial fibrillation (H)    ? PE (pulmonary thromboembolism) (H)    ? Stroke (H) 12/2018    Sub acute L MCA stroke   ? Varicose veins of both lower extremities        IMMUNIZATIONS:    There is no immunization history on file for this patient.  Above immunizations pulled from [facility]. Facility records also reconciled. Outstanding information sent to [facility] to update [facility].  Future immunizations are not needed at this point as all recommended immunizations are up to date.     PHYSICAL EXAMINATION  Vitals:    10/26/20 0700   BP: 124/60   Pulse: 61   Resp: 16   Temp: 98.4  F (36.9  C)   SpO2: 94%       Today on physical exam:     GENERAL: Awake, Alert, not in any form of acute distress, answers most questions appropriately, follows simple commands  HEENT: Head is normocephalic with normal hair distribution. No evidence of trauma. Ears: No acute purulent discharge. Eyes: Conjunctivae pink with no scleral jaundice. Nose: Normal mucosa and septum. NECK: Supple with no cervical or supraclavicular lymphadenopathy. Trachea is midline. Decreased vision and hearing, left eyelid small fluid filled vesicle noted, no erythema, no warmth, no drainage, no changes to vision, tender to palpation but nontender without palpation  CHEST: No tenderness or deformity, no crepitus  LUNG: dim to auscultation with good chest expansion. There are no crackles or wheezes, normal AP diameter.  No recent shortness of breath or cough   BACK: No kyphosis of the thoracic spine. Symmetric, no curvature, ROM normal, no CVA tenderness, no spinal tenderness   CVS: irregularly irregular rhythm,  2+ pulses symmetric in all extremities.  ABDOMEN: Rounded and soft, nontender to palpation, non distended, no masses, no organomegaly, good bowel sounds, no rebound or guarding, no  peritoneal signs.   EXTREMITIES: no edema, atraumatic  SKIN: Warm and dry, no rash today  NEURO/PSYCH: The patient is oriented to person, place and time. Forgetful at times, Right sided hemiplegia, dysarthria, dysphagia, expressive aphasia, EZ stand to slide board for transfers, dependent for cares            LABS:   Recent Results (from the past 168 hour(s))   Glycosylated Hemoglobin A1C   Result Value Ref Range    Hemoglobin A1c 7.4 (H) <=5.6 %   Thyroid Stimulating Hormone (TSH)   Result Value Ref Range    TSH 1.21 0.30 - 5.00 uIU/mL   HM1 (CBC with Diff)   Result Value Ref Range    WBC 7.2 4.0 - 11.0 thou/uL    RBC 4.01 3.80 - 5.40 mill/uL    Hemoglobin 11.6 (L) 12.0 - 16.0 g/dL    Hematocrit 35.9 35.0 - 47.0 %    MCV 90 80 - 100 fL    MCH 28.9 27.0 - 34.0 pg    MCHC 32.3 32.0 - 36.0 g/dL    RDW 13.8 11.0 - 14.5 %    Platelets 204 140 - 440 thou/uL    MPV 11.1 8.5 - 12.5 fL    Neutrophils % 61 50 - 70 %    Lymphocytes % 28 20 - 40 %    Monocytes % 7 2 - 10 %    Eosinophils % 3 0 - 6 %    Basophils % 0 0 - 2 %    Immature Granulocyte % 0 <=0 %    Neutrophils Absolute 4.4 2.0 - 7.7 thou/uL    Lymphocytes Absolute 2.0 0.8 - 4.4 thou/uL    Monocytes Absolute 0.5 0.0 - 0.9 thou/uL    Eosinophils Absolute 0.2 0.0 - 0.4 thou/uL    Basophils Absolute 0.0 0.0 - 0.2 thou/uL    Immature Granulocyte Absolute 0.0 <=0.0 thou/uL     Results for orders placed or performed in visit on 06/10/20   Basic Metabolic Panel   Result Value Ref Range    Sodium 140 136 - 145 mmol/L    Potassium 4.3 3.5 - 5.0 mmol/L    Chloride 103 98 - 107 mmol/L    CO2 29 22 - 31 mmol/L    Anion Gap, Calculation 8 5 - 18 mmol/L    Glucose 121 70 - 125 mg/dL    Calcium 9.2 8.5 - 10.5 mg/dL    BUN 18 8 - 28 mg/dL    Creatinine 0.85 0.60 - 1.10 mg/dL    GFR MDRD Af Amer >60 >60 mL/min/1.73m2    GFR MDRD Non Af Amer >60 >60 mL/min/1.73m2         Lab Results   Component Value Date    WBC 7.2 10/21/2020    HGB 11.6 (L) 10/21/2020    HCT 35.9 10/21/2020    MCV  90 10/21/2020     10/21/2020       No results found for: [id number]  Lab Results   Component Value Date    HGBA1C 7.4 (H) 10/21/2020     No results found for: INR, PROTIME  No results found for: [id number]  Lab Results   Component Value Date    TSH 1.21 10/21/2020           ASSESSMENT/PLAN:    Left eyelid Blister: small fluid filled blister noted on left eyelid, no warmth, no drainage, no erythema. No signs of infection. nontender unless being palpated. Will order warm packs three times a day x 7 days for reabsorption. If no improvement or enlarging will discuss further options next week. May need referral to ophthalmology for drainage.   Type 2 DM with HTN: Accuchecks controlled 193-258. On lantus two times a day, accuchecks bid. Hga1c 5.7 on 8/7. hga1c 5.5 on 11/8. hga1c 2/26-6.5. continue lantus. Hga1c up to 7.3 on 6/10.   CAD, s/p CABG: No chest pain or recent concerns. On aspirin, rosuvastatin. F/u cardiology.    H/o Left MCA CVA: Right sided hemiplegia, dysarthria, dysphagia, expressive aphasia. Wheelchair and bed bound. EZ stand to slide board for transfers. Dependent for cares. On rosuvastatin, aspirin. F/u with neurology prn. Dysphagia on NDD3, nectar thick liquids, trialing thin liquids. No current concerns.   Anxiety and depression: On paroxetine. ACP following, feels mood stable today.   Dysphagia: NDD3 nectar thick liquids. No recent concerns.   PAF: Rate controlled 60s. On eliquis, metoprolol. Well controlled.    ANGEL, OHS: On CPAP. Refusing at times. No concerns today.   Weights, obesity: [phone]. Monitor. Counseling on heart healthy diet, diabetic diet,  Continues to have weight gain. Immobility and increased intake. Counseling provided   Chronic CH[facility]: [facility] h/o CHF. No echo or records available [facility]. No shortness of breath, no edema noted today, on lasix daily. Appears euvolemic on  exam   HTN: 120s. On irbesartan, metoprolol, lasix. Stable.     CMP, hm1, tsh, hga1c on 6/10    Electronically signed by: Morenita Mosquera NP    Case Management:  I have reviewed the facility/SNF care plan/MDS which was done 9/24/20, including the falls risk, nutrition and pain screening. I also reviewed the current immunizations, and preventive care.. Future cancer screening is not clinically indicated secondary to age/goals of care.   Patient's desire to return to the community is not assessible due to cognitive impairment.    Information reviewed:  Medications, vital signs, orders, and nursing notes.  The health plan new enrollment has happened. I have reviewed the  MDS, the preventative needs,  and facility care plan. The level of care is appropriate. I have reviewed the code status/advanced directives.

## 2021-06-21 NOTE — LETTER
Letter by Morenita Mosquera NP at      Author: Morenita Moqsuera NP Service: -- Author Type: --    Filed:  Encounter Date: 2020 Status: (Other)         Patient: Zach Olmos   MR Number: [id number]   YOB: 1950   Date of Visit: 2020                 [facility]    DATE: 2020    NAME:  Zach Olmos             :  1950  MRN: [id number]  CODE STATUS:  FULL CODE    VISIT TYPE: Problem Visit (hyperglycemia)     FACILITY:  [facility] [[id number]]       CHIEF COMPLAIN/REASON FOR VISIT:    Chief Complaint   Patient presents with   ? Problem Visit     hyperglycemia               HISTORY OF PRESENT ILLNESS: Zach Olmos is a 70 y.o. female who is a long term care resident of [facility].     Today Ms. Olmos is seen for concerns of hyperglycemia. She has been running 179-305 recently but mostly running in 200s. Her other vitals have been stable. She is currently taking 22u of lantus daily. She is seen in her wheelchair today and has no concerns. She feels good. She went to an eye appt on Friday. She denies any changes to her appetite or vision. She is not having any pain or bowel concerns.       REVIEW OF SYSTEMS:  PROBLEMS AND REVIEW OF SYSTEMS:   Today on ROS:   Currently, no fever, chills, or rigors. Decreased vision. Denies any chest pain, headaches, palpitations, lightheadedness. Denies any GERD symptoms. Positive for weakness, EZ stand for transfers or slide board, dysphagia, urinary incontinence, fecal incontinence, appetite good, wheelchair bound, right sided weakness stable, aphasia, no shortness of breath,  Cpap use      Allergies   Allergen Reactions   ? Aricept [Donepezil]    ? Atorvastatin    ? Glipizide    ? Lisinopril      Current Outpatient Medications   Medication Sig   ? acetaminophen (TYLENOL) 325 MG tablet Take 650 mg by mouth every 6 (six) hours as needed for pain.          ? apixaban (ELIQUIS) 5 mg Tab tablet  Take 5 mg by mouth 2 (two) times a day.         ? artificial tears,hypromellose, (GENTEAL; SYSTANE) 0.3 % Gel Administer 1 drop to both eyes 4 (four) times a day.    ? furosemide (LASIX) 20 MG tablet Take 20 mg by mouth daily.          ? insulin glargine (LANTUS) 100 unit/mL injection Inject 24 Units under the skin at bedtime.    ? irbesartan (AVAPRO) 75 MG tablet Take 75 mg by mouth 2 (two) times a day.         ? metFORMIN (GLUCOPHAGE) 500 MG tablet Take 500 mg by mouth 2 (two) times a day with meals. 1000mg in am and 500mg in evening   ? metoprolol succinate (TOPROL-XL) 50 MG 24 hr tablet Take 50 mg by mouth daily.   ? ondansetron (ZOFRAN) 4 MG tablet Take 4 mg by mouth every 6 (six) hours as needed for nausea.   ? PARoxetine (PAXIL) 30 MG tablet Take 60 mg by mouth every morning.         ? rosuvastatin (CRESTOR) 20 MG tablet Take 20 mg by mouth every morning. PER HOSPITAL ORDERS GIVE TO PT. IN THE A.M.            Past Medical History:    Past Medical History:   Diagnosis Date   ? Anemia    ? Anxiety    ? Cancer (H)     Hx of colon cancer   ? CHF (congestive heart failure) (H)     diastolic   ? Coronary artery disease     s/p cabg   ? CVA (cerebral vascular accident) (H)    ? Depression    ? Diabetes mellitus (H)     type 2   ? DVT (deep vein thrombosis) in pregnancy    ? Granuloma annulare    ? Hemiplegia (H)     R sided and minimally communicative   ? Hyperlipidemia    ? Hypertension    ? Obesity    ? Oropharyngeal dysphagia    ? ANGEL (obstructive sleep apnea)    ? Parotitis    ? Paroxysmal atrial fibrillation (H)    ? PE (pulmonary thromboembolism) (H)    ? Stroke (H) 12/2018    Sub acute L MCA stroke   ? Varicose veins of both lower extremities        IMMUNIZATIONS:    There is no immunization history on file for this patient.  Above immunizations pulled from [facility]. Facility records also reconciled. Outstanding information sent to [facility] to update [facility].  Future  immunizations are not needed at this point as all recommended immunizations are up to date.     PHYSICAL EXAMINATION  Vitals:    12/14/20 0700   BP: 156/61   Pulse: 79   Resp: 19   Temp: 98  F (36.7  C)   SpO2: 97%   Weight: 217 lb (98.4 kg)       Today on physical exam:     GENERAL: Awake, Alert, not in any form of acute distress, answers most questions appropriately, follows simple commands  HEENT: Head is normocephalic with normal hair distribution. No evidence of trauma. Ears: No acute purulent discharge. Eyes: Conjunctivae pink with no scleral jaundice. Nose: Normal mucosa and septum. NECK: Supple with no cervical or supraclavicular lymphadenopathy. Trachea is midline. Decreased vision and hearing, left eyelid cyst   CHEST: No tenderness or deformity, no crepitus  LUNG: dim to auscultation with good chest expansion. There are no crackles or wheezes, normal AP diameter.  No recent shortness of breath or cough   BACK: No kyphosis of the thoracic spine. Symmetric, no curvature, ROM normal, no CVA tenderness, no spinal tenderness   CVS: irregularly irregular rhythm,  2+ pulses symmetric in all extremities.  ABDOMEN: Rounded and soft, nontender to palpation, non distended, no masses, no organomegaly, good bowel sounds, no rebound or guarding, no peritoneal signs.   EXTREMITIES: no edema, atraumatic  SKIN: Warm and dry, no rash today  NEURO/PSYCH: The patient is oriented to person, place and time. Forgetful at times, Right sided hemiplegia, dysarthria, dysphagia, expressive aphasia, EZ stand to slide board for transfers, dependent for cares            LABS:   No results found for this or any previous visit (from the past 168 hour(s)).  Results for orders placed or performed in visit on 06/10/20   Basic Metabolic Panel   Result Value Ref Range    Sodium 140 136 - 145 mmol/L    Potassium 4.3 3.5 - 5.0 mmol/L    Chloride 103 98 - 107 mmol/L    CO2 29 22 - 31 mmol/L    Anion Gap, Calculation 8 5 - 18 mmol/L    Glucose  121 70 - 125 mg/dL    Calcium 9.2 8.5 - 10.5 mg/dL    BUN 18 8 - 28 mg/dL    Creatinine 0.85 0.60 - 1.10 mg/dL    GFR MDRD Af Amer >60 >60 mL/min/1.73m2    GFR MDRD Non Af Amer >60 >60 mL/min/1.73m2         Lab Results   Component Value Date    WBC 7.2 10/21/2020    HGB 11.6 (L) 10/21/2020    HCT 35.9 10/21/2020    MCV 90 10/21/2020     10/21/2020       No results found for: [id number]  Lab Results   Component Value Date    HGBA1C 7.4 (H) 10/21/2020     No results found for: INR, PROTIME  No results found for: [id number]  Lab Results   Component Value Date    TSH 1.21 10/21/2020           ASSESSMENT/PLAN:      Hyperglycemia, Type 2 DM with HTN: Accuchecks 179-305, mostly running in 200s. On lantus, accuchecks two times a day. Hga1c 5.7 on 8/7. hga1c 5.5 on 11/8. hga1c 2/26-6.5. continue lantus. Hga1c up to 7.3 on 6/10.  hga1c 7.4 on 10/21. Increase lantus today to 24 unit(s) daily. Continue to encourage diabetic diet and monitoring carb and sugar intake.   CAD, s/p CABG: No chest pain or recent concerns. On aspirin, rosuvastatin. F/u cardiology.    H/o Left MCA CVA: Right sided hemiplegia, dysarthria, dysphagia, expressive aphasia. Wheelchair and bed bound. EZ stand to slide board for transfers. Dependent for cares. On rosuvastatin, aspirin. Dysphagia on NDD3, nectar thick and some thin liquids. Continue to monitor. F/u with  Neuro prn. No recent changes or concerns.   Anxiety and depression: On paroxetine. ACP following, feels mood stable today.   Dysphagia: NDD3 nectar thick liquids with some thin liquids. Being monitored by staff for signs of aspiration. No recent coughing or choking episodes.   PAF: Rate controlled 70s. On eliquis, metoprolol. Well controlled.    ANGEL, OHS: On CPAP. Refusing at times. No concerns today.   Weights, obesity: [phone]. Monitor. Counseling on heart healthy diet, diabetic diet,  Continues to have weight gain.  Immobility and increased intake. Counseling provided again, dietary monitoring. Encourage improved dietary habits.   Chronic CH[facility]: [facility] h/o CHF. No echo or records available [facility]. on lasix daily. Appears euvolemic on exam. Has had weight gain recently but does not appear fluid overloaded. No shortness of breath or edema.   HTN: 150s. On irbesartan, metoprolol, lasix. Stable.   Vitreal hemorrhage: injections q4-6 weeks. Last eye appt 11/11.   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 No further treatment at this time.     Labs in october    Electronically signed by: Morenita Mosquera NP    Case Management:  I have reviewed the facility/SNF care plan/MDS which was done 9/24/20, including the falls risk, nutrition and pain screening. I also reviewed the current immunizations, and preventive care.. Future cancer screening is not clinically indicated secondary to age/goals of care.   Patient's desire to return to the community is not assessible due to cognitive impairment.    Information reviewed:  Medications, vital signs, orders, and nursing notes.  The health plan new enrollment has happened. I have reviewed the  MDS, the preventative needs,  and facility care plan. The level of care is appropriate. I have reviewed the code status/advanced directives.

## 2021-06-21 NOTE — LETTER
Letter by Carolina Mendez SW at      Author: Carolina Mendez SW Service: -- Author Type: --    Filed:  Encounter Date: 2/3/2021 Status: (Other)       February 3, 2021      ELKE WESLEY  [address] [place] [address]      Dear Elke:    At [facility], we are dedicated to improving your health and well-being. Enclosed is the Comprehensive Care Plan that we developed with you on 12/4/20. Please review the Care Plan carefully.    As a reminder, some of the things we discussed at your visit include:    Your physical and mental health    Ways to reduce falls    Health care needs you may have    Dont forget to contact your care coordinator if you:    Have been hospitalized or plan to be hospitalized     Have had a fall     Have experienced a change in physical health    Are experiencing emotional problems     If you do not agree with your Care Plan, have questions about it, or have experienced a change in your needs, please call me at [phone]. If you are hearing impaired, please call the [place] [employer] at 389 or [phone] (bwskpm-ey-wqepuu relay service).    Sincerely,      MICHEL Barker    E-mail: [email or web address]  [phone]      [facility] ([facility]) is a health plan that contracts with both Medicare and the [place] Medical Assistance (Medicaid) program to provide benefits of both programs to enrollees. Enrollment in [facility] depends on contract renewal.    MSC+[id number])     [id number] (11/18)

## 2021-06-21 NOTE — LETTER
Letter by Morenita Mosquera NP at      Author: Morenita Mosquera NP Service: -- Author Type: --    Filed:  Encounter Date: 2020 Status: (Other)         [address] [address]                                  2020    Patient: Zach Olmos   MR Number: [id number]   YOB: 1950   Date of Visit: 2020     Dear Dr. Home:    Thank you for referring Zach Olmos to me for evaluation. Below are the relevant portions of my assessment and plan of care.    If you have questions, please do not hesitate to call me. I look forward to following Zach along with you.    Sincerely,        Morenita Mosquera NP          CC  No Recipients  Morenita Mosquera NP  2020  1:20 PM  Signed              [facility] FOR [facility]    DATE: 2020    NAME:  Zach Olmos             :  1950  MRN: [id number]  CODE STATUS:  FULL CODE    VISIT TYPE: Problem Visit (positive covid)     FACILITY:  [facility] [[id number]]       CHIEF COMPLAIN/REASON FOR VISIT:    Chief Complaint   Patient presents with   ? Problem Visit     positive covid               HISTORY OF PRESENT ILLNESS: Zach Olmos is a 70 y.o. female who is a long term care resident of [facility].     Today Ms. Olmos is seen for testing positive for covid. Staff report she did have some loose stools but otherwise has not been complaining of any symptoms. She was tested on Monday. She is seen in her room in bed today. She says she is actually feeling pretty  Good. She denies any cough, shortness of breath, sore throat, congestion, or runny nose. She has been sleeping well and appetite is ok. She denies any diarrhea today but did have some yesterday. She is not nauseated or having any urinary concerns. She says she feels otherwise fine. Staff report she did have some family visit recently but unsure if they were positive for covid. Unclear where  covid infection source from.       REVIEW OF SYSTEMS:  PROBLEMS AND REVIEW OF SYSTEMS:   Today on ROS:   Currently, no fever, chills, or rigors. Decreased vision. Denies any chest pain, headaches, palpitations, lightheadedness. Denies any GERD symptoms. Positive for weakness, EZ stand for transfers or slide board, dysphagia, urinary incontinence, fecal incontinence, appetite good, wheelchair bound, right sided weakness stable, aphasia, no shortness of breath,  Cpap use, loose stools      Allergies   Allergen Reactions   ? Aricept [Donepezil]    ? Atorvastatin    ? Glipizide    ? Lisinopril      Current Outpatient Medications   Medication Sig   ? acetaminophen (TYLENOL) 325 MG tablet Take 650 mg by mouth every 6 (six) hours as needed for pain.          ? apixaban (ELIQUIS) 5 mg Tab tablet Take 5 mg by mouth 2 (two) times a day.         ? artificial tears,hypromellose, (GENTEAL; SYSTANE) 0.3 % Gel Administer 1 drop to both eyes 4 (four) times a day.    ? furosemide (LASIX) 20 MG tablet Take 20 mg by mouth daily.          ? insulin glargine (LANTUS) 100 unit/mL injection Inject 24 Units under the skin at bedtime.    ? irbesartan (AVAPRO) 75 MG tablet Take 75 mg by mouth 2 (two) times a day.         ? metFORMIN (GLUCOPHAGE) 500 MG tablet Take 500 mg by mouth 2 (two) times a day with meals. 1000mg in am and 500mg in evening   ? metoprolol succinate (TOPROL-XL) 50 MG 24 hr tablet Take 50 mg by mouth daily.   ? ondansetron (ZOFRAN) 4 MG tablet Take 4 mg by mouth every 6 (six) hours as needed for nausea.   ? PARoxetine (PAXIL) 30 MG tablet Take 60 mg by mouth every morning.         ? rosuvastatin (CRESTOR) 20 MG tablet Take 20 mg by mouth every morning. PER HOSPITAL ORDERS GIVE TO PT. IN THE A.M.            Past Medical History:    Past Medical History:   Diagnosis Date   ? Anemia    ? Anxiety    ? Cancer (H)     Hx of colon cancer   ? CHF (congestive heart failure) (H)     diastolic   ? Coronary artery disease     s/p cabg  "  ? CVA (cerebral vascular accident) (H)    ? Depression    ? Diabetes mellitus (H)     type 2   ? DVT (deep vein thrombosis) in pregnancy    ? Granuloma annulare    ? Hemiplegia (H)     R sided and minimally communicative   ? Hyperlipidemia    ? Hypertension    ? Obesity    ? Oropharyngeal dysphagia    ? ANGEL (obstructive sleep apnea)    ? Parotitis    ? Paroxysmal atrial fibrillation (H)    ? PE (pulmonary thromboembolism) (H)    ? Stroke (H) 12/2018    Sub acute L MCA stroke   ? Varicose veins of both lower extremities        IMMUNIZATIONS:    There is no immunization history on file for this patient.  Above immunizations pulled from [employer]. Facility records also reconciled. Outstanding information sent to [facility] to update [employer].  Future immunizations are not needed at this point as all recommended immunizations are up to date.     PHYSICAL EXAMINATION  Vitals:    12/31/20 1051   BP: 122/56   Pulse: 62   Resp: 18   Temp: 98.4  F (36.9  C)   SpO2: 97%   Weight: 213 lb (96.6 kg)   Height: 5' 7\" (1.702 m)       Today on physical exam:     GENERAL: Awake, Alert, not in any form of acute distress, answers most questions appropriately, follows simple commands  HEENT: Head is normocephalic with normal hair distribution. No evidence of trauma. Ears: No acute purulent discharge. Eyes: Conjunctivae pink with no scleral jaundice. Nose: Normal mucosa and septum. NECK: Supple with no cervical or supraclavicular lymphadenopathy. Trachea is midline. Decreased vision and hearing, left eyelid cyst   CHEST: No tenderness or deformity, no crepitus  LUNG: dim to auscultation with good chest expansion. There are no crackles or wheezes, normal AP diameter.  No recent shortness of breath or cough   BACK: No kyphosis of the thoracic spine. Symmetric, no curvature, ROM normal, no CVA tenderness, no spinal tenderness   CVS: irregularly irregular rhythm,  2+ pulses symmetric in all extremities.  ABDOMEN: " Rounded and soft, nontender to palpation, non distended, no masses, no organomegaly, good bowel sounds, no rebound or guarding, no peritoneal signs.   EXTREMITIES: no edema, atraumatic  SKIN: Warm and dry, no rash today  NEURO/PSYCH: The patient is oriented to person, place and time. Forgetful at times, Right sided hemiplegia, dysarthria, dysphagia, expressive aphasia, EZ stand to slide board for transfers, dependent for cares            LABS:   No results found for this or any previous visit (from the past 168 hour(s)).  Results for orders placed or performed in visit on 06/10/20   Basic Metabolic Panel   Result Value Ref Range    Sodium 140 136 - 145 mmol/L    Potassium 4.3 3.5 - 5.0 mmol/L    Chloride 103 98 - 107 mmol/L    CO2 29 22 - 31 mmol/L    Anion Gap, Calculation 8 5 - 18 mmol/L    Glucose 121 70 - 125 mg/dL    Calcium 9.2 8.5 - 10.5 mg/dL    BUN 18 8 - 28 mg/dL    Creatinine 0.85 0.60 - 1.10 mg/dL    GFR MDRD Af Amer >60 >60 mL/min/1.73m2    GFR MDRD Non Af Amer >60 >60 mL/min/1.73m2         Lab Results   Component Value Date    WBC 7.2 10/21/2020    HGB 11.6 (L) 10/21/2020    HCT 35.9 10/21/2020    MCV 90 10/21/2020     10/21/2020       No results found for: [id number]  Lab Results   Component Value Date    HGBA1C 7.4 (H) 10/21/2020     No results found for: INR, PROTIME  No results found for: [id number]  Lab Results   Component Value Date    TSH 1.21 10/21/2020           ASSESSMENT/PLAN:    COVID 19 infection: some loose stools, denies other symptoms. o2 sats and temps stable. Continue to monitor closely. Notify if becomes hypoxic, develops cough, shortness of breath, or other symptoms. Will order zinc and vitamin c daily x 7 days. No other treatment warranted at this time due to minimal symptoms. Isolation for 10 days from day tested positive per MD guidelines.   Type 2 DM with HTN: Accuchecks stable. On lantus, accuchecks two times a day. Hga1c 5.7 on 8/7. hga1c 5.5 on 11/8. hga1c 2/26-6.5.  continue lantus. Hga1c up to 7.3 on 6/10.  hga1c 7.4 on 10/21.previously adjusted lantus and has been controlled. Continue to encourage diet compliance.   CAD, s/p CABG: No chest pain or recent concerns. On aspirin, rosuvastatin. F/u cardiology.    H/o Left MCA CVA: Right sided hemiplegia, dysarthria, dysphagia, expressive aphasia. Wheelchair and bed bound. EZ stand to slide board for transfers. Dependent for cares. On rosuvastatin, aspirin. Dysphagia on NDD3, nectar thick and some thin liquids. Continue to monitor. F/u with  Neuro prn. Stable.   Anxiety and depression: On paroxetine. ACP following, no recent mood concerns. Family recently visited.   Dysphagia: NDD3 nectar thin liquids. No recent concerns.   PAF: Rate controlled 70s. On eliquis, metoprolol. Well controlled.    ANGLE, OHS: On CPAP. Refusing at times. No concerns today.   Weights, obesity: [phone]. Monitor. Counseling on heart healthy diet, diabetic diet,  Continues to have weight gain. Immobility and increased intake. Counseling provided again, dietary monitoring. Encourage improved dietary habits.   Chronic CH[facility]: [facility] reported h/o CHF. No echo or records available [facility]. on lasix daily. Appears euvolemic on exam. Has had weight gain recently but does not appear fluid overloaded. No shortness of breath or edema. Monitor closely with covid infection.   HTN: 150s. On irbesartan, metoprolol, lasix. Stable.   Vitreal hemorrhage: injections q4-6 weeks. Last eye appt 11/11.       Labs in october    Electronically signed by: Morenita Mosquera NP    Case Management:  I have reviewed the facility/SNF care plan/MDS which was done 9/24/20, including the falls risk, nutrition and pain screening. I also reviewed the current immunizations, and preventive care.. Future cancer screening is not clinically indicated secondary to age/goals of care.   Patient's desire  to return to the community is not assessible due to cognitive impairment.    Information reviewed:  Medications, vital signs, orders, and nursing notes.  The health plan new enrollment has happened. I have reviewed the  MDS, the preventative needs,  and facility care plan. The level of care is appropriate. I have reviewed the code status/advanced directives.

## 2021-06-22 NOTE — PATIENT INSTRUCTIONS - HE
No changes needed for now in current management.  She seemed comfortable, and in a good mood when I met with her.  She definitely qualifies for care in a long-term care facility with full-time skilled nursing due to her cognitive and physical challenges.  She is currently full CODE STATUS.  It would be a good idea to further evaluate this issue if a responsible party for the patient is in the facility during any provider visit.  We will leave her at full CODE STATUS for now.

## 2021-06-22 NOTE — PROGRESS NOTES
Code Status:  FULL CODE  Visit Type: H & P     Facility:  [facility]] SNF [[id number]]      Facility Type: SNF (Skilled Nursing Facility, TCU)    History of Present Illness:   Hospital Admission Date: December 9, 2018 [facility] Hospital Discharge Date: December 16, 2018  Facility Admission Date: December 16, 2018 [facility] transitional care unit    Zach Olmos is a 68 y.o. female who has had a series of very unfortunate events recently.  She has a history of diabetes with her last hemoglobin A1c being 8.5, coronary artery disease status post CABG, hyperlipidemia anxiety depression atrial fibrillation for which she was started on apixaban and prior history of DVT and PEs.  However the significant event where she had a large left MCA stroke out of the timeframe for lytics in was status post a unsuccessful thrombectomy at [facility] on November 17 of this year.  She had subsequent significantly poor mental status right-sided deficits and when she went for a brief stay to TCU she then had left-sided facial and neck swelling and fever consistent with acute parotiditis for this reason she was admitted to the hospital.    Hospital course; started her on broad-spectrum antibiotics with Vanco and Flagyl and ENT recommended conservative management with warm compresses and frequent oral cares.  Swelling and redness and leukocytosis quickly resolved.  They narrowed her antibiotics to Unasyn and then finally Augmentin.  Blood culture was negative.  They recommending a 10-day course of p.o. antibiotics as well as oral cares with saline rinses and swabs.  Stroke left her with massive right-sided hemiplegia and minimally communicative.  Her cognition apparently did improve and she could answer yes/no questions.  They recommended in regards to that continuing the apixaban aspirin 81 and the statin 20 mg.  Her hemoglobin A1c had improved with that last admission to 6.4.   She is on continuous feeding tubes but her blood glucose is still often greater than 170.  In addition patient also had had a lower anterior resection in 2017 for:.    Our facility course to date patient appears to be trying does answer yes and no questions has significant right-sided neglect but the left-sided swelling has significantly improved.  She is able to state yes and no and can perform commands.    Past Medical History:   Diagnosis Date     Anemia      Anxiety      Cancer (H)     Hx of colon cancer     CHF (congestive heart failure) (H)     diastolic     Coronary artery disease     s/p cabg     CVA (cerebral vascular accident) (H)      Depression      Diabetes mellitus (H)     type 2     DVT (deep vein thrombosis) in pregnancy (H)      Granuloma annulare      Hemiplegia (H)     R sided and minimally communicative     Hyperlipidemia      Hypertension      Obesity      Oropharyngeal dysphagia      ANGEL (obstructive sleep apnea)      Parotitis      Paroxysmal atrial fibrillation (H)      PE (pulmonary thromboembolism) (H)      Stroke (H) 12/2018    Sub acute L MCA stroke     Varicose veins of both lower extremities      No past surgical history on file.  Family History   Problem Relation Age of Onset     Heart disease Mother      Cancer Father         esophageal     Social History     Socioeconomic History     Marital status: Unknown     Spouse name: Not on file     Number of children: Not on file     Years of education: Not on file     Highest education level: Not on file   Social Needs     Financial resource strain: Not on file     Food insecurity - worry: Not on file     Food insecurity - inability: Not on file     Transportation needs - medical: Not on file     Transportation needs - non-medical: Not on file   Occupational History     Not on file   Tobacco Use     Smoking status: Never Smoker     Smokeless tobacco: Never Used   Substance and Sexual Activity     Alcohol use: No     Frequency: Never     Drug  use: No     Sexual activity: Not on file   Other Topics Concern     Not on file   Social History Narrative     Not on file     Additional Geriatric Review sheet lives with her son who has been very supportive and wants to take her home when he can.  Additionally he has daughter that she is estranged from.  Current Outpatient Medications   Medication Sig Dispense Refill     acetaminophen (TYLENOL) 160 mg/5 mL (5 mL) Soln solution 20.3 mL by Enteral Tube route every 6 (six) hours MAX OF 5 DOSES IN 24 HOURS BETWEEN SCHEDULED AND PRN DOSES.  Every 6 Hours; 08:00 AM, 02:00 PM, 08:00 PM, 02:00 AM .       apixaban (ELIQUIS) 5 mg Tab tablet 5 mg by Enteral Tube route 2 (two) times a day .             aspirin 81 mg chewable tablet 81 mg by G-tube route daily.       ferrous sulfate 300 mg (60 mg iron)/5 mL syrup 300 mg by G-tube route 2 (two) times a day.       furosemide (LASIX) 20 MG tablet 20 mg by G-tube route 2 (two) times a day at 9am and 6pm .             insulin glargine (LANTUS) 100 unit/mL injection Inject 15 Units under the skin 2 (two) times a day 07:30 AM, 04:30 PM.             insulin regular (NOVOLIN R) 100 unit/mL injection Inject under the skin 4 (four) times a day If Blood Sugar is 150 to 199, give 1 Units.  If Blood Sugar is 200 to 249, give 2 Units.  If Blood Sugar is 250 to 299, give 3 Units.  If Blood Sugar is 300 to 349, give 4 Units.  If Blood Sugar is greater than 349, give 5 Units.  injection  Special Instructions: DX: Diabetes  Every 6 Hours; 07:30 AM, 01:30 PM, 07:30 PM, 01:30 AM .       lansoprazole (PREVACID SOLUTAB) 15 MG disintegrating tablet 15 mg by G-tube route daily.       lidocaine (LIDODERM) 5 % Place 2 patches on the skin daily Remove & Discard patch within 12 hours or as directed by MD  APPLY TO RIGHT AND LEFT SHOULDERS. .       metoprolol tartrate (LOPRESSOR) 50 MG tablet 50 mg by G-tube route 2 (two) times a day .             PARoxetine (PAXIL) 30 MG tablet 60 mg by G-tube route every  morning.       rosuvastatin (CRESTOR) 20 MG tablet 20 mg by G-tube route every morning PER HOSPITAL ORDERS GIVE TO PT. IN THE A.M. .       No current facility-administered medications for this visit.      Allergies   Allergen Reactions     Aricept [Donepezil]      Atorvastatin      Glipizide      Lisinopril        There is no immunization history on file for this patient.      Review of Systems   Unable to perform ROS: Other        Physical Exam   Constitutional: She is oriented to person, place, and time. She appears well-developed and well-nourished.   HENT:   Head: Normocephalic and atraumatic.   Right Ear: External ear normal.   Left Ear: External ear normal.   Nose: Nose normal.   Mouth/Throat: Oropharynx is clear and moist.   Eyes: Conjunctivae and EOM are normal. Pupils are equal, round, and reactive to light. Left eye exhibits no discharge. No scleral icterus.   Neck: Neck supple. No JVD present. No tracheal deviation present. No thyromegaly present.   Cardiovascular: Normal rate, regular rhythm and normal heart sounds. Exam reveals no friction rub.   No murmur heard.  Loud systolic ejection murmur heard throughout the entire precordium appears to be regular at this time   Pulmonary/Chest: Effort normal and breath sounds normal. No respiratory distress. She has no wheezes. She has no rales. She exhibits no tenderness.   Abdominal: She exhibits no distension and no mass. There is no tenderness. There is no guarding.   Musculoskeletal: Normal range of motion. She exhibits no edema or tenderness.   Lymphadenopathy:     She has no cervical adenopathy.   Neurological: She is alert and oriented to person, place, and time. She has normal reflexes. No cranial nerve deficit. She exhibits normal muscle tone. Coordination normal.   Significant deficits she can answer slowly yes or no will follow commands.  Almost no movement whatsoever on the right side.  With no sensation as well.  Left side is markedly weak she has  had very weak hand squeeze but can move her hand and arm.  Left leg also significantly weak.  Feeding tube dependent at this time   Skin: Skin is warm and dry. No rash noted. No erythema.   Psychiatric: She has a normal mood and affect. Her behavior is normal. Thought content normal.   Nursing note and vitals reviewed.        Labs:  All labs reviewed in the nursing home record.  Recent Results (from the past 240 hour(s))   Hemoglobin   Result Value Ref Range    Hemoglobin 10.7 (L) 12.0 - 16.0 g/dL   Prealbumin   Result Value Ref Range    Prealbumin 31.3 19.0 - 38.0 mg/dL   Comprehensive Metabolic Panel   Result Value Ref Range    Sodium 136 136 - 145 mmol/L    Potassium 3.9 3.5 - 5.0 mmol/L    Chloride 99 98 - 107 mmol/L    CO2 27 22 - 31 mmol/L    Anion Gap, Calculation 10 5 - 18 mmol/L    Glucose 207 (H) 70 - 125 mg/dL    BUN 26 8 - 28 mg/dL    Creatinine 0.66 0.60 - 1.10 mg/dL    GFR MDRD Af Amer >60 >60 mL/min/1.73m2    GFR MDRD Non Af Amer >60 >60 mL/min/1.73m2    Bilirubin, Total 0.4 0.0 - 1.0 mg/dL    Calcium 9.7 8.5 - 10.5 mg/dL    Protein, Total 7.6 6.0 - 8.0 g/dL    Albumin 3.0 (L) 3.5 - 5.0 g/dL    Alkaline Phosphatase 57 45 - 120 U/L    AST 43 (H) 0 - 40 U/L    ALT 37 0 - 45 U/L   Iron and Transferrin Iron Binding Capacity   Result Value Ref Range    Iron 49 42 - 175 ug/dL    Transferrin 272 212 - 360 mg/dL    Transferrin Saturation, Calculated 14 (L) 20 - 50 %    Transferrin IBC, Calculated 340 313 - 563 ug/dL         Assessment:  1. Cerebrovascular accident (CVA), unspecified mechanism (H)     2. Coronary artery disease involving coronary bypass graft with angina pectoris, unspecified whether native or transplanted heart (H)     3. Hyperlipidemia, unspecified hyperlipidemia type     4. Parotitis     5. Right hemiplegia (H)     6. History of colon cancer     7. Personal history of DVT (deep vein thrombosis)     8. History of pulmonary embolus (PE)         Plan: Patient's blood pressures have been  uniformly elevated.  We are going to add Avapro 75 twice daily and do twice daily blood pressures with parameters and update timing.  Patient is not been what we will do weekly weights.  Blood sugars still are in the 250s on average we will increase the Lantus to 8units twice daily.  Major efforts however will be with speech therapy occupational therapy and physical therapy.    Total 45 minutes of which 65% was spent in counseling and coordination of care of the above plan.    Electronically signed by: Homer Reid MD

## 2021-06-22 NOTE — PROGRESS NOTES
[facility]    Facility:   [facility] [[id number]]   Code Status: FULL CODE  PCP: Provider, No Primary Care   Phone: None   Fax: None      CHIEF COMPLAINT/REASON FOR VISIT:  Chief Complaint   Patient presents with     Discharge Summary       HISTORY COURSE:  Zach is a 68 y.o. female who I was asked to visit with secondary to hospitalization December 9 - December 16, 2018 secondary to principal diagnosis of parotiditis.  The CT of the brain and neck did not show any dilation of the left parotid duct or duct stone although was found to have moderate stenosis at the left greater than right proximal internal carotid arteries.  Also found was a subacute large left MCA stroke involving the left basal ganglia without hemorrhagic transformation.  She does have a history of diabetes last A1c in November 8 0.5% along with a history of CAD status post CABG along with hypertension hyperlipidemia anxiety depression paroxysmal atrial fib which was duly diagnosed and started on apixaban.  She recently had a large left MCA stroke out of the timeframe for lytics and unsuccessful thrombectomy and hospitalized at [facility] November 17 - December 7, 2018 with subsequent poor mental status and right-sided deficits.  She was found to have left neck and face swelling and a fever consistent with acute parotiditis.  She was started on broad-spectrum antibiotics including vancomycin and Flagyl ENT was consulted who recommended conservative treatment warm compresses frequent oral cares.  Leukocytosis did resolve and she did finish up her Augmentin.  Regarding the left MCA stroke with deficits including the right hemiplegia and minimal communication her cognition did improve with treatment of infection and able to respond yes and no questions.  She is on apixaban for history of paroxysmal atrial fibrillation.  She also has iron deficiency anemia apparently the iron workup was incomplete.   Regarding her diabetes A1c was 6.4 she is on tube feedings as well as Lantus and sliding scale.  Regarding history of CAD status post CABG she is continuing with her metoprolol 50 mg twice daily along with furosemide 20 mg twice daily.  She has a history of colon cancer with a lower anterior resection.  For her nutrition she currently has a GJ tube and tube feedings.  Her laboratory studies on December 9 did show a hemoglobin of 10.3 platelets 429 creatinine 0.6 sodium 132 potassium 4.4 albumin 2.9 magnesium 1.8.  She has been able to participate with the rehabilitation services and has made good progress and therefore now be discharging to long-term care next-door at [address] [place] [address].  They do feel that she would benefit from skilled therapy and she is also on day #21.  During her stay we have made a number of adjustments to her insulin now currently we will increase her Lantus 22 units twice daily plus sliding scale and that blood sugars actually have significantly improved with this particular regimen but still just slightly high for example in the morning have been still running 190-230 which is an improvement as the evening time or 8 PM comes around the sugars have been running anywhere from 145-180.  She does get her tube feedings.  She currently is on Eliquis for her stroke.  She has been normotensive and afebrile and also on air.  Avapro was initiated 75 mg twice a day on December 20.  She also did have a number of laboratory studies performed December 20 see results below.  She has been very pleasant to work with.  Does have a good demeanor.  She also has a wonderful decorations in her room [place] time.  I had a chance to talk to her about her chronic medical conditions including her recent laboratory studies as well as her diabetes and insulin as well as the rehabilitation process and now will be discharging to long-term care.  She did not have any questions.  Review of Systems  Currently there are no  reports of fever chills or fatigue flulike symptoms headache stiff neck chest pain rashes or sores.  History of diabetes CAD hypertension hyperlipidemia anxiety depression sleep apnea left MCA stroke with right hemiplegia  Vitals:    01/04/19 1044   BP: 128/64   Pulse: 63   Resp: 18   Temp: 98.8  F (37.1  C)   SpO2: 94%       Physical Exam    Constitutional: No distress.   HENT:   Cardiovascular: Normal rate, regular rhythm and normal heart sounds.   History of CAD status post CABG   Pulmonary/Chest: Breath sounds normal.   History of pulmonary embolism   Abdominal:. There is no tenderness. There is no guarding.   Tube feedings secondary to dysphagia.  History of colon cancer.  Abdominal scars.   Musculoskeletal:   History of left MCA stroke with right hemiplegia   Lymphadenopathy:     She has no cervical adenopathy.   Neurological: She is alert.   Dysarthria.  History of stroke   Skin: Skin is warm and dry. No rash noted.   Psychiatric:   History of anxiety and depression     Lab Results   Component Value Date    HGB 10.7 (L) 12/20/2018     No results found for: HGBA1C  No results found for this or any previous visit.      Lab Results   Component Value Date    ALT 37 12/20/2018    AST 43 (H) 12/20/2018    ALKPHOS 57 12/20/2018    BILITOT 0.4 12/20/2018       MEDICATION LIST:  Current Outpatient Medications   Medication Sig     acetaminophen (TYLENOL) 160 mg/5 mL (5 mL) Soln solution 20.3 mL by Enteral Tube route every 6 (six) hours MAX OF 5 DOSES IN 24 HOURS BETWEEN SCHEDULED AND PRN DOSES.  Every 6 Hours; 08:00 AM, 02:00 PM, 08:00 PM, 02:00 AM .     apixaban (ELIQUIS) 5 mg Tab tablet 5 mg by Enteral Tube route 2 (two) times a day .           aspirin 81 mg chewable tablet 81 mg by G-tube route daily.     ferrous sulfate 300 mg (60 mg iron)/5 mL syrup 300 mg by G-tube route 2 (two) times a day.     furosemide (LASIX) 20 MG tablet 20 mg by G-tube route 2 (two) times a day at 9am and 6pm .           insulin glargine  (LANTUS) 100 unit/mL injection Inject 22 Units under the skin 2 (two) times a day. 07:30 AM, 04:30 PM           insulin regular (NOVOLIN R) 100 unit/mL injection Inject under the skin 4 (four) times a day If Blood Sugar is 150 to 199, give 1 Units.  If Blood Sugar is 200 to 249, give 2 Units.  If Blood Sugar is 250 to 299, give 3 Units.  If Blood Sugar is 300 to 349, give 4 Units.  If Blood Sugar is greater than 349, give 5 Units.  injection  Special Instructions: DX: Diabetes  Every 6 Hours; 07:30 AM, 01:30 PM, 07:30 PM, 01:30 AM .     lansoprazole (PREVACID SOLUTAB) 15 MG disintegrating tablet 15 mg by G-tube route daily.     lidocaine (LIDODERM) 5 % Place 2 patches on the skin daily Remove & Discard patch within 12 hours or as directed by MD  APPLY TO RIGHT AND LEFT SHOULDERS. .     metoprolol tartrate (LOPRESSOR) 50 MG tablet 50 mg by G-tube route 2 (two) times a day .           PARoxetine (PAXIL) 30 MG tablet 60 mg by G-tube route every morning.     rosuvastatin (CRESTOR) 20 MG tablet 20 mg by G-tube route every morning PER HOSPITAL ORDERS GIVE TO PT. IN THE A.M. .       DISCHARGE DIAGNOSIS:    ICD-10-CM    1. Cerebrovascular accident (CVA), unspecified mechanism (H) I63.9    2. Essential hypertension I10    3. Type 1 diabetes mellitus with complication (H) E10.8    4. Right hemiplegia (H) G81.91        MEDICAL EQUIPMENT NEEDS:  None    DISCHARGE PLAN/FACE TO FACE:  I certify that services are/were furnished while this patient was under the care of a physician and that a physician or an allowed non-physician practitioner (NPP), had a face-to-face encounter that meets the physician face-to-face encounter requirements. The encounter was in whole, or in part, related to the primary reason for home health. The patient is confined to his/her home and needs intermittent skilled nursing, physical therapy, speech-language pathology, or the continued need for occupational therapy. A plan of care has been established by  a physician and is periodically reviewed by a physician.  Date of Face-to-Face Encounter: January 4, 2019    I certify that, based on my findings, the following services are medically necessary home health services: She will be discharging to [address] [place] [address] with current medications as well as physical and occupational therapy and speech therapy.  Discharge date has been set for January 4, 2019.    My clinical findings support the need for the above skilled services because: (Please write a brief narrative summary that describes what the RN, PT, SLP, or other services will be doing in the home. A list of diagnoses in this section does not meet the CMS requirements.)  She will require the skilled services at the long-term [facility] facility secondary to her subacute left stroke along with medication management generalized debility safety rehabilitation and self-care deficits.    This patient is homebound because: (Please write a brief narrative summary describing the functional limitations as to why this patient is homebound and specifically what makes this patient homebound.)  Secondary to multiple comorbid chronic conditions including diabetes hypertension CAD A. fib sleep apnea recent stroke self-care deficits rehabilitation and medication management    The patient is, or has been, under my care and I have initiated the establishment of the plan of care. This patient will be followed by a physician who will periodically review the plan of care.    Schedule follow up visit with primary care provider within 7 days to reestablish care.  She will follow-up with the primary care doctor at the facility to go over her labs and any future laboratory studies along with her current medications and treatment modalities.  She is made progress while in the transitional care unit.  She has been a delight to get to know.  She did not have any further questions regarding her stay on the transitional care unit nor her  discharge to the long-term care.  Continue to monitor her blood sugars as well as her overall status.    Discharge coordination of care greater than 30 minutes  Electronically signed by: Michael Duane Johnson, CNP

## 2021-06-22 NOTE — PROGRESS NOTES
[facility]    Facility:   [facility] [[id number]]   Code Status: FULL CODE      CHIEF COMPLAINT/REASON FOR VISIT:  Chief Complaint   Patient presents with     Problem Visit     asked to see       HISTORY:      HPI: Zcah is a 68 y.o. female who I was asked to visit with secondary to hospitalization December 9 - December 16, 2018 secondary to principal diagnosis of parotiditis.  The CT of the brain and neck did not show any dilation of the left parotid duct or duct stone although was found to have moderate stenosis at the left greater than right proximal internal carotid arteries.  Also found was a subacute large left MCA stroke involving the left basal ganglia without hemorrhagic transformation.  She does have a history of diabetes last A1c in November 8 0.5% along with a history of CAD status post CABG along with hypertension hyperlipidemia anxiety depression paroxysmal atrial fib which was duly diagnosed and started on apixaban.  She recently had a large left MCA stroke out of the timeframe for lytics and unsuccessful thrombectomy and hospitalized at [facility] November 17 - December 7, 2018 with subsequent poor mental status and right-sided deficits.  She was found to have left neck and face swelling and a fever consistent with acute parotiditis.  She was started on broad-spectrum antibiotics including vancomycin and Flagyl ENT was consulted who recommended conservative treatment warm compresses frequent oral cares.  Leukocytosis did resolve and she did finish up her Augmentin.  Regarding the left MCA stroke with deficits including the right hemiplegia and minimal communication her cognition did improve with treatment of infection and able to respond yes and no questions.  She is on apixaban for history of paroxysmal atrial fibrillation.  She also has iron deficiency anemia apparently the iron workup was incomplete.  Regarding her diabetes A1c was 6.4 she is on  tube feedings as well as Lantus and sliding scale.  Regarding history of CAD status post CABG she is continuing with her metoprolol 50 mg twice daily along with furosemide 20 mg twice daily.  She has a history of colon cancer with a lower anterior resection.  For her nutrition she currently has a GJ tube and tube feedings.  Her laboratory studies on December 9 did show a hemoglobin of 10.3 platelets 429 creatinine 0.6 sodium 132 potassium 4.4 albumin 2.9 magnesium 1.8.  I did have the pleasure of visiting with her today going over her chart her medications are unfortunate incidences.  She is able to answer some yes and no questions.  She has been normotensive and afebrile.  She is also on room air.  Her blood sugars have been ranging 189-218 will now increase Lantus 15 units twice daily.  She is also on ferrous sulfate twice daily I will add iron studies as well as a hemoglobin may we can back that down to once daily.  Also added mouth cares.  There is no obvious parotiditis at this time but she obviously cannot do mouth care is by herself so staffed with at least every 3 hours.    No past medical history on file.          No family history on file.  Social History     Socioeconomic History     Marital status: Not on file     Spouse name: Not on file     Number of children: Not on file     Years of education: Not on file     Highest education level: Not on file   Social Needs     Financial resource strain: Not on file     Food insecurity - worry: Not on file     Food insecurity - inability: Not on file     Transportation needs - medical: Not on file     Transportation needs - non-medical: Not on file   Occupational History     Not on file   Tobacco Use     Smoking status: Not on file   Substance and Sexual Activity     Alcohol use: Not on file     Drug use: Not on file     Sexual activity: Not on file   Other Topics Concern     Not on file   Social History Narrative     Not on file         Review of  Systems  Currently there are no reports of fever chills or fatigue flulike symptoms headache stiff neck chest pain rashes or sores.  History of diabetes CAD hypertension hyperlipidemia anxiety depression sleep apnea left MCA stroke with right hemiplegia  Current Outpatient Medications   Medication Sig     apixaban (ELIQUIS) 5 mg Tab tablet Take 5 mg by mouth 2 (two) times a day.     furosemide (LASIX) 20 MG tablet Take 20 mg by mouth 2 (two) times a day at 9am and 6pm.     insulin glargine (LANTUS) 100 unit/mL injection Inject 15 Units under the skin 2 (two) times a day.     metoprolol tartrate (LOPRESSOR) 50 MG tablet Take 50 mg by mouth 2 (two) times a day.       .There were no vitals filed for this visit.  Blood pressure 109/52 pulse 70 respirations 18 temperature 97.7 saturation room air 97%  Physical Exam   Constitutional: No distress.   HENT:   Head: Normocephalic.   Eyes: Pupils are equal, round, and reactive to light.   Neck: Neck supple. No thyromegaly present.   No left-sided parotiditis   Cardiovascular: Normal rate, regular rhythm and normal heart sounds.   History of CAD status post CABG   Pulmonary/Chest: Breath sounds normal.   History of pulmonary embolism   Abdominal: Bowel sounds are normal. There is no tenderness. There is no guarding.   Tube feedings secondary to dysphagia.  History of colon cancer.  Abdominal scars.   Musculoskeletal:   History of left MCA stroke with right hemiplegia   Lymphadenopathy:     She has no cervical adenopathy.   Neurological: She is alert.   Dysarthria.  History of stroke   Skin: Skin is warm and dry. No rash noted.   Psychiatric:   History of anxiety and depression         LABS:   No results found for: WBC, HGB, HCT, MCV, PLT      ASSESSMENT:      ICD-10-CM    1. Parotitis K11.20    2. Type 1 diabetes mellitus with complication (H) E10.8    3. Essential hypertension I10    4. Coronary artery disease involving coronary bypass graft with angina pectoris, unspecified  whether native or transplanted heart (H) I25.709    5. Hyperlipidemia, unspecified hyperlipidemia type E78.5    6. Cerebrovascular accident (CVA), unspecified mechanism (H) I63.9    7. Right hemiplegia (H) G81.91        PLAN:    We will currently increase the Lantus 15 units twice daily also mouth cares every 3 hours while awake.  Hemoglobin prealbumin CMP and iron studies for December 20, 2018.  I did talk with her about the rehabilitation process she does have dysarthria so yes and no questions.  Did finish up her Augmentin.  Continue to manage and follow her chronic medical conditions as well as the rehabilitation process.    For documentation purposes total visit 40 minutes to which over 50% was spent with the patient going over her care her medications her hospitalization her upcoming laboratory studies and coordination of care efforts.    .    Electronically signed by: Michael Duane Johnson, CNP

## 2021-06-22 NOTE — PROGRESS NOTES
"[facility]    Facility:   [facility] [[id number]]   Code Status: FULL CODE    Chief concern: Patient is seen by me today for admission to the long-term care facility.    Reason for admission: Patient is seen by me for admission to the long-term care facility, after being discharged from the TCU at [facility].  We had a good first meeting.  She seems to understand the circumstances of why she needs to be in a long-term care facility after suffering a CVA this past December.  She told me that she was living with a family member prior to her stroke.  We were able to have a verbal conversation, with me being able to understand most of what she said.    Review of systems: See history of present illness, all others negative.     The medication list from the nursing home orders in the [facility] EMR, and the medications listed in the Epic EMR were reconciled, and any new medications to include in Epic are listed as a \"historical medication\".    Current Outpatient Medications   Medication Sig Dispense Refill     acetaminophen (TYLENOL) 160 mg/5 mL (5 mL) Soln solution 20.3 mL by Enteral Tube route every 6 (six) hours MAX OF 5 DOSES IN 24 HOURS BETWEEN SCHEDULED AND PRN DOSES.  Every 6 Hours; 08:00 AM, 02:00 PM, 08:00 PM, 02:00 AM .       apixaban (ELIQUIS) 5 mg Tab tablet 5 mg by Enteral Tube route 2 (two) times a day .             aspirin 81 mg chewable tablet 81 mg by G-tube route daily.       ferrous sulfate 300 mg (60 mg iron)/5 mL syrup 300 mg by G-tube route 2 (two) times a day.       furosemide (LASIX) 20 MG tablet 20 mg by G-tube route 2 (two) times a day at 9am and 6pm .             insulin glargine (LANTUS) 100 unit/mL injection Inject 22 Units under the skin 2 (two) times a day. 07:30 AM, 04:30 PM             insulin regular (NOVOLIN R) 100 unit/mL injection Inject under the skin 4 (four) times a day If Blood Sugar is 150 to 199, give 1 Units.  If Blood Sugar is " 200 to 249, give 2 Units.  If Blood Sugar is 250 to 299, give 3 Units.  If Blood Sugar is 300 to 349, give 4 Units.  If Blood Sugar is greater than 349, give 5 Units.  injection  Special Instructions: DX: Diabetes  Every 6 Hours; 07:30 AM, 01:30 PM, 07:30 PM, 01:30 AM .       lansoprazole (PREVACID SOLUTAB) 15 MG disintegrating tablet 15 mg by G-tube route daily.       lidocaine (LIDODERM) 5 % Place 2 patches on the skin daily Remove & Discard patch within 12 hours or as directed by MD  APPLY TO RIGHT AND LEFT SHOULDERS. .       metoprolol tartrate (LOPRESSOR) 50 MG tablet 50 mg by G-tube route 2 (two) times a day .             PARoxetine (PAXIL) 30 MG tablet 60 mg by G-tube route every morning.       rosuvastatin (CRESTOR) 20 MG tablet 20 mg by G-tube route every morning PER HOSPITAL ORDERS GIVE TO PT. IN THE A.M. .       No current facility-administered medications for this visit.      Past Medical History:   Diagnosis Date     Anemia      Anxiety      Cancer (H)     Hx of colon cancer     CHF (congestive heart failure) (H)     diastolic     Coronary artery disease     s/p cabg     CVA (cerebral vascular accident) (H)      Depression      Diabetes mellitus (H)     type 2     DVT (deep vein thrombosis) in pregnancy (H)      Granuloma annulare      Hemiplegia (H)     R sided and minimally communicative     Hyperlipidemia      Hypertension      Obesity      Oropharyngeal dysphagia      ANGEL (obstructive sleep apnea)      Parotitis      Paroxysmal atrial fibrillation (H)      PE (pulmonary thromboembolism) (H)      Stroke (H) 12/2018    Sub acute L MCA stroke     Varicose veins of both lower extremities       No past surgical history on file.   Social History     Tobacco Use     Smoking status: Never Smoker     Smokeless tobacco: Never Used   Substance Use Topics     Alcohol use: No     Frequency: Never     Drug use: No        Family History   Problem Relation Age of Onset     Heart disease Mother      Cancer Father          esophageal       Vitals:    01/04/19 1419 01/04/19 1617   BP:  117/69   Pulse:  71   Resp:  18   Temp:  98  F (36.7  C)   SpO2:  94%   Weight: 178 lb 12.8 oz (81.1 kg)        EXAM:   General: Vital signs reviewed.  Patient is in no acute appearing distress.  Breathing appears nonlabored.  Patient is alert and oriented to her immediate surroundings.  She could not tell me what year it was.  She had a very pleasant affect, denying having any problems with her mood.  She made very good eye contact.  She admitted to me though of having some difficulty with seeing, having difficulty following either my outstretched finger in front of her, or a light held in front of her.  She had difficulty understanding some commands, especially relating to movement when I asked her to move either her nonparalyzed left upper and lower extremities, or try moving her right extremities.  She usually moves her left extremities when I asked her to attempt moving her right side extremities.  Her speech was clear, though slower than normal.  HEENT: Pupils are equal round and reactive to light.  Her gaze is consensual, but she had difficulty following a finger or light source.  No rhinorrhea or abnormal ear drainage.  No intraoral lesions or plaques.  Neck: No JVD.  Heart: Heart rate is regular without murmur.  Lungs: Lungs are clear to auscultation with good airflow bilaterally.  Abdomen:  Abdomen is soft, nontender.  No palpable abnormal masses or organomegaly.  Bowel sounds are normal.  Skin/extremities: Warm and dry with no ankle edema noted.  Neurological exam: Patient has total paralysis of right upper extremity and right lower extremity on my exam.  She seems to be able to move her left upper and lower extremities well.  I visited with her while she was lying in bed.  She requires nutrition using a feeding tube due to dysphasia.  When I asked her to protrude her tongue, she was unable to do this.  She had occasional difficulty  with choosing the words she wanted to used to express her thoughts.  She sometimes seemed to not understand my question.    Approximately 60 minutes was spent on patient management, with greater than 50% of this time being spent on medication reconciliation between electronic medical records, review of past medical history and current medical management, and discussion of management with patient and care staff.  Assessment/Plan   1. Cerebrovascular accident (CVA), unspecified mechanism (H)     2. Right hemiplegia (H)     3. Type 1 diabetes mellitus with complication (H)     4. Essential hypertension     5. Coronary artery disease involving coronary bypass graft with angina pectoris, unspecified whether native or transplanted heart (H)     6. Hyperlipidemia, unspecified hyperlipidemia type     7. Oropharyngeal dysphagia         Patient Instructions   No changes needed for now in current management.  She seemed comfortable, and in a good mood when I met with her.  She definitely qualifies for care in a long-term care facility with full-time skilled nursing due to her cognitive and physical challenges.  She is currently full CODE STATUS.  It would be a good idea to further evaluate this issue if a responsible party for the patient is in the facility during any provider visit.  We will leave her at full CODE STATUS for now.     Mark Agrawal,

## 2021-06-22 NOTE — PROGRESS NOTES
[facility]    Facility:   [facility] [place] [[id number]]   Code Status: FULL CODE      CHIEF COMPLAINT/REASON FOR VISIT:  Chief Complaint   Patient presents with     Follow Up     rehab       HISTORY:      HPI: Zach is a 68 y.o. female who I had the pleasure to revisit with secondary to her hospitalization December 9 - December 16, 2018 secondary to a principal diagnosis of parotiditis and was found to have a subacute large left MCA stroke involving the left basal ganglia without hemorrhagic transformation.  She currently is actually doing pretty good from a therapy standpoint is making pretty good progress and looks like she will eventually be going to a long-term care facility.  Had a chance to address that with her.  She has been normotensive and afebrile.  Also during her stay Avapro was started 75 mg twice daily also most recently the Lantus was increased to 20 units twice daily since most of her blood sugars now have been running 192-250 in the p.m. 150-230.  She did have a number of laboratory studies performed December 20 see results below.  She is getting tube feeding.  She is not having any discomfort.  She is on Eliquis for anticoagulation also ferrous sulfate twice daily for the anemia.  Her moods have been stable.    Past Medical History:   Diagnosis Date     Anemia      Anxiety      Cancer (H)     Hx of colon cancer     CHF (congestive heart failure) (H)     diastolic     Coronary artery disease     s/p cabg     CVA (cerebral vascular accident) (H)      Depression      Diabetes mellitus (H)     type 2     DVT (deep vein thrombosis) in pregnancy (H)      Granuloma annulare      Hemiplegia (H)     R sided and minimally communicative     Hyperlipidemia      Hypertension      Obesity      Oropharyngeal dysphagia      ANGEL (obstructive sleep apnea)      Parotitis      Paroxysmal atrial fibrillation (H)      PE (pulmonary thromboembolism) (H)      Stroke (H) 12/2018     Sub acute L MCA stroke     Varicose veins of both lower extremities              Family History   Problem Relation Age of Onset     Heart disease Mother      Cancer Father         esophageal     Social History     Socioeconomic History     Marital status: Unknown     Spouse name: Not on file     Number of children: Not on file     Years of education: Not on file     Highest education level: Not on file   Social Needs     Financial resource strain: Not on file     Food insecurity - worry: Not on file     Food insecurity - inability: Not on file     Transportation needs - medical: Not on file     Transportation needs - non-medical: Not on file   Occupational History     Not on file   Tobacco Use     Smoking status: Never Smoker     Smokeless tobacco: Never Used   Substance and Sexual Activity     Alcohol use: No     Frequency: Never     Drug use: No     Sexual activity: Not on file   Other Topics Concern     Not on file   Social History Narrative     Not on file         Review of Systems  Currently there are no reports of fever chills or fatigue flulike symptoms headache stiff neck chest pain rashes or sores.  History of diabetes CAD hypertension hyperlipidemia anxiety depression sleep apnea left MCA stroke with right hemiplegia    Current Outpatient Medications   Medication Sig     acetaminophen (TYLENOL) 160 mg/5 mL (5 mL) Soln solution 20.3 mL by Enteral Tube route every 6 (six) hours MAX OF 5 DOSES IN 24 HOURS BETWEEN SCHEDULED AND PRN DOSES.  Every 6 Hours; 08:00 AM, 02:00 PM, 08:00 PM, 02:00 AM .     apixaban (ELIQUIS) 5 mg Tab tablet 5 mg by Enteral Tube route 2 (two) times a day .           aspirin 81 mg chewable tablet 81 mg by G-tube route daily.     ferrous sulfate 300 mg (60 mg iron)/5 mL syrup 300 mg by G-tube route 2 (two) times a day.     furosemide (LASIX) 20 MG tablet 20 mg by G-tube route 2 (two) times a day at 9am and 6pm .           insulin glargine (LANTUS) 100 unit/mL injection Inject 20 Units  under the skin 2 (two) times a day. 07:30 AM, 04:30 PM           insulin regular (NOVOLIN R) 100 unit/mL injection Inject under the skin 4 (four) times a day If Blood Sugar is 150 to 199, give 1 Units.  If Blood Sugar is 200 to 249, give 2 Units.  If Blood Sugar is 250 to 299, give 3 Units.  If Blood Sugar is 300 to 349, give 4 Units.  If Blood Sugar is greater than 349, give 5 Units.  injection  Special Instructions: DX: Diabetes  Every 6 Hours; 07:30 AM, 01:30 PM, 07:30 PM, 01:30 AM .     lansoprazole (PREVACID SOLUTAB) 15 MG disintegrating tablet 15 mg by G-tube route daily.     lidocaine (LIDODERM) 5 % Place 2 patches on the skin daily Remove & Discard patch within 12 hours or as directed by MD  APPLY TO RIGHT AND LEFT SHOULDERS. .     metoprolol tartrate (LOPRESSOR) 50 MG tablet 50 mg by G-tube route 2 (two) times a day .           PARoxetine (PAXIL) 30 MG tablet 60 mg by G-tube route every morning.     rosuvastatin (CRESTOR) 20 MG tablet 20 mg by G-tube route every morning PER HOSPITAL ORDERS GIVE TO PT. IN THE A.M. .       .  Vitals:    01/02/19 1022   BP: 125/67   Pulse: 64   Resp: 16   Temp: 98.1  F (36.7  C)   SpO2: 97%       Physical Exam    Constitutional: No distress.   HENT:    Eyes: Pupils are equal, round, and reactive to light.    Cardiovascular: Normal rate, regular rhythm and normal heart sounds.   History of CAD status post CABG   Pulmonary/Chest: Breath sounds normal.   History of pulmonary embolism   Abdominal:. There is no tenderness. There is no guarding.   Tube feedings secondary to dysphagia.  History of colon cancer.  Abdominal scars.   Musculoskeletal:   History of left MCA stroke with right hemiplegia   Lymphadenopathy:     She has no cervical adenopathy.   Neurological: She is alert.   Dysarthria.  History of stroke   Skin: Skin is warm and dry. No rash noted.   Psychiatric:   History of anxiety and depression     LABS:   Lab Results   Component Value Date    HGB 10.7 (L) 12/20/2018      No results found for this or any previous visit.      No results found for: HGBA1C      ASSESSMENT:      ICD-10-CM    1. Type 1 diabetes mellitus with complication (H) E10.8    2. Right hemiplegia (H) G81.91    3. Oropharyngeal dysphagia R13.12    4. Essential hypertension I10        PLAN:    Increasing Lantus 20 units twice daily continue to monitor the blood sugars continue to monitor the blood pressures.  She is done well from a therapy standpoint looks like pretty soon they will be discharging her to a long-term care facility.    .    Electronically signed by: Michael Duane Johnson, CNP

## 2021-06-22 NOTE — PROGRESS NOTES
[facility]    Facility:   [facility] [[id number]]   Code Status: FULL CODE      CHIEF COMPLAINT/REASON FOR VISIT:  Chief Complaint   Patient presents with     Problem Visit     asked to see       HISTORY:      HPI: Zach is a 68 y.o. female who I was asked to see secondary to not only her hospitalization December 9 - December 16, 2018 secondary to a principal diagnosis of parotiditis also found was a subacute large right left MCA stroke involving the left basal ganglia without hemorrhagic transformation.  Is also being asked to see her secondary to her blood sugars and diabetes.  She is getting Accu-Cheks through the day and the night.  We will simplify that and do him 7:30 in the morning 11 AM and then 8:00 PM rather than 4 times a day plus they also waking her up at 2:00 in the morning.  Most her blood sugars ranging anywhere from 117-250.  She does get sliding scale were also going to adjust that since it does start at 150-199 only 1 unit.  She otherwise has been normotensive and afebrile.  Had a chance to talk about her cares and she is feels like she is doing pretty good and she does feel like she is getting the right amount of therapy.  She also has a very nice room the family is come into bring in lots of [place] decorations including release and various thick trees.  Recently added was Avapro 75 mg twice daily blood pressures have been good she also getting Tylenol every 6 hours which has been helpful.  Had a chance to talk about her overall care and she does feel pretty good at this time.    Past Medical History:   Diagnosis Date     Anemia      Anxiety      Cancer (H)     Hx of colon cancer     CHF (congestive heart failure) (H)     diastolic     Coronary artery disease     s/p cabg     CVA (cerebral vascular accident) (H)      Depression      Diabetes mellitus (H)     type 2     DVT (deep vein thrombosis) in pregnancy (H)      Granuloma annulare       Hemiplegia (H)     R sided and minimally communicative     Hyperlipidemia      Hypertension      Obesity      Oropharyngeal dysphagia      ANGEL (obstructive sleep apnea)      Parotitis      Paroxysmal atrial fibrillation (H)      PE (pulmonary thromboembolism) (H)      Stroke (H) 12/2018    Sub acute L MCA stroke     Varicose veins of both lower extremities              Family History   Problem Relation Age of Onset     Heart disease Mother      Cancer Father         esophageal     Social History     Socioeconomic History     Marital status: Unknown     Spouse name: Not on file     Number of children: Not on file     Years of education: Not on file     Highest education level: Not on file   Social Needs     Financial resource strain: Not on file     Food insecurity - worry: Not on file     Food insecurity - inability: Not on file     Transportation needs - medical: Not on file     Transportation needs - non-medical: Not on file   Occupational History     Not on file   Tobacco Use     Smoking status: Never Smoker     Smokeless tobacco: Never Used   Substance and Sexual Activity     Alcohol use: No     Frequency: Never     Drug use: No     Sexual activity: Not on file   Other Topics Concern     Not on file   Social History Narrative     Not on file         Review of Systems  Currently there are no reports of fever chills or fatigue flulike symptoms headache stiff neck chest pain rashes or sores.  History of diabetes CAD hypertension hyperlipidemia anxiety depression sleep apnea left MCA stroke with right hemiplegia    Current Outpatient Medications   Medication Sig     acetaminophen (TYLENOL) 160 mg/5 mL (5 mL) Soln solution 20.3 mL by Enteral Tube route every 6 (six) hours MAX OF 5 DOSES IN 24 HOURS BETWEEN SCHEDULED AND PRN DOSES.  Every 6 Hours; 08:00 AM, 02:00 PM, 08:00 PM, 02:00 AM .     apixaban (ELIQUIS) 5 mg Tab tablet 5 mg by Enteral Tube route 2 (two) times a day .           aspirin 81 mg chewable tablet 81  mg by G-tube route daily.     ferrous sulfate 300 mg (60 mg iron)/5 mL syrup 300 mg by G-tube route 2 (two) times a day.     furosemide (LASIX) 20 MG tablet 20 mg by G-tube route 2 (two) times a day at 9am and 6pm .           insulin glargine (LANTUS) 100 unit/mL injection Inject 15 Units under the skin 2 (two) times a day 07:30 AM, 04:30 PM.           insulin regular (NOVOLIN R) 100 unit/mL injection Inject under the skin 4 (four) times a day If Blood Sugar is 150 to 199, give 1 Units.  If Blood Sugar is 200 to 249, give 2 Units.  If Blood Sugar is 250 to 299, give 3 Units.  If Blood Sugar is 300 to 349, give 4 Units.  If Blood Sugar is greater than 349, give 5 Units.  injection  Special Instructions: DX: Diabetes  Every 6 Hours; 07:30 AM, 01:30 PM, 07:30 PM, 01:30 AM .     lansoprazole (PREVACID SOLUTAB) 15 MG disintegrating tablet 15 mg by G-tube route daily.     lidocaine (LIDODERM) 5 % Place 2 patches on the skin daily Remove & Discard patch within 12 hours or as directed by MD  APPLY TO RIGHT AND LEFT SHOULDERS. .     metoprolol tartrate (LOPRESSOR) 50 MG tablet 50 mg by G-tube route 2 (two) times a day .           PARoxetine (PAXIL) 30 MG tablet 60 mg by G-tube route every morning.     rosuvastatin (CRESTOR) 20 MG tablet 20 mg by G-tube route every morning PER HOSPITAL ORDERS GIVE TO PT. IN THE A.M. .       .There were no vitals filed for this visit.  Blood pressure 141/76 pulse 73 temperature 98.0  Physical Exam   Constitutional: No distress.   HENT:   Head: Normocephalic.   Eyes: Pupils are equal, round, and reactive to light.    Cardiovascular: Normal rate, regular rhythm and normal heart sounds.   History of CAD status post CABG   Pulmonary/Chest: Breath sounds normal.   History of pulmonary embolism   Abdominal: Bowel sounds are normal. There is no tenderness. There is no guarding.   Tube feedings secondary to dysphagia.  History of colon cancer.  Abdominal scars.   Musculoskeletal:   History of left MCA  stroke with right hemiplegia   Lymphadenopathy:     She has no cervical adenopathy.   Neurological: She is alert.   Dysarthria.  History of stroke   Skin: Skin is warm and dry. No rash noted.   Psychiatric:   History of anxiety and depression    LABS:   Lab Results   Component Value Date    HGB 10.7 (L) 12/20/2018     No results found for this or any previous visit.      Lab Results   Component Value Date    ALBUMIN 3.0 (L) 12/20/2018     Results for orders placed or performed in visit on 12/20/18   Comprehensive Metabolic Panel   Result Value Ref Range    Sodium 136 136 - 145 mmol/L    Potassium 3.9 3.5 - 5.0 mmol/L    Chloride 99 98 - 107 mmol/L    CO2 27 22 - 31 mmol/L    Anion Gap, Calculation 10 5 - 18 mmol/L    Glucose 207 (H) 70 - 125 mg/dL    BUN 26 8 - 28 mg/dL    Creatinine 0.66 0.60 - 1.10 mg/dL    GFR MDRD Af Amer >60 >60 mL/min/1.73m2    GFR MDRD Non Af Amer >60 >60 mL/min/1.73m2    Bilirubin, Total 0.4 0.0 - 1.0 mg/dL    Calcium 9.7 8.5 - 10.5 mg/dL    Protein, Total 7.6 6.0 - 8.0 g/dL    Albumin 3.0 (L) 3.5 - 5.0 g/dL    Alkaline Phosphatase 57 45 - 120 U/L    AST 43 (H) 0 - 40 U/L    ALT 37 0 - 45 U/L         ASSESSMENT:      ICD-10-CM    1. Type 1 diabetes mellitus with complication (H) E10.8    2. Right hemiplegia (H) G81.91    3. Oropharyngeal dysphagia R13.12    4. Essential hypertension I10        PLAN:    Continue to monitor and follow.  Blood sugars overall look pretty good blood pressures have also improved was checking her Accu-Cheks 3 times a day change up her sliding scale she will continue with the Lantus 18 units twice daily . Otherwise once again her hemoglobin does look pretty good at 10.7 her last BMP was December 20.  Continue to manage and follow.    .    Electronically signed by: Michael Duane Johnson, CNP

## 2021-06-22 NOTE — PROGRESS NOTES
Code Status:  FULL CODE  Visit Type: Problem Visit (Pretension/CVA)     Facility:  [facility] [[id number]]        Facility Type: SNF (Skilled Nursing Facility, TCU)    History of Present Illness: Zach Olmos is a 68 y.o. female who had a massive debilitating stroke which left her right side completely flaccid and limitations with gaze.  I am following up though because of her blood pressure that had been consistently elevated.  We had added Avapro and she tolerated that well.  Other than the last blood pressure all of the blood pressures were under 140 systolic showing better control.    Review of Systems     Physical Exam   Constitutional:   Stated all blood pressures were significantly better blood sugars were better.  He was able to have a good eye contact and voice turning towards me to the left.  Unfortunately the left hand squeeze is still quite weak.  She did spontaneously move the left leg.  Right side completely flaccid.   Vitals reviewed.      Labs:  All labs reviewed in the nursing home record.    Assessment:  1. Cerebrovascular accident (CVA), unspecified mechanism (H)     2. Right hemiplegia (H)     3. Hypertension, unspecified type         Plan: Reason for follow-up was to validate blood pressures are in good control and they are.  She is moving forward although it slow and this is a very debilitating stroke so we are going to hope for each little improvement as it comes along.      25 minutes spent of which greater than 65% was face to face communication with the patient about above plan of care    Electronically signed by: Homer Reid MD

## 2021-06-23 NOTE — TELEPHONE ENCOUNTER
"[facility] Nurse Triage Telephone Note      Provider: GIOVANNI Tomas  Facility: [facility]    Facility Type: LTC    Caller: Lay  Call Back Number:  [phone]    Allergies: Aricept [donepezil]; Atorvastatin; Glipizide; and Lisinopril    Reason for call: Nursing calling today stating that the pt seems \"off\" today, usually when the nurse comes into the room she will great the nurse but today she will open her eyes and then go back to sleep. Pt looks pale per nursing, VS stable. Afebrile, has a slight cough with some congestion noted in the left lower lobes. No SOB    Verbal Order/Direction given by Provider: ofe vieira UA/UC    Provider giving order: GIOVANNI Tomas    Verbal order given to: Lay Green RN      "

## 2021-06-24 NOTE — PROGRESS NOTES
[facility]    Facility:   [facility] [[id number]]   Code Status: FULL CODE      CHIEF COMPLAINT/REASON FOR VISIT:  Chief Complaint   Patient presents with     Review Of Multiple Medical Conditions       HISTORY:      HPI: Zach is a 68 y.o. female who had a chance to visit with secondary to 1 through review of chronic medical conditions.  I remember her from the transitional care unit when she was hospitalized initially December 9 - December 16, 2018 secondary to parotiditis.  She is also found to have a subacute large left MCA stroke involving the left basal ganglia without hemorrhagic transformation.  She does have diabetes along with hypertension and dysphasia.  Has been working with speech therapy and had a chance to connect with a speech therapist and they want to do a video swallow.  Currently running at 60 cc an hour tube feedings.  She has been normotensive and afebrile.  Blood sugars in the morning ranging 120-198 towards the p.m. 130-164 moods have been stable.  No heartburn or reflux.  Is on Prevacid.  Does use CPAP to sleep.  Does need assistance with all ADLs.    Past Medical History:   Diagnosis Date     Anemia      Anxiety      Cancer (H)     Hx of colon cancer     CHF (congestive heart failure) (H)     diastolic     Coronary artery disease     s/p cabg     CVA (cerebral vascular accident) (H)      Depression      Diabetes mellitus (H)     type 2     DVT (deep vein thrombosis) in pregnancy (H)      Granuloma annulare      Hemiplegia (H)     R sided and minimally communicative     Hyperlipidemia      Hypertension      Obesity      Oropharyngeal dysphagia      ANGEL (obstructive sleep apnea)      Parotitis      Paroxysmal atrial fibrillation (H)      PE (pulmonary thromboembolism) (H)      Stroke (H) 12/2018    Sub acute L MCA stroke     Varicose veins of both lower extremities              Family History   Problem Relation Age of Onset     Heart disease  Mother      Cancer Father         esophageal     Social History     Socioeconomic History     Marital status: Single     Spouse name: Not on file     Number of children: Not on file     Years of education: Not on file     Highest education level: Not on file   Occupational History     Not on file   Social Needs     Financial resource strain: Not on file     Food insecurity:     Worry: Not on file     Inability: Not on file     Transportation needs:     Medical: Not on file     Non-medical: Not on file   Tobacco Use     Smoking status: Never Smoker     Smokeless tobacco: Never Used   Substance and Sexual Activity     Alcohol use: No     Frequency: Never     Drug use: No     Sexual activity: Not on file   Lifestyle     Physical activity:     Days per week: Not on file     Minutes per session: Not on file     Stress: Not on file   Relationships     Social connections:     Talks on phone: Not on file     Gets together: Not on file     Attends [employer] service: Not on file     Active member of club or organization: Not on file     Attends meetings of clubs or organizations: Not on file     Relationship status: Not on file     Intimate partner violence:     Fear of current or ex partner: Not on file     Emotionally abused: Not on file     Physically abused: Not on file     Forced sexual activity: Not on file   Other Topics Concern     Not on file   Social History Narrative     Not on file         Review of Systems  Currently there are no reports of fever chills or fatigue flulike symptoms headache stiff neck chest pain rashes or sores.  History of diabetes CAD hypertension hyperlipidemia anxiety depression sleep apnea left MCA stroke with right hemiplegia    Current Outpatient Medications   Medication Sig     acetaminophen (TYLENOL) 160 mg/5 mL (5 mL) Soln solution 20.3 mL by Enteral Tube route every 6 (six) hours MAX OF 5 DOSES IN 24 HOURS BETWEEN SCHEDULED AND PRN DOSES.  Every 6 Hours; 08:00 AM, 02:00 PM, 08:00 PM,  02:00 AM .     apixaban (ELIQUIS) 5 mg Tab tablet 5 mg by Enteral Tube route 2 (two) times a day .           aspirin 81 mg chewable tablet 81 mg by G-tube route daily.     ferrous sulfate 300 mg (60 mg iron)/5 mL syrup 300 mg by G-tube route 2 (two) times a day.     furosemide (LASIX) 20 MG tablet 20 mg by G-tube route 2 (two) times a day at 9am and 6pm .           insulin glargine (LANTUS) 100 unit/mL injection Inject 22 Units under the skin 2 (two) times a day. 07:30 AM, 04:30 PM           insulin regular (NOVOLIN R) 100 unit/mL injection Inject under the skin 4 (four) times a day If Blood Sugar is 150 to 199, give 1 Units.  If Blood Sugar is 200 to 249, give 2 Units.  If Blood Sugar is 250 to 299, give 3 Units.  If Blood Sugar is 300 to 349, give 4 Units.  If Blood Sugar is greater than 349, give 5 Units.  injection  Special Instructions: DX: Diabetes  Every 6 Hours; 07:30 AM, 01:30 PM, 07:30 PM, 01:30 AM .     lansoprazole (PREVACID SOLUTAB) 15 MG disintegrating tablet 15 mg by G-tube route daily.     lidocaine (LIDODERM) 5 % Place 2 patches on the skin daily Remove & Discard patch within 12 hours or as directed by MD  APPLY TO RIGHT AND LEFT SHOULDERS. .     metoprolol tartrate (LOPRESSOR) 50 MG tablet 50 mg by G-tube route 2 (two) times a day .           PARoxetine (PAXIL) 30 MG tablet 60 mg by G-tube route every morning.     rosuvastatin (CRESTOR) 20 MG tablet 20 mg by G-tube route every morning PER HOSPITAL ORDERS GIVE TO PT. IN THE A.M. .       .There were no vitals filed for this visit.  Blood pressure 140/72 pulse 68 temperature 98.4  Physical Exam     Constitutional: No distress.   HENT:   Cardiovascular: Normal rate, regular rhythm and normal heart sounds.   History of CAD status post CABG   Pulmonary/Chest: Breath sounds normal.   History of pulmonary embolism   Abdominal:. There is no tenderness. There is no guarding.   Tube feedings secondary to dysphagia.  History of colon cancer.  Abdominal  scars.   Musculoskeletal:   History of left MCA stroke with right hemiplegia   Dysarthria.  History of stroke   Skin: Skin is warm and dry. No rash noted.   Psychiatric:   History of anxiety and depression      LABS:   Lab Results   Component Value Date    HGB 10.7 (L) 12/20/2018     No results found for: HGBA1C  No results found for: CHOL  No results found for: HDL  No results found for: LDLCALC  No results found for: TRIG  No components found for: CHOLHDL  No results found for this or any previous visit.          ASSESSMENT:      ICD-10-CM    1. Oropharyngeal dysphagia R13.12    2. Right hemiplegia (H) G81.91    3. Type 1 diabetes mellitus with complication (H) E10.8    4. Hyperlipidemia, unspecified hyperlipidemia type E78.5        PLAN:    No further changes at this time.  Does look like we are still trying to look for lipid panel will try to get that later on the spring with her next A1c check.  Otherwise seems to be pretty stable at this time.  Also continue work with speech therapy as well as get a video swallow.    .    Electronically signed by: Michael Duane Johnson, CNP

## 2021-06-24 NOTE — TELEPHONE ENCOUNTER
"[facility] Nurse Triage Telephone Note      Provider: GIOVANNI Tomas  Facility: [facility] Type: [facility]    Caller: Tiffany 2nd  Call Back Number:  [phone]    Allergies: Aricept [donepezil]; Atorvastatin; Glipizide; and Lisinopril    Reason for call: 7:30am found pt in room holding her abdomen & crying. T:98.4-78-20 98%RA, 171/67 before am meds. Gave am meds & Tylenol, she fell asleep & awoke in pain again approx 10:30am. 140/77 96 T:98.8 BS+X4, large bm last pm, when palpated abdomen RLQ she jumped in pain. TF runs continuous 60cc/hr. No changes in type or rate of tube feeding.    Verbal Order/Direction given by Provider: Stop TF/flush & leave off until HS. Get STAT flat abdominal x-ray.  Results: \"Non-specific gas pattern, no acute findings, no significant fecal burden present\"  Let bowel rest, re-start at HS.    Provider giving order: GIOVANNI Tomas    Verbal order given to: Tiffany Linda RN      "

## 2021-06-24 NOTE — PROGRESS NOTES
"Speech Language/Pathology  Videofluoroscopic Swallow Study     Problem:  Patient Active Problem List   Diagnosis     Parotitis     Type 1 diabetes mellitus with complication (H)     Essential hypertension     Coronary artery disease involving coronary bypass graft with angina pectoris, unspecified whether native or transplanted heart (H)     Hyperlipidemia, unspecified hyperlipidemia type     Cerebrovascular accident (CVA), unspecified mechanism (H)     Right hemiplegia (H)     Oropharyngeal dysphagia       Onset date: 3/7/19  Reason for evaluation: requested per primary SLP.  Pertinent History: Per CNP Michael Duane Johnson on 2/20/19 \"Zach is a 68 y.o. female who had a chance to visit with secondary to 1 through review of chronic medical conditions.  I remember her from the transitional care unit when she was hospitalized initially December 9 - December 16, 2018 secondary to parotiditis.  She is also found to have a subacute large left MCA stroke involving the left basal ganglia without hemorrhagic transformation.  She does have diabetes along with hypertension and dysphasia.  Has been working with speech therapy and had a chance to connect with a speech therapist and they want to do a video swallow.  Currently running at 60 cc an hour tube feedings.  She has been normotensive and afebrile.  Blood sugars in the morning ranging 120-198 towards the p.m. 130-164 moods have been stable.  No heartburn or reflux.  Is on Prevacid.  Does use CPAP to sleep.  Does need assistance with all ADLs.\"      Patient is accompanied by her son for evaluation.    Current Diet: NPO  Baseline Diet: presumed regular and thin    Assessment:    Patient demonstrated no oral and moderate pharyngeal dysphagia.    Patient is at high aspiration risk with thin and nectar thick liquids.    Rehab potential is good based on prior level of function and evaluation results.    Patient is a good candidate to complete therapeutic exercises and/or " compensatory strategies to improve oral and/or pharyngeal phase of swallow due to motivation and cooperation.    Reviewed history of swallow problem with patient/son, verbally explained role of SLP and radiologist. Verbally explained process of VFSS prior to administration of barium. Verbally explained results and recommendations to pt/son. SLP answered questions and stated that a written copy of test will be faxed to pt's SLP.  Patient and her son verbalized understanding.      Recommendations:    Plan:Recommend Regular and honey thick liquids via spoon. Consult SLP and RD prior to initiation to ensure diet tolerance and transition from TF.    Strategies: standard safe swallow strategies    Speech therapy is recommended with primary SLP, Rajwinder Pardo MA CCC-SLP. -phone message left.     Referrals: N/A    Subjective:    Patient presents as cooperative and awake during this evaluation.   An  was not applicable    Patient was given puree, honey, nectar and regular solid.    Objective:    Dentition/Oral hygiene: grossly intact    Oral motor function was grossly intact.      Bolus prep and oral control was not impaired. Mastication was slow but functional and the patient used rotary chewing.    Anterior-Posterior transit was not impaired.    Premature spill beyond the base of the tongue into the valleculae did not occur with all textures trialed.    Tongue base retraction was not impaired.    Oral stasis did not occur with all textures trialed.    Aspiration occurred with nectar thick. Patient had immediate strong cough with aspiration.     Laryngeal penetration occurred with nectar thick resulting in aspiration.    The strategies of reduced bolus size were trialed with honey thick resulting in reduced pourover.    Swallow response was delayed with all textures trialed.  Swallow was triggered at the level of valleculae with regular and puree. With large sip of honey thick pourover was past epiglottis. This  was eliminated with spoon size bite. Pourover was to the pyriform sinuses and pooling with nectar thick.     Epiglottic movement was complete consistently across texture trials.    Pharyngeal stasis did not occur with all textures trialed.     Pharyngeal constriction was not impaired.    Hyolaryngeal elevation was reduced. Hyolaryngeal excursion was reduced.    Cricopharyngeal function was not impaired. Cervical esophageal function was not impaired.    50 dysphagia minutes    Maia Calero MA, CCC-SLP

## 2021-07-03 NOTE — ADDENDUM NOTE
Addendum Note by Morenita Mosquera NP at 12/4/2019 11:59 PM     Author: Morenita Mosquera NP Service: -- Author Type: Nurse Practitioner    Filed: 12/4/2019  9:02 PM Encounter Date: 12/4/2019 Status: Signed    : Morenita Mosquera NP (Nurse Practitioner)    Addended by: MORENITA MOSQUERA on: 12/4/2019 09:02 PM        Modules accepted: Level of Service

## 2021-07-14 PROBLEM — I63.9 CEREBROVASCULAR ACCIDENT (CVA), UNSPECIFIED MECHANISM (H): Status: RESOLVED | Noted: 2018-12-18 | Resolved: 2019-10-09

## 2021-08-03 PROBLEM — G81.91 RIGHT HEMIPLEGIA (H): Status: RESOLVED | Noted: 2018-12-18 | Resolved: 2021-01-27

## 2021-08-03 PROBLEM — K94.29 GASTRIC TUBE GRANULATION TISSUE (H): Status: RESOLVED | Noted: 2019-09-10 | Resolved: 2019-10-09

## 2024-08-20 NOTE — LETTER
Letter by Mark Agrawal DO at      Author: Mark Agrawal DO Service: -- Author Type: --    Filed:  Encounter Date: 3/25/2019 Status: (Other)         Patient: Zach Olmos   MR Number: [id number]   YOB: 1950   Date of Visit: 3/25/2019     [facility]    Facility:   ANSWER ROOMING ACTIVITY QUESTION   Code Status: FULL CODE    Recent medical history/chief concerns: Patient is seen by me for review of multiple medical issues.  Nursing staff voices no new concerns regarding resident.  Earlier this month, patient had a swallowing study done.  Honey thick liquids given to her by spoon, and a regular diet were recommended with speech therapy, and a registered dietitian to be involved in her transition from her current nutrition being 100% by tube feedings.  On my visit with resident, resident had no acute concerns.  She did not remember me from my initial visit with her earlier in the year, but she did remember her trip to the hospital to get her swallowing study done.  She denied having any recent problems with breathing, and did not have any problems with pain.  She felt like her mood was all right.  She continues to have near total paralysis of her right upper and lower extremities, and needs assistance with all of her ADLs.    Review of systems: See history of present illness, all others negative.     Current Outpatient Medications   Medication Sig Dispense Refill   ? acetaminophen (TYLENOL) 160 mg/5 mL (5 mL) Soln solution 20.3 mL by Enteral Tube route every 6 (six) hours MAX OF 5 DOSES IN 24 HOURS BETWEEN SCHEDULED AND PRN DOSES.  Every 6 Hours; 08:00 AM, 02:00 PM, 08:00 PM, 02:00 AM .     ? apixaban (ELIQUIS) 5 mg Tab tablet 5 mg by Enteral Tube route 2 (two) times a day .           ? aspirin 81 mg chewable tablet 81 mg by G-tube route daily.     ? ferrous sulfate 300 mg (60 mg iron)/5 mL syrup 300 mg by G-tube route 2 (two) times a day.     ? furosemide (LASIX) 20 MG  tablet 20 mg by G-tube route 2 (two) times a day at 9am and 6pm .           ? insulin glargine (LANTUS) 100 unit/mL injection Inject 20 Units under the skin 2 (two) times a day. 07:30 AM, 04:30 PM           ? insulin regular (NOVOLIN R) 100 unit/mL injection Inject under the skin 3 (three) times a day. If Blood Sugar is 200 to 249, give 2 Units. If Blood Sugar is 250 to 299, give 4 Units. If Blood Sugar is 300 to 349, give 6 Units. If Blood Sugar is 350 to 399, give 8 Units. If Blood Sugar is 400 to 449, give 10 Units. If Blood Sugar is greater than 449, give 12 Units.           ? irbesartan (AVAPRO) 75 MG tablet 1 tablet (75 mg total) by G-tube route 2 (two) times a day.  0   ? lansoprazole (PREVACID SOLUTAB) 15 MG disintegrating tablet 15 mg by G-tube route daily.     ? lidocaine (LIDODERM) 5 % Place 2 patches on the skin daily Remove & Discard patch within 12 hours or as directed by MD  APPLY TO RIGHT AND LEFT SHOULDERS. .     ? metoprolol tartrate (LOPRESSOR) 50 MG tablet 50 mg by G-tube route 2 (two) times a day .           ? nystatin (MYCOSTATIN) powder Apply to abdominal fold twice daily. 15 g 0   ? PARoxetine (PAXIL) 30 MG tablet 60 mg by G-tube route every morning.     ? rosuvastatin (CRESTOR) 20 MG tablet 20 mg by G-tube route every morning PER HOSPITAL ORDERS GIVE TO PT. IN THE A.M. .       No current facility-administered medications for this visit.        Past Medical History:   Diagnosis Date   ? Anemia    ? Anxiety    ? Cancer (H)     Hx of colon cancer   ? CHF (congestive heart failure) (H)     diastolic   ? Coronary artery disease     s/p cabg   ? CVA (cerebral vascular accident) (H)    ? Depression    ? Diabetes mellitus (H)     type 2   ? DVT (deep vein thrombosis) in pregnancy (H)    ? Granuloma annulare    ? Hemiplegia (H)     R sided and minimally communicative   ? Hyperlipidemia    ? Hypertension    ? Obesity    ? Oropharyngeal dysphagia    ? ANGEL (obstructive sleep apnea)    ? Parotitis    ?  Paroxysmal atrial fibrillation (H)    ? PE (pulmonary thromboembolism) (H)    ? Stroke (H) 12/2018    Sub acute L MCA stroke   ? Varicose veins of both lower extremities       No past surgical history on file.   Social History     Socioeconomic History   ? Marital status: Single     Spouse name: Not on file   ? Number of children: Not on file   ? Years of education: Not on file   ? Highest education level: Not on file   Occupational History   ? Not on file   Social Needs   ? Financial resource strain: Not on file   ? Food insecurity:     Worry: Not on file     Inability: Not on file   ? Transportation needs:     Medical: Not on file     Non-medical: Not on file   Tobacco Use   ? Smoking status: Never Smoker   ? Smokeless tobacco: Never Used   Substance and Sexual Activity   ? Alcohol use: No     Frequency: Never   ? Drug use: No   ? Sexual activity: Not on file   Lifestyle   ? Physical activity:     Days per week: Not on file     Minutes per session: Not on file   ? Stress: Not on file   Relationships   ? Social connections:     Talks on phone: Not on file     Gets together: Not on file     Attends [employer] service: Not on file     Active member of club or organization: Not on file     Attends meetings of clubs or organizations: Not on file     Relationship status: Not on file   ? Intimate partner violence:     Fear of current or ex partner: Not on file     Emotionally abused: Not on file     Physically abused: Not on file     Forced sexual activity: Not on file   Other Topics Concern   ? Not on file   Social History Narrative   ? Not on file       Social History     Tobacco Use   Smoking Status Never Smoker   Smokeless Tobacco Never Used        Exam:   Vitals:    03/05/19 1337 03/23/19 1846   BP:  112/62   Pulse:  64   Resp:  18   Temp:  97.9  F (36.6  C)   SpO2:  95%   Weight: 182 lb 3.2 oz (82.6 kg)        EXAM:   General: Vital signs reviewed.  Patient is in no acute appearing distress.  Breathing appears  nonlabored.  Patient is alert and oriented pleasant, with her speech being a little easier to understand from my initial exam.  ENT exam: No abnormal rhinorrhea, or ear drainage.  Patient was able to open her mouth for me to examine, with no intraoral plaques, or other lesions noted.  Eyes: Pupils are equal and normal size, gaze is consensual, sclera are normal white color and corneas are clear.  Heart: Heart rate is regular without murmur.  Lungs: Lungs are clear to auscultation with good airflow bilaterally.  Abdomen:  Abdomen is soft, nontender.  No palpable abnormal masses or organomegaly.  Bowel sounds are normal.  The feeding tube site shows no skin breakdown, or discoloration.  Skin/extremities: Warm and dry.  No areas of skin breakdown noted by me.  She has slight bilateral ankle edema.  Neuro exam: No new focal abnormalities noted.  She has very slight active movement of her right fingers, and foot.  Similar to my initial exam, she does not quite follow directions as well as normal, flexing her left arm at the elbow when I asked her to flex her left leg at the knee.  She got this request right the second time I asked her.  She was able to actively raise her knee up off the bed briskly for me, and raise her left upper extremity above her head for me.    The only new labs to review were serum glucose checks, most of which were in the 100s, and less often in the 200s.    Approximately 25 minutes was spent on patient management, with greater than 50% of this time being spent on medication reconciliation between electronic medical records, review of past medical history and current medical management, and discussion of management with patient and care staff.  Assessment/Plan   1. Type 1 diabetes mellitus with complication (H)     2. Essential hypertension     3. Coronary artery disease involving coronary bypass graft with angina pectoris, unspecified whether native or transplanted heart (H)     4.  Hyperlipidemia, unspecified hyperlipidemia type     5. Cerebrovascular accident (CVA), unspecified mechanism (H)     6. Oropharyngeal dysphagia     7. Right hemiplegia (H)         Patient Instructions   Order written for eval from speech therapy and nutritional services to advance oral diet.  Otherwise, resident seems to be doing overall with current therapies.  She will be due for a hemoglobin A1c within the next couple months.  When she transitions away from being 100% on feeding tube nutrition, we will have to carefully monitor that she gets adequate fluids.     Mark Agrawal, DO          Admitted

## 2024-11-06 ENCOUNTER — TRANSFERRED RECORDS (OUTPATIENT)
Dept: HEALTH INFORMATION MANAGEMENT | Facility: CLINIC | Age: 74
End: 2024-11-06

## 2024-11-07 ENCOUNTER — TRANSCRIBE ORDERS (OUTPATIENT)
Dept: OTHER | Age: 74
End: 2024-11-07

## 2024-11-07 DIAGNOSIS — H02.9 UNSPECIFIED DISORDER OF EYELID: Primary | ICD-10-CM

## 2024-12-10 NOTE — TELEPHONE ENCOUNTER
FUTURE VISIT INFORMATION      FUTURE VISIT INFORMATION:  Date: 3/20/25  Time: 9:30am  Location: [facility]  REFERRAL INFORMATION:  Referring provider:  Sebastian Chamberlain MD   Referring providers clinic:  [facility]  Reason for visit/diagnosis  Cyst-like bubble on left eyelid     RECORDS REQUESTED FROM:       Clinic name Comments Records Status Imaging Status   [facility] Recs scanned into chart under 11/6/24 epic

## 2025-03-18 ENCOUNTER — TELEPHONE (OUTPATIENT)
Dept: OPHTHALMOLOGY | Facility: CLINIC | Age: 75
End: 2025-03-18

## 2025-03-18 NOTE — TELEPHONE ENCOUNTER
Lvm to r/s 3/20 appt with Dr. Sena as provider needs to leave clinic early for surgery. Offering 7:45 am ramya. Gave MS direct call back number to discuss and reschedule.  Alexsandra Costa on 3/18/2025 at 9:39 AM

## 2025-03-19 NOTE — TELEPHONE ENCOUNTER
lvm with patient's contact and whom appears to have scheduled original appointment. Gave direct call back number. Gave call center number for after 4pm as writer will be out of office until Tuesday. Please reschedule to 7:45 am ramya or send high priority send TE if calls call center.

## 2025-03-19 NOTE — TELEPHONE ENCOUNTER
Per further clinical review, okay to keep as scheduled if needed. If hoping for potential same day procedure (if indicated after consult with Dr. Sena), would need to come at earlier time offered below.  Alexsandra Costa on 3/19/2025 at 3:55 PM

## 2025-03-20 ENCOUNTER — PRE VISIT (OUTPATIENT)
Dept: OPHTHALMOLOGY | Facility: CLINIC | Age: 75
End: 2025-03-20

## 2025-03-20 ENCOUNTER — OFFICE VISIT (OUTPATIENT)
Dept: OPHTHALMOLOGY | Facility: CLINIC | Age: 75
End: 2025-03-20

## 2025-03-20 DIAGNOSIS — D23.112: ICD-10-CM

## 2025-03-20 DIAGNOSIS — D23.121: Primary | ICD-10-CM

## 2025-03-20 RX ORDER — IRBESARTAN 75 MG/1
75 TABLET ORAL AT BEDTIME
COMMUNITY

## 2025-03-20 RX ORDER — MIRTAZAPINE 7.5 MG/1
7.5 TABLET, FILM COATED ORAL AT BEDTIME
COMMUNITY

## 2025-03-20 RX ORDER — DOCUSATE SODIUM 100 MG/1
100 CAPSULE, LIQUID FILLED ORAL 2 TIMES DAILY
COMMUNITY

## 2025-03-20 RX ORDER — FUROSEMIDE 20 MG/1
20 TABLET ORAL DAILY
COMMUNITY

## 2025-03-20 RX ORDER — METOPROLOL TARTRATE 25 MG/1
25 TABLET, FILM COATED ORAL 2 TIMES DAILY
COMMUNITY

## 2025-03-20 RX ORDER — ALBUTEROL SULFATE 90 UG/1
2 INHALANT RESPIRATORY (INHALATION) EVERY 6 HOURS PRN
COMMUNITY

## 2025-03-20 RX ORDER — ROSUVASTATIN CALCIUM 20 MG/1
20 TABLET, COATED ORAL DAILY
COMMUNITY

## 2025-03-20 RX ORDER — ERYTHROMYCIN 5 MG/G
OINTMENT OPHTHALMIC ONCE
Status: COMPLETED | OUTPATIENT
Start: 2025-03-20 | End: 2025-03-20

## 2025-03-20 RX ORDER — FAMOTIDINE 20 MG/1
20 TABLET, FILM COATED ORAL 2 TIMES DAILY
COMMUNITY

## 2025-03-20 ASSESSMENT — TONOMETRY
IOP_METHOD: ICARE
OS_IOP_MMHG: 16
OD_IOP_MMHG: 14

## 2025-03-20 NOTE — ADDENDUM NOTE
Addended by: CRYSTAL ANNE on: 3/20/2025 10:44 AM     Modules accepted: Orders    
Addended by: KENIA CARRILLO on: 3/20/2025 10:39 AM     Modules accepted: Orders    
No

## 2025-03-20 NOTE — LETTER
3/20/2025         RE:  :  MRN: Zach Olmos  1950  [id number]     Dear Dr. Sebastian Chamberlain,    Thank you for asking me to see your patient, Zach Olmos, for an oculoplastic   consultation.  My assessment and plan are below.  For further details, please see my attached clinic note.      CC: Eyelid lesion    Chief Complaint(s) and History of Present Illness(es)     Consult For            Laterality: left eye          Comments    Zach Olmos is being seen for a consult today by the request of Dr. Chamberlain for cyst left eyelid.  This has been bothersome.  Rubs it a lot.  It   has been gradually worsening.      Jill Gallardo on 3/20/2025 at 9:44 AM                  HPI:     Zach Olmos is a 74 year old female who has noted a lesion on the left upper eyelid. It has been present for several years, maybe 5 years. This has not been biopsied. Initially did not bother her, but now causing too much irritation. She has a dark cyst by the right eye for several years but that one is not causing irritation.     Enlarging: Yes  Irritating to eyelashes and catches in folds of skin: Yes  Bleeding: No  Prior cutaneous malignancy: No  Immunosuppression: No  Non-smoker         Assessment and Plan:     Encounter Diagnoses   Name Primary?    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    Left upper eyelid and lateral canthus cysts x 3. It is irritating her eye and would like them excised. They prefer to do it today if possible.        Operative Note - Eyelid Biopsy      Pre-operative Diagnosis: Lesion left upper Eyelid    Post-operative Diagnosis: Same.    Procedure: Excision of eyelid neoplasm.    Surgeon: Nathen Love MD    Assistant: None    Anesthesia: Local infiltration with 2% Lidocaine and Epinephrine.    Complications: None.    Estimated  blood loss: <5 mL    Specimen: Eyelid neoplasm to pathology.    Procedure: The patient was brought to the minor procedure room and placed supine on the operating table.  The involved eyelid was infiltrated with local anesthetic.  The area was prepped and draped in the typical sterile fashion.  A tooth forceps was used to elevate the lesion and it was excised at its base with a Gillian scissors.  Hemostasis was obtained with a high temperature cautery.  The excised diameter measured 10 mmx8 mm.      Closure of wound: 6-0 plain gut    Dressing: The wound was dressed with Erythromycin ophthalmic ointment.     The patient left the minor procedure room in stable condition.    I was present for the entire procedure. Nathen Love MD                         Again, thank you for allowing me to participate in the care of your patient.      Sincerely,    Nathen Love MD  Department of Ophthalmology and Visual Neurosciences  [facility]    CC: Sebastian Chamberlain MD  [address] [place] [id number]  Via Outside Provider Messaging

## 2025-03-20 NOTE — PROGRESS NOTES
Oculoplastic Clinic New Patient    Patient: Zach Olmos MRN# [id number]   YOB: 1950 Age: 74 year old   Date of Visit: Mar 20, 2025         CC: Eyelid lesion    Chief Complaint(s) and History of Present Illness(es)     Consult For            Laterality: left eye          Comments    Zach Olmos is being seen for a consult today by the request of Dr. Chamberlain for cyst left eyelid.  This has been bothersome.  Rubs it a lot.  It   has been gradually worsening.      Jill Gallardo on 3/20/2025 at 9:44 AM                  HPI:     Zach Olmos is a 74 year old female who has noted a lesion on the left upper eyelid. It has been present for several years, maybe 5 years. This has not been biopsied. Initially did not bother her, but now causing too much irritation. She has a dark cyst by the right eye for several years but that one is not causing irritation.     Enlarging: Yes  Irritating to eyelashes and catches in folds of skin: Yes  Bleeding: No  Prior cutaneous malignancy: No  Immunosuppression: No  Non-smoker         Assessment and Plan:     Encounter Diagnoses   Name Primary?    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    Left upper eyelid and lateral canthus cysts x 3. It is irritating her eye and would like them excised. They prefer to do it today if possible.    Addendum: her son changed his mind and decided to return for the procedure.                          Attending Physician Attestation: Complete documentation of historical and exam elements from today's encounter can be found in the full encounter summary report (not reduplicated in this progress note). I personally obtained the chief complaint(s) and history of present illness. I confirmed and edited as necessary the review of systems, past medical/surgical history,  family history, social history, and examination findings as documented by others; and I examined the patient myself. I personally reviewed the relevant tests, images, and reports as documented above. I formulated and edited as necessary the assessment and plan and discussed the findings and management plan with the patient. Nathen Love MD    Today with Zach Olmos, I reviewed the indications, risks, benefits, and alternatives of the proposed surgical procedure including, but not limited to, failure obtain the desired result  and need for additional surgery, bleeding, infection, loss of vision, injury to the eye, and the remote possibility of permanent damage to any organ system or death with the use of anesthesia.  I provided multiple opportunities for the questions, answered all questions to the best of my ability, and confirmed that my answers and my discussion were understood. Nathen Love MD

## 2025-03-20 NOTE — NURSING NOTE
Chief Complaints and History of Present Illnesses   Patient presents with    Consult For     Chief Complaint(s) and History of Present Illness(es)       Consult For              Laterality: left eye              Comments    Zach Deanne Nickolas is being seen for a consult today by the request of Dr. Chamberlain for cyst left eyelid.  This has been bothersome.  Rubs it a lot.  It has been gradually worsening.      Jill Gallardo on 3/20/2025 at 9:44 AM

## 2025-04-23 NOTE — LETTER
"Letter by Morenita Mosquera NP at      Author: Morenita Mosquera NP Service: -- Author Type: --    Filed:  Encounter Date: 10/12/2020 Status: (Other)         Patient: Zach Olmos   MR Number: [id number]   YOB: 1950   Date of Visit: 10/12/2020       [facility]   Telephone Visit      CHIEF COMPLAINT/REASON FOR VISIT:  Chief Complaint   Patient presents with   ? Problem Visit     stye on eye     Zach Olmos is a 70 y.o. female who is being evaluated via a billable telephone visit due to the restrictions of the Covid-19 pandemic.     The patient has been notified of the following:     \"This is a telephone visit call between you and your physician/provider. We have found that certain health care needs can be provided without the need for a physical exam.  This service lets us provide the care you need with a short phone conversation.  If a prescription is necessary we can send it to the facility team.  If lab work is needed we can place an order through the facility team to have that test done at a later time.    If during the course of the call the physician/provider feels a telephone visit is not appropriate, you will not be charged for this service.\"     Zach Olmos complains of    Chief Complaint   Patient presents with   ? Problem Visit     stye on eye       HPI:   Zach is a 70 y.o. female who is a long term care resident of [facility].     Today nursing notified of persistent stye on her left eyelid. She was complaining more about this over the weekend and nursing had notified manager. Nursing reports today it is more flesh colored and clear. She says it is not painful and no changes to her vision. It does itch at times. She denies of any drainage, crusting or other concerns. Nursing denies any signs of infection. We discussed ordering warm moist cloth or warm pack three times daily to assist with reabsorption.     I have reviewed and updated the patient's " Past Medical History, Social History, Family History and Medication List.    ALLERGIES  Aricept [donepezil], Atorvastatin, Glipizide, and Lisinopril    Review of Systems  Currently, no fever, chills, or rigors. Decreased vision. Denies any chest pain, headaches, palpitations, lightheadedness. Denies any GERD symptoms. Positive for weakness, EZ stand for transfers or slide board, dysphagia, urinary incontinence, fecal incontinence, appetite good, wheelchair bound, right sided weakness stable, aphasia, no shortness of breath,  cpap, forgetfulness, sty on eye    Vitals:    10/12/20 0700   BP: 124/75   Pulse: (!) 101   Resp: 16   Temp: 98  F (36.7  C)   SpO2: 93%         Labs:  None applicable    Additional provider notes: discussed with nursing    Assessment/Plan:    ICD-10-CM    1. 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 Hordeolum externum of left upper eyelid  H00.014    3. Chronic combined systolic and diastolic congestive heart failure (H)  I50.42           Phone call duration:  15 minutes, additional 10 min spent in counseling and coordination of care.     Morenita Mosquera NP                     36.9

## (undated) DEVICE — SUCTION MANIFOLD DORNOCH ULTRA CART UL-CL500

## (undated) DEVICE — ENDO SYSTEM WATER BOTTLE & TUBING W/CO2 FILTER 00711549

## (undated) DEVICE — JELLY LUBRICATING SURGILUBE 2OZ TUBE

## (undated) DEVICE — PREP POVIDONE IODINE SOLUTION 10% 120ML

## (undated) DEVICE — ESU GROUND PAD ADULT W/CORD E7507

## (undated) DEVICE — LINEN TOWEL PACK X30 5481

## (undated) DEVICE — ESU LIGASURE LAPAROSCOPIC BLUNT TIP SEALER 5MMX37CM LF1637

## (undated) DEVICE — SU VICRYL 0 CT-1 27" UND J260H

## (undated) DEVICE — Device

## (undated) DEVICE — ANTIFOG SOLUTION W/FOAM PAD 31142527

## (undated) DEVICE — PREP CHLORAPREP 26ML TINTED ORANGE  260815

## (undated) DEVICE — GLOVE PROTEXIS BLUE W/NEU-THERA 7.5  2D73EB75

## (undated) DEVICE — SU PROLENE 2-0 SHDA 36" 8523H

## (undated) DEVICE — DRAPE IOBAN INCISE 23X17" 6650EZ

## (undated) DEVICE — SOL NACL 0.9% INJ 1000ML BAG 07983-09

## (undated) DEVICE — STPL CIRCULAR DST 28MM W/TILT TIP EEA28

## (undated) DEVICE — SPONGE LAP 18X18" X8435

## (undated) DEVICE — ENDO CANNULA 05MM VERSAONE UNIVERSAL UNVCA5STF

## (undated) DEVICE — ENDO TROCAR BLUNT TIP KII BALLOON 12X100MM C0R47

## (undated) DEVICE — PAD CHUX UNDERPAD 30X30"

## (undated) DEVICE — CATH TRAY FOLEY SURESTEP 16FR W/URNE MTR STLK LATEX A303316A

## (undated) DEVICE — WIPE PREMOIST CLEANSING WASHCLOTHS 7988

## (undated) DEVICE — SU VICRYL 2-0 TIE 12X18" J905T

## (undated) DEVICE — ENDO CONNECTOR ENDOGATOR AUX WATER JET FOR OLYMPUS SCOPE

## (undated) DEVICE — SU VICRYL 3-0 MH 27" J322H

## (undated) DEVICE — DRAPE LEGGINGS CLEAR 8430

## (undated) DEVICE — CLIP APPLIER ENDO 05MM MED/LG 176630

## (undated) DEVICE — GLOVE PROTEXIS MICRO 7.0  2D73PM70

## (undated) DEVICE — SU VICRYL 3-0 SH 27" J316H

## (undated) DEVICE — KIT PATIENT POSITIONING PIGAZZI LATEX FREE 40580

## (undated) DEVICE — SU VICRYL 2-0 TIE 54" J615H

## (undated) DEVICE — ENDO TROCAR OPTICAL 05MM VERSAPORT PLUS W/FIX CAN ONB5STF

## (undated) DEVICE — SOL NACL 0.9% IRRIG 1000ML BOTTLE 2F7124

## (undated) DEVICE — SU PDS II 1 CTX 36" Z371T

## (undated) DEVICE — LINEN TOWEL PACK X6 WHITE 5487

## (undated) DEVICE — STPL CONTOUR CUT CVD GREEN CS40G

## (undated) DEVICE — PROTECTOR ARM ONE-STEP TRENDELENBURG 40418

## (undated) DEVICE — SOL WATER IRRIG 1000ML BOTTLE 2F7114

## (undated) DEVICE — TUBING SUCTION MEDI-VAC SOFT 3/16"X20' N520A

## (undated) DEVICE — SUCTION IRR STRYKERFLOW II W/TIP 250-070-520

## (undated) DEVICE — KIT ENDO FIRST STEP DISINFECTANT 200ML W/POUCH EP-4

## (undated) DEVICE — TUBING INSUFFLATION W/FILTER CPC TO LUER 620-030-301

## (undated) DEVICE — ENDO CAP AND TUBING STERILE FOR ENDOGATOR  100130

## (undated) DEVICE — LINEN GOWN XLG 5407

## (undated) DEVICE — SU MONOCRYL 4-0 PS-2 27" UND Y426H

## (undated) RX ORDER — HYDROMORPHONE HYDROCHLORIDE 1 MG/ML
INJECTION, SOLUTION INTRAMUSCULAR; INTRAVENOUS; SUBCUTANEOUS
Status: DISPENSED
Start: 2017-08-30

## (undated) RX ORDER — FENTANYL CITRATE 50 UG/ML
INJECTION, SOLUTION INTRAMUSCULAR; INTRAVENOUS
Status: DISPENSED
Start: 2017-08-30

## (undated) RX ORDER — LIDOCAINE HYDROCHLORIDE 20 MG/ML
INJECTION, SOLUTION EPIDURAL; INFILTRATION; INTRACAUDAL; PERINEURAL
Status: DISPENSED
Start: 2017-08-30

## (undated) RX ORDER — PROPOFOL 10 MG/ML
INJECTION, EMULSION INTRAVENOUS
Status: DISPENSED
Start: 2017-08-30

## (undated) RX ORDER — ROCURONIUM BROMIDE 50 MG/5 ML
SYRINGE (ML) INTRAVENOUS
Status: DISPENSED
Start: 2017-08-30

## (undated) RX ORDER — CALCIUM CHLORIDE 100 MG/ML
INJECTION INTRAVENOUS; INTRAVENTRICULAR
Status: DISPENSED
Start: 2017-08-30

## (undated) RX ORDER — LABETALOL HYDROCHLORIDE 5 MG/ML
INJECTION, SOLUTION INTRAVENOUS
Status: DISPENSED
Start: 2017-08-30

## (undated) RX ORDER — PHENYLEPHRINE HCL IN 0.9% NACL 1 MG/10 ML
SYRINGE (ML) INTRAVENOUS
Status: DISPENSED
Start: 2017-08-30

## (undated) RX ORDER — GLYCOPYRROLATE 0.2 MG/ML
INJECTION, SOLUTION INTRAMUSCULAR; INTRAVENOUS
Status: DISPENSED
Start: 2017-08-30

## (undated) RX ORDER — ACETAMINOPHEN 325 MG/1
TABLET ORAL
Status: DISPENSED
Start: 2017-08-30

## (undated) RX ORDER — ONDANSETRON 2 MG/ML
INJECTION INTRAMUSCULAR; INTRAVENOUS
Status: DISPENSED
Start: 2017-08-30

## (undated) RX ORDER — ERTAPENEM 1 G/1
INJECTION, POWDER, LYOPHILIZED, FOR SOLUTION INTRAMUSCULAR; INTRAVENOUS
Status: DISPENSED
Start: 2017-08-30

## (undated) RX ORDER — ERYTHROMYCIN 5 MG/G
OINTMENT OPHTHALMIC
Status: DISPENSED
Start: 2025-03-20